# Patient Record
Sex: MALE | Race: WHITE | NOT HISPANIC OR LATINO | Employment: OTHER | URBAN - METROPOLITAN AREA
[De-identification: names, ages, dates, MRNs, and addresses within clinical notes are randomized per-mention and may not be internally consistent; named-entity substitution may affect disease eponyms.]

---

## 2017-06-06 ENCOUNTER — LAB VISIT (OUTPATIENT)
Dept: LAB | Facility: HOSPITAL | Age: 67
End: 2017-06-06
Attending: FAMILY MEDICINE
Payer: MEDICARE

## 2017-06-06 ENCOUNTER — OFFICE VISIT (OUTPATIENT)
Dept: FAMILY MEDICINE | Facility: CLINIC | Age: 67
End: 2017-06-06
Payer: MEDICARE

## 2017-06-06 VITALS
SYSTOLIC BLOOD PRESSURE: 110 MMHG | WEIGHT: 235.88 LBS | BODY MASS INDEX: 33.77 KG/M2 | HEIGHT: 70 IN | DIASTOLIC BLOOD PRESSURE: 68 MMHG | TEMPERATURE: 98 F | HEART RATE: 70 BPM

## 2017-06-06 DIAGNOSIS — R53.83 FATIGUE, UNSPECIFIED TYPE: ICD-10-CM

## 2017-06-06 DIAGNOSIS — M19.90 OSTEOARTHRITIS, UNSPECIFIED OSTEOARTHRITIS TYPE, UNSPECIFIED SITE: ICD-10-CM

## 2017-06-06 DIAGNOSIS — N40.0 BENIGN PROSTATIC HYPERPLASIA, PRESENCE OF LOWER URINARY TRACT SYMPTOMS UNSPECIFIED, UNSPECIFIED MORPHOLOGY: ICD-10-CM

## 2017-06-06 DIAGNOSIS — R53.83 FATIGUE, UNSPECIFIED TYPE: Primary | ICD-10-CM

## 2017-06-06 DIAGNOSIS — R97.20 ELEVATED PSA: ICD-10-CM

## 2017-06-06 DIAGNOSIS — R07.9 CHEST PAIN, UNSPECIFIED TYPE: ICD-10-CM

## 2017-06-06 LAB
ALBUMIN SERPL BCP-MCNC: 3.8 G/DL
ALP SERPL-CCNC: 66 U/L
ALT SERPL W/O P-5'-P-CCNC: 15 U/L
ANION GAP SERPL CALC-SCNC: 8 MMOL/L
AST SERPL-CCNC: 23 U/L
BASOPHILS # BLD AUTO: 0.06 K/UL
BASOPHILS NFR BLD: 0.8 %
BILIRUB SERPL-MCNC: 0.6 MG/DL
BUN SERPL-MCNC: 24 MG/DL
CALCIUM SERPL-MCNC: 9.7 MG/DL
CHLORIDE SERPL-SCNC: 106 MMOL/L
CO2 SERPL-SCNC: 26 MMOL/L
CREAT SERPL-MCNC: 1.6 MG/DL
CRP SERPL-MCNC: 5.4 MG/L
DIFFERENTIAL METHOD: NORMAL
EOSINOPHIL # BLD AUTO: 0.1 K/UL
EOSINOPHIL NFR BLD: 0.9 %
ERYTHROCYTE [DISTWIDTH] IN BLOOD BY AUTOMATED COUNT: 13.1 %
ERYTHROCYTE [SEDIMENTATION RATE] IN BLOOD BY WESTERGREN METHOD: 8 MM/HR
EST. GFR  (AFRICAN AMERICAN): 50.8 ML/MIN/1.73 M^2
EST. GFR  (NON AFRICAN AMERICAN): 43.9 ML/MIN/1.73 M^2
GLUCOSE SERPL-MCNC: 102 MG/DL
HCT VFR BLD AUTO: 45.6 %
HGB BLD-MCNC: 15.6 G/DL
LYMPHOCYTES # BLD AUTO: 2.2 K/UL
LYMPHOCYTES NFR BLD: 28 %
MCH RBC QN AUTO: 30.8 PG
MCHC RBC AUTO-ENTMCNC: 34.2 %
MCV RBC AUTO: 90 FL
MONOCYTES # BLD AUTO: 0.6 K/UL
MONOCYTES NFR BLD: 7.9 %
NEUTROPHILS # BLD AUTO: 4.9 K/UL
NEUTROPHILS NFR BLD: 62.1 %
PLATELET # BLD AUTO: 175 K/UL
PMV BLD AUTO: 9.4 FL
POTASSIUM SERPL-SCNC: 4.7 MMOL/L
PROT SERPL-MCNC: 7.6 G/DL
RBC # BLD AUTO: 5.06 M/UL
SODIUM SERPL-SCNC: 140 MMOL/L
TSH SERPL DL<=0.005 MIU/L-ACNC: 1.18 UIU/ML
WBC # BLD AUTO: 7.85 K/UL

## 2017-06-06 PROCEDURE — 80053 COMPREHEN METABOLIC PANEL: CPT

## 2017-06-06 PROCEDURE — 86140 C-REACTIVE PROTEIN: CPT

## 2017-06-06 PROCEDURE — 99999 PR PBB SHADOW E&M-EST. PATIENT-LVL II: CPT | Mod: PBBFAC,,, | Performed by: FAMILY MEDICINE

## 2017-06-06 PROCEDURE — 85025 COMPLETE CBC W/AUTO DIFF WBC: CPT

## 2017-06-06 PROCEDURE — 85651 RBC SED RATE NONAUTOMATED: CPT | Mod: PO

## 2017-06-06 PROCEDURE — 36415 COLL VENOUS BLD VENIPUNCTURE: CPT | Mod: PO

## 2017-06-06 PROCEDURE — 99214 OFFICE O/P EST MOD 30 MIN: CPT | Mod: S$GLB,,, | Performed by: FAMILY MEDICINE

## 2017-06-06 PROCEDURE — 84443 ASSAY THYROID STIM HORMONE: CPT

## 2017-06-06 PROCEDURE — 1159F MED LIST DOCD IN RCRD: CPT | Mod: S$GLB,,, | Performed by: FAMILY MEDICINE

## 2017-06-06 PROCEDURE — 86431 RHEUMATOID FACTOR QUANT: CPT

## 2017-06-06 NOTE — PROGRESS NOTES
The patient presents today co sig drop in energy p several months. No dyspnea but occasionally SS CP. Co mod gen arthralgia and sleeps well per wife    Past Medical History:  Past Medical History:   Diagnosis Date    Benign prostate hyperplasia     DDD (degenerative disc disease), thoracolumbar     Hypertension      Past Surgical History:   Procedure Laterality Date    EYE SURGERY      TESTICLE SURGERY       Review of patient's allergies indicates:   Allergen Reactions    Benadryl allergy decongestant      Opposite reaction, speeds him up     Diphenhydramine      Current Outpatient Prescriptions on File Prior to Visit   Medication Sig Dispense Refill    azelastine (ASTELIN) 137 mcg (0.1 %) nasal spray 1 spray (137 mcg total) by Nasal route 2 (two) times daily. 30 mL 11    dutasteride (AVODART) 0.5 mg capsule Take 0.5 mg by mouth once daily.  3    latanoprost 0.005 % ophthalmic solution PUT ONE DROP IN THE RIGHT EYE AT BEDTIME  6    meclizine (ANTIVERT) 25 mg tablet Take 1 tablet (25 mg total) by mouth 3 (three) times daily as needed. 30 tablet 1    ondansetron (ZOFRAN-ODT) 8 MG TbDL Take 1 tablet (8 mg total) by mouth every 8 (eight) hours as needed. 20 tablet 1     No current facility-administered medications on file prior to visit.      Social History     Social History    Marital status:      Spouse name: N/A    Number of children: N/A    Years of education: N/A     Occupational History    Not on file.     Social History Main Topics    Smoking status: Never Smoker    Smokeless tobacco: Never Used    Alcohol use Not on file    Drug use: No    Sexual activity: Not on file     Other Topics Concern    Not on file     Social History Narrative    No narrative on file     History reviewed. No pertinent family history.      ROS:GENERAL: No fever, chills, fatigability or weight loss.  SKIN: No rashes, itching or changes in color or texture of skin.  HEAD: No headaches or recent head  trauma.EYES: Visual acuity fine. No photophobia, ocular pain or diplopia.EARS: Denies ear pain, discharge or vertigo.NOSE: No loss of smell, no epistaxis or postnasal drip.MOUTH & THROAT: No hoarseness or change in voice. No excessive gum bleeding.NODES: Denies swollen glands.  CHEST: Denies ALVARADO, cyanosis, wheezing, cough and sputum production.  CARDIOVASCULAR: Denies chest pain, PND, orthopnea or reduced exercise tolerance.  ABDOMEN: Appetite fine. No weight loss. Denies diarrhea, abdominal pain, hematemesis or blood in stool.  URINARY: No flank pain, dysuria or hematuria.  PERIPHERAL VASCULAR: No claudication or cyanosis.  MUSCULOSKELETAL: See above.  NEUROLOGIC: No history of seizures, paralysis, alteration of gait or coordination.  PE:   HEAD: Normocephalic, atraumatic.EYES: PERRL. EOMI.   EARS: TM's intact. Light reflex normal. No retraction or perforation.   NOSE: Mucosa pink. Airway clear.MOUTH & THROAT: No tonsillar enlargement. No pharyngeal erythema or exudate. No stridor.  NODES: No cervical, axillary or inguinal lymph node enlargement.  CHEST: Lungs clear to auscultation.  CARDIOVASCULAR: Normal S1, S2. No rubs, murmurs or gallops.  ABDOMEN: Bowel sounds normal. Not distended. Soft. No tenderness or masses.  MUSCULOSKELETAL: Mod gen degen changes   NEUROLOGIC: Cranial Nerves: II-XII grossly intact.  Motor: 5/5 strength major flexors/extensors.  DTR's: Knees, Ankles 2+ and equal bilaterally; downgoing toes.  Sensory: Intact to light touch distally.  Gait & Posture: Normal gait and fine motion. No cerebellar signs.     Impression:Fatigue  Polyarthritis  El Cr  El PSA  BPH w LUTS   Plan:Lab eval  Rec diet and ex recs  Rev rec against nsaids   Consider Gabapentin hs   Rev if above nl rec Card con   fu

## 2017-06-07 LAB — RHEUMATOID FACT SERPL-ACNC: <10 IU/ML

## 2017-06-12 ENCOUNTER — TELEPHONE (OUTPATIENT)
Dept: FAMILY MEDICINE | Facility: CLINIC | Age: 67
End: 2017-06-12

## 2017-06-12 NOTE — TELEPHONE ENCOUNTER
----- Message from Lidia Orantes sent at 6/12/2017  3:05 PM CDT -----  Pt wife(Lina) at [phone]//[facility] is calling to get results of lab work done//please call//thanks/[facility]

## 2017-06-13 ENCOUNTER — TELEPHONE (OUTPATIENT)
Dept: FAMILY MEDICINE | Facility: CLINIC | Age: 67
End: 2017-06-13

## 2017-06-13 DIAGNOSIS — N28.9 FUNCTION KIDNEY DECREASED: Primary | ICD-10-CM

## 2017-07-11 ENCOUNTER — TELEPHONE (OUTPATIENT)
Dept: FAMILY MEDICINE | Facility: CLINIC | Age: 67
End: 2017-07-11

## 2017-07-11 DIAGNOSIS — R97.20 ELEVATED PSA: Primary | ICD-10-CM

## 2017-07-11 NOTE — TELEPHONE ENCOUNTER
----- Message from Guillermina Sullivan sent at 7/11/2017  3:39 PM CDT -----  Contact: Lina Jeffery  States he's schedule for lab work on Thursday and he would like to know if we can include a PSA test. Please call Lina Jeffery at [phone]. Thank you

## 2017-07-11 NOTE — TELEPHONE ENCOUNTER
Richie medicare-doesn't cover screening PSA wo waiver   Can either get waiver or order free and totsl for a diagnostic reason like elevated PSA in his case  not screening

## 2017-07-13 ENCOUNTER — LAB VISIT (OUTPATIENT)
Dept: LAB | Facility: HOSPITAL | Age: 67
End: 2017-07-13
Attending: FAMILY MEDICINE
Payer: MEDICARE

## 2017-07-13 DIAGNOSIS — R97.20 ELEVATED PSA: ICD-10-CM

## 2017-07-13 DIAGNOSIS — N28.9 FUNCTION KIDNEY DECREASED: ICD-10-CM

## 2017-07-13 LAB
ANION GAP SERPL CALC-SCNC: 9 MMOL/L
BUN SERPL-MCNC: 25 MG/DL
CALCIUM SERPL-MCNC: 9.6 MG/DL
CHLORIDE SERPL-SCNC: 105 MMOL/L
CO2 SERPL-SCNC: 23 MMOL/L
CREAT SERPL-MCNC: 1.6 MG/DL
EST. GFR  (AFRICAN AMERICAN): 50.4 ML/MIN/1.73 M^2
EST. GFR  (NON AFRICAN AMERICAN): 43.6 ML/MIN/1.73 M^2
GLUCOSE SERPL-MCNC: 89 MG/DL
POTASSIUM SERPL-SCNC: 4.4 MMOL/L
SODIUM SERPL-SCNC: 137 MMOL/L

## 2017-07-13 PROCEDURE — 36415 COLL VENOUS BLD VENIPUNCTURE: CPT | Mod: PO

## 2017-07-13 PROCEDURE — 84153 ASSAY OF PSA TOTAL: CPT

## 2017-07-13 PROCEDURE — 80048 BASIC METABOLIC PNL TOTAL CA: CPT

## 2017-07-14 LAB
PROSTATE SPECIFIC ANTIGEN, TOTAL: 3.2 NG/ML
PSA FREE MFR SERPL: 24.38 %
PSA FREE SERPL-MCNC: 0.78 NG/ML

## 2017-07-17 ENCOUNTER — TELEPHONE (OUTPATIENT)
Dept: FAMILY MEDICINE | Facility: CLINIC | Age: 67
End: 2017-07-17

## 2017-07-17 DIAGNOSIS — N28.9 DECREASED RENAL FUNCTION: Primary | ICD-10-CM

## 2017-07-17 NOTE — TELEPHONE ENCOUNTER
----- Message from Ritchie Rivero MD sent at 7/16/2017  5:27 PM CDT -----  PSA in nl range 3.2 kidney function the same-sl deminished-did Dr Aly have opinion-if he didn't think it was related to BPH, rec renal con

## 2017-07-20 NOTE — TELEPHONE ENCOUNTER
I have signed for the following orders AND/OR meds.  Please call the patient and ask the patient to schedule the testing AND/OR inform about any medications that were sent.      Orders Placed This Encounter   Procedures    Ambulatory referral to Nephrology     Referral Priority:   Routine     Referral Type:   Consultation     Referral Reason:   Specialty Services Required     Requested Specialty:   Nephrology     Number of Visits Requested:   1

## 2017-07-28 ENCOUNTER — LAB VISIT (OUTPATIENT)
Dept: LAB | Facility: HOSPITAL | Age: 67
End: 2017-07-28
Attending: FAMILY MEDICINE
Payer: MEDICARE

## 2017-07-28 ENCOUNTER — INITIAL CONSULT (OUTPATIENT)
Dept: NEPHROLOGY | Facility: CLINIC | Age: 67
End: 2017-07-28
Payer: MEDICARE

## 2017-07-28 VITALS
DIASTOLIC BLOOD PRESSURE: 73 MMHG | BODY MASS INDEX: 33.55 KG/M2 | WEIGHT: 234.38 LBS | HEART RATE: 62 BPM | SYSTOLIC BLOOD PRESSURE: 134 MMHG | HEIGHT: 70 IN

## 2017-07-28 DIAGNOSIS — N18.30 CKD (CHRONIC KIDNEY DISEASE) STAGE 3, GFR 30-59 ML/MIN: Primary | ICD-10-CM

## 2017-07-28 DIAGNOSIS — R73.9 HYPERGLYCEMIA: ICD-10-CM

## 2017-07-28 DIAGNOSIS — E66.9 NON MORBID OBESITY, UNSPECIFIED OBESITY TYPE: ICD-10-CM

## 2017-07-28 DIAGNOSIS — N18.30 CKD (CHRONIC KIDNEY DISEASE) STAGE 3, GFR 30-59 ML/MIN: ICD-10-CM

## 2017-07-28 LAB
ALBUMIN SERPL BCP-MCNC: 4 G/DL
ANION GAP SERPL CALC-SCNC: 13 MMOL/L
BASOPHILS # BLD AUTO: 0.09 K/UL
BASOPHILS NFR BLD: 1.2 %
BUN SERPL-MCNC: 19 MG/DL
CALCIUM SERPL-MCNC: 9.2 MG/DL
CHLORIDE SERPL-SCNC: 107 MMOL/L
CO2 SERPL-SCNC: 22 MMOL/L
CREAT SERPL-MCNC: 1.5 MG/DL
DIFFERENTIAL METHOD: ABNORMAL
EOSINOPHIL # BLD AUTO: 0.1 K/UL
EOSINOPHIL NFR BLD: 1.2 %
ERYTHROCYTE [DISTWIDTH] IN BLOOD BY AUTOMATED COUNT: 13.2 %
EST. GFR  (AFRICAN AMERICAN): 54.5 ML/MIN/1.73 M^2
EST. GFR  (NON AFRICAN AMERICAN): 47.2 ML/MIN/1.73 M^2
ESTIMATED AVG GLUCOSE: 108 MG/DL
GLUCOSE SERPL-MCNC: 86 MG/DL
HBA1C MFR BLD HPLC: 5.4 %
HCT VFR BLD AUTO: 44 %
HGB BLD-MCNC: 15.5 G/DL
LYMPHOCYTES # BLD AUTO: 1.8 K/UL
LYMPHOCYTES NFR BLD: 25 %
MCH RBC QN AUTO: 30.9 PG
MCHC RBC AUTO-ENTMCNC: 35.2 G/DL
MCV RBC AUTO: 88 FL
MONOCYTES # BLD AUTO: 0.5 K/UL
MONOCYTES NFR BLD: 6.2 %
NEUTROPHILS # BLD AUTO: 4.8 K/UL
NEUTROPHILS NFR BLD: 66.1 %
PHOSPHATE SERPL-MCNC: 3.6 MG/DL
PLATELET # BLD AUTO: 170 K/UL
PMV BLD AUTO: 9.1 FL
POTASSIUM SERPL-SCNC: 4.4 MMOL/L
RBC # BLD AUTO: 5.01 M/UL
SODIUM SERPL-SCNC: 142 MMOL/L
WBC # BLD AUTO: 7.31 K/UL

## 2017-07-28 PROCEDURE — 84165 PROTEIN E-PHORESIS SERUM: CPT | Mod: 26,,, | Performed by: PATHOLOGY

## 2017-07-28 PROCEDURE — 1159F MED LIST DOCD IN RCRD: CPT | Mod: S$GLB,,, | Performed by: INTERNAL MEDICINE

## 2017-07-28 PROCEDURE — 80069 RENAL FUNCTION PANEL: CPT

## 2017-07-28 PROCEDURE — 99204 OFFICE O/P NEW MOD 45 MIN: CPT | Mod: S$GLB,,, | Performed by: INTERNAL MEDICINE

## 2017-07-28 PROCEDURE — 84165 PROTEIN E-PHORESIS SERUM: CPT

## 2017-07-28 PROCEDURE — 83036 HEMOGLOBIN GLYCOSYLATED A1C: CPT

## 2017-07-28 PROCEDURE — 86038 ANTINUCLEAR ANTIBODIES: CPT

## 2017-07-28 PROCEDURE — 1126F AMNT PAIN NOTED NONE PRSNT: CPT | Mod: S$GLB,,, | Performed by: INTERNAL MEDICINE

## 2017-07-28 PROCEDURE — 3078F DIAST BP <80 MM HG: CPT | Mod: S$GLB,,, | Performed by: INTERNAL MEDICINE

## 2017-07-28 PROCEDURE — 87340 HEPATITIS B SURFACE AG IA: CPT

## 2017-07-28 PROCEDURE — 85025 COMPLETE CBC W/AUTO DIFF WBC: CPT

## 2017-07-28 PROCEDURE — 3075F SYST BP GE 130 - 139MM HG: CPT | Mod: S$GLB,,, | Performed by: INTERNAL MEDICINE

## 2017-07-28 PROCEDURE — 86803 HEPATITIS C AB TEST: CPT

## 2017-07-28 PROCEDURE — 99999 PR PBB SHADOW E&M-EST. PATIENT-LVL III: CPT | Mod: PBBFAC,,, | Performed by: INTERNAL MEDICINE

## 2017-07-28 PROCEDURE — 36415 COLL VENOUS BLD VENIPUNCTURE: CPT | Mod: PO

## 2017-07-28 PROCEDURE — 86255 FLUORESCENT ANTIBODY SCREEN: CPT | Mod: 91

## 2017-07-28 NOTE — Clinical Note
August 2, 2017      Rl Kevin MD  [address] [address]           [place] - Nephrology  [address] [address]  Phone: [phone]  Fax: [phone]          Patient: Yasir Jeffery   MR Number: [id number]   YOB: 1950   Date of Visit: 7/28/2017       Dear Dr. Rl Kevin:    Thank you for referring Yasir Jeffery to me for evaluation. Attached you will find relevant portions of my assessment and plan of care.    If you have questions, please do not hesitate to call me. I look forward to following Yasir Jeffery along with you.    Sincerely,    Lavern Marie  CC:  No Recipients    If you would like to receive this communication electronically, please contact [email or web address] or [phone] to request more information on [employer] Link access.    For providers and/or their staff who would like to refer a patient to [facility], please contact us through our one-stop-shop provider referral line, Virginia Quintana, at [phone].    If you feel you have received this communication in error or would no longer like to receive these types of communications, please e-mail [email or web address]

## 2017-07-31 ENCOUNTER — TELEPHONE (OUTPATIENT)
Dept: FAMILY MEDICINE | Facility: CLINIC | Age: 67
End: 2017-07-31

## 2017-07-31 LAB
ALBUMIN SERPL ELPH-MCNC: 4.31 G/DL
ALPHA1 GLOB SERPL ELPH-MCNC: 0.28 G/DL
ALPHA2 GLOB SERPL ELPH-MCNC: 0.65 G/DL
ANA SER QL IF: NORMAL
B-GLOBULIN SERPL ELPH-MCNC: 0.89 G/DL
GAMMA GLOB SERPL ELPH-MCNC: 1.07 G/DL
HBV SURFACE AG SERPL QL IA: NEGATIVE
HCV AB SERPL QL IA: NEGATIVE
PATHOLOGIST INTERPRETATION SPE: NORMAL
PROT SERPL-MCNC: 7.2 G/DL

## 2017-07-31 NOTE — TELEPHONE ENCOUNTER
----- Message from Lidia Orantes sent at 7/31/2017 10:09 AM CDT -----  Pt at  [phone]//states he needs to be scheduled for a kidney ultrasound//they saw Kidney Dr last Friday/7-28-17//please call to discuss//geraldine/judith

## 2017-08-01 ENCOUNTER — HOSPITAL ENCOUNTER (OUTPATIENT)
Dept: RADIOLOGY | Facility: HOSPITAL | Age: 67
Discharge: HOME | End: 2017-08-01
Attending: INTERNAL MEDICINE
Payer: MEDICARE

## 2017-08-01 DIAGNOSIS — N18.30 CKD (CHRONIC KIDNEY DISEASE) STAGE 3, GFR 30-59 ML/MIN: ICD-10-CM

## 2017-08-01 PROCEDURE — 76770 US EXAM ABDO BACK WALL COMP: CPT | Mod: TC,PO

## 2017-08-01 PROCEDURE — 76770 US EXAM ABDO BACK WALL COMP: CPT | Mod: 26,,, | Performed by: RADIOLOGY

## 2017-08-02 LAB
ANCA AB TITR SER IF: NORMAL TITER
P-ANCA TITR SER IF: NORMAL TITER

## 2017-08-04 ENCOUNTER — TELEPHONE (OUTPATIENT)
Dept: NEPHROLOGY | Facility: CLINIC | Age: 67
End: 2017-08-04

## 2017-08-09 PROBLEM — N18.30 CKD (CHRONIC KIDNEY DISEASE) STAGE 3, GFR 30-59 ML/MIN: Status: ACTIVE | Noted: 2017-08-09

## 2017-08-09 PROBLEM — E66.9 NON MORBID OBESITY: Status: ACTIVE | Noted: 2017-08-09

## 2017-08-09 NOTE — PROGRESS NOTES
Subjective:       Patient ID: Yasir Jeffery is a 67 y.o. White male who presents for new evaluation of Chronic Kidney Disease    HPI  Review of Systems    Objective:      Physical Exam    Assessment:       1. CKD (chronic kidney disease) stage 3, GFR 30-59 ml/min    2. Hyperglycemia    3. Non morbid obesity, unspecified obesity type        Plan:             He will undergo further work up with serologies, urine studies and renal u/s

## 2017-08-23 ENCOUNTER — OFFICE VISIT (OUTPATIENT)
Dept: FAMILY MEDICINE | Facility: CLINIC | Age: 67
End: 2017-08-23
Payer: MEDICARE

## 2017-08-23 ENCOUNTER — LAB VISIT (OUTPATIENT)
Dept: LAB | Facility: HOSPITAL | Age: 67
End: 2017-08-23
Attending: FAMILY MEDICINE
Payer: MEDICARE

## 2017-08-23 VITALS
SYSTOLIC BLOOD PRESSURE: 128 MMHG | DIASTOLIC BLOOD PRESSURE: 71 MMHG | HEIGHT: 70 IN | WEIGHT: 236.75 LBS | BODY MASS INDEX: 33.89 KG/M2 | HEART RATE: 72 BPM

## 2017-08-23 DIAGNOSIS — R53.83 FATIGUE, UNSPECIFIED TYPE: ICD-10-CM

## 2017-08-23 DIAGNOSIS — R53.83 FATIGUE, UNSPECIFIED TYPE: Primary | ICD-10-CM

## 2017-08-23 LAB
CRP SERPL-MCNC: 2.9 MG/L
ERYTHROCYTE [SEDIMENTATION RATE] IN BLOOD BY WESTERGREN METHOD: 2 MM/HR
TESTOST SERPL-MCNC: 418 NG/DL

## 2017-08-23 PROCEDURE — 84403 ASSAY OF TOTAL TESTOSTERONE: CPT

## 2017-08-23 PROCEDURE — 3078F DIAST BP <80 MM HG: CPT | Mod: S$GLB,,, | Performed by: FAMILY MEDICINE

## 2017-08-23 PROCEDURE — 86140 C-REACTIVE PROTEIN: CPT

## 2017-08-23 PROCEDURE — 1159F MED LIST DOCD IN RCRD: CPT | Mod: S$GLB,,, | Performed by: FAMILY MEDICINE

## 2017-08-23 PROCEDURE — 3008F BODY MASS INDEX DOCD: CPT | Mod: S$GLB,,, | Performed by: FAMILY MEDICINE

## 2017-08-23 PROCEDURE — 99999 PR PBB SHADOW E&M-EST. PATIENT-LVL II: CPT | Mod: PBBFAC,,, | Performed by: FAMILY MEDICINE

## 2017-08-23 PROCEDURE — 3074F SYST BP LT 130 MM HG: CPT | Mod: S$GLB,,, | Performed by: FAMILY MEDICINE

## 2017-08-23 PROCEDURE — 1126F AMNT PAIN NOTED NONE PRSNT: CPT | Mod: S$GLB,,, | Performed by: FAMILY MEDICINE

## 2017-08-23 PROCEDURE — 36415 COLL VENOUS BLD VENIPUNCTURE: CPT | Mod: PO

## 2017-08-23 PROCEDURE — 85651 RBC SED RATE NONAUTOMATED: CPT | Mod: PO

## 2017-08-23 PROCEDURE — 99215 OFFICE O/P EST HI 40 MIN: CPT | Mod: S$GLB,,, | Performed by: FAMILY MEDICINE

## 2017-08-23 RX ORDER — PREDNISONE 10 MG/1
10 TABLET ORAL DAILY
Qty: 30 TABLET | Refills: 0 | Status: SHIPPED | OUTPATIENT
Start: 2017-08-23 | End: 2017-09-02

## 2017-08-23 NOTE — PROGRESS NOTES
The patient presents today co sig drop in energy p several months ago. Was vigorously active and now often only able to walk a few steps. Recent Renal eval rev .  Co mod gen arthralgia and sleeps well per wife    Past Medical History:  Past Medical History:   Diagnosis Date    Benign prostate hyperplasia     DDD (degenerative disc disease), thoracolumbar     Hypertension      Past Surgical History:   Procedure Laterality Date    EYE SURGERY      TESTICLE SURGERY       Review of patient's allergies indicates:   Allergen Reactions    Benadryl allergy decongestant      Opposite reaction, speeds him up     Diphenhydramine      Current Outpatient Prescriptions on File Prior to Visit   Medication Sig Dispense Refill    latanoprost 0.005 % ophthalmic solution PUT ONE DROP IN THE RIGHT EYE AT BEDTIME  6    ondansetron (ZOFRAN-ODT) 8 MG TbDL Take 1 tablet (8 mg total) by mouth every 8 (eight) hours as needed. 20 tablet 1    [DISCONTINUED] meclizine (ANTIVERT) 25 mg tablet Take 1 tablet (25 mg total) by mouth 3 (three) times daily as needed. 30 tablet 1    [DISCONTINUED] azelastine (ASTELIN) 137 mcg (0.1 %) nasal spray 1 spray (137 mcg total) by Nasal route 2 (two) times daily. 30 mL 11    [DISCONTINUED] dutasteride (AVODART) 0.5 mg capsule Take 0.5 mg by mouth once daily.  3     No current facility-administered medications on file prior to visit.      Social History     Social History    Marital status:      Spouse name: N/A    Number of children: N/A    Years of education: N/A     Occupational History    Not on file.     Social History Main Topics    Smoking status: Never Smoker    Smokeless tobacco: Never Used    Alcohol use Not on file    Drug use: No    Sexual activity: Not on file     Other Topics Concern    Not on file     Social History Narrative    No narrative on file     History reviewed. No pertinent family history.      ROS:GENERAL: No fever, chills, fatigability or weight  loss.  SKIN: No rashes, itching or changes in color or texture of skin.  HEAD: No headaches or recent head trauma.EYES: Visual acuity fine. No photophobia, ocular pain or diplopia.EARS: Denies ear pain, discharge or vertigo.NOSE: No loss of smell, no epistaxis or postnasal drip.MOUTH & THROAT: No hoarseness or change in voice. No excessive gum bleeding.NODES: Denies swollen glands.  CHEST: Denies ALVARADO, cyanosis, wheezing, cough and sputum production.  CARDIOVASCULAR: Denies chest pain, PND, orthopnea or reduced exercise tolerance.  ABDOMEN: Appetite fine. No weight loss. Denies diarrhea, abdominal pain, hematemesis or blood in stool.  URINARY: No flank pain, dysuria or hematuria.  PERIPHERAL VASCULAR: No claudication or cyanosis.  MUSCULOSKELETAL: See above.  NEUROLOGIC: No history of seizures, paralysis, alteration of gait or coordination.  PE:   HEAD: Normocephalic, atraumatic.EYES: PERRL. EOMI.   EARS: TM's intact. Light reflex normal. No retraction or perforation.   NOSE: Mucosa pink. Airway clear.MOUTH & THROAT: No tonsillar enlargement. No pharyngeal erythema or exudate. No stridor.  NODES: No cervical, axillary or inguinal lymph node enlargement.  CHEST: Lungs clear to auscultation.  CARDIOVASCULAR: Normal S1, S2. No rubs, murmurs or gallops.  ABDOMEN: Bowel sounds normal. Not distended. Soft. No tenderness or masses.  MUSCULOSKELETAL: Mod gen degen changes   NEUROLOGIC: Cranial Nerves: II-XII grossly intact.  Motor: 5/5 strength major flexors/extensors.  DTR's: Knees, Ankles 2+ and equal bilaterally; downgoing toes.  Sensory: Intact to light touch distally.  Gait & Posture: Normal gait and fine motion. No cerebellar signs.     Impression:Fatigue suspicious for post viral malaise vs hypoT  Polyarthritis  Hypotestosterone  El Cr  El PSA  BPH w LUTS vs susp for prostate cancer   Plan:Lab eval rec testosterone and if <300 will need  input  Consider Rheum/ID con   Rec diet and ex recs  Consider trial low dose  prednisone        fu

## 2017-09-05 ENCOUNTER — HOSPITAL ENCOUNTER (OUTPATIENT)
Dept: RADIOLOGY | Facility: HOSPITAL | Age: 67
Discharge: HOME | End: 2017-09-05
Attending: NURSE PRACTITIONER
Payer: MEDICARE

## 2017-09-05 ENCOUNTER — OFFICE VISIT (OUTPATIENT)
Dept: FAMILY MEDICINE | Facility: CLINIC | Age: 67
End: 2017-09-05
Payer: MEDICARE

## 2017-09-05 VITALS
WEIGHT: 238.19 LBS | SYSTOLIC BLOOD PRESSURE: 118 MMHG | BODY MASS INDEX: 34.1 KG/M2 | HEIGHT: 70 IN | DIASTOLIC BLOOD PRESSURE: 75 MMHG | HEART RATE: 75 BPM | TEMPERATURE: 99 F

## 2017-09-05 DIAGNOSIS — R07.81 RIB PAIN ON LEFT SIDE: ICD-10-CM

## 2017-09-05 DIAGNOSIS — M51.36 DDD (DEGENERATIVE DISC DISEASE), LUMBAR: ICD-10-CM

## 2017-09-05 DIAGNOSIS — R20.9 DISTURBANCE OF SKIN SENSATION: ICD-10-CM

## 2017-09-05 DIAGNOSIS — M51.34 DDD (DEGENERATIVE DISC DISEASE), THORACIC: ICD-10-CM

## 2017-09-05 DIAGNOSIS — M50.30 DDD (DEGENERATIVE DISC DISEASE), CERVICAL: ICD-10-CM

## 2017-09-05 DIAGNOSIS — N40.0 BENIGN PROSTATIC HYPERPLASIA, PRESENCE OF LOWER URINARY TRACT SYMPTOMS UNSPECIFIED: ICD-10-CM

## 2017-09-05 DIAGNOSIS — R53.82 CHRONIC FATIGUE: ICD-10-CM

## 2017-09-05 DIAGNOSIS — E66.9 NON MORBID OBESITY, UNSPECIFIED OBESITY TYPE: ICD-10-CM

## 2017-09-05 DIAGNOSIS — M19.90 OSTEOARTHRITIS, UNSPECIFIED OSTEOARTHRITIS TYPE, UNSPECIFIED SITE: ICD-10-CM

## 2017-09-05 DIAGNOSIS — N18.30 CKD (CHRONIC KIDNEY DISEASE) STAGE 3, GFR 30-59 ML/MIN: Primary | ICD-10-CM

## 2017-09-05 PROBLEM — M51.369 DDD (DEGENERATIVE DISC DISEASE), LUMBAR: Status: ACTIVE | Noted: 2017-09-05

## 2017-09-05 PROCEDURE — 99499 UNLISTED E&M SERVICE: CPT | Mod: S$GLB,,, | Performed by: NURSE PRACTITIONER

## 2017-09-05 PROCEDURE — 96160 PT-FOCUSED HLTH RISK ASSMT: CPT | Mod: S$GLB,,, | Performed by: NURSE PRACTITIONER

## 2017-09-05 PROCEDURE — 99999 PR PBB SHADOW E&M-EST. PATIENT-LVL III: CPT | Mod: PBBFAC,,, | Performed by: NURSE PRACTITIONER

## 2017-09-05 PROCEDURE — 71100 X-RAY EXAM RIBS UNI 2 VIEWS: CPT | Mod: TC,PO

## 2017-09-05 PROCEDURE — 71100 X-RAY EXAM RIBS UNI 2 VIEWS: CPT | Mod: 26,,, | Performed by: RADIOLOGY

## 2017-09-05 NOTE — PROGRESS NOTES
"Yasir Jeffery presented for a  Medicare AWV and comprehensive Health Risk Assessment today. The following components were reviewed and updated:    · Medical history  · Family History  · Social history  · Allergies and Current Medications  · Health Risk Assessment  · Health Maintenance  · Care Team     ** See Completed Assessments for Annual Wellness Visit within the encounter summary.**       The following assessments were completed:  · Living Situation  · CAGE  · Depression Screening  · Timed Get Up and Go  · Whisper Test  · Cognitive Function Screening  · Nutrition Screening  · ADL Screening  · PAQ Screening    Vitals:    09/05/17 1304   BP: 118/75   BP Location: Left arm   Patient Position: Sitting   BP Method: Medium (Automatic)   Pulse: 75   Temp: 98.5 °F (36.9 °C)   TempSrc: Oral   Weight: 108 kg (238 lb 3.2 oz)   Height: 5' 10" (1.778 m)     Body mass index is 34.18 kg/m².  Physical Exam   Constitutional: He is oriented to person, place, and time. He appears well-developed. No distress.   HENT:   Head: Normocephalic and atraumatic.   Eyes: EOM are normal. Pupils are equal, round, and reactive to light.   Neck: Normal range of motion. Neck supple.   Cardiovascular: Normal rate and regular rhythm.    Pulmonary/Chest: Effort normal and breath sounds normal.   Tenderness over left ribs   Abdominal: Soft. Bowel sounds are normal.   Musculoskeletal: Normal range of motion.   Neurological: He is alert and oriented to person, place, and time.   Skin: Skin is warm and dry. No rash noted.   Psychiatric: He has a normal mood and affect. Thought content normal.   Nursing note and vitals reviewed.        Diagnoses and health risks identified today and associated recommendations/orders:    1. CKD (chronic kidney disease) stage 3, GFR 30-59 ml/min  Stable- continue to monitor, routine labs and follow up with nephrology  - Vitamin B12; Future  - Vitamin D; Future    2. Benign prostatic hyperplasia, presence of lower urinary " tract symptoms unspecified  Stable- routine PSA and follow up    3. Osteoarthritis, unspecified osteoarthritis type, unspecified site  Stable- routine follow up as needed  - Vitamin B12; Future  - Vitamin D; Future    4. Non morbid obesity, unspecified obesity type  Encourage healthy diet with weight loss    5. DDD (degenerative disc disease), lumbar  Stable- routine follow up as needed    6. DDD (degenerative disc disease), thoracic  Stable- routine follow up as needed    7. DDD (degenerative disc disease), cervical  Stable- routine follow up as needed    8. Chronic fatigue    - Ambulatory referral to Endocrinology  - Vitamin B12; Future  - Vitamin D; Future    9. Rib pain on left side    - X-Ray Ribs 2 View Left; Future    10. Disturbance of skin sensation     - Vitamin B12; Future      Provided Yasir with a 5-10 year written screening schedule and personal prevention plan. Recommendations were developed using the USPSTF age appropriate recommendations. Education, counseling, and referrals were provided as needed. After Visit Summary printed and given to patient which includes a list of additional screenings\tests needed.    Return if symptoms worsen or fail to improve, for Routine scheduled appointment.    Joselusi Posada NP

## 2017-10-02 ENCOUNTER — OFFICE VISIT (OUTPATIENT)
Dept: ENDOCRINOLOGY | Facility: CLINIC | Age: 67
End: 2017-10-02
Payer: MEDICARE

## 2017-10-02 VITALS
HEART RATE: 65 BPM | SYSTOLIC BLOOD PRESSURE: 140 MMHG | HEIGHT: 70 IN | DIASTOLIC BLOOD PRESSURE: 70 MMHG | BODY MASS INDEX: 34.61 KG/M2 | WEIGHT: 241.75 LBS

## 2017-10-02 DIAGNOSIS — R53.83 FATIGUE, UNSPECIFIED TYPE: Primary | ICD-10-CM

## 2017-10-02 DIAGNOSIS — R11.0 NAUSEA: ICD-10-CM

## 2017-10-02 PROCEDURE — 1125F AMNT PAIN NOTED PAIN PRSNT: CPT | Mod: S$GLB,,, | Performed by: INTERNAL MEDICINE

## 2017-10-02 PROCEDURE — 1159F MED LIST DOCD IN RCRD: CPT | Mod: S$GLB,,, | Performed by: INTERNAL MEDICINE

## 2017-10-02 PROCEDURE — 99204 OFFICE O/P NEW MOD 45 MIN: CPT | Mod: S$GLB,,, | Performed by: INTERNAL MEDICINE

## 2017-10-02 PROCEDURE — 3078F DIAST BP <80 MM HG: CPT | Mod: S$GLB,,, | Performed by: INTERNAL MEDICINE

## 2017-10-02 PROCEDURE — 3008F BODY MASS INDEX DOCD: CPT | Mod: S$GLB,,, | Performed by: INTERNAL MEDICINE

## 2017-10-02 PROCEDURE — 3077F SYST BP >= 140 MM HG: CPT | Mod: S$GLB,,, | Performed by: INTERNAL MEDICINE

## 2017-10-02 PROCEDURE — 99999 PR PBB SHADOW E&M-EST. PATIENT-LVL III: CPT | Mod: PBBFAC,,, | Performed by: INTERNAL MEDICINE

## 2017-10-02 NOTE — LETTER
October 2, 2017      [place]  [address] [place] [address]           [facility]  [address] [facility] [address] [place] [address]  Phone: [phone]  Fax: [phone]          Patient: Yasir Jeffery   MR Number: [id number]   YOB: 1950   Date of Visit: 10/2/2017       Dear Joseluis Posada:    Thank you for referring Yasir Jeffery to me for evaluation. Attached you will find relevant portions of my assessment and plan of care.    If you have questions, please do not hesitate to call me. I look forward to following Yasir Jeffery along with you.    Sincerely,    Alen Urena, DO    Enclosure  CC:  No Recipients    If you would like to receive this communication electronically, please contact [email or web address] or [phone] to request more information on [employer] Link access.    For providers and/or their staff who would like to refer a patient to [facility], please contact us through our one-stop-shop provider referral line, Virginia Quintana, at [phone].    If you feel you have received this communication in error or would no longer like to receive these types of communications, please e-mail [email or web address]

## 2017-10-02 NOTE — PROGRESS NOTES
"CHIEF COMPLAINT: Fatigue  67 year old being seen as a new patient. States that he is "extremely tired." Started since earlier in the year. Told that he may have post viral malaise syndrome. Occasional snoring. Gets 7-8 hours of sleep. Rarely has nocturia. Taking Barley green powder, fish oil, Spirolina, Vit D, and aloe juice. Was very active before. Has had some nausea.       PAST MEDICAL HISTORY/PAST SURGICAL HISTORY:  Reviewed in EPIC    SOCIAL HISTORY: No T/A    FAMILY HISTORY:  No known thyroid disease    MEDICATIONS/ALLERGIES: The patient's MedCard has been updated and reviewed.      ROS:   Constitutional: No recent significant weight change  Eyes: No recent visual changes  ENT: No dysphagia  Cardiovascular: Denies current anginal symptoms. Has had a normal stress test.   Respiratory: Denies current respiratory difficulty  Gastrointestinal: Denies recent bowel disturbances  GenitoUrinary - No dysuria  Skin: No new skin rash  Neurologic: No focal neurologic complaints  Remainder ROS negative        PE:    GENERAL: Well developed, well nourished.  PSYCH:  appropriate mood and affect  EYES:  PERRL, EOM intact.  ENT: Nares patent, oropharynx clear, mucosa pink,   NECK: Supple, trachea midline, No palpable thyroid nodules.   CHEST: Resp even and unlabored, CTA bilateral.  CARDIAC: RRR, S1, S2 heard, no murmurs, rubs, S3, or S4  ABDOMEN: Soft, non-tender, non-distended;  No organomegaly  VASCULAR:  DP pulses +2/4 bilaterally, no edema  NEURO: Gait steady, CN II-VII grossly intact  SKIN: No areas of breakdown, no acanthosis nigracans.    LABS   Results for DUNIA GUZMAN (MRN [id number]) as of 10/2/2017 12:45   Ref. Range 8/23/2017 11:55   Testosterone, Total Latest Ref Range: 195.0 - 1138.0 ng/dL 418   Results for DUNIA GUZMAN (MRN [id number]) as of 10/2/2017 12:45   Ref. Range 5/5/2016 16:18 6/6/2017 13:30   TSH Latest Ref Range: 0.400 - 4.000 uIU/mL 1.475 1.184   Results for DUNIA GUZMAN (MRN [id number]) as of " 10/2/2017 12:45   Ref. Range 7/28/2017 14:20   WBC Latest Ref Range: 3.90 - 12.70 K/uL 7.31   RBC Latest Ref Range: 4.60 - 6.20 M/uL 5.01   Hemoglobin Latest Ref Range: 14.0 - 18.0 g/dL 15.5   Hematocrit Latest Ref Range: 40.0 - 54.0 % 44.0   MCV Latest Ref Range: 82 - 98 fL 88   MCH Latest Ref Range: 27.0 - 31.0 pg 30.9   MCHC Latest Ref Range: 32.0 - 36.0 g/dL 35.2   RDW Latest Ref Range: 11.5 - 14.5 % 13.2   Platelets Latest Ref Range: 150 - 350 K/uL 170   Results for DUNIA GUZMAN (MRN [id number]) as of 10/2/2017 12:45   Ref. Range 7/28/2017 14:20   Sodium Latest Ref Range: 136 - 145 mmol/L 142   Potassium Latest Ref Range: 3.5 - 5.1 mmol/L 4.4   Chloride Latest Ref Range: 95 - 110 mmol/L 107   CO2 Latest Ref Range: 23 - 29 mmol/L 22 (L)   Anion Gap Latest Ref Range: 8 - 16 mmol/L 13   BUN, Bld Latest Ref Range: 8 - 23 mg/dL 19   Creatinine Latest Ref Range: 0.5 - 1.4 mg/dL 1.5 (H)   eGFR if non African American Latest Ref Range: >60 mL/min/1.73 m^2 47.2 (A)   eGFR if African American Latest Ref Range: >60 mL/min/1.73 m^2 54.5 (A)   Glucose Latest Ref Range: 70 - 110 mg/dL 86   Calcium Latest Ref Range: 8.7 - 10.5 mg/dL 9.2   Phosphorus Latest Ref Range: 2.7 - 4.5 mg/dL 3.6       ASSESSMENT/PLAN:  1. Fatigue- Unclear etiology. WIll do labs as seen below to r/o any endocrine related etiology. Will also refer to sleep medicine as he does snore. R/O MARIN. Discussed that TSH has been normal. Will get FT4 to r/o pituitary source. Testosterone done in the afternoon was WNL, but will repeat 8 AM. Also r/o adrenal insufficiency as he is having some nausea. States that he had a normal stress test.     2. Nausea- see # 1.       FOLLOWUP  8 AM- ACTH, Cortisol, testosterone, TSH, Ft4  Sleep medicine

## 2017-10-03 ENCOUNTER — LAB VISIT (OUTPATIENT)
Dept: LAB | Facility: HOSPITAL | Age: 67
End: 2017-10-03
Attending: FAMILY MEDICINE
Payer: MEDICARE

## 2017-10-03 DIAGNOSIS — R53.83 FATIGUE, UNSPECIFIED TYPE: ICD-10-CM

## 2017-10-03 LAB
T4 FREE SERPL-MCNC: 0.98 NG/DL
TESTOST SERPL-MCNC: 407 NG/DL
TSH SERPL DL<=0.005 MIU/L-ACNC: 1.79 UIU/ML

## 2017-10-03 PROCEDURE — 82024 ASSAY OF ACTH: CPT

## 2017-10-03 PROCEDURE — 84443 ASSAY THYROID STIM HORMONE: CPT

## 2017-10-03 PROCEDURE — 82533 TOTAL CORTISOL: CPT

## 2017-10-03 PROCEDURE — 36415 COLL VENOUS BLD VENIPUNCTURE: CPT | Mod: PO

## 2017-10-03 PROCEDURE — 84439 ASSAY OF FREE THYROXINE: CPT

## 2017-10-03 PROCEDURE — 84403 ASSAY OF TOTAL TESTOSTERONE: CPT

## 2017-10-04 ENCOUNTER — TELEPHONE (OUTPATIENT)
Dept: ENDOCRINOLOGY | Facility: CLINIC | Age: 67
End: 2017-10-04

## 2017-10-04 LAB — CORTIS SERPL-MCNC: 12.4 UG/DL

## 2017-10-04 NOTE — TELEPHONE ENCOUNTER
Let him know that the thyroid, cortisol and testosterone are normal. Will need sleep study as discussed at appointment

## 2017-10-04 NOTE — TELEPHONE ENCOUNTER
Spoke with pt, results given, voiced understanding  Will contact sleep medicine to call pt to set up sleep study

## 2017-10-06 LAB — ACTH PLAS-MCNC: 33 PG/ML

## 2017-10-12 ENCOUNTER — TELEPHONE (OUTPATIENT)
Dept: ENDOCRINOLOGY | Facility: CLINIC | Age: 67
End: 2017-10-12

## 2017-10-12 NOTE — TELEPHONE ENCOUNTER
----- Message from Adilene Velarde MA sent at 10/10/2017  9:29 AM CDT -----  Contact: pt's wife  Dr. Urena ordered sleep study on pt  ----- Message -----  From: Mica Gonzalez  Sent: 10/9/2017   2:01 PM  To: Josefa MARTINEZ Staff    She's calling stating that pt was supposed to be referred to the sleep clinic, please advise [phone]

## 2017-10-25 ENCOUNTER — HOSPITAL ENCOUNTER (OUTPATIENT)
Dept: RADIOLOGY | Facility: HOSPITAL | Age: 67
Discharge: HOME | End: 2017-10-25
Attending: FAMILY MEDICINE
Payer: MEDICARE

## 2017-10-25 ENCOUNTER — OFFICE VISIT (OUTPATIENT)
Dept: FAMILY MEDICINE | Facility: CLINIC | Age: 67
End: 2017-10-25
Payer: MEDICARE

## 2017-10-25 VITALS
SYSTOLIC BLOOD PRESSURE: 137 MMHG | TEMPERATURE: 99 F | WEIGHT: 244.63 LBS | DIASTOLIC BLOOD PRESSURE: 68 MMHG | HEIGHT: 70 IN | HEART RATE: 64 BPM | BODY MASS INDEX: 35.02 KG/M2 | OXYGEN SATURATION: 96 %

## 2017-10-25 DIAGNOSIS — R06.00 DYSPNEA, UNSPECIFIED TYPE: ICD-10-CM

## 2017-10-25 DIAGNOSIS — N18.30 CKD (CHRONIC KIDNEY DISEASE) STAGE 3, GFR 30-59 ML/MIN: ICD-10-CM

## 2017-10-25 DIAGNOSIS — R07.9 CHEST PAIN, UNSPECIFIED TYPE: Primary | ICD-10-CM

## 2017-10-25 PROCEDURE — 93010 ELECTROCARDIOGRAM REPORT: CPT | Mod: S$GLB,,, | Performed by: INTERNAL MEDICINE

## 2017-10-25 PROCEDURE — 71020 XR CHEST PA AND LATERAL: CPT | Mod: 26,,, | Performed by: RADIOLOGY

## 2017-10-25 PROCEDURE — 99214 OFFICE O/P EST MOD 30 MIN: CPT | Mod: S$GLB,,, | Performed by: FAMILY MEDICINE

## 2017-10-25 PROCEDURE — 93005 ELECTROCARDIOGRAM TRACING: CPT | Mod: S$GLB,,, | Performed by: FAMILY MEDICINE

## 2017-10-25 PROCEDURE — 99999 PR PBB SHADOW E&M-EST. PATIENT-LVL III: CPT | Mod: PBBFAC,,, | Performed by: FAMILY MEDICINE

## 2017-10-25 PROCEDURE — 71020 XR CHEST PA AND LATERAL: CPT | Mod: TC,PO

## 2017-10-25 RX ORDER — PNEUMOCOCCAL VACCINE POLYVALENT 25; 25; 25; 25; 25; 25; 25; 25; 25; 25; 25; 25; 25; 25; 25; 25; 25; 25; 25; 25; 25; 25; 25 UG/.5ML; UG/.5ML; UG/.5ML; UG/.5ML; UG/.5ML; UG/.5ML; UG/.5ML; UG/.5ML; UG/.5ML; UG/.5ML; UG/.5ML; UG/.5ML; UG/.5ML; UG/.5ML; UG/.5ML; UG/.5ML; UG/.5ML; UG/.5ML; UG/.5ML; UG/.5ML; UG/.5ML; UG/.5ML; UG/.5ML
INJECTION, SOLUTION INTRAMUSCULAR; SUBCUTANEOUS
COMMUNITY
Start: 2017-09-07 | End: 2017-12-08

## 2017-10-25 RX ORDER — DUTASTERIDE 0.5 MG/1
0.5 CAPSULE, LIQUID FILLED ORAL DAILY
COMMUNITY
Start: 2017-10-02 | End: 2019-04-04

## 2017-10-25 NOTE — PROGRESS NOTES
The patient presents today co persistent energy p several months ago. Was vigorously active and now often only able to walk a few steps wo sig ALVARADO and chest pressure , resolves at rest . Recent Renal eval and Endo marr neg rev .  At rest symptoms zurdo and sleeps well per wife but feels sleepy during the day.   Past Medical History:  Past Medical History:   Diagnosis Date    Back pain     Benign prostate hyperplasia     DDD (degenerative disc disease), thoracolumbar     Disorder of kidney and ureter     stage 3    Glaucoma     Hypertension     Obesity     Pneumonia      Past Surgical History:   Procedure Laterality Date    EYE SURGERY      TESTICLE SURGERY       Review of patient's allergies indicates:   Allergen Reactions    Benadryl allergy decongestant      Opposite reaction, speeds him up     Diphenhydramine      Current Outpatient Prescriptions on File Prior to Visit   Medication Sig Dispense Refill    latanoprost 0.005 % ophthalmic solution PUT ONE DROP IN THE RIGHT EYE AT BEDTIME  6    ondansetron (ZOFRAN-ODT) 8 MG TbDL Take 1 tablet (8 mg total) by mouth every 8 (eight) hours as needed. 20 tablet 1     No current facility-administered medications on file prior to visit.      Social History     Social History    Marital status:      Spouse name: N/A    Number of children: N/A    Years of education: N/A     Occupational History    Not on file.     Social History Main Topics    Smoking status: Never Smoker    Smokeless tobacco: Never Used    Alcohol use Not on file    Drug use: No    Sexual activity: Not on file     Other Topics Concern    Not on file     Social History Narrative    No narrative on file     History reviewed. No pertinent family history.      ROS:GENERAL: No fever, chills, fatigability or weight loss.  SKIN: No rashes, itching or changes in color or texture of skin.  HEAD: No headaches or recent head trauma.EYES: Visual acuity fine. No photophobia, ocular pain or  diplopia.EARS: Denies ear pain, discharge or vertigo.NOSE: No loss of smell, no epistaxis or postnasal drip.MOUTH & THROAT: No hoarseness or change in voice. No excessive gum bleeding.NODES: Denies swollen glands.  CHEST: Denies ALVARADO, cyanosis, wheezing, cough and sputum production.  CARDIOVASCULAR: Denies chest pain, PND, orthopnea or reduced exercise tolerance.  ABDOMEN: Appetite fine. No weight loss. Denies diarrhea, abdominal pain, hematemesis or blood in stool.  URINARY: No flank pain, dysuria or hematuria.  PERIPHERAL VASCULAR: No claudication or cyanosis.  MUSCULOSKELETAL: See above.  NEUROLOGIC: No history of seizures, paralysis, alteration of gait or coordination.  PE:   HEAD: Normocephalic, atraumatic.EYES: PERRL. EOMI.   EARS: TM's intact. Light reflex normal. No retraction or perforation.   NOSE: Mucosa pink. Airway clear.MOUTH & THROAT: No tonsillar enlargement. No pharyngeal erythema or exudate. No stridor.  NODES: No cervical, axillary or inguinal lymph node enlargement.  CHEST: Lungs clear to auscultation.  CARDIOVASCULAR: Normal S1, S2. No rubs, murmurs or gallops.  ABDOMEN: Bowel sounds normal. Not distended. Soft. No tenderness or masses.  MUSCULOSKELETAL: Mod gen degen changes   NEUROLOGIC: Cranial Nerves: II-XII grossly intact.  Motor: 5/5 strength major flexors/extensors.  DTR's: Knees, Ankles 2+ and equal bilaterally; downgoing toes.  Sensory: Intact to light touch distally.  Gait & Posture: Normal gait and fine motion. No cerebellar signs.     Impression:ALVARADO  Exertional CP   Fatigue   Polyarthritis  Hypotestosterone  El Cr  El PSA  BPH w LUTS vs susp for prostate cancer   Plan:Lab rev  EKG  CXR  Rec diet and ex recs  Card consult

## 2017-10-26 ENCOUNTER — LAB VISIT (OUTPATIENT)
Dept: LAB | Facility: HOSPITAL | Age: 67
End: 2017-10-26
Attending: NURSE PRACTITIONER
Payer: MEDICARE

## 2017-10-26 ENCOUNTER — OFFICE VISIT (OUTPATIENT)
Dept: CARDIOLOGY | Facility: CLINIC | Age: 67
End: 2017-10-26
Payer: MEDICARE

## 2017-10-26 VITALS
WEIGHT: 242.94 LBS | HEART RATE: 54 BPM | HEIGHT: 70 IN | SYSTOLIC BLOOD PRESSURE: 139 MMHG | DIASTOLIC BLOOD PRESSURE: 67 MMHG | BODY MASS INDEX: 34.78 KG/M2

## 2017-10-26 DIAGNOSIS — E78.49 OTHER HYPERLIPIDEMIA: Primary | ICD-10-CM

## 2017-10-26 DIAGNOSIS — N18.30 STAGE 3 CHRONIC KIDNEY DISEASE: ICD-10-CM

## 2017-10-26 DIAGNOSIS — N18.30 CKD (CHRONIC KIDNEY DISEASE) STAGE 3, GFR 30-59 ML/MIN: ICD-10-CM

## 2017-10-26 DIAGNOSIS — E78.2 MIXED HYPERLIPIDEMIA: ICD-10-CM

## 2017-10-26 DIAGNOSIS — R07.9 ACUTE CHEST PAIN: ICD-10-CM

## 2017-10-26 DIAGNOSIS — R07.9 ACUTE CHEST PAIN: Primary | ICD-10-CM

## 2017-10-26 DIAGNOSIS — R06.09 DOE (DYSPNEA ON EXERTION): ICD-10-CM

## 2017-10-26 LAB
ALT SERPL W/O P-5'-P-CCNC: 23 U/L
ANION GAP SERPL CALC-SCNC: 8 MMOL/L
BNP SERPL-MCNC: 25 PG/ML
BUN SERPL-MCNC: 23 MG/DL
CALCIUM SERPL-MCNC: 9.6 MG/DL
CHLORIDE SERPL-SCNC: 107 MMOL/L
CHOLEST SERPL-MCNC: 249 MG/DL
CHOLEST/HDLC SERPL: 6.6 {RATIO}
CO2 SERPL-SCNC: 25 MMOL/L
CREAT SERPL-MCNC: 1.7 MG/DL
EST. GFR  (AFRICAN AMERICAN): 46.9 ML/MIN/1.73 M^2
EST. GFR  (NON AFRICAN AMERICAN): 40.5 ML/MIN/1.73 M^2
GLUCOSE SERPL-MCNC: 100 MG/DL
HDLC SERPL-MCNC: 38 MG/DL
HDLC SERPL: 15.3 %
INSULIN COLLECTION INTERVAL: NORMAL
INSULIN SERPL-ACNC: 8.9 UU/ML
LDLC SERPL CALC-MCNC: 180.2 MG/DL
NONHDLC SERPL-MCNC: 211 MG/DL
POTASSIUM SERPL-SCNC: 5.1 MMOL/L
SODIUM SERPL-SCNC: 140 MMOL/L
TRIGL SERPL-MCNC: 154 MG/DL

## 2017-10-26 PROCEDURE — 80048 BASIC METABOLIC PNL TOTAL CA: CPT

## 2017-10-26 PROCEDURE — 80061 LIPID PANEL: CPT

## 2017-10-26 PROCEDURE — 83880 ASSAY OF NATRIURETIC PEPTIDE: CPT

## 2017-10-26 PROCEDURE — 99499 UNLISTED E&M SERVICE: CPT | Mod: S$GLB,,, | Performed by: NURSE PRACTITIONER

## 2017-10-26 PROCEDURE — 84460 ALANINE AMINO (ALT) (SGPT): CPT

## 2017-10-26 PROCEDURE — 83525 ASSAY OF INSULIN: CPT

## 2017-10-26 PROCEDURE — 99203 OFFICE O/P NEW LOW 30 MIN: CPT | Mod: S$GLB,,, | Performed by: NURSE PRACTITIONER

## 2017-10-26 PROCEDURE — 99999 PR PBB SHADOW E&M-EST. PATIENT-LVL III: CPT | Mod: PBBFAC,,, | Performed by: NURSE PRACTITIONER

## 2017-10-26 NOTE — LETTER
October 26, 2017      Ritchie Rivero MD  [address] [facility] [address]           [facility]  [address] [facility] [address]  Phone: [phone]  Fax: [phone]          Patient: Yasir Jeffery   MR Number: [id number]   YOB: 1950   Date of Visit: 10/26/2017       Dear Dr. Ritchie Rivero:    Thank you for referring Yasir Jeffery to me for evaluation. Attached you will find relevant portions of my assessment and plan of care.    If you have questions, please do not hesitate to call me. I look forward to following Yasir Jeffery along with you.    Sincerely,    Ani Srivastava NP    Enclosure  CC:  No Recipients    If you would like to receive this communication electronically, please contact [email or web address] or [phone] to request more information on [employer] Link access.    For providers and/or their staff who would like to refer a patient to [facility], please contact us through our one-stop-shop provider referral line, [facility], at [phone].    If you feel you have received this communication in error or would no longer like to receive these types of communications, please e-mail [email or web address]

## 2017-10-26 NOTE — Clinical Note
I saw Mr. Jeffery in cardiology clinic today. His symptoms are suspicious for heart disease. We are getting a stress test, echo, and labs.

## 2017-10-26 NOTE — PROGRESS NOTES
Subjective:    Patient ID:  Yasir Jeffery is a 67 y.o. male who presents for evaluation of Consult; Chest Pain; and Shortness of Breath      HPI: . Yasir Jeffery presents to the clinic with his wife for evaluation of chest pain and ALVARADO. he stated that he has ALVARADO with very little activity that is accompanied with non-radiating pressure in the center of his chest. He stated that he must stop and rest. The symptoms will resolve with rest, but return with activity. He also complains of severe fatigue with very little activity. If he takes a shower, he is exhausted and feels the need for a nap. He denied any N/V. However, he does have diaphoresis which may or may not accompany chest pain and SOB. These symptoms have been going on since May of this year. Prior to this, he has been very active from the time he awoke until bedtime. He reports that he has gained weight during this time period; he thinks it is due to lack of activity. The weight gain has been in the abdominal area predominantly. He has gone up two sizes in his clothes in the last 6 months.  Reviewed chest x-ray done yesterday: no cardiomegaly; no effusion or consolidation.    Medications: he is not taking any CV medications.  Sodium: he does not add salt to foods,  he is not reading labels for sodium content.   Dietary Fats: He does love meat: skaggs, sausage, steak and he eats some fried foods. He generally cooks his own meals from scratch.  Exercise: he does not; he is limited by ALVARADO and chest pain  Tobacco:The patient denies current or previous tobacco use. no alcohol use     Weight: 110.2 kg (242 lb 15.2 oz) he states that his daily weights increased  Wt Readings from Last 3 Encounters:   10/26/17 110.2 kg (242 lb 15.2 oz)   10/25/17 110.9 kg (244 lb 9.6 oz)   10/02/17 109.6 kg (241 lb 11.8 oz)     BP log: none.    Review of Systems   Constitution: Positive for diaphoresis, malaise/fatigue and weight gain. Negative for chills, decreased appetite,  fever, night sweats and weight loss.   HENT: Negative for congestion.         Chronic sinus problems   Cardiovascular: Positive for chest pain, dyspnea on exertion and palpitations. Negative for claudication, cyanosis, irregular heartbeat, leg swelling, near-syncope, orthopnea, paroxysmal nocturnal dyspnea and syncope.        Notices edema to right ankle when standing for long periods of time.   Respiratory: Positive for snoring. Negative for cough, hemoptysis, shortness of breath, sputum production and wheezing.    Hematologic/Lymphatic: Negative for adenopathy and bleeding problem. Does not bruise/bleed easily.   Skin: Negative for color change and nail changes.   Gastrointestinal: Positive for bloating. Negative for abdominal pain, change in bowel habit, heartburn, hematochezia, melena, nausea and vomiting.   Genitourinary: Negative for hematuria.   Neurological: Negative for dizziness and light-headedness.   Psychiatric/Behavioral: Negative for altered mental status.       Objective:   Physical Exam   Constitutional: He is oriented to person, place, and time. He appears well-developed and well-nourished. No distress.   HENT:   Head: Normocephalic and atraumatic.   Eyes: Conjunctivae are normal. No scleral icterus.   Neck: Neck supple. No JVD present. No tracheal deviation present. No thyromegaly present.   Cardiovascular: Normal rate, regular rhythm, normal heart sounds and intact distal pulses.  Exam reveals no gallop and no friction rub.    No murmur heard.  Pulmonary/Chest: Effort normal and breath sounds normal. No respiratory distress. He has no wheezes. He has no rales. He exhibits no tenderness.   Abdominal: Soft. Bowel sounds are normal. He exhibits no distension and no mass. There is no tenderness. There is no rebound and no guarding.   Musculoskeletal: Normal range of motion. He exhibits no edema.   Lymphadenopathy:     He has no cervical adenopathy.   Neurological: He is alert and oriented to person,  place, and time.   Skin: Skin is warm and dry. No rash noted. He is not diaphoretic. No erythema. No pallor.   Pink   Psychiatric: He has a normal mood and affect.   Results for DUNIA GUZMAN (MRN [id number]) as of 10/26/2017 08:46   Ref. Range 5/5/2016 16:18   Sodium Latest Ref Range: 136 - 145 mmol/L 141   Potassium Latest Ref Range: 3.5 - 5.1 mmol/L 5.3 (H)   Chloride Latest Ref Range: 95 - 110 mmol/L 108   CO2 Latest Ref Range: 23 - 29 mmol/L 25   Anion Gap Latest Ref Range: 8 - 16 mmol/L 8   BUN, Bld Latest Ref Range: 8 - 23 mg/dL 20   Creatinine Latest Ref Range: 0.5 - 1.4 mg/dL 1.4   eGFR if non African American Latest Ref Range: >60 mL/min/1.73 m^2 52.0 (A)   eGFR if African American Latest Ref Range: >60 mL/min/1.73 m^2 >60.0   Glucose Latest Ref Range: 70 - 110 mg/dL 77   Calcium Latest Ref Range: 8.7 - 10.5 mg/dL 9.5   Alkaline Phosphatase Latest Ref Range: 55 - 135 U/L 67   Total Protein Latest Ref Range: 6.0 - 8.4 g/dL 7.5   Albumin Latest Ref Range: 3.5 - 5.2 g/dL 3.9   Total Bilirubin Latest Ref Range: 0.1 - 1.0 mg/dL 0.6   AST Latest Ref Range: 10 - 40 U/L 24   ALT Latest Ref Range: 10 - 44 U/L 18   Triglycerides Latest Ref Range: 30 - 150 mg/dL 218 (H)   Cholesterol Latest Ref Range: 120 - 199 mg/dL 249 (H)   HDL Latest Ref Range: 40 - 75 mg/dL 36 (L)   LDL Cholesterol Latest Ref Range: 63.0 - 159.0 mg/dL 169.4 (H)   Total Cholesterol/HDL Ratio Latest Ref Range: 2.0 - 5.0  6.9 (H)   Results for DUNIA GUZMAN (MRN [id number]) as of 10/26/2017 08:46   Ref. Range 5/5/2016 16:18   Non-HDL Cholesterol Latest Units: mg/dL 213      Results for DUNIA GUZMAN (MRN [id number]) as of 10/26/2017 08:46   Ref. Range 7/28/2017 14:20   Sodium Latest Ref Range: 136 - 145 mmol/L 142   Potassium Latest Ref Range: 3.5 - 5.1 mmol/L 4.4   Chloride Latest Ref Range: 95 - 110 mmol/L 107   CO2 Latest Ref Range: 23 - 29 mmol/L 22 (L)   Anion Gap Latest Ref Range: 8 - 16 mmol/L 13   BUN, Bld Latest Ref Range: 8 - 23 mg/dL  19   Creatinine Latest Ref Range: 0.5 - 1.4 mg/dL 1.5 (H)   eGFR if non African American Latest Ref Range: >60 mL/min/1.73 m^2 47.2 (A)   eGFR if African American Latest Ref Range: >60 mL/min/1.73 m^2 54.5 (A)   Glucose Latest Ref Range: 70 - 110 mg/dL 86   Calcium Latest Ref Range: 8.7 - 10.5 mg/dL 9.2   Phosphorus Latest Ref Range: 2.7 - 4.5 mg/dL 3.6   Protein, Serum Latest Ref Range: 6.0 - 8.4 g/dL 7.2   Albumin Latest Ref Range: 3.5 - 5.2 g/dL 4.0   Hemoglobin A1C Latest Ref Range: 4.0 - 5.6 % 5.4   Estimated Avg Glucose Latest Ref Range: 68 - 131 mg/dL 108     Assessment:      1. Acute chest pain    2. ALVARADO (dyspnea on exertion)    3. Mixed hyperlipidemia    4. BMI 34.0-34.9,adult    5. CKD (chronic kidney disease) stage 3, GFR 30-59 ml/min        Plan:   EKG done yesterday: SB with sinus arrhythmia at 59 BPM. Possible Q waves in leads 2 and 3.    He cannot walk on treadmill because of symptoms. Will get lexiscan and complete echo.  Labs: BMP, ALT, FLP, fasting insulin, BNP.  Follow up after testing complete.

## 2017-11-02 ENCOUNTER — CLINICAL SUPPORT (OUTPATIENT)
Dept: CARDIOLOGY | Facility: CLINIC | Age: 67
End: 2017-11-02
Payer: MEDICARE

## 2017-11-02 DIAGNOSIS — N18.30 STAGE 3 CHRONIC KIDNEY DISEASE: ICD-10-CM

## 2017-11-02 DIAGNOSIS — E78.49 OTHER HYPERLIPIDEMIA: ICD-10-CM

## 2017-11-02 LAB
DIASTOLIC DYSFUNCTION: NO
ESTIMATED PA SYSTOLIC PRESSURE: 31.3
MITRAL VALVE REGURGITATION: NORMAL
RETIRED EF AND QEF - SEE NOTES: 50 (ref 55–65)

## 2017-11-02 PROCEDURE — 93306 TTE W/DOPPLER COMPLETE: CPT | Mod: S$GLB,,, | Performed by: INTERNAL MEDICINE

## 2017-11-07 ENCOUNTER — TELEPHONE (OUTPATIENT)
Dept: CARDIOLOGY | Facility: CLINIC | Age: 67
End: 2017-11-07

## 2017-11-07 NOTE — TELEPHONE ENCOUNTER
Attempted to reach patient at number listed and no answer.No VM available. Was attempting to go over test instructions for tomorrow.

## 2017-11-08 ENCOUNTER — CLINICAL SUPPORT (OUTPATIENT)
Dept: CARDIOLOGY | Facility: CLINIC | Age: 67
End: 2017-11-08
Payer: MEDICARE

## 2017-11-08 DIAGNOSIS — E78.2 MIXED HYPERLIPIDEMIA: ICD-10-CM

## 2017-11-08 DIAGNOSIS — N18.30 CKD (CHRONIC KIDNEY DISEASE) STAGE 3, GFR 30-59 ML/MIN: ICD-10-CM

## 2017-11-08 DIAGNOSIS — R06.09 DOE (DYSPNEA ON EXERTION): ICD-10-CM

## 2017-11-08 DIAGNOSIS — R07.9 ACUTE CHEST PAIN: ICD-10-CM

## 2017-11-08 PROCEDURE — 93015 CV STRESS TEST SUPVJ I&R: CPT | Mod: S$GLB,,, | Performed by: INTERNAL MEDICINE

## 2017-11-08 PROCEDURE — A9500 TC99M SESTAMIBI: HCPCS | Mod: S$GLB,,, | Performed by: INTERNAL MEDICINE

## 2017-11-08 PROCEDURE — 78452 HT MUSCLE IMAGE SPECT MULT: CPT | Mod: S$GLB,,, | Performed by: INTERNAL MEDICINE

## 2017-11-15 ENCOUNTER — OFFICE VISIT (OUTPATIENT)
Dept: CARDIOLOGY | Facility: CLINIC | Age: 67
End: 2017-11-15
Payer: MEDICARE

## 2017-11-15 VITALS
SYSTOLIC BLOOD PRESSURE: 140 MMHG | WEIGHT: 243 LBS | BODY MASS INDEX: 34.79 KG/M2 | DIASTOLIC BLOOD PRESSURE: 80 MMHG | HEART RATE: 72 BPM | HEIGHT: 70 IN

## 2017-11-15 DIAGNOSIS — E78.5 HYPERLIPIDEMIA, UNSPECIFIED HYPERLIPIDEMIA TYPE: ICD-10-CM

## 2017-11-15 DIAGNOSIS — R06.02 SOB (SHORTNESS OF BREATH): Primary | ICD-10-CM

## 2017-11-15 DIAGNOSIS — R06.02 SOB (SHORTNESS OF BREATH): ICD-10-CM

## 2017-11-15 DIAGNOSIS — Z71.2 ENCOUNTER TO DISCUSS TEST RESULTS: Primary | ICD-10-CM

## 2017-11-15 PROCEDURE — 99213 OFFICE O/P EST LOW 20 MIN: CPT | Mod: S$GLB,,, | Performed by: NURSE PRACTITIONER

## 2017-11-15 PROCEDURE — 99999 PR PBB SHADOW E&M-EST. PATIENT-LVL III: CPT | Mod: PBBFAC,,, | Performed by: NURSE PRACTITIONER

## 2017-11-15 PROCEDURE — 99499 UNLISTED E&M SERVICE: CPT | Mod: S$GLB,,, | Performed by: NURSE PRACTITIONER

## 2017-11-15 RX ORDER — ROSUVASTATIN CALCIUM 10 MG/1
10 TABLET, COATED ORAL DAILY
Qty: 30 TABLET | Refills: 6 | Status: SHIPPED | OUTPATIENT
Start: 2017-11-15 | End: 2018-01-24 | Stop reason: SDDI

## 2017-11-15 NOTE — PROGRESS NOTES
Subjective:    Patient ID:  Yasir Jeffery is a 67 y.o. male who presents for evaluation of Chest Pain (review test results) and Hyperlipidemia      HPI: Mr. Yasir Jeffery presents to the clinic with his wife for test results. he stated that he has ALVARADO with very little activity that is accompanied with non-radiating pressure in the center of his chest. He stated that he must stop and rest. The symptoms will resolve with rest, but return with activity. He also complains of severe fatigue with very little activity. If he takes a shower, he is exhausted and feels the need for a nap. He denied any N/V. However, he does have diaphoresis which may or may not accompany chest pain and SOB. These symptoms have been going on since May of this year. Prior to this, he has been very active from the time he awoke until bedtime. He reports that he has gained weight during this time period; he thinks it is due to lack of activity. The weight gain has been in the abdominal area predominantly. He has gone up two sizes in his clothes in the last 6 months. Stress test normal. Echo with concentric hypertrophy, normal EF. PAP systolic 31mmHg. Labs: hypercholesterolemia.  Reviewed chest x-ray done yesterday: no cardiomegaly; no effusion or consolidation.  Has not had sleep study yet. Waiting for stress test results. 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    Pharm NM stress test 11/8/17: Impression: NORMAL MYOCARDIAL PERFUSION  1. The perfusion scan is free of evidence for myocardial ischemia or injury.   2. There is a mild intensity fixed defect in the inferoapical wall of the left ventricle, secondary to diaphragm attenuation.   3. Resting wall motion is physiologic.   4. Resting LV function is normal.   5. The ventricular volumes are normal at rest and stress.   6. The extracardiac distribution of radioactivity is normal.     Medications: he is not taking any CV  medications.  Sodium: he does not add salt to foods,  he is not reading labels for sodium content.   Dietary Fats: He does love meat: skaggs, sausage, steak and he eats some fried foods. He generally cooks his own meals from scratch, but has been eating convenience foods lately. He does like making veggie smoothies with orange juice, but he hasn't done that recently.  Exercise: he does not; he is limited by ALVARADO and chest pain  Tobacco:smoked one cigar/day for 3 years in his 30s.. None since. no alcohol use     Weight: 110.2 kg (243 lb) he states that his daily weights increased  Wt Readings from Last 3 Encounters:   11/15/17 110.2 kg (243 lb)   10/26/17 110.2 kg (242 lb 15.2 oz)   10/25/17 110.9 kg (244 lb 9.6 oz)     BP log: none.    Review of Systems   Constitution: Positive for diaphoresis, malaise/fatigue and weight gain. Negative for chills, decreased appetite, fever, night sweats and weight loss.   HENT: Negative for congestion.         Chronic sinus problems   Cardiovascular: Positive for chest pain, dyspnea on exertion and palpitations. Negative for claudication, cyanosis, irregular heartbeat, leg swelling, near-syncope, orthopnea, paroxysmal nocturnal dyspnea and syncope.        Notices edema to right ankle when standing for long periods of time.   Respiratory: Positive for snoring. Negative for cough, hemoptysis, shortness of breath, sputum production and wheezing.    Hematologic/Lymphatic: Negative for adenopathy and bleeding problem. Does not bruise/bleed easily.   Skin: Negative for color change and nail changes.   Gastrointestinal: Positive for bloating. Negative for abdominal pain, change in bowel habit, heartburn, hematochezia, melena, nausea and vomiting.   Genitourinary: Negative for hematuria.   Neurological: Negative for dizziness and light-headedness.   Psychiatric/Behavioral: Negative for altered mental status.       Objective:   Physical Exam   Constitutional: He is oriented to person, place,  and time. He appears well-developed and well-nourished. No distress.   HENT:   Head: Normocephalic and atraumatic.   Eyes: Conjunctivae are normal. No scleral icterus.   Neck: Neck supple. No JVD present. No tracheal deviation present. No thyromegaly present.   Cardiovascular: Normal rate, regular rhythm, normal heart sounds and intact distal pulses.  Exam reveals no gallop and no friction rub.    No murmur heard.  Pulmonary/Chest: Effort normal and breath sounds normal. No respiratory distress. He has no wheezes. He has no rales. He exhibits no tenderness.   Abdominal: Soft. Bowel sounds are normal. He exhibits no distension and no mass. There is no tenderness. There is no rebound and no guarding.   Musculoskeletal: Normal range of motion. He exhibits no edema.   Lymphadenopathy:     He has no cervical adenopathy.   Neurological: He is alert and oriented to person, place, and time.   Skin: Skin is warm and dry. No rash noted. He is not diaphoretic. No erythema. No pallor.   Pink   Psychiatric: He has a normal mood and affect.     Results for DUNIA GUZMAN CADEN (MRN [id number]) as of 11/15/2017 10:10   Ref. Range 10/26/2017 10:05   Sodium Latest Ref Range: 136 - 145 mmol/L 140   Potassium Latest Ref Range: 3.5 - 5.1 mmol/L 5.1   Chloride Latest Ref Range: 95 - 110 mmol/L 107   CO2 Latest Ref Range: 23 - 29 mmol/L 25   Anion Gap Latest Ref Range: 8 - 16 mmol/L 8   BUN, Bld Latest Ref Range: 8 - 23 mg/dL 23   Creatinine Latest Ref Range: 0.5 - 1.4 mg/dL 1.7 (H)   eGFR if non African American Latest Ref Range: >60 mL/min/1.73 m^2 40.5 (A)   eGFR if African American Latest Ref Range: >60 mL/min/1.73 m^2 46.9 (A)   Glucose Latest Ref Range: 70 - 110 mg/dL 100   Calcium Latest Ref Range: 8.7 - 10.5 mg/dL 9.6   ALT Latest Ref Range: 10 - 44 U/L 23   Triglycerides Latest Ref Range: 30 - 150 mg/dL 154 (H)   Cholesterol Latest Ref Range: 120 - 199 mg/dL 249 (H)   HDL Latest Ref Range: 40 - 75 mg/dL 38 (L)   LDL Cholesterol Latest  Ref Range: 63.0 - 159.0 mg/dL 180.2 (H)   Total Cholesterol/HDL Ratio Latest Ref Range: 2.0 - 5.0  6.6 (H)   BNP Latest Ref Range: 0 - 99 pg/mL 25   Insulin Latest Ref Range: <25.0 uU/mL 8.9       Results for DUNIA GUZMAN (MRN [id number]) as of 10/26/2017 08:46   Ref. Range 5/5/2016 16:18   Sodium Latest Ref Range: 136 - 145 mmol/L 141   Potassium Latest Ref Range: 3.5 - 5.1 mmol/L 5.3 (H)   Chloride Latest Ref Range: 95 - 110 mmol/L 108   CO2 Latest Ref Range: 23 - 29 mmol/L 25   Anion Gap Latest Ref Range: 8 - 16 mmol/L 8   BUN, Bld Latest Ref Range: 8 - 23 mg/dL 20   Creatinine Latest Ref Range: 0.5 - 1.4 mg/dL 1.4   eGFR if non African American Latest Ref Range: >60 mL/min/1.73 m^2 52.0 (A)   eGFR if African American Latest Ref Range: >60 mL/min/1.73 m^2 >60.0   Glucose Latest Ref Range: 70 - 110 mg/dL 77   Calcium Latest Ref Range: 8.7 - 10.5 mg/dL 9.5   Alkaline Phosphatase Latest Ref Range: 55 - 135 U/L 67   Total Protein Latest Ref Range: 6.0 - 8.4 g/dL 7.5   Albumin Latest Ref Range: 3.5 - 5.2 g/dL 3.9   Total Bilirubin Latest Ref Range: 0.1 - 1.0 mg/dL 0.6   AST Latest Ref Range: 10 - 40 U/L 24   ALT Latest Ref Range: 10 - 44 U/L 18   Triglycerides Latest Ref Range: 30 - 150 mg/dL 218 (H)   Cholesterol Latest Ref Range: 120 - 199 mg/dL 249 (H)   HDL Latest Ref Range: 40 - 75 mg/dL 36 (L)   LDL Cholesterol Latest Ref Range: 63.0 - 159.0 mg/dL 169.4 (H)   Total Cholesterol/HDL Ratio Latest Ref Range: 2.0 - 5.0  6.9 (H)   Results for DUNIA GUZMAN (MRN [id number]) as of 10/26/2017 08:46   Ref. Range 5/5/2016 16:18   Non-HDL Cholesterol Latest Units: mg/dL 213      Results for DUNIA GUZMAN (MRN [id number]) as of 10/26/2017 08:46   Ref. Range 7/28/2017 14:20   Sodium Latest Ref Range: 136 - 145 mmol/L 142   Potassium Latest Ref Range: 3.5 - 5.1 mmol/L 4.4   Chloride Latest Ref Range: 95 - 110 mmol/L 107   CO2 Latest Ref Range: 23 - 29 mmol/L 22 (L)   Anion Gap Latest Ref Range: 8 - 16 mmol/L 13   BUN, Bld  Latest Ref Range: 8 - 23 mg/dL 19   Creatinine Latest Ref Range: 0.5 - 1.4 mg/dL 1.5 (H)   eGFR if non African American Latest Ref Range: >60 mL/min/1.73 m^2 47.2 (A)   eGFR if African American Latest Ref Range: >60 mL/min/1.73 m^2 54.5 (A)   Glucose Latest Ref Range: 70 - 110 mg/dL 86   Calcium Latest Ref Range: 8.7 - 10.5 mg/dL 9.2   Phosphorus Latest Ref Range: 2.7 - 4.5 mg/dL 3.6   Protein, Serum Latest Ref Range: 6.0 - 8.4 g/dL 7.2   Albumin Latest Ref Range: 3.5 - 5.2 g/dL 4.0   Hemoglobin A1C Latest Ref Range: 4.0 - 5.6 % 5.4   Estimated Avg Glucose Latest Ref Range: 68 - 131 mg/dL 108   Results for DUNIA GUZMAN (MRN [id number]) as of 11/15/2017 10:10   Ref. Range 10/26/2017 10:05   Non-HDL Cholesterol Latest Units: mg/dL 211     Assessment:      1. Encounter to discuss test results    2. SOB (shortness of breath)    3. Hyperlipidemia, unspecified hyperlipidemia type    4. BMI 34.0-34.9,adult        Plan:   Reviewed test results with Mr. Guzman and his wife.  Tabby Crestor. Explained rationale. He will also make some diet changes at this time. He is not currently able to exercise due to SOB and ALVARADO.  Reduce sugar intake. Increase non-starchy vegetables. Reduce fat intake by removing skin from poultry, eating lean cuts of meat, and reducing portion size.  Labs: BMP, ALT, FLP in 6 weeks.  PFTs, sleep study, pulmonary evaluation.  Follow up in 3 months or call sooner for any problems.

## 2017-11-15 NOTE — Clinical Note
Mr. Jeffery's stress test is negative. His cholesterol is high, and he will start crestor and begin to make some changes in his diet. He will get sleep study and PFTs next.

## 2017-12-08 ENCOUNTER — PROCEDURE VISIT (OUTPATIENT)
Dept: PULMONOLOGY | Facility: CLINIC | Age: 67
End: 2017-12-08
Payer: MEDICARE

## 2017-12-08 ENCOUNTER — OFFICE VISIT (OUTPATIENT)
Dept: PULMONOLOGY | Facility: CLINIC | Age: 67
End: 2017-12-08
Payer: MEDICARE

## 2017-12-08 VITALS
DIASTOLIC BLOOD PRESSURE: 60 MMHG | RESPIRATION RATE: 15 BRPM | OXYGEN SATURATION: 94 % | BODY MASS INDEX: 34.93 KG/M2 | HEIGHT: 70 IN | SYSTOLIC BLOOD PRESSURE: 120 MMHG | WEIGHT: 244 LBS | HEART RATE: 73 BPM

## 2017-12-08 DIAGNOSIS — R06.02 SOB (SHORTNESS OF BREATH): ICD-10-CM

## 2017-12-08 DIAGNOSIS — G47.33 OSA (OBSTRUCTIVE SLEEP APNEA): ICD-10-CM

## 2017-12-08 DIAGNOSIS — R06.09 DOE (DYSPNEA ON EXERTION): Primary | ICD-10-CM

## 2017-12-08 PROCEDURE — 94729 DIFFUSING CAPACITY: CPT | Mod: S$GLB,,, | Performed by: INTERNAL MEDICINE

## 2017-12-08 PROCEDURE — 94060 EVALUATION OF WHEEZING: CPT | Mod: S$GLB,,, | Performed by: INTERNAL MEDICINE

## 2017-12-08 PROCEDURE — 94726 PLETHYSMOGRAPHY LUNG VOLUMES: CPT | Mod: S$GLB,,, | Performed by: INTERNAL MEDICINE

## 2017-12-08 PROCEDURE — 99999 PR PBB SHADOW E&M-EST. PATIENT-LVL III: CPT | Mod: PBBFAC,,, | Performed by: NURSE PRACTITIONER

## 2017-12-08 PROCEDURE — 99204 OFFICE O/P NEW MOD 45 MIN: CPT | Mod: 25,S$GLB,, | Performed by: NURSE PRACTITIONER

## 2017-12-08 RX ORDER — MECLIZINE HCL 12.5 MG 12.5 MG/1
12.5 TABLET ORAL 3 TIMES DAILY PRN
COMMUNITY
End: 2019-01-03 | Stop reason: SDUPTHER

## 2017-12-08 NOTE — LETTER
December 8, 2017      Ani Srivastava NP  [address] [place] [address]           [facility]  [address] [facility] [address] [place] [address]  Phone: [phone]  Fax: [phone]          Patient: Yasir Jeffery   MR Number: [id number]   YOB: 1950   Date of Visit: 12/8/2017       Dear Ani Srivastava:    Thank you for referring Yaisr Jeffery to me for evaluation. Attached you will find relevant portions of my assessment and plan of care.    If you have questions, please do not hesitate to call me. I look forward to following Yasir Jeffery along with you.    Sincerely,    Elizabeth Lejeune, NP    Enclosure  CC:  No Recipients    If you would like to receive this communication electronically, please contact [email or web address] or [phone] to request more information on [employer] Link access.    For providers and/or their staff who would like to refer a patient to [facility], please contact us through our one-stop-shop provider referral line, [facility], at [phone].    If you feel you have received this communication in error or would no longer like to receive these types of communications, please e-mail [email or web address]

## 2017-12-08 NOTE — PROGRESS NOTES
Subjective:      Patient ID: Yasir Jeffery is a 67 y.o. male.    Chief Complaint: No chief complaint on file.    Patient referred for dyspnea with exertion referred from cardiology for pulmonary eval and polysomnogram.  Shortness of breath with minimal exertion.  He also has complaints of severe fatigue with little activity such as taking a shower.  He has hypersomnolence.   He had been sleeping all night and a lot of the day. He is pushing through it now but still sleepy. He is typically a very active person. His symptoms started 8-10 months ago.     STOP - BANG Questionnaire:     1. Snoring : Do you snore loudly ?    Yes    2. Tired : Do you often feel tired, fatigued, or sleepy during daytime? No    3. Observed: Has anyone observed you stop breathing during your sleep?   Yes     4. Blood pressure : Do you have or are you being treated for high blood pressure?   No    5. BMI :BMI more than 35 kg/m2?   Yes    6. Age : Age over 50 yr old?   Yes    7. Neck circumference: Neck circumference greater than 40 cm?   No    8. Gender: Gender male?   Yes    High risk of MARIN: Yes 5 - 8  Intermediate risk of MARIN: Yes 3 - 4  Low risk of MARIN: Yes 0 - 2      References:   STOP Questionnaire   A Tool to Screen Patients for Obstructive Sleep Apnea: APRIL Sahu.C.P.C., ANIL Ye.B.B.S., Betito Pollack M.D.,Anusha Martin, Ph.D., ANIL Martino.B.B.S.,_ Bebeto Nova.,_ Sherron Ernst M.D., APRIL Murray.C.P.C.; Anesthesiology 2008; 108:812-21 Copyright © 2008, the American Society of Anesthesiologists, Inc. [employer]          There were no vitals taken for this visit.  There is no height or weight on file to calculate BMI.    Review of Systems   Respiratory: Positive for dyspnea on extertion and somnolence.    Psychiatric/Behavioral: Positive for sleep disturbance.   All other systems reviewed and are negative.    Objective:      Physical Exam   Constitutional: He  is oriented to person, place, and time. He appears well-developed and well-nourished.   HENT:   Head: Normocephalic and atraumatic.   Mouth/Throat: Oropharynx is clear and moist.   Eyes: EOM are normal. Pupils are equal, round, and reactive to light.   Neck: Normal range of motion. Neck supple.   Cardiovascular: Normal rate and regular rhythm.  Exam reveals no gallop and no friction rub.    No murmur heard.  Pulmonary/Chest: Effort normal and breath sounds normal.   Abdominal: Soft. Bowel sounds are normal. He exhibits no distension. There is no tenderness.   Musculoskeletal: Normal range of motion.   Neurological: He is alert and oriented to person, place, and time.   Skin: Skin is warm and dry.   Psychiatric: He has a normal mood and affect.     Personal Diagnostic Review  PFT normal    Results for orders placed during the hospital encounter of 10/25/17   X-Ray Chest PA And Lateral    Narrative Comparison: 03/24/2006    2 views     Findings:     Heart and pulmonary vasculature are within normal limits and trachea midline.  Lungs are symmetrically aerated and there is again noted to be multiple calcified nodules bilaterally consistent with granulomas.  No consolidation or effusion.  Degenerative change noted throughout the spine.  Multilevel bridging syndesmophyte formation and minimal underlying scoliosis noted.    Impression       1.  Prior granulomatous disease without focal consolidation or effusion.    2.  Additional findings as above.       Electronically signed by: RUMA ARMSTRONG III, MD  Date:     10/25/17  Time:    15:34          Assessment:       No diagnosis found.    Outpatient Encounter Prescriptions as of 12/8/2017   Medication Sig Dispense Refill    dutasteride (AVODART) 0.5 mg capsule Take 0.5 mg by mouth once daily.       latanoprost 0.005 % ophthalmic solution PUT ONE DROP IN THE RIGHT EYE AT BEDTIME  6    ondansetron (ZOFRAN-ODT) 8 MG TbDL Take 1 tablet (8 mg total) by mouth every 8 (eight)  hours as needed. 20 tablet 1    PNEUMOVAX 23 25 mcg/0.5 mL Syrg       rosuvastatin (CRESTOR) 10 MG tablet Take 1 tablet (10 mg total) by mouth once daily. 30 tablet 6    ZOSTAVAX, PF, 19,400 unit/0.65 mL injection        No facility-administered encounter medications on file as of 12/8/2017.      No orders of the defined types were placed in this encounter.    Plan:      PFT normal. Formal polysomnogram for evaluation of sleep apnea. Return to clinic when study is available for review.    Thank you SERGE Webber for this consultation.

## 2017-12-30 ENCOUNTER — HOSPITAL ENCOUNTER (OUTPATIENT)
Dept: SLEEP MEDICINE | Facility: HOSPITAL | Age: 67
Discharge: HOME | End: 2017-12-30
Attending: STUDENT IN AN ORGANIZED HEALTH CARE EDUCATION/TRAINING PROGRAM
Payer: MEDICARE

## 2017-12-30 DIAGNOSIS — G47.33 OSA (OBSTRUCTIVE SLEEP APNEA): Primary | ICD-10-CM

## 2017-12-30 DIAGNOSIS — F51.04 PSYCHOPHYSIOLOGICAL INSOMNIA: ICD-10-CM

## 2017-12-30 DIAGNOSIS — R06.09 DOE (DYSPNEA ON EXERTION): ICD-10-CM

## 2017-12-30 DIAGNOSIS — Z72.821 INADEQUATE SLEEP HYGIENE: ICD-10-CM

## 2017-12-30 DIAGNOSIS — G47.61 PERIODIC LIMB MOVEMENT DISORDER (PLMD): ICD-10-CM

## 2017-12-30 PROCEDURE — 95810 POLYSOM 6/> YRS 4/> PARAM: CPT

## 2017-12-30 PROCEDURE — 95810 POLYSOM 6/> YRS 4/> PARAM: CPT | Mod: 26,,, | Performed by: PSYCHOLOGIST

## 2018-01-02 PROBLEM — R06.09 DOE (DYSPNEA ON EXERTION): Status: ACTIVE | Noted: 2017-10-25

## 2018-01-08 LAB
POST FEF 25 75: 3.52 L/S (ref 1.78–3.38)
POST FET 100: 8.5 S
POST FEV1 FVC: 84 %
POST FEV1: 2.89 L (ref 2.94–3.73)
POST FIF 50: 2.17 L/S
POST FVC: 3.44 L (ref 4.05–4.98)
POST PEF: 6.92 L/S (ref 7.41–9.73)
PRE DLCO: 21.86 ML/MMHG/MIN (ref 21.7–29.99)
PRE ERV: 1.21 L
PRE FEF 25 75: 2.73 L/S (ref 1.78–3.38)
PRE FET 100: 9.65 S
PRE FEV1 FVC: 78 %
PRE FEV1: 2.74 L (ref 2.94–3.73)
PRE FIF 50: 2.44 L/S
PRE FRC PL: 3.47 L (ref 2.63–3.84)
PRE FVC: 3.5 L (ref 4.05–4.98)
PRE KROGHS K: 4.05 1/MIN
PRE PEF: 6.12 L/S (ref 7.41–9.73)
PRE RV: 2.25 L (ref 2.11–2.9)
PRE SVC: 3.83 L
PRE TLC: 6.08 L (ref 6.04–7.04)
PREDICTED DLCO: 25.85 ML/MMHG/MIN (ref 21.7–29.99)
PREDICTED FEV1 FVC: 74.02 % (ref 69.18–78.86)
PREDICTED FEV1: 3.34 L (ref 2.94–3.73)
PREDICTED FRC N2: 3.24 L (ref 2.63–3.84)
PREDICTED FRC PL: 3.24 L (ref 2.63–3.84)
PREDICTED FVC: 4.51 L (ref 4.05–4.98)
PREDICTED RV: 2.51 L (ref 2.11–2.9)
PREDICTED SVC: 4.22 L
PREDICTED TLC: 6.54 L (ref 6.04–7.04)
PROVOCATION PROTOCOL: ABNORMAL

## 2018-01-09 ENCOUNTER — LAB VISIT (OUTPATIENT)
Dept: LAB | Facility: HOSPITAL | Age: 68
End: 2018-01-09
Attending: FAMILY MEDICINE
Payer: MEDICARE

## 2018-01-09 DIAGNOSIS — E78.5 HYPERLIPIDEMIA, UNSPECIFIED HYPERLIPIDEMIA TYPE: ICD-10-CM

## 2018-01-09 LAB
ALT SERPL W/O P-5'-P-CCNC: 17 U/L
ANION GAP SERPL CALC-SCNC: 8 MMOL/L
BUN SERPL-MCNC: 21 MG/DL
CALCIUM SERPL-MCNC: 9.2 MG/DL
CHLORIDE SERPL-SCNC: 108 MMOL/L
CHOLEST SERPL-MCNC: 187 MG/DL
CHOLEST/HDLC SERPL: 6.2 {RATIO}
CO2 SERPL-SCNC: 28 MMOL/L
CREAT SERPL-MCNC: 1.4 MG/DL
EST. GFR  (AFRICAN AMERICAN): 59.3 ML/MIN/1.73 M^2
EST. GFR  (NON AFRICAN AMERICAN): 51.3 ML/MIN/1.73 M^2
GLUCOSE SERPL-MCNC: 109 MG/DL
HDLC SERPL-MCNC: 30 MG/DL
HDLC SERPL: 16 %
LDLC SERPL CALC-MCNC: 112.6 MG/DL
NONHDLC SERPL-MCNC: 157 MG/DL
POTASSIUM SERPL-SCNC: 4.8 MMOL/L
SODIUM SERPL-SCNC: 144 MMOL/L
TRIGL SERPL-MCNC: 222 MG/DL

## 2018-01-09 PROCEDURE — 80048 BASIC METABOLIC PNL TOTAL CA: CPT

## 2018-01-09 PROCEDURE — 84460 ALANINE AMINO (ALT) (SGPT): CPT

## 2018-01-09 PROCEDURE — 36415 COLL VENOUS BLD VENIPUNCTURE: CPT | Mod: PO

## 2018-01-09 PROCEDURE — 80061 LIPID PANEL: CPT

## 2018-01-10 DIAGNOSIS — G47.33 OSA (OBSTRUCTIVE SLEEP APNEA): Primary | ICD-10-CM

## 2018-01-24 ENCOUNTER — OFFICE VISIT (OUTPATIENT)
Dept: CARDIOLOGY | Facility: CLINIC | Age: 68
End: 2018-01-24
Payer: MEDICARE

## 2018-01-24 VITALS
HEART RATE: 66 BPM | SYSTOLIC BLOOD PRESSURE: 141 MMHG | HEIGHT: 70 IN | WEIGHT: 246.38 LBS | DIASTOLIC BLOOD PRESSURE: 69 MMHG | BODY MASS INDEX: 35.27 KG/M2

## 2018-01-24 DIAGNOSIS — R06.09 DOE (DYSPNEA ON EXERTION): Primary | ICD-10-CM

## 2018-01-24 DIAGNOSIS — E88.810 METABOLIC SYNDROME: ICD-10-CM

## 2018-01-24 DIAGNOSIS — E78.2 MIXED HYPERLIPIDEMIA: Chronic | ICD-10-CM

## 2018-01-24 PROCEDURE — 99213 OFFICE O/P EST LOW 20 MIN: CPT | Mod: S$GLB,,, | Performed by: NURSE PRACTITIONER

## 2018-01-24 PROCEDURE — 99999 PR PBB SHADOW E&M-EST. PATIENT-LVL III: CPT | Mod: PBBFAC,,, | Performed by: NURSE PRACTITIONER

## 2018-01-24 PROCEDURE — 99499 UNLISTED E&M SERVICE: CPT | Mod: S$GLB,,, | Performed by: NURSE PRACTITIONER

## 2018-01-24 NOTE — PROGRESS NOTES
Subjective:    Patient ID:  Yasir Jeffery is a 67 y.o. male who presents for evaluation of Follow-up      HPI: . Yasir Jeffery presents to the clinic with his wife for follow up of hyperlipidemia and SOB. He stated that he is not taking Crestor. He wants to try to get his cholesterol down naturally with lifestyle changes. He added oatmeal, and he is eating serving size of about 1.5 cups. He adds milk to it. He is not drinking juice. He is eating peanut butter, fried eggs, fried catfish. He does like vegetables. He is interested in making his own green drinks again, but he noticed that he gained weight when he did it before. He was adding juice, fruit, and it was a very large portion that he drank.  HiS SOB is about the same as it has been. It tends to occur with exertion. He denied coughing or wheezing. He denied any chest pain.  Had sleep study, and he does not think he needs CPAP. I do not see report. Trudi Tate, NP's note indicated normal PFTs.  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    Pharm NM stress test 11/8/17: Impression: NORMAL MYOCARDIAL PERFUSION  1. The perfusion scan is free of evidence for myocardial ischemia or injury.   2. There is a mild intensity fixed defect in the inferoapical wall of the left ventricle, secondary to diaphragm attenuation.   3. Resting wall motion is physiologic.   4. Resting LV function is normal.   5. The ventricular volumes are normal at rest and stress.   6. The extracardiac distribution of radioactivity is normal.     Echo 11/02/17: CONCLUSIONS     1 - Concentric hypertrophy.     2 - No wall motion abnormalities.     3 - Low normal to mildly depressed left ventricular systolic function (EF 50-55%).     4 - Normal left ventricular diastolic function.     5 - Normal right ventricular systolic function .     6 - The estimated PA systolic pressure is 31 mmHg.     7 - Mild mitral regurgitation.     Medications:  he is not taking any CV medications.  Sodium: he does not add salt to foods,  he is not reading labels for sodium content.   Dietary Fats: He does love meat: skaggs, sausage, steak and he eats some fried foods. He generally cooks his own meals from scratch. Added oatmeal with milk. He generally eats one meal/day.  Exercise: he does not; he is limited by back pain, arthritis in knees, left shoulder and right elbow.  Tobacco:smoked one cigar/day for 3 years in his 30s.. None since. no alcohol use     Weight: 111.7 kg (246 lb 5.8 oz) he states that his daily weights increased  Wt Readings from Last 3 Encounters:   01/24/18 111.7 kg (246 lb 5.8 oz)   12/08/17 110.7 kg (244 lb)   11/15/17 110.2 kg (243 lb)     BP log: none.    Review of Systems   Constitution: Positive for malaise/fatigue and weight gain. Negative for chills, decreased appetite, diaphoresis, fever, night sweats and weight loss.   HENT: Negative for congestion.         Chronic sinus problems   Cardiovascular: Positive for dyspnea on exertion. Negative for chest pain, claudication, cyanosis, irregular heartbeat, leg swelling, near-syncope, orthopnea, palpitations, paroxysmal nocturnal dyspnea and syncope.        Notices edema to right ankle when standing for long periods of time.   Respiratory: Positive for snoring. Negative for cough, hemoptysis, shortness of breath, sputum production and wheezing.    Hematologic/Lymphatic: Negative for adenopathy and bleeding problem. Does not bruise/bleed easily.   Skin: Negative for color change and nail changes.   Gastrointestinal: Negative for bloating, abdominal pain, change in bowel habit, heartburn, hematochezia, melena, nausea and vomiting.   Genitourinary: Negative for hematuria.   Neurological: Negative for dizziness and light-headedness.   Psychiatric/Behavioral: Negative for altered mental status.       Objective:   Physical Exam   Constitutional: He is oriented to person, place, and time. He appears  well-developed and well-nourished. No distress.   HENT:   Head: Normocephalic and atraumatic.   Eyes: Conjunctivae are normal. No scleral icterus.   Neck: Neck supple. No JVD present. No tracheal deviation present. No thyromegaly present.   Cardiovascular: Normal rate, regular rhythm, normal heart sounds and intact distal pulses.  Exam reveals no gallop and no friction rub.    No murmur heard.  Pulmonary/Chest: Effort normal and breath sounds normal. No respiratory distress. He has no wheezes. He has no rales. He exhibits no tenderness.   Abdominal: Soft. Bowel sounds are normal. He exhibits no distension and no mass. There is no tenderness. There is no rebound and no guarding.   Musculoskeletal: Normal range of motion. He exhibits no edema.   Lymphadenopathy:     He has no cervical adenopathy.   Neurological: He is alert and oriented to person, place, and time.   Skin: Skin is warm and dry. No rash noted. He is not diaphoretic. No erythema. No pallor.   Pink   Psychiatric: He has a normal mood and affect.   Results for DUNIA GUZMAN CADEN (MRN [id number]) as of 1/24/2018 10:55   Ref. Range 1/9/2018 08:20   Sodium Latest Ref Range: 136 - 145 mmol/L 144   Potassium Latest Ref Range: 3.5 - 5.1 mmol/L 4.8   Chloride Latest Ref Range: 95 - 110 mmol/L 108   CO2 Latest Ref Range: 23 - 29 mmol/L 28   Anion Gap Latest Ref Range: 8 - 16 mmol/L 8   BUN, Bld Latest Ref Range: 8 - 23 mg/dL 21   Creatinine Latest Ref Range: 0.5 - 1.4 mg/dL 1.4   eGFR if non African American Latest Ref Range: >60 mL/min/1.73 m^2 51.3 (A)   eGFR if African American Latest Ref Range: >60 mL/min/1.73 m^2 59.3 (A)   Glucose Latest Ref Range: 70 - 110 mg/dL 109   Calcium Latest Ref Range: 8.7 - 10.5 mg/dL 9.2   ALT Latest Ref Range: 10 - 44 U/L 17   Triglycerides Latest Ref Range: 30 - 150 mg/dL 222 (H)   Cholesterol Latest Ref Range: 120 - 199 mg/dL 187   HDL Latest Ref Range: 40 - 75 mg/dL 30 (L)   LDL Cholesterol Latest Ref Range: 63.0 - 159.0 mg/dL  112.6   Total Cholesterol/HDL Ratio Latest Ref Range: 2.0 - 5.0  6.2 (H)   Results for DUNIA GUZMAN (MRN [id number]) as of 1/24/2018 10:55   Ref. Range 1/9/2018 08:20   Non-HDL Cholesterol Latest Units: mg/dL 157       Assessment:      1. ALVARADO (dyspnea on exertion)    2. Mixed hyperlipidemia    3. BMI 35.0-35.9,adult    4. Metabolic syndrome        Plan:   Reviewed lab results with Mr. Guzman and his wife. He is not taking Crestor. He is willing to try lifestyle modifications for weight loss and physiologic improvements.  Reduce sugar intake. Discussed sources of hidden sugars: peanut butter, protein drinks/powders, and other convenience foods. Increase non-starchy vegetables to every day. Reduce fat intake by removing skin from poultry, eating lean cuts of meat, and reducing portion size (oatmeal is 1/2 cup). Green smoothies with water, decrease fruit (half a banana, small orange); no juice. Include berries, cinnamon, rahel, and tumeric in diet.  Avoid skipping meals.  Labs: BMP, ALT, FLP in 4 months.  Recommended low impact exercise three days per week.  Follow up in 4 months or call sooner for any problems.

## 2018-01-24 NOTE — PROGRESS NOTES
Patient, Yasir Jeffery (MRN #[id number]), presented with a recorded BMI of 35.35 kg/m^2 and a documented comorbidity(s):  - Hyperlipidemia  to which the severe obesity is a contributing factor. This is consistent with the definition of severe obesity (BMI 35.0-35.9) with comorbidity (ICD-10 E66.01, Z68.35). The patient's severe obesity was monitored, evaluated, addressed and/or treated. This addendum to the medical record is made on 01/24/2018.

## 2018-01-24 NOTE — PATIENT INSTRUCTIONS
Sources of hidden sugars: peanut butter, protein drinks/powders, and other convenience foods. Increase non-starchy vegetables to every day.   Reduce fat intake by removing skin from poultry, eating lean cuts of meat, and reducing portion size (oatmeal is 1/2 cup).   Green smoothies with water; decrease fruit (half a banana, small orange); no juice. Include berries, cinnamon, rahel, and tumeric in diet.  Avoid skipping meals.

## 2018-04-30 ENCOUNTER — PES CALL (OUTPATIENT)
Dept: ADMINISTRATIVE | Facility: CLINIC | Age: 68
End: 2018-04-30

## 2018-05-14 ENCOUNTER — OFFICE VISIT (OUTPATIENT)
Dept: FAMILY MEDICINE | Facility: CLINIC | Age: 68
End: 2018-05-14
Payer: MEDICARE

## 2018-05-14 ENCOUNTER — LAB VISIT (OUTPATIENT)
Dept: LAB | Facility: HOSPITAL | Age: 68
End: 2018-05-14
Attending: NURSE PRACTITIONER
Payer: MEDICARE

## 2018-05-14 VITALS
HEIGHT: 70 IN | HEART RATE: 61 BPM | BODY MASS INDEX: 33.07 KG/M2 | WEIGHT: 231 LBS | SYSTOLIC BLOOD PRESSURE: 131 MMHG | DIASTOLIC BLOOD PRESSURE: 64 MMHG | TEMPERATURE: 99 F

## 2018-05-14 DIAGNOSIS — M50.30 DDD (DEGENERATIVE DISC DISEASE), CERVICAL: ICD-10-CM

## 2018-05-14 DIAGNOSIS — N40.1 BENIGN PROSTATIC HYPERPLASIA WITH WEAK URINARY STREAM: ICD-10-CM

## 2018-05-14 DIAGNOSIS — E88.810 METABOLIC SYNDROME: ICD-10-CM

## 2018-05-14 DIAGNOSIS — G47.33 OSA (OBSTRUCTIVE SLEEP APNEA): ICD-10-CM

## 2018-05-14 DIAGNOSIS — F51.04 PSYCHOPHYSIOLOGICAL INSOMNIA: ICD-10-CM

## 2018-05-14 DIAGNOSIS — E78.2 MIXED HYPERLIPIDEMIA: Chronic | ICD-10-CM

## 2018-05-14 DIAGNOSIS — E66.9 OBESITY (BMI 30-39.9): ICD-10-CM

## 2018-05-14 DIAGNOSIS — R07.9 CHEST PAIN, UNSPECIFIED TYPE: ICD-10-CM

## 2018-05-14 DIAGNOSIS — R97.20 ELEVATED PSA: ICD-10-CM

## 2018-05-14 DIAGNOSIS — R39.12 BENIGN PROSTATIC HYPERPLASIA WITH WEAK URINARY STREAM: ICD-10-CM

## 2018-05-14 DIAGNOSIS — M51.36 DDD (DEGENERATIVE DISC DISEASE), LUMBAR: ICD-10-CM

## 2018-05-14 DIAGNOSIS — Z13.6 SCREENING FOR AAA (ABDOMINAL AORTIC ANEURYSM): Primary | ICD-10-CM

## 2018-05-14 DIAGNOSIS — N18.30 CKD (CHRONIC KIDNEY DISEASE) STAGE 3, GFR 30-59 ML/MIN: ICD-10-CM

## 2018-05-14 DIAGNOSIS — M51.34 DDD (DEGENERATIVE DISC DISEASE), THORACIC: ICD-10-CM

## 2018-05-14 DIAGNOSIS — G47.61 PERIODIC LIMB MOVEMENT DISORDER (PLMD): ICD-10-CM

## 2018-05-14 DIAGNOSIS — R06.09 DOE (DYSPNEA ON EXERTION): ICD-10-CM

## 2018-05-14 DIAGNOSIS — M19.90 OSTEOARTHRITIS, UNSPECIFIED OSTEOARTHRITIS TYPE, UNSPECIFIED SITE: ICD-10-CM

## 2018-05-14 LAB
ALT SERPL W/O P-5'-P-CCNC: 16 U/L
ANION GAP SERPL CALC-SCNC: 8 MMOL/L
BUN SERPL-MCNC: 24 MG/DL
CALCIUM SERPL-MCNC: 9.4 MG/DL
CHLORIDE SERPL-SCNC: 110 MMOL/L
CHOLEST SERPL-MCNC: 195 MG/DL
CHOLEST/HDLC SERPL: 6.1 {RATIO}
CO2 SERPL-SCNC: 24 MMOL/L
CREAT SERPL-MCNC: 1.5 MG/DL
EST. GFR  (AFRICAN AMERICAN): 54.5 ML/MIN/1.73 M^2
EST. GFR  (NON AFRICAN AMERICAN): 47.2 ML/MIN/1.73 M^2
GLUCOSE SERPL-MCNC: 108 MG/DL
HDLC SERPL-MCNC: 32 MG/DL
HDLC SERPL: 16.4 %
LDLC SERPL CALC-MCNC: 135 MG/DL
NONHDLC SERPL-MCNC: 163 MG/DL
POTASSIUM SERPL-SCNC: 5 MMOL/L
SODIUM SERPL-SCNC: 142 MMOL/L
TRIGL SERPL-MCNC: 140 MG/DL

## 2018-05-14 PROCEDURE — 99999 PR PBB SHADOW E&M-EST. PATIENT-LVL III: CPT | Mod: PBBFAC,,, | Performed by: NURSE PRACTITIONER

## 2018-05-14 PROCEDURE — 36415 COLL VENOUS BLD VENIPUNCTURE: CPT | Mod: PO

## 2018-05-14 PROCEDURE — 99499 UNLISTED E&M SERVICE: CPT | Mod: S$GLB,,, | Performed by: NURSE PRACTITIONER

## 2018-05-14 PROCEDURE — 80048 BASIC METABOLIC PNL TOTAL CA: CPT

## 2018-05-14 PROCEDURE — 96160 PT-FOCUSED HLTH RISK ASSMT: CPT | Mod: S$GLB,,, | Performed by: NURSE PRACTITIONER

## 2018-05-14 PROCEDURE — 80061 LIPID PANEL: CPT

## 2018-05-14 PROCEDURE — 84460 ALANINE AMINO (ALT) (SGPT): CPT

## 2018-05-14 NOTE — ASSESSMENT & PLAN NOTE
Continue efforts towards weight loss  Stable  Continue current treatment plan as previously prescribed with your PCP and cardiology

## 2018-05-15 PROBLEM — R39.12 BENIGN PROSTATIC HYPERPLASIA WITH WEAK URINARY STREAM: Status: ACTIVE | Noted: 2017-06-06

## 2018-05-15 PROBLEM — N40.1 BENIGN PROSTATIC HYPERPLASIA WITH WEAK URINARY STREAM: Status: ACTIVE | Noted: 2017-06-06

## 2018-05-15 NOTE — ASSESSMENT & PLAN NOTE
Stable   Continue medication as prescribed  Continue current treatment plan as previously prescribed with your PCP

## 2018-05-15 NOTE — ASSESSMENT & PLAN NOTE
Stable  Continue medication as prescribed  Continue current treatment plan as previously prescribed with your PCP and Dr. Wyatt, urology

## 2018-05-15 NOTE — PROGRESS NOTES
"Yasir Jeffery presented for a  Medicare AWV and comprehensive Health Risk Assessment today. The following components were reviewed and updated:    · Medical history  · Family History  · Social history  · Allergies and Current Medications  · Health Risk Assessment  · Health Maintenance  · Care Team     ** See Completed Assessments for Annual Wellness Visit within the encounter summary.**       The following assessments were completed:  · Living Situation  · CAGE  · Depression Screening  · Timed Get Up and Go  · Whisper Test  · Cognitive Function Screening  · Nutrition Screening  · ADL Screening  · PAQ Screening    Vitals:    05/14/18 1127   BP: 131/64   Pulse: 61   Temp: 98.7 °F (37.1 °C)   TempSrc: Oral   Weight: 104.8 kg (231 lb)   Height: 5' 10" (1.778 m)     Body mass index is 33.15 kg/m².  Physical Exam   Constitutional: He is oriented to person, place, and time. He appears well-developed and well-nourished.   HENT:   Head: Normocephalic.   Eyes: Pupils are equal, round, and reactive to light.   Cardiovascular: Normal rate, regular rhythm and normal heart sounds.    Pulmonary/Chest: Effort normal and breath sounds normal. No respiratory distress. He has no decreased breath sounds. He has no wheezes. He has no rhonchi. He has no rales.   Lymphadenopathy:     He has no cervical adenopathy.   Neurological: He is alert and oriented to person, place, and time.   Skin: Skin is warm and dry. No rash noted.   Psychiatric: He has a normal mood and affect. His behavior is normal. Judgment and thought content normal.   Vitals reviewed.        Diagnoses and health risks identified today and associated recommendations/orders:    Mixed hyperlipidemia  Continue efforts towards weight loss  Stable  Continue current treatment plan as previously prescribed with your PCP and cardiology      Metabolic syndrome  Continue efforts towards weight loss  Stable  Continue current treatment plan as previously prescribed with your PCP and " cardiology    Obesity (BMI 30-39.9)  Continue efforts towards weight loss  Stable  Continue current treatment plan as previously prescribed with your PCP and cardiology    CKD (chronic kidney disease) stage 3, GFR 30-59 ml/min  .Stable-followed by Dr. Durbin in nephrology  Continue current treatment plan as previously prescribed with your PCP and Dr. Durbin      Benign prostatic hyperplasia with weak urinary stream  Stable  Continue medication as prescribed  Continue current treatment plan as previously prescribed with your PCP and Dr. Wyatt, urology      Osteoarthritis  Stable   Continue medication as prescribed  Continue current treatment plan as previously prescribed with your PCP      DDD (degenerative disc disease), cervical  Stable   Continue current treatment plan as previously prescribed with your PCP      DDD (degenerative disc disease), thoracic  Stable   Continue current treatment plan as previously prescribed with your PCP    DDD (degenerative disc disease), lumbar  Stable   Continue current treatment plan as previously prescribed with your PCP    MARIN (obstructive sleep apnea)  Stable   Continue current treatment plan as previously prescribed with your PCP and pulmonology      Psychophysiological insomnia  Stable   Continue current treatment plan as previously prescribed with your PCP and pulmonology    Periodic limb movement disorder (PLMD)  Stable   Continue current treatment plan as previously prescribed with your PCP and pulmonology    ALVARADO (dyspnea on exertion)  Stable   Continue current treatment plan as previously prescribed with your PCP and cardiology    Chest pain  Stable   Continue current treatment plan as previously prescribed with your PCP and cardiology    Elevated PSA  Stable  Continue medication as prescribed  Continue current treatment plan as previously prescribed with your PCP and Dr. Wyatt, urology      Patient voices he has received his pneumonia and zoster vaccinations at  [facility] and the nurse has been asked to retrieve these records.  Provided Yasir with a 5-10 year written screening schedule and personal prevention plan. Recommendations were developed using the USPSTF age appropriate recommendations. Education, counseling, and referrals were provided as needed. After Visit Summary printed and given to patient which includes a list of additional screenings\tests needed.    Follow-up if symptoms worsen or fail to improve.    Nick Lagunas, SERGE

## 2018-05-15 NOTE — ASSESSMENT & PLAN NOTE
.Stable-followed by Dr. Durbin in nephrology  Continue current treatment plan as previously prescribed with your PCP and Dr. Durbin

## 2018-05-30 ENCOUNTER — OFFICE VISIT (OUTPATIENT)
Dept: CARDIOLOGY | Facility: CLINIC | Age: 68
End: 2018-05-30
Payer: MEDICARE

## 2018-05-30 VITALS
HEART RATE: 53 BPM | HEIGHT: 70 IN | WEIGHT: 223.69 LBS | BODY MASS INDEX: 32.02 KG/M2 | SYSTOLIC BLOOD PRESSURE: 130 MMHG | DIASTOLIC BLOOD PRESSURE: 62 MMHG

## 2018-05-30 DIAGNOSIS — Z71.2 ENCOUNTER TO DISCUSS TEST RESULTS: Primary | ICD-10-CM

## 2018-05-30 DIAGNOSIS — E78.2 MIXED HYPERLIPIDEMIA: Primary | ICD-10-CM

## 2018-05-30 DIAGNOSIS — E88.810 METABOLIC SYNDROME: ICD-10-CM

## 2018-05-30 DIAGNOSIS — E66.9 OBESITY (BMI 30-39.9): ICD-10-CM

## 2018-05-30 DIAGNOSIS — E78.2 MIXED HYPERLIPIDEMIA: Chronic | ICD-10-CM

## 2018-05-30 PROCEDURE — 3078F DIAST BP <80 MM HG: CPT | Mod: CPTII,S$GLB,, | Performed by: NURSE PRACTITIONER

## 2018-05-30 PROCEDURE — 99499 UNLISTED E&M SERVICE: CPT | Mod: S$GLB,,, | Performed by: NURSE PRACTITIONER

## 2018-05-30 PROCEDURE — 99999 PR PBB SHADOW E&M-EST. PATIENT-LVL III: CPT | Mod: PBBFAC,,, | Performed by: NURSE PRACTITIONER

## 2018-05-30 PROCEDURE — 3075F SYST BP GE 130 - 139MM HG: CPT | Mod: CPTII,S$GLB,, | Performed by: NURSE PRACTITIONER

## 2018-05-30 PROCEDURE — 99213 OFFICE O/P EST LOW 20 MIN: CPT | Mod: S$GLB,,, | Performed by: NURSE PRACTITIONER

## 2018-05-30 NOTE — PROGRESS NOTES
Subjective:    Patient ID:  Yasir Jeffery is a 67 y.o. male who presents for evaluation of Follow-up      HPI: . Yasir Jeffery presents to the clinic with his wife for follow up of lab results, hyperlipidemia and SOB. He stated that he is doing very well; he denied any chest pain or SOB; she does report ALVARADO that is chronic and unchanged from the past. Had sleep study, and he does not think he needs CPAP. I do not see report. Trudi Tate, NP's note indicated normal PFTs.  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  He does not want to take statin. He wants to try to get his cholesterol down naturally with lifestyle changes. He added oatmeal, and he is eating serving size of about 1.5 cups. He adds milk to it and 2-3 tsp of sugar.      Medications: he is not taking any CV medications.  Sodium: he does not add salt to foods,  he is not reading labels for sodium content.   Diet: Rare bread; stops snacking/eating at 6:30-7:00. No pasta or potatoes. Eating oatmeal with some sugar and milk. Eating two meals per day. Chicken, occasional red meat. Cooks with a little butter. He generally cooks his own meals from scratch. He is not drinking juice or sodas. He is concerned about bacterial contamination and pesticides on produce. So he doesn't eat much of them.  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 He limits climbing due to osteoarthritis pain-shoulders, hips, and knees.  Tobacco:smoked one cigar/day for 3 years in his 30s.. None since. no alcohol use     Weight: 101.5 kg (223 lb 10.5 oz) he states that his daily weights increased  Wt Readings from Last 3 Encounters:   05/30/18 101.5 kg (223 lb 10.5 oz)   05/14/18 104.8 kg (231 lb)   01/24/18 111.7 kg (246 lb 5.8 oz)     BP log: none.    Review of Systems   Constitution: Negative for chills, decreased appetite, diaphoresis, fever, malaise/fatigue, night sweats, weight gain and weight  loss.   HENT: Negative for congestion.         Chronic sinus problems   Cardiovascular: Positive for dyspnea on exertion. Negative for chest pain, claudication, cyanosis, irregular heartbeat, leg swelling, near-syncope, orthopnea, palpitations, paroxysmal nocturnal dyspnea and syncope.   Respiratory: Negative for cough, hemoptysis, shortness of breath, sputum production and wheezing.    Hematologic/Lymphatic: Negative for adenopathy and bleeding problem. Does not bruise/bleed easily.   Skin: Negative for color change and nail changes.   Gastrointestinal: Negative for bloating, abdominal pain, change in bowel habit, heartburn, hematochezia, melena, nausea and vomiting.   Genitourinary: Negative for hematuria.   Neurological: Negative for dizziness and light-headedness.   Psychiatric/Behavioral: Negative for altered mental status.       Objective:   Physical Exam   Constitutional: He is oriented to person, place, and time. He appears well-developed and well-nourished. No distress.   HENT:   Head: Normocephalic and atraumatic.   Eyes: Conjunctivae are normal. No scleral icterus.   Neck: Neck supple. No JVD present. No tracheal deviation present. No thyromegaly present.   Cardiovascular: Normal rate, regular rhythm, normal heart sounds and intact distal pulses.  Exam reveals no gallop and no friction rub.    No murmur heard.  Pulmonary/Chest: Effort normal and breath sounds normal. No respiratory distress. He has no wheezes. He has no rales. He exhibits no tenderness.   Abdominal: Soft. Bowel sounds are normal. He exhibits no distension and no mass. There is no tenderness. There is no rebound and no guarding.   Musculoskeletal: Normal range of motion. He exhibits no edema.   Lymphadenopathy:     He has no cervical adenopathy.   Neurological: He is alert and oriented to person, place, and time.   Skin: Skin is warm and dry. No rash noted. He is not diaphoretic. No erythema. No pallor.   Pink   Psychiatric: He has a  normal mood and affect.   Results for DUNIA GUZMAN (MRN [id number]) as of 5/30/2018 08:28   Ref. Range 5/14/2018 08:40   Sodium Latest Ref Range: 136 - 145 mmol/L 142   Potassium Latest Ref Range: 3.5 - 5.1 mmol/L 5.0   Chloride Latest Ref Range: 95 - 110 mmol/L 110   CO2 Latest Ref Range: 23 - 29 mmol/L 24   Anion Gap Latest Ref Range: 8 - 16 mmol/L 8   BUN, Bld Latest Ref Range: 8 - 23 mg/dL 24 (H)   Creatinine Latest Ref Range: 0.5 - 1.4 mg/dL 1.5 (H)   eGFR if non African American Latest Ref Range: >60 mL/min/1.73 m^2 47.2 (A)   eGFR if African American Latest Ref Range: >60 mL/min/1.73 m^2 54.5 (A)   Glucose Latest Ref Range: 70 - 110 mg/dL 108   Calcium Latest Ref Range: 8.7 - 10.5 mg/dL 9.4   ALT Latest Ref Range: 10 - 44 U/L 16   Triglycerides Latest Ref Range: 30 - 150 mg/dL 140   Cholesterol Latest Ref Range: 120 - 199 mg/dL 195   HDL Latest Ref Range: 40 - 75 mg/dL 32 (L)   LDL Cholesterol Latest Ref Range: 63.0 - 159.0 mg/dL 135.0   Total Cholesterol/HDL Ratio Latest Ref Range: 2.0 - 5.0  6.1 (H)   Results for DUNIA GUZMAN (MRN [id number]) as of 5/30/2018 08:28   Ref. Range 5/14/2018 08:40   Non-HDL Cholesterol Latest Units: mg/dL 163     Pharm NM stress test 11/8/17: Impression: NORMAL MYOCARDIAL PERFUSION  1. The perfusion scan is free of evidence for myocardial ischemia or injury.   2. There is a mild intensity fixed defect in the inferoapical wall of the left ventricle, secondary to diaphragm attenuation.   3. Resting wall motion is physiologic.   4. Resting LV function is normal.   5. The ventricular volumes are normal at rest and stress.   6. The extracardiac distribution of radioactivity is normal.     Echo 11/02/17: CONCLUSIONS     1 - Concentric hypertrophy.     2 - No wall motion abnormalities.     3 - Low normal to mildly depressed left ventricular systolic function (EF 50-55%).     4 - Normal left ventricular diastolic function.     5 - Normal right ventricular systolic function .     6 -  The estimated PA systolic pressure is 31 mmHg.     7 - Mild mitral regurgitation.     Assessment:      1. Encounter to discuss test results    2. Metabolic syndrome    3. Mixed hyperlipidemia    4. Obesity (BMI 30-39.9)        Plan:   Reviewed lab results with Mr. Jeffery. He does not want to take medication for his lipids. He is willing to continue to try lifestyle modifications for weight loss and physiologic improvements.  Reduce sugar intake.  Increase non-starchy vegetables. Dirty dozen and clean 15 lists given and reviewed.  Avoid skipping meals.  Recommended low impact exercise three days per week. Walking or chair exercises.  Labs in 6 months: BMP, FLP, ALT.  Follow up in 6 months or call sooner for any problems.

## 2018-07-18 ENCOUNTER — TELEPHONE (OUTPATIENT)
Dept: RADIOLOGY | Facility: HOSPITAL | Age: 68
End: 2018-07-18

## 2018-07-19 ENCOUNTER — HOSPITAL ENCOUNTER (OUTPATIENT)
Dept: RADIOLOGY | Facility: HOSPITAL | Age: 68
Discharge: HOME | End: 2018-07-19
Attending: NURSE PRACTITIONER
Payer: MEDICARE

## 2018-07-19 DIAGNOSIS — Z13.6 SCREENING FOR AAA (ABDOMINAL AORTIC ANEURYSM): ICD-10-CM

## 2018-07-19 PROCEDURE — 76775 US EXAM ABDO BACK WALL LIM: CPT | Mod: 26,,, | Performed by: RADIOLOGY

## 2018-07-19 PROCEDURE — 76775 US EXAM ABDO BACK WALL LIM: CPT | Mod: TC,PO

## 2018-09-10 NOTE — PROGRESS NOTES
Subjective:       Patient ID: Yasir Jeffery is a 68 y.o. male.    Chief Complaint: Hip Pain and Shoulder Pain    Hip Pain    The incident occurred more than 1 week ago. There was no injury mechanism. The pain is present in the right hip. The quality of the pain is described as aching. The pain is at a severity of 7/10. The pain has been constant since onset. The symptoms are aggravated by weight bearing. He has tried acetaminophen and NSAIDs for the symptoms. The treatment provided no relief.   Shoulder Pain    The pain is present in the left shoulder. This is a chronic problem. The current episode started more than 1 year ago. There has been no history of extremity trauma. The problem occurs intermittently. The problem has been gradually worsening. The quality of the pain is described as aching and dull. The pain is at a severity of 6/10. Pertinent negatives include no fever or headaches. He has tried NSAIDS for the symptoms. The treatment provided no relief.       Review of Systems   Constitutional: Negative for fatigue, fever and unexpected weight change.   HENT: Negative for ear pain and sore throat.    Eyes: Negative.  Negative for pain and visual disturbance.   Respiratory: Negative for cough and shortness of breath.    Cardiovascular: Negative for chest pain and palpitations.   Gastrointestinal: Negative for abdominal pain, diarrhea, nausea and vomiting.   Genitourinary: Negative for dysuria and frequency.   Musculoskeletal: Positive for arthralgias. Negative for myalgias.   Skin: Negative for color change and rash.   Neurological: Negative for dizziness and headaches.   Psychiatric/Behavioral: Negative for sleep disturbance. The patient is not nervous/anxious.        Vitals:    09/11/18 0820   BP: 128/79   Pulse: (!) 59   Temp: 98.8 °F (37.1 °C)       Objective:     Current Outpatient Medications   Medication Sig Dispense Refill    latanoprost 0.005 % ophthalmic solution PUT ONE DROP IN THE RIGHT EYE AT  BEDTIME  6    meclizine (ANTIVERT) 12.5 mg tablet Take 12.5 mg by mouth 3 (three) times daily as needed.      ondansetron (ZOFRAN-ODT) 8 MG TbDL Take 1 tablet (8 mg total) by mouth every 8 (eight) hours as needed. 20 tablet 1    dutasteride (AVODART) 0.5 mg capsule Take 0.5 mg by mouth once daily.       sulindac (CLINORIL) 200 MG Tab Take 1 tablet (200 mg total) by mouth 2 (two) times daily as needed. 30 tablet 1     No current facility-administered medications for this visit.        Physical Exam   Constitutional: He is oriented to person, place, and time. He appears well-developed. No distress.   HENT:   Head: Normocephalic and atraumatic.   Eyes: EOM are normal. Pupils are equal, round, and reactive to light.   Neck: Normal range of motion. Neck supple.   Cardiovascular: Normal rate and regular rhythm.   Pulmonary/Chest: Effort normal and breath sounds normal.   Musculoskeletal:        Left shoulder: He exhibits decreased range of motion, tenderness and decreased strength.        Right hip: He exhibits decreased range of motion, tenderness and bony tenderness.   Neurological: He is alert and oriented to person, place, and time.   Skin: Skin is warm and dry. No rash noted.   Psychiatric: He has a normal mood and affect. Thought content normal.   Nursing note and vitals reviewed.      Assessment:       1. Bursitis of right hip, unspecified bursa    2. Chronic left shoulder pain    3. Vertigo        Plan:   Bursitis of right hip, unspecified bursa  -     dexamethasone injection 8 mg; Inject 2 mLs (8 mg total) into the muscle once.    Chronic left shoulder pain  -     Ambulatory referral to Orthopedics    Vertigo  -     ondansetron (ZOFRAN-ODT) 8 MG TbDL; Take 1 tablet (8 mg total) by mouth every 8 (eight) hours as needed.  Dispense: 20 tablet; Refill: 1    Other orders  -     sulindac (CLINORIL) 200 MG Tab; Take 1 tablet (200 mg total) by mouth 2 (two) times daily as needed.  Dispense: 30 tablet; Refill:  1        No Follow-up on file.    There are no Patient Instructions on file for this visit.

## 2018-09-11 ENCOUNTER — OFFICE VISIT (OUTPATIENT)
Dept: FAMILY MEDICINE | Facility: CLINIC | Age: 68
End: 2018-09-11
Payer: MEDICARE

## 2018-09-11 VITALS
TEMPERATURE: 99 F | SYSTOLIC BLOOD PRESSURE: 128 MMHG | WEIGHT: 215.19 LBS | DIASTOLIC BLOOD PRESSURE: 79 MMHG | BODY MASS INDEX: 30.81 KG/M2 | HEART RATE: 59 BPM | HEIGHT: 70 IN

## 2018-09-11 DIAGNOSIS — G89.29 CHRONIC LEFT SHOULDER PAIN: ICD-10-CM

## 2018-09-11 DIAGNOSIS — M25.512 LEFT SHOULDER PAIN, UNSPECIFIED CHRONICITY: Primary | ICD-10-CM

## 2018-09-11 DIAGNOSIS — R42 VERTIGO: ICD-10-CM

## 2018-09-11 DIAGNOSIS — M70.71 BURSITIS OF RIGHT HIP, UNSPECIFIED BURSA: Primary | ICD-10-CM

## 2018-09-11 DIAGNOSIS — M25.512 CHRONIC LEFT SHOULDER PAIN: ICD-10-CM

## 2018-09-11 PROCEDURE — 99214 OFFICE O/P EST MOD 30 MIN: CPT | Mod: PBBFAC,PO | Performed by: NURSE PRACTITIONER

## 2018-09-11 PROCEDURE — 99213 OFFICE O/P EST LOW 20 MIN: CPT | Mod: S$PBB,,, | Performed by: NURSE PRACTITIONER

## 2018-09-11 PROCEDURE — 3078F DIAST BP <80 MM HG: CPT | Mod: CPTII,,, | Performed by: NURSE PRACTITIONER

## 2018-09-11 PROCEDURE — 1101F PT FALLS ASSESS-DOCD LE1/YR: CPT | Mod: CPTII,,, | Performed by: NURSE PRACTITIONER

## 2018-09-11 PROCEDURE — 99999 PR PBB SHADOW E&M-EST. PATIENT-LVL IV: CPT | Mod: PBBFAC,,, | Performed by: NURSE PRACTITIONER

## 2018-09-11 PROCEDURE — 3074F SYST BP LT 130 MM HG: CPT | Mod: CPTII,,, | Performed by: NURSE PRACTITIONER

## 2018-09-11 PROCEDURE — 96372 THER/PROPH/DIAG INJ SC/IM: CPT | Mod: PBBFAC,PO

## 2018-09-11 RX ORDER — DEXAMETHASONE SODIUM PHOSPHATE 4 MG/ML
8 INJECTION, SOLUTION INTRA-ARTICULAR; INTRALESIONAL; INTRAMUSCULAR; INTRAVENOUS; SOFT TISSUE ONCE
Status: COMPLETED | OUTPATIENT
Start: 2018-09-11 | End: 2018-09-11

## 2018-09-11 RX ORDER — ONDANSETRON 8 MG/1
8 TABLET, ORALLY DISINTEGRATING ORAL EVERY 8 HOURS PRN
Qty: 20 TABLET | Refills: 1 | Status: SHIPPED | OUTPATIENT
Start: 2018-09-11 | End: 2020-03-09 | Stop reason: CLARIF

## 2018-09-11 RX ORDER — SULINDAC 200 MG/1
200 TABLET ORAL 2 TIMES DAILY PRN
Qty: 30 TABLET | Refills: 1 | Status: SHIPPED | OUTPATIENT
Start: 2018-09-11 | End: 2019-04-04

## 2018-09-11 RX ADMIN — DEXAMETHASONE SODIUM PHOSPHATE 8 MG: 4 INJECTION, SOLUTION INTRAMUSCULAR; INTRAVENOUS at 08:09

## 2018-09-12 ENCOUNTER — HOSPITAL ENCOUNTER (OUTPATIENT)
Dept: RADIOLOGY | Facility: HOSPITAL | Age: 68
Discharge: HOME | End: 2018-09-12
Attending: ORTHOPAEDIC SURGERY
Payer: MEDICARE

## 2018-09-12 ENCOUNTER — OFFICE VISIT (OUTPATIENT)
Dept: ORTHOPEDICS | Facility: CLINIC | Age: 68
End: 2018-09-12
Payer: MEDICARE

## 2018-09-12 VITALS — BODY MASS INDEX: 30.78 KG/M2 | WEIGHT: 215 LBS | HEIGHT: 70 IN

## 2018-09-12 DIAGNOSIS — M19.012 LOCALIZED OSTEOARTHROSIS, SHOULDER REGION, LEFT: Primary | ICD-10-CM

## 2018-09-12 DIAGNOSIS — M25.512 LEFT SHOULDER PAIN, UNSPECIFIED CHRONICITY: ICD-10-CM

## 2018-09-12 PROCEDURE — 73030 X-RAY EXAM OF SHOULDER: CPT | Mod: TC,PO,LT

## 2018-09-12 PROCEDURE — 99212 OFFICE O/P EST SF 10 MIN: CPT | Mod: PBBFAC,25,PO | Performed by: ORTHOPAEDIC SURGERY

## 2018-09-12 PROCEDURE — 99203 OFFICE O/P NEW LOW 30 MIN: CPT | Mod: S$PBB,,, | Performed by: ORTHOPAEDIC SURGERY

## 2018-09-12 PROCEDURE — 73030 X-RAY EXAM OF SHOULDER: CPT | Mod: 26,LT,, | Performed by: RADIOLOGY

## 2018-09-12 PROCEDURE — 1101F PT FALLS ASSESS-DOCD LE1/YR: CPT | Mod: CPTII,,, | Performed by: ORTHOPAEDIC SURGERY

## 2018-09-12 PROCEDURE — 99999 PR PBB SHADOW E&M-EST. PATIENT-LVL II: CPT | Mod: PBBFAC,,, | Performed by: ORTHOPAEDIC SURGERY

## 2018-09-12 NOTE — LETTER
September 12, 2018      Joseluis Posada NP  [address] [facility] [address]           [place] [facility]  [address] [facility] [address]  Phone: [phone]          Patient: Yasir Jeffery   MR Number: [id number]   YOB: 1950   Date of Visit: 9/12/2018       Dear Joseluis Posada:    Thank you for referring Yasir Jeffery to me for evaluation. Attached you will find relevant portions of my assessment and plan of care.    If you have questions, please do not hesitate to call me. I look forward to following Yasir Jeffery along with you.    Sincerely,    Ritchie Patel MD    Enclosure  CC:  No Recipients    If you would like to receive this communication electronically, please contact [email or web address] or [phone] to request more information on [employer] access.    For providers and/or their staff who would like to refer a patient to [facility], please contact us through our one-stop-shop provider referral line, [facility], at [phone].    If you feel you have received this communication in error or would no longer like to receive these types of communications, please e-mail [email or web address]

## 2018-09-12 NOTE — PROGRESS NOTES
DATE: 9/12/2018  PATIENT: Yasir Jeffery  REFERRING MD:   CHIEF COMPLAINT:   Chief Complaint   Patient presents with    Left Shoulder - Pain       HISTORY:  Yasir Jeffery is a 68 y.o. right hand dominant male  who presents for initial evaluation of left shoulder pain.  Notes a 1 year history without trauma.  He was seen a year ago and had a cortisone injection which seemed to help.  He states his symptoms are intermittent.  They occur with activity.  He notes pain on the superior lateral aspect of his shoulder.  Denies any neck pain. Denies any numbness, tingling or weakness to the time upper extremity.  He presents today for evaluation.  States he takes no medicine for this problem as he is not good with medicines and .  He is retired [employer] .  Pain is reported at 0/10 at rest but increases with activity.    PAST MEDICAL/SURGICAL HISTORY:  Past Medical History:   Diagnosis Date    Back pain     Benign prostate hyperplasia     DDD (degenerative disc disease), thoracolumbar     Disorder of kidney and ureter     stage 3    Glaucoma     Hypertension     Metabolic syndrome     Mixed hyperlipidemia 1/24/2018    Obesity     MARIN (obstructive sleep apnea)     Pneumonia     Psychophysiological insomnia      Past Surgical History:   Procedure Laterality Date    CYST REMOVAL  1993    neck    EYE SURGERY      cataract surgery    GANGLION CYST EXCISION Right 1994    TESTICLE SURGERY  1980       Current Medications:   Current Outpatient Medications:     dutasteride (AVODART) 0.5 mg capsule, Take 0.5 mg by mouth once daily. , Disp: , Rfl:     latanoprost 0.005 % ophthalmic solution, PUT ONE DROP IN THE RIGHT EYE AT BEDTIME, Disp: , Rfl: 6    meclizine (ANTIVERT) 12.5 mg tablet, Take 12.5 mg by mouth 3 (three) times daily as needed., Disp: , Rfl:     ondansetron (ZOFRAN-ODT) 8 MG TbDL, Take 1 tablet (8 mg total) by mouth every 8 (eight) hours as needed., Disp: 20 tablet, Rfl: 1     "sulindac (CLINORIL) 200 MG Tab, Take 1 tablet (200 mg total) by mouth 2 (two) times daily as needed., Disp: 30 tablet, Rfl: 1    Family History: family history was reviewed and is noncontributory  Social History:   Social History     Socioeconomic History    Marital status:      Spouse name: Not on file    Number of children: Not on file    Years of education: Not on file    Highest education level: Not on file   Social Needs    Financial resource strain: Not on file    Food insecurity - worry: Not on file    Food insecurity - inability: Not on file    Transportation needs - medical: Not on file    Transportation needs - non-medical: Not on file   Occupational History    Not on file   Tobacco Use    Smoking status: Former Smoker     Types: Cigars     Last attempt to quit:      Years since quittin.7    Smokeless tobacco: Never Used   Substance and Sexual Activity    Alcohol use: No    Drug use: No    Sexual activity: Not on file   Other Topics Concern    Not on file   Social History Narrative    Not on file       ROS:  Constitution: Negative for chills, fever, and sweats. Negative for unexplained weight loss.  HENT: Negative for headaches and blurry vision.   Cardiovascular: Negative for chest pain, irregular heartbeat, leg swelling and palpitations.   Respiratory: Negative for cough and shortness of breath.   Gastrointestinal: Negative for abdominal pain, heartburn, nausea and vomiting.   Genitourinary: Negative for bladder incontinence and dysuria.   Musculoskeletal: Negative for systemic arthritis, joint swelling, muscle weakness and myalgias.   Neurological: Negative for numbness.   Psychiatric/Behavioral: Negative for depression.   Endocrine: Negative for polyuria.   Hematologic/Lymphatic: Negative for bleeding disorders.  Skin: Negative for poor wound healing.       PHYSICAL EXAM:  Ht 5' 10" (1.778 m)   Wt 97.5 kg (215 lb)   BMI 30.85 kg/m²   Yasir Jeffery is a well " developed, well nourished male in no acute distress. Physical examination of the left shoulder evaluated the following:    Inspection, palpation and ROM of the cervical spine  Disc compression testing bilaterally  Inspection for swelling, ecchymosis, erythema, deformity and atrophy  Tenderness to palpation of the soft tissue and bony structures  Active and passive range of motion  Sensation of the shoulder and upper extremity  Motor strength in the deltoid, supraspinatus, internal rotators and external rotators  Impingement, apprehension, relocation and Speed's tests  Upper extremity vascular exam (skin temp,color, capillary refill)  Inspection for pseudomotor signs    Remarkable findings included:  Full range of motion cervical spine. Negative disc compression sign.  Examination left shoulder reveals range of motion is 160° of forward elevation, 160° of abduction, external rotation abducted 90° is near 90°, internal rotation to L1.  Motor strength 5/5 in the supraspinatus and external rotators.  Negative impingement sign.  Positive crossover sign.          IMAGING:   X-rays of the left shoulder personally reviewed.  No acute fractures or dislocations are seen.  Significant degenerative changes at the acromioclavicular joint are noted. Moderate degenerative changes also seen at the glenohumeral joint with an inferior humeral head osteophyte.      ASSESSMENT:   Osteoarthrosis left glenohumeral and acromioclavicular joints    PLAN:  The nature of the diagnosis, using models and diagrams when appropriate, was explained to the patient and his wife in detail.  I have explained that I think most of his pain is coming from the acromioclavicular joint.  Treatment options discussed included observation, physical therapy, cortisone injection to the acromioclavicular joint.. All questions answered and the patient wishes to observe his symptoms at present time.  Activities may otherwise be performed as tolerated.  Should his  symptoms worsen, he will return for consideration of ultrasound-guided cortisone injection to the acromioclavicular joint.  Follow-up if not improving or worse.      This note was dictated using voice recognition software. Please excuse any grammatical or typographical errors.

## 2018-12-06 ENCOUNTER — OFFICE VISIT (OUTPATIENT)
Dept: ORTHOPEDICS | Facility: CLINIC | Age: 68
End: 2018-12-06
Payer: MEDICARE

## 2018-12-06 VITALS — WEIGHT: 215 LBS | BODY MASS INDEX: 30.78 KG/M2 | HEIGHT: 70 IN

## 2018-12-06 DIAGNOSIS — M19.012 LOCALIZED OSTEOARTHROSIS, SHOULDER REGION, LEFT: Primary | ICD-10-CM

## 2018-12-06 PROCEDURE — 99213 OFFICE O/P EST LOW 20 MIN: CPT | Mod: 25,HCWC,S$GLB, | Performed by: ORTHOPAEDIC SURGERY

## 2018-12-06 PROCEDURE — 99999 PR PBB SHADOW E&M-EST. PATIENT-LVL II: CPT | Mod: PBBFAC,HCWC,, | Performed by: ORTHOPAEDIC SURGERY

## 2018-12-06 PROCEDURE — 20610 DRAIN/INJ JOINT/BURSA W/O US: CPT | Mod: HCWC,LT,S$GLB, | Performed by: ORTHOPAEDIC SURGERY

## 2018-12-06 PROCEDURE — 1101F PT FALLS ASSESS-DOCD LE1/YR: CPT | Mod: CPTII,HCWC,S$GLB, | Performed by: ORTHOPAEDIC SURGERY

## 2018-12-06 RX ORDER — TRIAMCINOLONE ACETONIDE 40 MG/ML
40 INJECTION, SUSPENSION INTRA-ARTICULAR; INTRAMUSCULAR
Status: DISCONTINUED | OUTPATIENT
Start: 2018-12-06 | End: 2018-12-06 | Stop reason: HOSPADM

## 2018-12-06 RX ADMIN — TRIAMCINOLONE ACETONIDE 40 MG: 40 INJECTION, SUSPENSION INTRA-ARTICULAR; INTRAMUSCULAR at 01:12

## 2018-12-06 NOTE — PROGRESS NOTES
"DATE: 12/6/2018  PATIENT: Yasir Jeffery    Attending Physician: Ritchie Patel M.D.    HISTORY:  Yasir Jeffery is a 68 y.o. male who returns for follow up evaluation of  his left shoulder.  He was seen in September and diagnosed with osteoarthrosis of both the glenohumeral and acromioclavicular joints.  States his pain is got worse.  Strap going down his arm.  He notes occasional numbness tingling.  He denies any neck pain.  Pain is reported at 2/10 today    PMH/PSH/FamHx/SocHx:  Reviewed and unchanged from prior visit    ROS:  Constitution: Negative for chills, fever, and sweats. Negative for unexplained weight loss.  HENT: Negative for headaches and blurry vision.   Cardiovascular: Negative for chest pain, irregular heartbeat, leg swelling and palpitations.   Respiratory: Negative for cough and shortness of breath.   Gastrointestinal: Negative for abdominal pain, heartburn, nausea and vomiting.   Genitourinary: Negative for bladder incontinence and dysuria.   Musculoskeletal: Negative for systemic arthritis, joint swelling, muscle weakness and myalgias.   Neurological: Negative for numbness.   Psychiatric/Behavioral: Negative for depression.   Endocrine: Negative for polyuria.   Hematologic/Lymphatic: Negative for bleeding disorders.  Skin: Negative for poor wound healing.       EXAM:  Ht 5' 10" (1.778 m)   Wt 97.5 kg (215 lb)   BMI 30.85 kg/m²   Yasir Jeffery is a well developed, well nourished male in no acute distress. Physical examination of the left shoulder evaluated the following:    Inspection, palpation and ROM of the cervical spine  Disc compression testing bilaterally  Inspection for swelling, ecchymosis, erythema, deformity and atrophy  Tenderness to palpation of the soft tissue and bony structures  Active and passive range of motion  Sensation of the shoulder and upper extremity  Motor strength in the deltoid, supraspinatus, internal rotators and external rotators  Impingement, " apprehension, relocation and Speed's tests  Upper extremity vascular exam (skin temp,color, capillary refill)  Inspection for pseudomotor signs    Remarkable findings included:  Full range of motion cervical spine. Negative disc compression sign.  Examination left shoulder reveals range of motion is 160° of forward elevation, 160° of abduction, external rotation abducted 90° is near 90°, internal rotation to L1.  Motor strength 5/5 in the supraspinatus and external rotators.  Negative impingement sign.  No tenderness at the acromioclavicular joint.  Mild crepitus with range of motion of the glenohumeral joint.  Negative crossover sign    IMAGING:   No new x-rays are performed today    ASSESSMENT:  .Osteoarthrosis left glenohumeral and acromioclavicular joints    PLAN:  The implications of the patient's evolution of symptoms and findings were explained to the patient and his wife in detail.  I have explained that examination today does suggest more glenohumeral arthrosis.  I recommended cortisone injection performed to the glenohumeral joint (performed today).  A monitor symptoms over the next few weeks.  Follow-up if not improving or worse    This note was dictated using voice recognition software. Please excuse any grammatical or typographical errors.

## 2018-12-06 NOTE — PROCEDURES
Large Joint Aspiration/Injection: L glenohumeral  Date/Time: 12/6/2018 1:42 PM  Performed by: Ritchie Patel MD  Authorized by: Ritchie Patel MD     Consent Done?:  Yes (Verbal)  Indications:  Pain  Procedure site marked: Yes    Timeout: Prior to procedure the correct patient, procedure, and site was verified      Location:  Shoulder  Site:  L glenohumeral  Prep: Patient was prepped and draped in usual sterile fashion    Ultrasonic Guidance for needle placement: No  Needle size:  22 G  Approach:  Anterior  Medications:  40 mg triamcinolone acetonide 40 mg/mL  Patient tolerance:  Patient tolerated the procedure well with no immediate complications

## 2019-01-03 RX ORDER — MECLIZINE HCL 12.5 MG 12.5 MG/1
12.5 TABLET ORAL 3 TIMES DAILY PRN
Qty: 30 TABLET | Refills: 3 | Status: SHIPPED | OUTPATIENT
Start: 2019-01-03 | End: 2019-01-04 | Stop reason: SDUPTHER

## 2019-01-03 NOTE — TELEPHONE ENCOUNTER
----- Message from Guillermina Sullivan sent at 1/3/2019  2:19 PM CST -----  Contact: Lina Jeffery/wife   1. What is the name of the medication you are requesting? antivert  2. What is the dose? ?  3. How do you take the medication? Orally, topically, etc? orally  4. How often do you take this medication? As needed  5. Do you need a 30 day or 90 day supply? ?  6. How many refills are you requesting? ?  7. What is your preferred pharmacy and location of the pharmacy?   [facility] [place] [address] [address]  Phone: [phone] Fax: [phone]  8. Who can we contact with further questions? Lina Latia at [phone]  Thank you

## 2019-01-04 RX ORDER — MECLIZINE HCL 12.5 MG 12.5 MG/1
12.5 TABLET ORAL 3 TIMES DAILY PRN
Qty: 90 TABLET | Refills: 3 | Status: SHIPPED | OUTPATIENT
Start: 2019-01-04 | End: 2020-02-24 | Stop reason: SDUPTHER

## 2019-01-04 NOTE — TELEPHONE ENCOUNTER
----- Message from Carolee Morales sent at 1/4/2019 10:46 AM CST -----  Contact: pt   Pt is requesting a call back from the nurse in regards to the pt med meclizine being moved to   [phone]      1. What is the name of the medication you are requesting? meclizine   2. What is the dose? 1  3. How do you take the medication? Orally, topically, etc? Orally  4. How often do you take this medication? As needed  5. Do you need a 30 day or 90 day supply? 90 day   6. How many refills are you requesting? Per    7. What is your preferred pharmacy and location of the pharmacy?    [facility] - [place], [place] - [address]  [address] [facility] [address]  Phone: [phone] Fax: [phone]    8. Who can we contact with further questions? Pt

## 2019-01-09 ENCOUNTER — TELEPHONE (OUTPATIENT)
Dept: FAMILY MEDICINE | Facility: CLINIC | Age: 69
End: 2019-01-09

## 2019-01-09 NOTE — TELEPHONE ENCOUNTER
----- Message from London Brown sent at 1/9/2019 10:21 AM CST -----  Contact: Lina Jeffery (Spouse)  Lina Jeffery (Spouse) is requesting a call from nurse to f/u on a prescription PA          Please call Lina Jeffery (Spouse) [phone]

## 2019-01-09 NOTE — TELEPHONE ENCOUNTER
Advised pts wife that we had not gotten a PA request on pts meclizine, states she spoke to [facility] this morning and they were going to send it. Advised her that we would continue to watch for it.

## 2019-03-27 ENCOUNTER — TELEPHONE (OUTPATIENT)
Dept: FAMILY MEDICINE | Facility: CLINIC | Age: 69
End: 2019-03-27

## 2019-04-04 ENCOUNTER — OFFICE VISIT (OUTPATIENT)
Dept: FAMILY MEDICINE | Facility: CLINIC | Age: 69
End: 2019-04-04
Payer: MEDICARE

## 2019-04-04 VITALS
HEIGHT: 70 IN | BODY MASS INDEX: 30.52 KG/M2 | HEART RATE: 55 BPM | DIASTOLIC BLOOD PRESSURE: 73 MMHG | SYSTOLIC BLOOD PRESSURE: 125 MMHG | WEIGHT: 213.19 LBS | TEMPERATURE: 99 F

## 2019-04-04 DIAGNOSIS — J02.9 SORE THROAT: ICD-10-CM

## 2019-04-04 DIAGNOSIS — J06.9 UPPER RESPIRATORY TRACT INFECTION, UNSPECIFIED TYPE: Primary | ICD-10-CM

## 2019-04-04 LAB — DEPRECATED S PYO AG THROAT QL EIA: NEGATIVE

## 2019-04-04 PROCEDURE — 87880 STREP A ASSAY W/OPTIC: CPT | Mod: HCNC,PO

## 2019-04-04 PROCEDURE — 96372 THER/PROPH/DIAG INJ SC/IM: CPT | Mod: HCNC,S$GLB,, | Performed by: NURSE PRACTITIONER

## 2019-04-04 PROCEDURE — 3078F PR MOST RECENT DIASTOLIC BLOOD PRESSURE < 80 MM HG: ICD-10-PCS | Mod: HCNC,CPTII,S$GLB, | Performed by: NURSE PRACTITIONER

## 2019-04-04 PROCEDURE — 99999 PR PBB SHADOW E&M-EST. PATIENT-LVL III: ICD-10-PCS | Mod: PBBFAC,HCNC,, | Performed by: NURSE PRACTITIONER

## 2019-04-04 PROCEDURE — 3074F PR MOST RECENT SYSTOLIC BLOOD PRESSURE < 130 MM HG: ICD-10-PCS | Mod: HCNC,CPTII,S$GLB, | Performed by: NURSE PRACTITIONER

## 2019-04-04 PROCEDURE — 1101F PR PT FALLS ASSESS DOC 0-1 FALLS W/OUT INJ PAST YR: ICD-10-PCS | Mod: HCNC,CPTII,S$GLB, | Performed by: NURSE PRACTITIONER

## 2019-04-04 PROCEDURE — 3078F DIAST BP <80 MM HG: CPT | Mod: HCNC,CPTII,S$GLB, | Performed by: NURSE PRACTITIONER

## 2019-04-04 PROCEDURE — 99213 OFFICE O/P EST LOW 20 MIN: CPT | Mod: 25,HCNC,S$GLB, | Performed by: NURSE PRACTITIONER

## 2019-04-04 PROCEDURE — 87081 CULTURE SCREEN ONLY: CPT | Mod: HCNC

## 2019-04-04 PROCEDURE — 3074F SYST BP LT 130 MM HG: CPT | Mod: HCNC,CPTII,S$GLB, | Performed by: NURSE PRACTITIONER

## 2019-04-04 PROCEDURE — 1101F PT FALLS ASSESS-DOCD LE1/YR: CPT | Mod: HCNC,CPTII,S$GLB, | Performed by: NURSE PRACTITIONER

## 2019-04-04 PROCEDURE — 99213 PR OFFICE/OUTPT VISIT, EST, LEVL III, 20-29 MIN: ICD-10-PCS | Mod: 25,HCNC,S$GLB, | Performed by: NURSE PRACTITIONER

## 2019-04-04 PROCEDURE — 96372 PR INJECTION,THERAP/PROPH/DIAG2ST, IM OR SUBCUT: ICD-10-PCS | Mod: HCNC,S$GLB,, | Performed by: NURSE PRACTITIONER

## 2019-04-04 PROCEDURE — 99999 PR PBB SHADOW E&M-EST. PATIENT-LVL III: CPT | Mod: PBBFAC,HCNC,, | Performed by: NURSE PRACTITIONER

## 2019-04-04 RX ORDER — ALBUTEROL SULFATE 90 UG/1
2 AEROSOL, METERED RESPIRATORY (INHALATION) EVERY 6 HOURS PRN
Qty: 1 INHALER | Refills: 0 | Status: SHIPPED | OUTPATIENT
Start: 2019-04-04 | End: 2020-03-09 | Stop reason: CLARIF

## 2019-04-04 RX ORDER — LIDOCAINE HYDROCHLORIDE 20 MG/ML
SOLUTION OROPHARYNGEAL EVERY 8 HOURS PRN
Qty: 1 BOTTLE | Refills: 0 | Status: SHIPPED | OUTPATIENT
Start: 2019-04-04 | End: 2019-04-11

## 2019-04-04 RX ORDER — BENZONATATE 200 MG/1
200 CAPSULE ORAL 3 TIMES DAILY PRN
Qty: 30 CAPSULE | Refills: 0 | Status: SHIPPED | OUTPATIENT
Start: 2019-04-04 | End: 2019-04-14

## 2019-04-04 RX ORDER — DEXAMETHASONE SODIUM PHOSPHATE 4 MG/ML
8 INJECTION, SOLUTION INTRA-ARTICULAR; INTRALESIONAL; INTRAMUSCULAR; INTRAVENOUS; SOFT TISSUE
Status: COMPLETED | OUTPATIENT
Start: 2019-04-04 | End: 2019-04-04

## 2019-04-04 RX ADMIN — DEXAMETHASONE SODIUM PHOSPHATE 8 MG: 4 INJECTION, SOLUTION INTRA-ARTICULAR; INTRALESIONAL; INTRAMUSCULAR; INTRAVENOUS; SOFT TISSUE at 10:04

## 2019-04-04 NOTE — PROGRESS NOTES
Subjective:       Patient ID: Yasir Jeffery is a 68 y.o. male.    Chief Complaint: Nasal Congestion; Cough; Chest Congestion; and Sore Throat    URI    This is a new problem. The current episode started in the past 7 days. The problem has been unchanged. There has been no fever. Associated symptoms include congestion, coughing, rhinorrhea, a sore throat and wheezing. Pertinent negatives include no abdominal pain, chest pain, diarrhea, dysuria, ear pain, headaches, joint pain, joint swelling, nausea, neck pain, plugged ear sensation, rash, sinus pain, sneezing, swollen glands or vomiting. He has tried nothing for the symptoms. The treatment provided no relief.     Past Medical History:   Diagnosis Date    Back pain     Benign prostate hyperplasia     DDD (degenerative disc disease), thoracolumbar     Disorder of kidney and ureter     stage 3    Glaucoma     Hypertension     Metabolic syndrome     Mixed hyperlipidemia 2018    Obesity     MARIN (obstructive sleep apnea)     Pneumonia     Psychophysiological insomnia      Social History     Socioeconomic History    Marital status:      Spouse name: Not on file    Number of children: Not on file    Years of education: Not on file    Highest education level: Not on file   Occupational History    Not on file   Social Needs    Financial resource strain: Not on file    Food insecurity:     Worry: Not on file     Inability: Not on file    Transportation needs:     Medical: Not on file     Non-medical: Not on file   Tobacco Use    Smoking status: Former Smoker     Types: Cigars     Last attempt to quit:      Years since quittin.2    Smokeless tobacco: Never Used   Substance and Sexual Activity    Alcohol use: No    Drug use: No    Sexual activity: Not on file   Lifestyle    Physical activity:     Days per week: Not on file     Minutes per session: Not on file    Stress: Not on file   Relationships    Social connections:      Talks on phone: Not on file     Gets together: Not on file     Attends Ten[employer] service: Not on file     Active member of club or organization: Not on file     Attends meetings of clubs or organizations: Not on file     Relationship status: Not on file   Other Topics Concern    Not on file   Social History Narrative    Not on file     Past Surgical History:   Procedure Laterality Date    CYST REMOVAL  1993    neck    EYE SURGERY      cataract surgery    GANGLION CYST EXCISION Right 1994    TESTICLE SURGERY  1980       Review of Systems   Constitutional: Negative.    HENT: Positive for congestion, postnasal drip, rhinorrhea and sore throat. Negative for ear pain, sinus pain and sneezing.    Eyes: Negative.    Respiratory: Positive for cough and wheezing.    Cardiovascular: Negative.  Negative for chest pain.   Gastrointestinal: Negative.  Negative for abdominal pain, diarrhea, nausea and vomiting.   Endocrine: Negative.    Genitourinary: Negative.  Negative for dysuria.   Musculoskeletal: Negative.  Negative for joint pain and neck pain.   Skin: Negative.  Negative for rash.   Allergic/Immunologic: Negative.    Neurological: Negative.  Negative for headaches.   Psychiatric/Behavioral: Negative.        Objective:      Physical Exam   Constitutional: He is oriented to person, place, and time. He appears well-developed and well-nourished.   HENT:   Head: Normocephalic.   Right Ear: Hearing, tympanic membrane, external ear and ear canal normal.   Left Ear: Hearing, tympanic membrane, external ear and ear canal normal.   Nose: Rhinorrhea present. Right sinus exhibits no maxillary sinus tenderness and no frontal sinus tenderness. Left sinus exhibits no maxillary sinus tenderness and no frontal sinus tenderness.   Mouth/Throat: Uvula is midline and mucous membranes are normal. Posterior oropharyngeal erythema present.   Eyes: Pupils are equal, round, and reactive to light. Conjunctivae are normal.   Neck: Normal  range of motion. Neck supple.   Cardiovascular: Normal rate, regular rhythm and normal heart sounds.   Pulmonary/Chest: Effort normal and breath sounds normal.   Abdominal: Soft. Bowel sounds are normal.   Musculoskeletal: Normal range of motion.   Neurological: He is alert and oriented to person, place, and time.   Skin: Skin is warm and dry. Capillary refill takes 2 to 3 seconds.   Psychiatric: He has a normal mood and affect. His behavior is normal. Judgment and thought content normal.   Nursing note and vitals reviewed.      Assessment:       1. Upper respiratory tract infection, unspecified type    2. Sore throat        Plan:           Yasir was seen today for nasal congestion, cough, chest congestion and sore throat.    Diagnoses and all orders for this visit:    Upper respiratory tract infection, unspecified type  Sore throat  -     Throat Screen, Rapid  -     dexamethasone injection 8 mg  -     albuterol (PROAIR HFA) 90 mcg/actuation inhaler; Inhale 2 puffs into the lungs every 6 (six) hours as needed for Wheezing. Rescue  -     benzonatate (TESSALON) 200 MG capsule; Take 1 capsule (200 mg total) by mouth 3 (three) times daily as needed.  -     lidocaine HCl 2% (XYLOCAINE) 2 % Soln; by Mucous Membrane route every 8 (eight) hours as needed. Gargle and spit. Do not swallow  Mucinex OTC as directed  Hydrate well  Tylenol OTC as directed  Report to ER immediately if symptoms worsen

## 2019-04-04 NOTE — PATIENT INSTRUCTIONS
Mucinex OTC as directed  Hydrate well  Tylenol OTC as directed  Report to ER immediately if symptoms worsen

## 2019-04-06 LAB — BACTERIA THROAT CULT: NORMAL

## 2019-06-07 ENCOUNTER — PATIENT OUTREACH (OUTPATIENT)
Dept: ADMINISTRATIVE | Facility: HOSPITAL | Age: 69
End: 2019-06-07

## 2019-06-07 ENCOUNTER — OFFICE VISIT (OUTPATIENT)
Dept: FAMILY MEDICINE | Facility: CLINIC | Age: 69
End: 2019-06-07
Payer: MEDICARE

## 2019-06-07 VITALS
HEART RATE: 58 BPM | TEMPERATURE: 98 F | SYSTOLIC BLOOD PRESSURE: 122 MMHG | DIASTOLIC BLOOD PRESSURE: 72 MMHG | WEIGHT: 204 LBS | BODY MASS INDEX: 29.2 KG/M2 | HEIGHT: 70 IN

## 2019-06-07 DIAGNOSIS — M19.90 OSTEOARTHRITIS, UNSPECIFIED OSTEOARTHRITIS TYPE, UNSPECIFIED SITE: ICD-10-CM

## 2019-06-07 DIAGNOSIS — Z23 IMMUNIZATION DUE: ICD-10-CM

## 2019-06-07 DIAGNOSIS — M50.30 DDD (DEGENERATIVE DISC DISEASE), CERVICAL: ICD-10-CM

## 2019-06-07 DIAGNOSIS — G47.61 PERIODIC LIMB MOVEMENT DISORDER (PLMD): ICD-10-CM

## 2019-06-07 DIAGNOSIS — N18.30 CKD (CHRONIC KIDNEY DISEASE) STAGE 3, GFR 30-59 ML/MIN: ICD-10-CM

## 2019-06-07 DIAGNOSIS — M51.34 DDD (DEGENERATIVE DISC DISEASE), THORACIC: ICD-10-CM

## 2019-06-07 DIAGNOSIS — N40.1 BENIGN PROSTATIC HYPERPLASIA WITH WEAK URINARY STREAM: ICD-10-CM

## 2019-06-07 DIAGNOSIS — E66.9 OBESITY (BMI 30-39.9): ICD-10-CM

## 2019-06-07 DIAGNOSIS — G47.33 OSA (OBSTRUCTIVE SLEEP APNEA): ICD-10-CM

## 2019-06-07 DIAGNOSIS — M51.36 DDD (DEGENERATIVE DISC DISEASE), LUMBAR: ICD-10-CM

## 2019-06-07 DIAGNOSIS — R97.20 ELEVATED PSA: ICD-10-CM

## 2019-06-07 DIAGNOSIS — F51.04 PSYCHOPHYSIOLOGICAL INSOMNIA: ICD-10-CM

## 2019-06-07 DIAGNOSIS — E78.2 MIXED HYPERLIPIDEMIA: Primary | Chronic | ICD-10-CM

## 2019-06-07 DIAGNOSIS — E88.810 METABOLIC SYNDROME: ICD-10-CM

## 2019-06-07 DIAGNOSIS — R39.12 BENIGN PROSTATIC HYPERPLASIA WITH WEAK URINARY STREAM: ICD-10-CM

## 2019-06-07 PROCEDURE — 99999 PR PBB SHADOW E&M-EST. PATIENT-LVL III: CPT | Mod: PBBFAC,HCNC,, | Performed by: NURSE PRACTITIONER

## 2019-06-07 PROCEDURE — 90670 PNEUMOCOCCAL CONJUGATE VACCINE 13-VALENT LESS THAN 5YO & GREATER THAN: ICD-10-PCS | Mod: HCNC,S$GLB,, | Performed by: NURSE PRACTITIONER

## 2019-06-07 PROCEDURE — 99999 PR PBB SHADOW E&M-EST. PATIENT-LVL III: ICD-10-PCS | Mod: PBBFAC,HCNC,, | Performed by: NURSE PRACTITIONER

## 2019-06-07 PROCEDURE — G0009 ADMIN PNEUMOCOCCAL VACCINE: HCPCS | Mod: HCNC,S$GLB,, | Performed by: NURSE PRACTITIONER

## 2019-06-07 PROCEDURE — 99499 UNLISTED E&M SERVICE: CPT | Mod: HCNC,S$GLB,, | Performed by: NURSE PRACTITIONER

## 2019-06-07 PROCEDURE — G0439 PPPS, SUBSEQ VISIT: HCPCS | Mod: HCNC,S$GLB,, | Performed by: NURSE PRACTITIONER

## 2019-06-07 PROCEDURE — 99499 RISK ADDL DX/OHS AUDIT: ICD-10-PCS | Mod: HCNC,S$GLB,, | Performed by: NURSE PRACTITIONER

## 2019-06-07 PROCEDURE — 3078F PR MOST RECENT DIASTOLIC BLOOD PRESSURE < 80 MM HG: ICD-10-PCS | Mod: HCNC,CPTII,S$GLB, | Performed by: NURSE PRACTITIONER

## 2019-06-07 PROCEDURE — G0439 PR MEDICARE ANNUAL WELLNESS SUBSEQUENT VISIT: ICD-10-PCS | Mod: HCNC,S$GLB,, | Performed by: NURSE PRACTITIONER

## 2019-06-07 PROCEDURE — 90670 PCV13 VACCINE IM: CPT | Mod: HCNC,S$GLB,, | Performed by: NURSE PRACTITIONER

## 2019-06-07 PROCEDURE — 3074F PR MOST RECENT SYSTOLIC BLOOD PRESSURE < 130 MM HG: ICD-10-PCS | Mod: HCNC,CPTII,S$GLB, | Performed by: NURSE PRACTITIONER

## 2019-06-07 PROCEDURE — G0009 PNEUMOCOCCAL CONJUGATE VACCINE 13-VALENT LESS THAN 5YO & GREATER THAN: ICD-10-PCS | Mod: HCNC,S$GLB,, | Performed by: NURSE PRACTITIONER

## 2019-06-07 PROCEDURE — 3074F SYST BP LT 130 MM HG: CPT | Mod: HCNC,CPTII,S$GLB, | Performed by: NURSE PRACTITIONER

## 2019-06-07 PROCEDURE — 3078F DIAST BP <80 MM HG: CPT | Mod: HCNC,CPTII,S$GLB, | Performed by: NURSE PRACTITIONER

## 2019-06-07 NOTE — PROGRESS NOTES
"Yasir Jeffery presented for a  Medicare AWV and comprehensive Health Risk Assessment today. The following components were reviewed and updated:    · Medical history  · Family History  · Social history  · Allergies and Current Medications  · Health Risk Assessment  · Health Maintenance  · Care Team     ** See Completed Assessments for Annual Wellness Visit within the encounter summary.**       The following assessments were completed:  · Living Situation  · CAGE  · Depression Screening  · Timed Get Up and Go  · Whisper Test  · Cognitive Function Screening  · Nutrition Screening  · ADL Screening  · PAQ Screening    Vitals:    06/07/19 1122   BP: 122/72   Pulse: (!) 58   Temp: 98 °F (36.7 °C)   TempSrc: Oral   Weight: 92.5 kg (204 lb)   Height: 5' 10" (1.778 m)     Body mass index is 29.27 kg/m².  Physical Exam   Constitutional: He is oriented to person, place, and time. He appears well-developed. No distress.   HENT:   Head: Normocephalic and atraumatic.   Eyes: Pupils are equal, round, and reactive to light. EOM are normal.   Neck: Normal range of motion. Neck supple.   Cardiovascular: Normal rate and regular rhythm.   Pulmonary/Chest: Effort normal and breath sounds normal.   Musculoskeletal: Normal range of motion.   Neurological: He is alert and oriented to person, place, and time.   Skin: Skin is warm and dry. No rash noted.   Psychiatric: He has a normal mood and affect. Thought content normal.   Nursing note and vitals reviewed.        Diagnoses and health risks identified today and associated recommendations/orders:    1. Mixed hyperlipidemia  Stable on no medications, will continue to monitor labs    2. Metabolic syndrome  Stable on no medications, will continue to monitor labs    3. Obesity (BMI 30-39.9)  Stable-encourage healthy diet choices and cardiovascular activities    4. Osteoarthritis, unspecified osteoarthritis type, unspecified site  Stable-follow up for worsening symptoms    5. MARIN (obstructive " sleep apnea)  Stable-follow up for worsening symptoms    6. Periodic limb movement disorder (PLMD)  Stable-follow up for worsening symptoms    7. Psychophysiological insomnia  Stable-follow up for worsening symptoms    8. Elevated PSA  Stable-routine labs and follow-up    9. CKD (chronic kidney disease) stage 3, GFR 30-59 ml/min  Stable-routine labs and follow-up    10. Benign prostatic hyperplasia with weak urinary stream  Stable-follow up for worsening symptoms    11. DDD (degenerative disc disease), thoracic  Stable-follow up for worsening symptoms    12. DDD (degenerative disc disease), lumbar  Stable-follow up for worsening symptoms    13. DDD (degenerative disc disease), cervical  Stable-follow up for worsening symptoms    14. Immunization due    - (In Office Administered) Pneumococcal Conjugate Vaccine (13 Valent) (IM)      Provided Yasir with a 5-10 year written screening schedule and personal prevention plan. Recommendations were developed using the USPSTF age appropriate recommendations. Education, counseling, and referrals were provided as needed. After Visit Summary printed and given to patient which includes a list of additional screenings\tests needed.    Follow up for Routine scheduled appointment.    Joseluis Posada NP

## 2019-06-07 NOTE — PROGRESS NOTES
Health Maintenance reviewed. Colonoscopy dated 3/11/2010 performed by Dr. Ware linked to media and HM updated.

## 2019-06-27 ENCOUNTER — HOSPITAL ENCOUNTER (OUTPATIENT)
Dept: RADIOLOGY | Facility: HOSPITAL | Age: 69
Discharge: HOME | End: 2019-06-27
Attending: FAMILY MEDICINE
Payer: MEDICARE

## 2019-06-27 ENCOUNTER — OFFICE VISIT (OUTPATIENT)
Dept: FAMILY MEDICINE | Facility: CLINIC | Age: 69
End: 2019-06-27
Payer: MEDICARE

## 2019-06-27 VITALS
BODY MASS INDEX: 29.57 KG/M2 | WEIGHT: 206.56 LBS | SYSTOLIC BLOOD PRESSURE: 136 MMHG | TEMPERATURE: 98 F | DIASTOLIC BLOOD PRESSURE: 69 MMHG | OXYGEN SATURATION: 95 % | HEART RATE: 52 BPM | HEIGHT: 70 IN

## 2019-06-27 DIAGNOSIS — R97.20 ELEVATED PSA: ICD-10-CM

## 2019-06-27 DIAGNOSIS — R06.09 DOE (DYSPNEA ON EXERTION): ICD-10-CM

## 2019-06-27 DIAGNOSIS — N41.9 PROSTATITIS, UNSPECIFIED PROSTATITIS TYPE: ICD-10-CM

## 2019-06-27 DIAGNOSIS — N18.30 CKD (CHRONIC KIDNEY DISEASE) STAGE 3, GFR 30-59 ML/MIN: ICD-10-CM

## 2019-06-27 DIAGNOSIS — R63.4 WEIGHT LOSS: ICD-10-CM

## 2019-06-27 DIAGNOSIS — N40.1 BENIGN PROSTATIC HYPERPLASIA WITH WEAK URINARY STREAM: ICD-10-CM

## 2019-06-27 DIAGNOSIS — R39.12 BENIGN PROSTATIC HYPERPLASIA WITH WEAK URINARY STREAM: ICD-10-CM

## 2019-06-27 DIAGNOSIS — M54.50 BILATERAL LOW BACK PAIN WITHOUT SCIATICA, UNSPECIFIED CHRONICITY: Primary | ICD-10-CM

## 2019-06-27 LAB
BACTERIA #/AREA URNS HPF: ABNORMAL /HPF
BILIRUB UR QL STRIP: NEGATIVE
CLARITY UR: CLEAR
COLOR UR: YELLOW
GLUCOSE UR QL STRIP: NEGATIVE
HGB UR QL STRIP: NEGATIVE
KETONES UR QL STRIP: NEGATIVE
LEUKOCYTE ESTERASE UR QL STRIP: ABNORMAL
MICROSCOPIC COMMENT: ABNORMAL
NITRITE UR QL STRIP: NEGATIVE
PH UR STRIP: 6.5 [PH] (ref 5–8)
PROT UR QL STRIP: ABNORMAL
RBC #/AREA URNS HPF: 1 /HPF (ref 0–4)
SP GR UR STRIP: 1.02 (ref 1–1.03)
SQUAMOUS #/AREA URNS HPF: ABNORMAL /HPF
URN SPEC COLLECT METH UR: ABNORMAL
WBC #/AREA URNS HPF: 8 /HPF (ref 0–5)

## 2019-06-27 PROCEDURE — 1101F PT FALLS ASSESS-DOCD LE1/YR: CPT | Mod: HCNC,CPTII,S$GLB, | Performed by: FAMILY MEDICINE

## 2019-06-27 PROCEDURE — 1101F PR PT FALLS ASSESS DOC 0-1 FALLS W/OUT INJ PAST YR: ICD-10-PCS | Mod: HCNC,CPTII,S$GLB, | Performed by: FAMILY MEDICINE

## 2019-06-27 PROCEDURE — 71046 X-RAY EXAM CHEST 2 VIEWS: CPT | Mod: 26,HCNC,, | Performed by: RADIOLOGY

## 2019-06-27 PROCEDURE — 71046 X-RAY EXAM CHEST 2 VIEWS: CPT | Mod: TC,HCNC,PO

## 2019-06-27 PROCEDURE — 81000 URINALYSIS NONAUTO W/SCOPE: CPT | Mod: HCNC,PO

## 2019-06-27 PROCEDURE — 3078F PR MOST RECENT DIASTOLIC BLOOD PRESSURE < 80 MM HG: ICD-10-PCS | Mod: HCNC,CPTII,S$GLB, | Performed by: FAMILY MEDICINE

## 2019-06-27 PROCEDURE — 99999 PR PBB SHADOW E&M-EST. PATIENT-LVL IV: ICD-10-PCS | Mod: PBBFAC,HCNC,, | Performed by: FAMILY MEDICINE

## 2019-06-27 PROCEDURE — 99214 PR OFFICE/OUTPT VISIT, EST, LEVL IV, 30-39 MIN: ICD-10-PCS | Mod: HCNC,S$GLB,, | Performed by: FAMILY MEDICINE

## 2019-06-27 PROCEDURE — 3075F PR MOST RECENT SYSTOLIC BLOOD PRESS GE 130-139MM HG: ICD-10-PCS | Mod: HCNC,CPTII,S$GLB, | Performed by: FAMILY MEDICINE

## 2019-06-27 PROCEDURE — 87086 URINE CULTURE/COLONY COUNT: CPT | Mod: HCNC

## 2019-06-27 PROCEDURE — 99999 PR PBB SHADOW E&M-EST. PATIENT-LVL IV: CPT | Mod: PBBFAC,HCNC,, | Performed by: FAMILY MEDICINE

## 2019-06-27 PROCEDURE — 71046 XR CHEST PA AND LATERAL: ICD-10-PCS | Mod: 26,HCNC,, | Performed by: RADIOLOGY

## 2019-06-27 PROCEDURE — 99214 OFFICE O/P EST MOD 30 MIN: CPT | Mod: HCNC,S$GLB,, | Performed by: FAMILY MEDICINE

## 2019-06-27 PROCEDURE — 3078F DIAST BP <80 MM HG: CPT | Mod: HCNC,CPTII,S$GLB, | Performed by: FAMILY MEDICINE

## 2019-06-27 PROCEDURE — 3075F SYST BP GE 130 - 139MM HG: CPT | Mod: HCNC,CPTII,S$GLB, | Performed by: FAMILY MEDICINE

## 2019-06-27 RX ORDER — SULFAMETHOXAZOLE AND TRIMETHOPRIM 800; 160 MG/1; MG/1
1 TABLET ORAL 2 TIMES DAILY
Qty: 28 TABLET | Refills: 0 | Status: SHIPPED | OUTPATIENT
Start: 2019-06-27 | End: 2019-07-11

## 2019-06-27 RX ORDER — ACETAMINOPHEN 500 MG
1000 TABLET ORAL EVERY 6 HOURS PRN
COMMUNITY
Start: 2019-06-27 | End: 2020-03-09 | Stop reason: CLARIF

## 2019-06-27 NOTE — PROGRESS NOTES
Complains of some bilateral right greater than left lower back pain.  Notice past month, prior history of similar symptoms.  Previous degenerative disease noted..  No injury.  No radiation.  Prior chronic kidney disease which he was concerned about.  Appears to be asymptomatic.  He does get some urinary frequency and decreased flow.  Previously followed by outside Urology due to elevated PSA.  States biopsy with uncertain results.  He would like to see [facility].  Some increase in obstructive symptoms past month.  Noted some dyspnea on exertion for at least a year.  Previous negative nuclear stress test November 2017.  Echocardiogram with mild mitral regurgitation.  He occasionally gets a catching sensation in his chest which lasts less than 10 min and is not exertional.  He has lost weight which he relates to significant diet change past year.    Yasir was seen today for back pain.    Diagnoses and all orders for this visit:    Bilateral low back pain without sciatica, unspecified chronicity  -     Urinalysis    Benign prostatic hyperplasia with weak urinary stream  -     Ambulatory referral to Urology    Elevated PSA  -     Ambulatory referral to Urology  -     Prostate Specific Antigen, Diagnostic; Future    CKD (chronic kidney disease) stage 3, GFR 30-59 ml/min  -     Basic metabolic panel; Future  -     Ambulatory referral to Urology    Prostatitis, unspecified prostatitis type  -     Urine culture; Future  -     Ambulatory referral to Urology  -     Urine culture    ALVARADO (dyspnea on exertion)  -     X-Ray Chest PA And Lateral; Future  -     CBC auto differential; Future    Weight loss  -     TSH; Future    Other orders  -     Urinalysis Microscopic  -     sulfamethoxazole-trimethoprim 800-160mg (BACTRIM DS) 800-160 mg Tab; Take 1 tablet by mouth 2 (two) times daily. for 14 days  -     acetaminophen (TYLENOL) 500 MG tablet; Take 2 tablets (1,000 mg total) by mouth every 6 (six) hours as needed for  Pain.      Schedule follow-up with PCP next month.  Request last note from his neurologist as well as pathology report.  Will treat for possible prostatitis until he sees the urologist.  Urine analysis today with 8 WBCs otherwise negative.            Past Medical History:  Past Medical History:   Diagnosis Date    Back pain     Benign prostate hyperplasia     DDD (degenerative disc disease), thoracolumbar     Disorder of kidney and ureter     stage 3    Elevated PSA     Glaucoma     Hypertension     Metabolic syndrome     Mixed hyperlipidemia 1/24/2018    Obesity     MARIN (obstructive sleep apnea)     Pneumonia     Psychophysiological insomnia      Past Surgical History:   Procedure Laterality Date    CYST REMOVAL  1993    neck    EYE SURGERY      cataract surgery    GANGLION CYST EXCISION Right 1994    PROSTATE BIOPSY      TESTICLE SURGERY  1980     Review of patient's allergies indicates:   Allergen Reactions    Benadryl allergy decongestant      Opposite reaction, speeds him up   Opposite reaction, speeds him up     Diphenhydramine      Current Outpatient Medications on File Prior to Visit   Medication Sig Dispense Refill    latanoprost 0.005 % ophthalmic solution PUT ONE DROP IN THE RIGHT EYE AT BEDTIME  6    meclizine (ANTIVERT) 12.5 mg tablet Take 1 tablet (12.5 mg total) by mouth 3 (three) times daily as needed. 90 tablet 3    ondansetron (ZOFRAN-ODT) 8 MG TbDL Take 1 tablet (8 mg total) by mouth every 8 (eight) hours as needed. 20 tablet 1    albuterol (PROAIR HFA) 90 mcg/actuation inhaler Inhale 2 puffs into the lungs every 6 (six) hours as needed for Wheezing. Rescue 1 Inhaler 0     No current facility-administered medications on file prior to visit.      Social History     Socioeconomic History    Marital status:      Spouse name: Not on file    Number of children: Not on file    Years of education: Not on file    Highest education level: Not on file   Occupational  "History    Not on file   Social Needs    Financial resource strain: Not on file    Food insecurity:     Worry: Not on file     Inability: Not on file    Transportation needs:     Medical: Not on file     Non-medical: Not on file   Tobacco Use    Smoking status: Former Smoker     Years: 3.00     Types: Cigars     Last attempt to quit:      Years since quittin.5    Smokeless tobacco: Never Used   Substance and Sexual Activity    Alcohol use: No    Drug use: No    Sexual activity: Not on file   Lifestyle    Physical activity:     Days per week: Not on file     Minutes per session: Not on file    Stress: Not on file   Relationships    Social connections:     Talks on phone: Not on file     Gets together: Not on file     Attends Jainism service: Not on file     Active member of club or organization: Not on file     Attends meetings of clubs or organizations: Not on file     Relationship status: Not on file   Other Topics Concern    Not on file   Social History Narrative    Not on file     Family History   Problem Relation Age of Onset    Prostate cancer Father     No Known Problems Sister     Alzheimer's disease Brother     Dementia Brother     Pacemaker/defibrilator Sister     Arthritis Sister            ROS:  GENERAL: No fever, chills,.  Positive weight changes.   CARDIOVASCULAR: Denies exertional chest pain, PND, orthopnea.  ABDOMEN: Appetite fine. Denies diarrhea, abdominal pain, hematemesis or blood in stool.  URINARY: No  hematuria.    Vitals:    19 1011   BP: 136/69   Pulse: (!) 52   Temp: 98.1 °F (36.7 °C)   SpO2: 95%   Weight: 93.7 kg (206 lb 9.1 oz)   Height: 5' 10" (1.778 m)     Wt Readings from Last 3 Encounters:   19 93.7 kg (206 lb 9.1 oz)   19 92.5 kg (204 lb)   19 96.7 kg (213 lb 3.2 oz)       OBJECTIVE:   APPEARANCE: Well nourished, well developed, in no acute distress.    HEAD: Normocephalic.  Atraumatic.  No sinus tenderness.  EYES:   Right eye: " Pupil reactive.  Conjunctiva clear.    Left eye: Pupil reactive.  Conjunctiva clear.  EOMI.    EARS: TM's intact. Light reflex normal. No retraction or perforation.    NOSE:  clear.  MOUTH & THROAT:  No pharyngeal erythema or exudate. No lesions.  NECK: Supple. No bruits.  No JVD.  No cervical lymphadenopathy.  No thyromegaly.    CHEST: Breath sounds clear bilaterally.  Normal respiratory effort  CARDIOVASCULAR: Normal rate.  Regular rhythm.  No murmurs.  No rub.  No gallops.  ABDOMEN: Bowel sounds normal.  Soft.  No tenderness.  No organomegaly.  PERIPHERAL VASCULAR: No cyanosis.  No clubbing.  No edema.  NEUROLOGIC: No focal findings.  Negative straight leg raise.  Deep tendon reflexes intact. Motor strength intact.  MENTAL STATUS: Alert.  Oriented x 3.  Rectal exam prostate enlarged without masses.  Slightly boggy.  Slightly tender.  Back with decreased range of motion.  Mild tenderness palpation left SI area.  Mild tenderness palpation right paralumbar muscles.

## 2019-06-29 LAB — BACTERIA UR CULT: NORMAL

## 2019-07-03 ENCOUNTER — OFFICE VISIT (OUTPATIENT)
Dept: UROLOGY | Facility: CLINIC | Age: 69
End: 2019-07-03
Payer: MEDICARE

## 2019-07-03 VITALS
BODY MASS INDEX: 29.35 KG/M2 | HEART RATE: 59 BPM | HEIGHT: 70 IN | WEIGHT: 205 LBS | SYSTOLIC BLOOD PRESSURE: 124 MMHG | DIASTOLIC BLOOD PRESSURE: 71 MMHG

## 2019-07-03 DIAGNOSIS — R20.0 NUMBNESS AND TINGLING IN BOTH HANDS: ICD-10-CM

## 2019-07-03 DIAGNOSIS — R97.20 ELEVATED PSA: ICD-10-CM

## 2019-07-03 DIAGNOSIS — N13.8 ENLARGED PROSTATE WITH URINARY OBSTRUCTION: ICD-10-CM

## 2019-07-03 DIAGNOSIS — R20.2 NUMBNESS AND TINGLING IN BOTH HANDS: ICD-10-CM

## 2019-07-03 DIAGNOSIS — N40.0 PROSTATE ENLARGEMENT: Primary | ICD-10-CM

## 2019-07-03 DIAGNOSIS — N40.1 ENLARGED PROSTATE WITH URINARY OBSTRUCTION: ICD-10-CM

## 2019-07-03 PROCEDURE — 1101F PT FALLS ASSESS-DOCD LE1/YR: CPT | Mod: HCNC,CPTII,S$GLB, | Performed by: UROLOGY

## 2019-07-03 PROCEDURE — 3078F DIAST BP <80 MM HG: CPT | Mod: HCNC,CPTII,S$GLB, | Performed by: UROLOGY

## 2019-07-03 PROCEDURE — 99999 PR PBB SHADOW E&M-EST. PATIENT-LVL IV: CPT | Mod: PBBFAC,HCNC,, | Performed by: UROLOGY

## 2019-07-03 PROCEDURE — 99204 PR OFFICE/OUTPT VISIT, NEW, LEVL IV, 45-59 MIN: ICD-10-PCS | Mod: HCNC,S$GLB,, | Performed by: UROLOGY

## 2019-07-03 PROCEDURE — 1101F PR PT FALLS ASSESS DOC 0-1 FALLS W/OUT INJ PAST YR: ICD-10-PCS | Mod: HCNC,CPTII,S$GLB, | Performed by: UROLOGY

## 2019-07-03 PROCEDURE — 99999 PR PBB SHADOW E&M-EST. PATIENT-LVL IV: ICD-10-PCS | Mod: PBBFAC,HCNC,, | Performed by: UROLOGY

## 2019-07-03 PROCEDURE — 3074F SYST BP LT 130 MM HG: CPT | Mod: HCNC,CPTII,S$GLB, | Performed by: UROLOGY

## 2019-07-03 PROCEDURE — 3074F PR MOST RECENT SYSTOLIC BLOOD PRESSURE < 130 MM HG: ICD-10-PCS | Mod: HCNC,CPTII,S$GLB, | Performed by: UROLOGY

## 2019-07-03 PROCEDURE — 99204 OFFICE O/P NEW MOD 45 MIN: CPT | Mod: HCNC,S$GLB,, | Performed by: UROLOGY

## 2019-07-03 PROCEDURE — 3078F PR MOST RECENT DIASTOLIC BLOOD PRESSURE < 80 MM HG: ICD-10-PCS | Mod: HCNC,CPTII,S$GLB, | Performed by: UROLOGY

## 2019-07-03 NOTE — PROGRESS NOTES
"Subjective:       Patient ID: Yasir Jeffery is a 69 y.o. male.    Chief Complaint: Consult (bilateral flank pain) and Flank Pain    68 yo M, hx of back pain, referred for right flank pain, elevated psa, BPH.  Also c/o intermittent numbness bilateral hands.  IPSS 17.  Reports undergoing prostate biopsy 1-2 years ago with Dr. Aly, "suspicious cells" found per patient.  Report not available.  Urine culture negative.  PSA 6.5, was 3.2 in 2017.     Past Medical History:   Diagnosis Date    Back pain     Benign prostate hyperplasia     DDD (degenerative disc disease), thoracolumbar     Disorder of kidney and ureter     stage 3    Elevated PSA     Glaucoma     Hypertension     Metabolic syndrome     Mixed hyperlipidemia 2018    Obesity     MARIN (obstructive sleep apnea)     Pneumonia     Psychophysiological insomnia       Past Surgical History:   Procedure Laterality Date    CYST REMOVAL      neck    EYE SURGERY      cataract surgery    GANGLION CYST EXCISION Right 1994    PROSTATE BIOPSY      TESTICLE SURGERY       Social History     Socioeconomic History    Marital status:      Spouse name: Not on file    Number of children: Not on file    Years of education: Not on file    Highest education level: Not on file   Occupational History    Not on file   Social Needs    Financial resource strain: Not on file    Food insecurity:     Worry: Not on file     Inability: Not on file    Transportation needs:     Medical: Not on file     Non-medical: Not on file   Tobacco Use    Smoking status: Former Smoker     Years: 3.00     Types: Cigars     Last attempt to quit:      Years since quittin.5    Smokeless tobacco: Never Used   Substance and Sexual Activity    Alcohol use: No    Drug use: No    Sexual activity: Not on file   Lifestyle    Physical activity:     Days per week: Not on file     Minutes per session: Not on file    Stress: Not on file   Relationships    " Social connections:     Talks on phone: Not on file     Gets together: Not on file     Attends Hinduism service: Not on file     Active member of club or organization: Not on file     Attends meetings of clubs or organizations: Not on file     Relationship status: Not on file   Other Topics Concern    Not on file   Social History Narrative    Not on file       Family History   Problem Relation Age of Onset    Prostate cancer Father     No Known Problems Sister     Alzheimer's disease Brother     Dementia Brother     Pacemaker/defibrilator Sister     Arthritis Sister       Review of patient's allergies indicates:   Allergen Reactions    Benadryl allergy decongestant      Opposite reaction, speeds him up   Opposite reaction, speeds him up     Diphenhydramine      Medication List with Changes/Refills   Current Medications    ACETAMINOPHEN (TYLENOL) 500 MG TABLET    Take 2 tablets (1,000 mg total) by mouth every 6 (six) hours as needed for Pain.    ALBUTEROL (PROAIR HFA) 90 MCG/ACTUATION INHALER    Inhale 2 puffs into the lungs every 6 (six) hours as needed for Wheezing. Rescue    LATANOPROST 0.005 % OPHTHALMIC SOLUTION    PUT ONE DROP IN THE RIGHT EYE AT BEDTIME    MECLIZINE (ANTIVERT) 12.5 MG TABLET    Take 1 tablet (12.5 mg total) by mouth 3 (three) times daily as needed.    ONDANSETRON (ZOFRAN-ODT) 8 MG TBDL    Take 1 tablet (8 mg total) by mouth every 8 (eight) hours as needed.    SULFAMETHOXAZOLE-TRIMETHOPRIM 800-160MG (BACTRIM DS) 800-160 MG TAB    Take 1 tablet by mouth 2 (two) times daily. for 14 days      Review of Systems   Constitutional: Negative for fatigue, fever and unexpected weight change.   HENT: Negative for congestion.    Eyes: Negative for visual disturbance.   Respiratory: Negative for cough, choking and shortness of breath.    Cardiovascular: Negative for chest pain and leg swelling.   Gastrointestinal: Negative for abdominal distention, constipation, diarrhea, nausea and vomiting.    Endocrine: Negative for polyuria.   Genitourinary: Positive for flank pain. Negative for difficulty urinating, hematuria and testicular pain.   Musculoskeletal: Positive for back pain.   Skin: Negative for color change and wound.   Allergic/Immunologic: Negative for immunocompromised state.   Neurological: Negative for weakness.   Hematological: Negative for adenopathy. Does not bruise/bleed easily.   Psychiatric/Behavioral: Negative for confusion.       Objective:      Physical Exam   Nursing note and vitals reviewed.  Constitutional: He is oriented to person, place, and time. He appears well-developed and well-nourished. No distress.   HENT:   Head: Normocephalic and atraumatic.   Eyes: Pupils are equal, round, and reactive to light.   Neck: Neck supple.   Cardiovascular: Normal rate.    Pulmonary/Chest: Effort normal. No respiratory distress. He has no wheezes.   Abdominal: Soft. He exhibits no distension and no mass. There is no tenderness. There is no rebound and no guarding. Hernia confirmed negative in the right inguinal area and confirmed negative in the left inguinal area.   Genitourinary: Prostate normal, testes normal and penis normal. Rectal exam shows no external hemorrhoid, no internal hemorrhoid, no mass and no tenderness. Prostate is not tender. Right testis shows no swelling and no tenderness. Left testis shows no swelling and no tenderness. Circumcised. No penile tenderness.   Genitourinary Comments: Prostate smooth   Musculoskeletal: He exhibits no edema.   Neurological: He is alert and oriented to person, place, and time.   Skin: Skin is warm. He is not diaphoretic.           Sodium   Date Value Ref Range Status   06/27/2019 142 136 - 145 mmol/L Final     Potassium   Date Value Ref Range Status   06/27/2019 5.0 3.5 - 5.1 mmol/L Final     Chloride   Date Value Ref Range Status   06/27/2019 107 95 - 110 mmol/L Final     CO2   Date Value Ref Range Status   06/27/2019 29 23 - 29 mmol/L Final      Creatinine   Date Value Ref Range Status   06/27/2019 1.6 (H) 0.5 - 1.4 mg/dL Final     Glucose   Date Value Ref Range Status   06/27/2019 91 70 - 110 mg/dL Final     AST   Date Value Ref Range Status   06/06/2017 23 10 - 40 U/L Final     ALT   Date Value Ref Range Status   05/14/2018 16 10 - 44 U/L Final     WBC   Date Value Ref Range Status   06/27/2019 6.56 3.90 - 12.70 K/uL Final     Hemoglobin   Date Value Ref Range Status   06/27/2019 15.3 14.0 - 18.0 g/dL Final     Hematocrit   Date Value Ref Range Status   06/27/2019 47.3 40.0 - 54.0 % Final     Platelets   Date Value Ref Range Status   06/27/2019 170 150 - 350 K/uL Final          Assessment/Plan:       Problem List Items Addressed This Visit        Renal/    Elevated PSA    Overview     9/21/2017 PSA 6.5 per Dr. Wyatt's record in media file.  Using Avodart and plans to recheck.  If still elevated at next check, Dr. Wyatt is planning to biopsy prostate.            Other Visit Diagnoses     Prostate enlargement    -  Primary    Relevant Orders    POCT urine dipstick without microscope    Enlarged prostate with urinary obstruction        Numbness and tingling in both hands              -discussed management of BPH and options.  disucssed rising PSA.  Will plan on prostate biopsy, cysto.   I have explained the risk, benefits, and alternatives of the procedure in detail. The patient voices understanding and all questions have been answered. The patient agrees to proceed as planned.    -refer to NSG for numbness

## 2019-07-03 NOTE — LETTER
July 3, 2019      Leo Livingston MD  [address]  [place] [address]           [facility]  [address] [facility] [address] [place] [address]  Phone: [phone]  Fax: [phone]          Patient: Yasir Jeffery   MR Number: [id number]   YOB: 1950   Date of Visit: 7/3/2019       Dear Dr. Leo Livingston:    Thank you for referring Yasir Jeffery to me for evaluation. Attached you will find relevant portions of my assessment and plan of care.    If you have questions, please do not hesitate to call me. I look forward to following Yasir Jeffery along with you.    Sincerely,    Ulisses Serra MD    Enclosure  CC:  No Recipients    If you would like to receive this communication electronically, please contact [email or web address] or [phone] to request more information on Push[employer] Link access.    For providers and/or their staff who would like to refer a patient to [facility], please contact us through our one-stop-shop provider referral line, [facility], at [phone].    If you feel you have received this communication in error or would no longer like to receive these types of communications, please e-mail [email or web address]

## 2019-07-03 NOTE — H&P (VIEW-ONLY)
"Subjective:       Patient ID: Yasir Jeffery is a 69 y.o. male.    Chief Complaint: Consult (bilateral flank pain) and Flank Pain    70 yo M, hx of back pain, referred for right flank pain, elevated psa, BPH.  Also c/o intermittent numbness bilateral hands.  IPSS 17.  Reports undergoing prostate biopsy 1-2 years ago with Dr. Aly, "suspicious cells" found per patient.  Report not available.  Urine culture negative.  PSA 6.5, was 3.2 in 2017.     Past Medical History:   Diagnosis Date    Back pain     Benign prostate hyperplasia     DDD (degenerative disc disease), thoracolumbar     Disorder of kidney and ureter     stage 3    Elevated PSA     Glaucoma     Hypertension     Metabolic syndrome     Mixed hyperlipidemia 2018    Obesity     MARIN (obstructive sleep apnea)     Pneumonia     Psychophysiological insomnia       Past Surgical History:   Procedure Laterality Date    CYST REMOVAL      neck    EYE SURGERY      cataract surgery    GANGLION CYST EXCISION Right 1994    PROSTATE BIOPSY      TESTICLE SURGERY       Social History     Socioeconomic History    Marital status:      Spouse name: Not on file    Number of children: Not on file    Years of education: Not on file    Highest education level: Not on file   Occupational History    Not on file   Social Needs    Financial resource strain: Not on file    Food insecurity:     Worry: Not on file     Inability: Not on file    Transportation needs:     Medical: Not on file     Non-medical: Not on file   Tobacco Use    Smoking status: Former Smoker     Years: 3.00     Types: Cigars     Last attempt to quit:      Years since quittin.5    Smokeless tobacco: Never Used   Substance and Sexual Activity    Alcohol use: No    Drug use: No    Sexual activity: Not on file   Lifestyle    Physical activity:     Days per week: Not on file     Minutes per session: Not on file    Stress: Not on file   Relationships    " Social connections:     Talks on phone: Not on file     Gets together: Not on file     Attends Pentecostal service: Not on file     Active member of club or organization: Not on file     Attends meetings of clubs or organizations: Not on file     Relationship status: Not on file   Other Topics Concern    Not on file   Social History Narrative    Not on file       Family History   Problem Relation Age of Onset    Prostate cancer Father     No Known Problems Sister     Alzheimer's disease Brother     Dementia Brother     Pacemaker/defibrilator Sister     Arthritis Sister       Review of patient's allergies indicates:   Allergen Reactions    Benadryl allergy decongestant      Opposite reaction, speeds him up   Opposite reaction, speeds him up     Diphenhydramine      Medication List with Changes/Refills   Current Medications    ACETAMINOPHEN (TYLENOL) 500 MG TABLET    Take 2 tablets (1,000 mg total) by mouth every 6 (six) hours as needed for Pain.    ALBUTEROL (PROAIR HFA) 90 MCG/ACTUATION INHALER    Inhale 2 puffs into the lungs every 6 (six) hours as needed for Wheezing. Rescue    LATANOPROST 0.005 % OPHTHALMIC SOLUTION    PUT ONE DROP IN THE RIGHT EYE AT BEDTIME    MECLIZINE (ANTIVERT) 12.5 MG TABLET    Take 1 tablet (12.5 mg total) by mouth 3 (three) times daily as needed.    ONDANSETRON (ZOFRAN-ODT) 8 MG TBDL    Take 1 tablet (8 mg total) by mouth every 8 (eight) hours as needed.    SULFAMETHOXAZOLE-TRIMETHOPRIM 800-160MG (BACTRIM DS) 800-160 MG TAB    Take 1 tablet by mouth 2 (two) times daily. for 14 days      Review of Systems   Constitutional: Negative for fatigue, fever and unexpected weight change.   HENT: Negative for congestion.    Eyes: Negative for visual disturbance.   Respiratory: Negative for cough, choking and shortness of breath.    Cardiovascular: Negative for chest pain and leg swelling.   Gastrointestinal: Negative for abdominal distention, constipation, diarrhea, nausea and vomiting.    Endocrine: Negative for polyuria.   Genitourinary: Positive for flank pain. Negative for difficulty urinating, hematuria and testicular pain.   Musculoskeletal: Positive for back pain.   Skin: Negative for color change and wound.   Allergic/Immunologic: Negative for immunocompromised state.   Neurological: Negative for weakness.   Hematological: Negative for adenopathy. Does not bruise/bleed easily.   Psychiatric/Behavioral: Negative for confusion.       Objective:      Physical Exam   Nursing note and vitals reviewed.  Constitutional: He is oriented to person, place, and time. He appears well-developed and well-nourished. No distress.   HENT:   Head: Normocephalic and atraumatic.   Eyes: Pupils are equal, round, and reactive to light.   Neck: Neck supple.   Cardiovascular: Normal rate.    Pulmonary/Chest: Effort normal. No respiratory distress. He has no wheezes.   Abdominal: Soft. He exhibits no distension and no mass. There is no tenderness. There is no rebound and no guarding. Hernia confirmed negative in the right inguinal area and confirmed negative in the left inguinal area.   Genitourinary: Prostate normal, testes normal and penis normal. Rectal exam shows no external hemorrhoid, no internal hemorrhoid, no mass and no tenderness. Prostate is not tender. Right testis shows no swelling and no tenderness. Left testis shows no swelling and no tenderness. Circumcised. No penile tenderness.   Genitourinary Comments: Prostate smooth   Musculoskeletal: He exhibits no edema.   Neurological: He is alert and oriented to person, place, and time.   Skin: Skin is warm. He is not diaphoretic.           Sodium   Date Value Ref Range Status   06/27/2019 142 136 - 145 mmol/L Final     Potassium   Date Value Ref Range Status   06/27/2019 5.0 3.5 - 5.1 mmol/L Final     Chloride   Date Value Ref Range Status   06/27/2019 107 95 - 110 mmol/L Final     CO2   Date Value Ref Range Status   06/27/2019 29 23 - 29 mmol/L Final      Creatinine   Date Value Ref Range Status   06/27/2019 1.6 (H) 0.5 - 1.4 mg/dL Final     Glucose   Date Value Ref Range Status   06/27/2019 91 70 - 110 mg/dL Final     AST   Date Value Ref Range Status   06/06/2017 23 10 - 40 U/L Final     ALT   Date Value Ref Range Status   05/14/2018 16 10 - 44 U/L Final     WBC   Date Value Ref Range Status   06/27/2019 6.56 3.90 - 12.70 K/uL Final     Hemoglobin   Date Value Ref Range Status   06/27/2019 15.3 14.0 - 18.0 g/dL Final     Hematocrit   Date Value Ref Range Status   06/27/2019 47.3 40.0 - 54.0 % Final     Platelets   Date Value Ref Range Status   06/27/2019 170 150 - 350 K/uL Final          Assessment/Plan:       Problem List Items Addressed This Visit        Renal/    Elevated PSA    Overview     9/21/2017 PSA 6.5 per Dr. Wyatt's record in media file.  Using Avodart and plans to recheck.  If still elevated at next check, Dr. Wyatt is planning to biopsy prostate.            Other Visit Diagnoses     Prostate enlargement    -  Primary    Relevant Orders    POCT urine dipstick without microscope    Enlarged prostate with urinary obstruction        Numbness and tingling in both hands              -discussed management of BPH and options.  disucssed rising PSA.  Will plan on prostate biopsy, cysto.   I have explained the risk, benefits, and alternatives of the procedure in detail. The patient voices understanding and all questions have been answered. The patient agrees to proceed as planned.    -refer to NSG for numbness

## 2019-07-09 ENCOUNTER — TELEPHONE (OUTPATIENT)
Dept: UROLOGY | Facility: CLINIC | Age: 69
End: 2019-07-09

## 2019-07-09 NOTE — TELEPHONE ENCOUNTER
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  Lina is calling to follow up on the MRI order that was to be placed.  Please call her about that.  Thank you!

## 2019-07-09 NOTE — TELEPHONE ENCOUNTER
Spoke to pt's wife and spoke to Dr. Serra. If pt's biopsy results are normal then he may advise pt to get an MRI of prostate. She verbalized understanding.

## 2019-07-16 ENCOUNTER — ANESTHESIA EVENT (OUTPATIENT)
Dept: SURGERY | Facility: HOSPITAL | Age: 69
End: 2019-07-16
Payer: MEDICARE

## 2019-07-17 ENCOUNTER — HOSPITAL ENCOUNTER (OUTPATIENT)
Facility: HOSPITAL | Age: 69
Discharge: HOME | End: 2019-07-17
Attending: UROLOGY | Admitting: UROLOGY
Payer: MEDICARE

## 2019-07-17 ENCOUNTER — ANESTHESIA (OUTPATIENT)
Dept: SURGERY | Facility: HOSPITAL | Age: 69
End: 2019-07-17
Payer: MEDICARE

## 2019-07-17 VITALS
TEMPERATURE: 97 F | DIASTOLIC BLOOD PRESSURE: 68 MMHG | HEIGHT: 70 IN | SYSTOLIC BLOOD PRESSURE: 123 MMHG | RESPIRATION RATE: 14 BRPM | HEART RATE: 58 BPM | BODY MASS INDEX: 29.35 KG/M2 | WEIGHT: 205 LBS | OXYGEN SATURATION: 98 %

## 2019-07-17 DIAGNOSIS — R97.20 ELEVATED PSA: Primary | ICD-10-CM

## 2019-07-17 PROCEDURE — 88305 TISSUE SPECIMEN TO PATHOLOGY - SURGERY: ICD-10-PCS | Mod: 26,HCNC,, | Performed by: PATHOLOGY

## 2019-07-17 PROCEDURE — 37000008 HC ANESTHESIA 1ST 15 MINUTES: Mod: HCNC,PO | Performed by: UROLOGY

## 2019-07-17 PROCEDURE — 25000003 PHARM REV CODE 250: Mod: HCNC,PO | Performed by: ANESTHESIOLOGY

## 2019-07-17 PROCEDURE — 36000707: Mod: HCNC,PO | Performed by: UROLOGY

## 2019-07-17 PROCEDURE — 71000033 HC RECOVERY, INTIAL HOUR: Mod: HCNC,PO | Performed by: UROLOGY

## 2019-07-17 PROCEDURE — 88305 TISSUE EXAM BY PATHOLOGIST: CPT | Mod: 26,HCNC,, | Performed by: PATHOLOGY

## 2019-07-17 PROCEDURE — D9220A PRA ANESTHESIA: ICD-10-PCS | Mod: HCNC,ANES,, | Performed by: ANESTHESIOLOGY

## 2019-07-17 PROCEDURE — 71000015 HC POSTOP RECOV 1ST HR: Mod: HCNC,PO | Performed by: UROLOGY

## 2019-07-17 PROCEDURE — 63600175 PHARM REV CODE 636 W HCPCS: Mod: HCNC,PO | Performed by: NURSE ANESTHETIST, CERTIFIED REGISTERED

## 2019-07-17 PROCEDURE — 37000009 HC ANESTHESIA EA ADD 15 MINS: Mod: HCNC,PO | Performed by: UROLOGY

## 2019-07-17 PROCEDURE — 36000706: Mod: HCNC,PO | Performed by: UROLOGY

## 2019-07-17 PROCEDURE — 88305 TISSUE EXAM BY PATHOLOGIST: CPT | Mod: HCNC | Performed by: PATHOLOGY

## 2019-07-17 PROCEDURE — D9220A PRA ANESTHESIA: Mod: HCNC,ANES,, | Performed by: ANESTHESIOLOGY

## 2019-07-17 PROCEDURE — 63600175 PHARM REV CODE 636 W HCPCS: Mod: HCNC,PO | Performed by: UROLOGY

## 2019-07-17 PROCEDURE — 25000003 PHARM REV CODE 250: Mod: HCNC,PO | Performed by: NURSE ANESTHETIST, CERTIFIED REGISTERED

## 2019-07-17 RX ORDER — SODIUM CHLORIDE, SODIUM LACTATE, POTASSIUM CHLORIDE, CALCIUM CHLORIDE 600; 310; 30; 20 MG/100ML; MG/100ML; MG/100ML; MG/100ML
INJECTION, SOLUTION INTRAVENOUS CONTINUOUS
Status: DISCONTINUED | OUTPATIENT
Start: 2019-07-17 | End: 2019-07-17 | Stop reason: HOSPADM

## 2019-07-17 RX ORDER — ONDANSETRON 2 MG/ML
INJECTION INTRAMUSCULAR; INTRAVENOUS
Status: DISCONTINUED | OUTPATIENT
Start: 2019-07-17 | End: 2019-07-17

## 2019-07-17 RX ORDER — CEFTRIAXONE 1 G/50ML
1 INJECTION, SOLUTION INTRAVENOUS ONCE
Status: COMPLETED | OUTPATIENT
Start: 2019-07-17 | End: 2019-07-17

## 2019-07-17 RX ORDER — FENTANYL CITRATE 50 UG/ML
INJECTION, SOLUTION INTRAMUSCULAR; INTRAVENOUS
Status: DISCONTINUED | OUTPATIENT
Start: 2019-07-17 | End: 2019-07-17

## 2019-07-17 RX ORDER — HYDROCODONE BITARTRATE AND ACETAMINOPHEN 5; 325 MG/1; MG/1
1 TABLET ORAL EVERY 4 HOURS PRN
Status: DISCONTINUED | OUTPATIENT
Start: 2019-07-17 | End: 2019-07-17 | Stop reason: HOSPADM

## 2019-07-17 RX ORDER — KETAMINE HYDROCHLORIDE 100 MG/ML
INJECTION, SOLUTION INTRAMUSCULAR; INTRAVENOUS
Status: DISCONTINUED | OUTPATIENT
Start: 2019-07-17 | End: 2019-07-17

## 2019-07-17 RX ORDER — LIDOCAINE HYDROCHLORIDE 10 MG/ML
1 INJECTION, SOLUTION EPIDURAL; INFILTRATION; INTRACAUDAL; PERINEURAL ONCE
Status: DISCONTINUED | OUTPATIENT
Start: 2019-07-17 | End: 2019-07-17 | Stop reason: HOSPADM

## 2019-07-17 RX ORDER — MIDAZOLAM HYDROCHLORIDE 1 MG/ML
INJECTION, SOLUTION INTRAMUSCULAR; INTRAVENOUS
Status: DISCONTINUED | OUTPATIENT
Start: 2019-07-17 | End: 2019-07-17

## 2019-07-17 RX ORDER — PROPOFOL 10 MG/ML
VIAL (ML) INTRAVENOUS CONTINUOUS PRN
Status: DISCONTINUED | OUTPATIENT
Start: 2019-07-17 | End: 2019-07-17

## 2019-07-17 RX ORDER — LIDOCAINE HCL/PF 100 MG/5ML
SYRINGE (ML) INTRAVENOUS
Status: DISCONTINUED | OUTPATIENT
Start: 2019-07-17 | End: 2019-07-17

## 2019-07-17 RX ADMIN — LIDOCAINE HYDROCHLORIDE 100 MG: 20 INJECTION PARENTERAL at 12:07

## 2019-07-17 RX ADMIN — PROPOFOL 100 MCG/KG/MIN: 10 INJECTION, EMULSION INTRAVENOUS at 12:07

## 2019-07-17 RX ADMIN — KETAMINE HYDROCHLORIDE 30 MG: 100 INJECTION, SOLUTION, CONCENTRATE INTRAMUSCULAR; INTRAVENOUS at 12:07

## 2019-07-17 RX ADMIN — CEFTRIAXONE 2 G: 1 INJECTION, SOLUTION INTRAVENOUS at 12:07

## 2019-07-17 RX ADMIN — FENTANYL CITRATE 50 MCG: 50 INJECTION, SOLUTION INTRAMUSCULAR; INTRAVENOUS at 12:07

## 2019-07-17 RX ADMIN — ONDANSETRON 4 MG: 2 INJECTION, SOLUTION INTRAMUSCULAR; INTRAVENOUS at 12:07

## 2019-07-17 RX ADMIN — SODIUM CHLORIDE, SODIUM LACTATE, POTASSIUM CHLORIDE, AND CALCIUM CHLORIDE: .6; .31; .03; .02 INJECTION, SOLUTION INTRAVENOUS at 11:07

## 2019-07-17 RX ADMIN — MIDAZOLAM HYDROCHLORIDE 2 MG: 1 INJECTION, SOLUTION INTRAMUSCULAR; INTRAVENOUS at 12:07

## 2019-07-17 NOTE — BRIEF OP NOTE
[facility]  Brief Operative Note     SUMMARY     Surgery Date: 7/17/2019     Surgeon(s) and Role:     * Ulisses Serra MD - Primary    Assisting Surgeon: None    Pre-op Diagnosis:  Elevated PSA [R97.20]    Post-op Diagnosis:  Post-Op Diagnosis Codes:     * Elevated PSA [R97.20]    Procedure(s) (LRB):  BIOPSY, PROSTATE, RECTAL APPROACH, WITH US GUIDANCE (Bilateral)  CYSTOSCOPY (N/A)    Anesthesia: Local MAC    Description of the findings of the procedure: see op note    Findings/Key Components:     Estimated Blood Loss: * No values recorded between 7/17/2019 12:24 PM and 7/17/2019 12:51 PM *         Specimens:   Specimen (12h ago, onward)    Start     Ordered    07/17/19 1239  Specimen to Pathology - Surgery  Once     Comments:  Pre-op Diagnosis: Elevated PSA [R97.20]Post-op Diagnosis: sameProcedure(s):BIOPSY, PROSTATE, RECTAL APPROACH, WITH US GUIDANCECYSTOSCOPY Number of specimens: 6Name of specimens: 1- right prostate base2- right prostate mid3- right prostate apex4- left prostate base5- left prostate mid6- left prostate apex     Start Status     07/17/19 1239 Collected (07/17/19 1239) Order ID: [id number]       07/17/19 1239          Discharge Note    SUMMARY     Admit Date: 7/17/2019    Discharge Date and Time:  07/17/2019 12:51 PM    Hospital Course (synopsis of major diagnoses, care, treatment, and services provided during the course of the hospital stay): s/p prostate biopsy, cysto     Final Diagnosis: Post-Op Diagnosis Codes:     * Elevated PSA [R97.20]    Disposition: Home or Self Care    Follow Up/Patient Instructions:     Medications:  Reconciled Home Medications:      Medication List      CONTINUE taking these medications    acetaminophen 500 MG tablet  Commonly known as:  TYLENOL  Take 2 tablets (1,000 mg total) by mouth every 6 (six) hours as needed for Pain.     albuterol 90 mcg/actuation inhaler  Commonly known as:  PROAIR HFA  Inhale 2 puffs into the lungs every 6 (six) hours  as needed for Wheezing. Rescue     latanoprost 0.005 % ophthalmic solution  PUT ONE DROP IN THE RIGHT EYE AT BEDTIME     meclizine 12.5 mg tablet  Commonly known as:  ANTIVERT  Take 1 tablet (12.5 mg total) by mouth 3 (three) times daily as needed.     ondansetron 8 MG Tbdl  Commonly known as:  ZOFRAN-ODT  Take 1 tablet (8 mg total) by mouth every 8 (eight) hours as needed.          Discharge Procedure Orders   Diet general     Call MD for:  temperature >100.4     Call MD for:  persistent nausea and vomiting     Call MD for:  severe uncontrolled pain     Activity as tolerated     Follow-up Information     Ulisses Serra MD In 2 weeks.    Specialty:  Urology  Contact information:  [address] [place] [address] [phone]

## 2019-07-17 NOTE — ANESTHESIA PREPROCEDURE EVALUATION
07/17/2019  Yasir Jeffery is a 69 y.o., male.    Anesthesia Evaluation    I have reviewed the Patient Summary Reports.    I have reviewed the Nursing Notes.   I have reviewed the Medications.     Review of Systems  Anesthesia Hx:  No problems with previous Anesthesia    Social:  Former Smoker    Hematology/Oncology:  Hematology Normal   Oncology Normal     EENT/Dental:EENT/Dental Normal   Cardiovascular:   Hypertension hyperlipidemia ALVARADO    Pulmonary:   Pneumonia Shortness of breath Sleep Apnea    Renal/:   Chronic Renal Disease BPH    Musculoskeletal:   Arthritis   Spine Disorders: Degenerative disease and Disc disease    Neurological:  Neurology Normal    Dermatological:  Skin Normal    Psych:  Psychiatric Normal           Physical Exam  General:  Well nourished    Airway/Jaw/Neck:  Airway Findings: Mouth Opening: Normal Tongue: Normal  General Airway Assessment: Adult  Mallampati: II  TM Distance: Normal, at least 6 cm        Eyes/Ears/Nose:  EYES/EARS/NOSE FINDINGS: Normal   Dental:  DENTAL FINDINGS: Normal   Chest/Lungs:  Chest/Lungs Findings: Clear to auscultation, Normal Respiratory Rate     Heart/Vascular:  Heart Findings: Rate: Normal  Rhythm: Regular Rhythm        Mental Status:  Mental Status Findings:  Cooperative, Alert and Oriented         Anesthesia Plan  Type of Anesthesia, risks & benefits discussed:  Anesthesia Type:  MAC  Patient's Preference:   Intra-op Monitoring Plan: standard ASA monitors  Intra-op Monitoring Plan Comments:   Post Op Pain Control Plan: IV/PO Opioids PRN  Post Op Pain Control Plan Comments:   Induction:   IV  Beta Blocker:  Patient is not currently on a Beta-Blocker (No further documentation required).       Informed Consent: Patient understands risks and agrees with Anesthesia plan.  Questions answered. Anesthesia consent signed with patient.  ASA Score: 2     Day  of Surgery Review of History & Physical: I have interviewed and examined the patient. I have reviewed the patient's H&P dated:  There are no significant changes.  H&P update referred to the surgeon.         Ready For Surgery From Anesthesia Perspective.

## 2019-07-17 NOTE — DISCHARGE INSTRUCTIONS
PROSTATE BIOPSY      DOS:   Minimal activity for 24 hours.   May shower or bathe today.   Advance diet as tolerated.   Drink a lot of liquids until you see your doctor.   Expect blood in urine/stool for up to 3 weeks.   Expect blood in semen for up to 8 weeks.   Resume home medications as prescribed   Biopsy results may not be available for 10-14 days    DONT:   No driving for 24 hours or while taking narcotic pain medication   No aspirin, NSAIDS or blood thinners for 7 days   No sexual intercourse, heavy lifting, or strenuous activity for 7 days.   DO NOT TAKE ADDITIONAL TYLENOL/ACETAMINOPHEN WHILE TAKING NARCOTIC PAIN MEDICATION THAT CONTAINS TYLENOL/ACETAMINOPHEN.    CALL PHYSICIAN FOR:   Unable to urinate within 6 hours after surgery.   Excessive bleeding.    Fever>101   Persistent pain not relieved by pain medication.    Contact your physician for emergencies at [phone]

## 2019-07-17 NOTE — INTERVAL H&P NOTE
The patient has been examined and the H&P has been reviewed:    I concur with the findings and no changes have occurred since H&P was written.    Anesthesia/Surgery risks, benefits and alternative options discussed and understood by patient/family.          Active Hospital Problems    Diagnosis  POA    Elevated PSA [R97.20]  Yes     9/21/2017 PSA 6.5 per Dr. Wyatt's record in media file.  Using Avodart and plans to recheck.  If still elevated at next check, Dr. Wyatt is planning to biopsy prostate.         Resolved Hospital Problems   No resolved problems to display.

## 2019-07-17 NOTE — TRANSFER OF CARE
"Anesthesia Transfer of Care Note    Patient: Yasir Jeffery    Procedure(s) Performed: Procedure(s) (LRB):  BIOPSY, PROSTATE, RECTAL APPROACH, WITH US GUIDANCE (Bilateral)  CYSTOSCOPY (N/A)    Patient location: PACU    Anesthesia Type: MAC    Transport from OR: Transported from OR on room air with adequate spontaneous ventilation    Post pain: adequate analgesia    Post assessment: no apparent anesthetic complications    Post vital signs: stable    Level of consciousness: awake    Nausea/Vomiting: no nausea/vomiting    Complications: none    Transfer of care protocol was followed      Last vitals:   Visit Vitals  /69 (BP Location: Left arm, Patient Position: Lying)   Pulse 62   Temp 36.2 °C (97.1 °F) (Skin)   Resp 18   Ht 5' 10" (1.778 m)   Wt 93 kg (205 lb 0.4 oz)   SpO2 96%   BMI 29.42 kg/m²     "

## 2019-07-17 NOTE — ANESTHESIA POSTPROCEDURE EVALUATION
Anesthesia Post Evaluation    Patient: Yasir Jeffery    Procedure(s) Performed: Procedure(s) (LRB):  BIOPSY, PROSTATE, RECTAL APPROACH, WITH US GUIDANCE (Bilateral)  CYSTOSCOPY (N/A)    Final Anesthesia Type: MAC  Patient location during evaluation: PACU  Patient participation: Yes- Able to Participate  Level of consciousness: awake and alert  Post-procedure vital signs: reviewed and stable  Pain management: adequate  Airway patency: patent  PONV status at discharge: No PONV  Anesthetic complications: no      Cardiovascular status: hemodynamically stable  Respiratory status: unassisted and room air  Hydration status: euvolemic  Follow-up not needed.          Vitals Value Taken Time   /68 7/17/2019  1:14 PM   Temp 36.2 °C (97.1 °F) 7/17/2019 12:44 PM   Pulse 58 7/17/2019  1:14 PM   Resp 14 7/17/2019  1:14 PM   SpO2 98 % 7/17/2019  1:14 PM         Event Time     Out of Recovery 12:45:00          Pain/Shital Score: Shital Score: 10 (7/17/2019 12:44 PM)

## 2019-07-17 NOTE — OP NOTE
Pre-procedure diagnosis: elevated PSA, BPH  Procedure: TRUS / Prostate Bx, cystoscopy  Surgeon: Ponce  Anesthesia: MAC  Complications: none  EBL: none        Procedure in Detail: After proper consents were obtained, the patient was prepped and draped in normal fashion.  A 16 Citizen of [place] rigid cystoscope is inserted into the urethra.  The anterior urethra is normal.  Bilobar hyperplasia without a median lobe is seen.  Mild trabeculations are seen in the bladder.  NO stones or tumors are identified.  He is then placed in the left lateral decubitus position for TRUS/Bx. The prostate is surveyed, no lesions are seen.  The seminal vesicles appear normal.  The prostate is sized to 37 cc.  Biopsy was then performed using an 18Ga biopsy needle directed at the base, middle, and apex bilaterally for a total of 12 cores. No bleeding is seen.  The patient is then taken to the PACU in stable condition.

## 2019-07-29 ENCOUNTER — OFFICE VISIT (OUTPATIENT)
Dept: FAMILY MEDICINE | Facility: CLINIC | Age: 69
End: 2019-07-29
Payer: MEDICARE

## 2019-07-29 ENCOUNTER — TELEPHONE (OUTPATIENT)
Dept: UROLOGY | Facility: CLINIC | Age: 69
End: 2019-07-29

## 2019-07-29 VITALS
DIASTOLIC BLOOD PRESSURE: 79 MMHG | HEART RATE: 57 BPM | WEIGHT: 206 LBS | HEIGHT: 70 IN | SYSTOLIC BLOOD PRESSURE: 146 MMHG | BODY MASS INDEX: 29.49 KG/M2 | TEMPERATURE: 99 F

## 2019-07-29 DIAGNOSIS — R10.9 FLANK PAIN: ICD-10-CM

## 2019-07-29 DIAGNOSIS — M51.36 DDD (DEGENERATIVE DISC DISEASE), LUMBAR: ICD-10-CM

## 2019-07-29 DIAGNOSIS — C61 PROSTATE CANCER: Primary | ICD-10-CM

## 2019-07-29 PROCEDURE — 3077F PR MOST RECENT SYSTOLIC BLOOD PRESSURE >= 140 MM HG: ICD-10-PCS | Mod: HCNC,CPTII,S$GLB, | Performed by: FAMILY MEDICINE

## 2019-07-29 PROCEDURE — 1101F PR PT FALLS ASSESS DOC 0-1 FALLS W/OUT INJ PAST YR: ICD-10-PCS | Mod: HCNC,CPTII,S$GLB, | Performed by: FAMILY MEDICINE

## 2019-07-29 PROCEDURE — 3078F PR MOST RECENT DIASTOLIC BLOOD PRESSURE < 80 MM HG: ICD-10-PCS | Mod: HCNC,CPTII,S$GLB, | Performed by: FAMILY MEDICINE

## 2019-07-29 PROCEDURE — 99214 OFFICE O/P EST MOD 30 MIN: CPT | Mod: HCNC,S$GLB,, | Performed by: FAMILY MEDICINE

## 2019-07-29 PROCEDURE — 3077F SYST BP >= 140 MM HG: CPT | Mod: HCNC,CPTII,S$GLB, | Performed by: FAMILY MEDICINE

## 2019-07-29 PROCEDURE — 99214 PR OFFICE/OUTPT VISIT, EST, LEVL IV, 30-39 MIN: ICD-10-PCS | Mod: HCNC,S$GLB,, | Performed by: FAMILY MEDICINE

## 2019-07-29 PROCEDURE — 99999 PR PBB SHADOW E&M-EST. PATIENT-LVL III: ICD-10-PCS | Mod: PBBFAC,HCNC,, | Performed by: FAMILY MEDICINE

## 2019-07-29 PROCEDURE — 3078F DIAST BP <80 MM HG: CPT | Mod: HCNC,CPTII,S$GLB, | Performed by: FAMILY MEDICINE

## 2019-07-29 PROCEDURE — 1101F PT FALLS ASSESS-DOCD LE1/YR: CPT | Mod: HCNC,CPTII,S$GLB, | Performed by: FAMILY MEDICINE

## 2019-07-29 PROCEDURE — 99999 PR PBB SHADOW E&M-EST. PATIENT-LVL III: CPT | Mod: PBBFAC,HCNC,, | Performed by: FAMILY MEDICINE

## 2019-07-29 NOTE — PROGRESS NOTES
The patient presents today to fu back pain urinary symptoms incl hesitency and decr stream Recent prosate Bx cw carcinoma.    Past Medical History:  Past Medical History:   Diagnosis Date    Back pain     Benign prostate hyperplasia     DDD (degenerative disc disease), thoracolumbar     Disorder of kidney and ureter     stage 3    Elevated PSA     Glaucoma     Hypertension     Metabolic syndrome     Mixed hyperlipidemia 1/24/2018    Obesity     MARIN (obstructive sleep apnea)     Pneumonia     Psychophysiological insomnia      Past Surgical History:   Procedure Laterality Date    BIOPSY, PROSTATE, RECTAL APPROACH, WITH US GUIDANCE Bilateral 7/17/2019    Performed [facility] at [facility] OR    CYST REMOVAL  1993    neck    CYSTOSCOPY N/A 7/17/2019    Performed by [facility] at [facility] OR    EYE SURGERY      cataract surgery    GANGLION CYST EXCISION Right 1994    PROSTATE BIOPSY      TESTICLE SURGERY  1980     Review of patient's allergies indicates:   Allergen Reactions    Benadryl allergy decongestant      Opposite reaction, speeds him up   Opposite reaction, speeds him up     Diphenhydramine      Current Outpatient Medications on File Prior to Visit   Medication Sig Dispense Refill    acetaminophen (TYLENOL) 500 MG tablet Take 2 tablets (1,000 mg total) by mouth every 6 (six) hours as needed for Pain.      latanoprost 0.005 % ophthalmic solution PUT ONE DROP IN THE RIGHT EYE AT BEDTIME  6    meclizine (ANTIVERT) 12.5 mg tablet Take 1 tablet (12.5 mg total) by mouth 3 (three) times daily as needed. 90 tablet 3    ondansetron (ZOFRAN-ODT) 8 MG TbDL Take 1 tablet (8 mg total) by mouth every 8 (eight) hours as needed. 20 tablet 1    albuterol (PROAIR HFA) 90 mcg/actuation inhaler Inhale 2 puffs into the lungs every 6 (six) hours as needed for Wheezing. Rescue 1 Inhaler 0     No current facility-administered medications on file prior to visit.      Social History      Socioeconomic History    Marital status:      Spouse name: Not on file    Number of children: Not on file    Years of education: Not on file    Highest education level: Not on file   Occupational History    Not on file   Social Needs    Financial resource strain: Not on file    Food insecurity:     Worry: Not on file     Inability: Not on file    Transportation needs:     Medical: Not on file     Non-medical: Not on file   Tobacco Use    Smoking status: Former Smoker     Years: 3.00     Types: Cigars     Last attempt to quit:      Years since quittin.5    Smokeless tobacco: Never Used   Substance and Sexual Activity    Alcohol use: No    Drug use: No    Sexual activity: Not on file   Lifestyle    Physical activity:     Days per week: Not on file     Minutes per session: Not on file    Stress: Not on file   Relationships    Social connections:     Talks on phone: Not on file     Gets together: Not on file     Attends Orthodoxy service: Not on file     Active member of club or organization: Not on file     Attends meetings of clubs or organizations: Not on file     Relationship status: Not on file   Other Topics Concern    Not on file   Social History Narrative    Not on file     Family History   Problem Relation Age of Onset    Prostate cancer Father     No Known Problems Sister     Alzheimer's disease Brother     Dementia Brother     Pacemaker/defibrilator Sister     Arthritis Sister          ROS:GENERAL: No fever, chills, fatigability or weight loss.  SKIN: No rashes, itching or changes in color or texture of skin.  HEAD: No headaches or recent head trauma.EYES: Visual acuity fine. No photophobia, ocular pain or diplopia.EARS: Denies ear pain, discharge or vertigo.NOSE: No loss of smell, no epistaxis or postnasal drip.MOUTH & THROAT: No hoarseness or change in voice. No excessive gum bleeding.NODES: Denies swollen glands.  CHEST: Denies ALVARADO, cyanosis, wheezing, cough and  sputum production.  CARDIOVASCULAR: Denies chest pain, PND, orthopnea or reduced exercise tolerance.  ABDOMEN: Appetite fine. No weight loss. Denies diarrhea, abdominal pain, hematemesis or blood in stool.  URINARY: No flank pain, dysuria or hematuria.  PERIPHERAL VASCULAR: No claudication or cyanosis.  MUSCULOSKELETAL: See above.  NEUROLOGIC: No history of seizures, paralysis, alteration of gait or coordination.  PE:    HEAD: Normocephalic, atraumatic.EYES: PERRL. EOMI.   EARS: TM's intact. Light reflex normal. No retraction or perforation.   NOSE: Mucosa pink. Airway clear.MOUTH & THROAT: No tonsillar enlargement. No pharyngeal erythema or exudate. No stridor.  NODES: No cervical, axillary or inguinal lymph node enlargement.  CHEST: Lungs clear to auscultation.  CARDIOVASCULAR: Normal S1, S2. No rubs, murmurs or gallops.  ABDOMEN: Bowel sounds normal. Not distended. Soft. No tenderness or masses.  MUSCULOSKELETAL: Tender T10-L3 midline. Rt flank tender     NEUROLOGIC: Cranial Nerves: II-XII grossly intact.  Motor: 5/5 strength major flexors/extensors.  DTR's: Knees, Ankles 2+ and equal bilaterally; downgoing toes.  Sensory: Intact to light touch distally.  Gait & Posture: Normal gait and fine motion. No cerebellar signs.     Impression:Prostate cancer  Flank pain  Lumbar pain   Plan:  fu asap  Rec diet and ex recs  Will need further imaging -Renal US

## 2019-07-29 NOTE — TELEPHONE ENCOUNTER
----- Message from Lizabeth Fisher sent at 7/29/2019 11:31 AM CDT -----  Contact: Adilene gaviria/ [facility] in [place]  Type:  Sooner Apoointment Request    Caller is requesting a sooner appointment.  Caller declined first available appointment listed below.  Caller will not accept being placed on the waitlist and is requesting a message be sent to doctor.    Name of Caller:  Adilene  When is the first available appointment?  N/a w/ Dr Serra  Symptoms:  Pt has surgery w/ Dr Serra. Computer will not allow me to schedule or send msg to provder  Best Call Back Number:  Ext [phone]    Additional Information:  Pls call w/ an earlier post op appt

## 2019-07-30 ENCOUNTER — HOSPITAL ENCOUNTER (OUTPATIENT)
Dept: RADIOLOGY | Facility: HOSPITAL | Age: 69
Discharge: HOME | End: 2019-07-30
Attending: FAMILY MEDICINE
Payer: MEDICARE

## 2019-07-30 DIAGNOSIS — R10.9 FLANK PAIN: ICD-10-CM

## 2019-07-30 PROCEDURE — 76770 US EXAM ABDO BACK WALL COMP: CPT | Mod: 26,HCNC,, | Performed by: RADIOLOGY

## 2019-07-30 PROCEDURE — 76770 US RETROPERITONEAL COMPLETE: ICD-10-PCS | Mod: 26,HCNC,, | Performed by: RADIOLOGY

## 2019-07-30 PROCEDURE — 76770 US EXAM ABDO BACK WALL COMP: CPT | Mod: TC,HCNC,PO

## 2019-07-31 ENCOUNTER — OFFICE VISIT (OUTPATIENT)
Dept: UROLOGY | Facility: CLINIC | Age: 69
End: 2019-07-31
Payer: MEDICARE

## 2019-07-31 VITALS
BODY MASS INDEX: 29.22 KG/M2 | DIASTOLIC BLOOD PRESSURE: 80 MMHG | WEIGHT: 204.13 LBS | SYSTOLIC BLOOD PRESSURE: 142 MMHG | HEIGHT: 70 IN | HEART RATE: 57 BPM

## 2019-07-31 DIAGNOSIS — N40.0 ENLARGED PROSTATE: Primary | ICD-10-CM

## 2019-07-31 DIAGNOSIS — M54.9 BACK PAIN, UNSPECIFIED BACK LOCATION, UNSPECIFIED BACK PAIN LATERALITY, UNSPECIFIED CHRONICITY: ICD-10-CM

## 2019-07-31 DIAGNOSIS — C61 MALIGNANT NEOPLASM OF PROSTATE: ICD-10-CM

## 2019-07-31 LAB
BILIRUB SERPL-MCNC: NORMAL MG/DL
BLOOD URINE, POC: NORMAL
COLOR, POC UA: NORMAL
GLUCOSE UR QL STRIP: NORMAL
KETONES UR QL STRIP: NORMAL
LEUKOCYTE ESTERASE URINE, POC: NORMAL
NITRITE, POC UA: NORMAL
PH, POC UA: 5.5
PROTEIN, POC: NORMAL
SPECIFIC GRAVITY, POC UA: 1.02
UROBILINOGEN, POC UA: 0.2

## 2019-07-31 PROCEDURE — 99214 PR OFFICE/OUTPT VISIT, EST, LEVL IV, 30-39 MIN: ICD-10-PCS | Mod: HCNC,25,S$GLB, | Performed by: UROLOGY

## 2019-07-31 PROCEDURE — 3079F DIAST BP 80-89 MM HG: CPT | Mod: HCNC,CPTII,S$GLB, | Performed by: UROLOGY

## 2019-07-31 PROCEDURE — 99999 PR PBB SHADOW E&M-EST. PATIENT-LVL III: ICD-10-PCS | Mod: PBBFAC,HCNC,, | Performed by: UROLOGY

## 2019-07-31 PROCEDURE — 3077F SYST BP >= 140 MM HG: CPT | Mod: HCNC,CPTII,S$GLB, | Performed by: UROLOGY

## 2019-07-31 PROCEDURE — 81002 POCT URINE DIPSTICK WITHOUT MICROSCOPE: ICD-10-PCS | Mod: HCNC,S$GLB,, | Performed by: UROLOGY

## 2019-07-31 PROCEDURE — 81002 URINALYSIS NONAUTO W/O SCOPE: CPT | Mod: HCNC,S$GLB,, | Performed by: UROLOGY

## 2019-07-31 PROCEDURE — 1100F PR PT FALLS ASSESS DOC 2+ FALLS/FALL W/INJURY/YR: ICD-10-PCS | Mod: HCNC,CPTII,S$GLB, | Performed by: UROLOGY

## 2019-07-31 PROCEDURE — 99214 OFFICE O/P EST MOD 30 MIN: CPT | Mod: HCNC,25,S$GLB, | Performed by: UROLOGY

## 2019-07-31 PROCEDURE — 3288F FALL RISK ASSESSMENT DOCD: CPT | Mod: HCNC,CPTII,S$GLB, | Performed by: UROLOGY

## 2019-07-31 PROCEDURE — 99999 PR PBB SHADOW E&M-EST. PATIENT-LVL III: CPT | Mod: PBBFAC,HCNC,, | Performed by: UROLOGY

## 2019-07-31 PROCEDURE — 3288F PR FALLS RISK ASSESSMENT DOCUMENTED: ICD-10-PCS | Mod: HCNC,CPTII,S$GLB, | Performed by: UROLOGY

## 2019-07-31 PROCEDURE — 1100F PTFALLS ASSESS-DOCD GE2>/YR: CPT | Mod: HCNC,CPTII,S$GLB, | Performed by: UROLOGY

## 2019-07-31 PROCEDURE — 3079F PR MOST RECENT DIASTOLIC BLOOD PRESSURE 80-89 MM HG: ICD-10-PCS | Mod: HCNC,CPTII,S$GLB, | Performed by: UROLOGY

## 2019-07-31 PROCEDURE — 3077F PR MOST RECENT SYSTOLIC BLOOD PRESSURE >= 140 MM HG: ICD-10-PCS | Mod: HCNC,CPTII,S$GLB, | Performed by: UROLOGY

## 2019-07-31 NOTE — PROGRESS NOTES
Subjective:       Patient ID: Yasir Jeffery is a 69 y.o. male.    Chief Complaint: Follow-up    F/u after biopsy.  Doing well. Denies any fevers, hematuria, dysuria.  Prostate sized to 37 cc.  Path reviewed, scanned in epic:  cores Gl 3+3=6, <5% each core.  Reports ED and LUTs.  Also c/o ongoing flank pain.  Recent u/s revealed 1 cm right renal cyst, no obstruction.  Waiting for appt with NS    Past Medical History:   Diagnosis Date    Back pain     Benign prostate hyperplasia     DDD (degenerative disc disease), thoracolumbar     Disorder of kidney and ureter     stage 3    Elevated PSA     Glaucoma     Hypertension     Malignant neoplasm of prostate 2019    Metabolic syndrome     Mixed hyperlipidemia 2018    Obesity     MARIN (obstructive sleep apnea)     Pneumonia     Psychophysiological insomnia       Past Surgical History:   Procedure Laterality Date    BIOPSY, PROSTATE, RECTAL APPROACH, WITH US GUIDANCE Bilateral 2019    [facility]    CYST REMOVAL      neck    CYSTOSCOPY N/A 2019    [facility] OR    EYE SURGERY      cataract surgery    GANGLION CYST EXCISION Right     PROSTATE BIOPSY      TESTICLE SURGERY       Social History     Socioeconomic History    Marital status:      Spouse name: Not on file    Number of children: Not on file    Years of education: Not on file    Highest education level: Not on file   Occupational History    Not on file   Social Needs    Financial resource strain: Not on file    Food insecurity:     Worry: Not on file     Inability: Not on file    Transportation needs:     Medical: Not on file     Non-medical: Not on file   Tobacco Use    Smoking status: Former Smoker     Years: 3.00     Types: Cigars     Last attempt to quit:      Years since quittin.5    Smokeless tobacco: Never Used   Substance and Sexual Activity    Alcohol use:  No    Drug use: No    Sexual activity: Not on file   Lifestyle    Physical activity:     Days per week: Not on file     Minutes per session: Not on file    Stress: Not on file   Relationships    Social connections:     Talks on phone: Not on file     Gets together: Not on file     Attends Orthodox service: Not on file     Active member of club or organization: Not on file     Attends meetings of clubs or organizations: Not on file     Relationship status: Not on file   Other Topics Concern    Not on file   Social History Narrative    Not on file       Family History   Problem Relation Age of Onset    Prostate cancer Father     No Known Problems Sister     Alzheimer's disease Brother     Dementia Brother     Pacemaker/defibrilator Sister     Arthritis Sister       Review of patient's allergies indicates:   Allergen Reactions    Benadryl allergy decongestant      Opposite reaction, speeds him up   Opposite reaction, speeds him up     Diphenhydramine      Medication List with Changes/Refills   Current Medications    ACETAMINOPHEN (TYLENOL) 500 MG TABLET    Take 2 tablets (1,000 mg total) by mouth every 6 (six) hours as needed for Pain.    ALBUTEROL (PROAIR HFA) 90 MCG/ACTUATION INHALER    Inhale 2 puffs into the lungs every 6 (six) hours as needed for Wheezing. Rescue    LATANOPROST 0.005 % OPHTHALMIC SOLUTION    PUT ONE DROP IN THE RIGHT EYE AT BEDTIME    MECLIZINE (ANTIVERT) 12.5 MG TABLET    Take 1 tablet (12.5 mg total) by mouth 3 (three) times daily as needed.    ONDANSETRON (ZOFRAN-ODT) 8 MG TBDL    Take 1 tablet (8 mg total) by mouth every 8 (eight) hours as needed.      Review of Systems   Constitutional: Negative for fatigue, fever and unexpected weight change.   HENT: Negative for congestion.    Eyes: Negative for visual disturbance.   Respiratory: Negative for cough, choking and shortness of breath.    Cardiovascular: Negative for chest pain and leg swelling.   Gastrointestinal: Negative for  abdominal distention, constipation, diarrhea, nausea and vomiting.   Endocrine: Negative for polyuria.   Genitourinary: Positive for flank pain. Negative for difficulty urinating, hematuria and testicular pain.   Musculoskeletal: Positive for back pain.   Skin: Negative for color change and wound.   Allergic/Immunologic: Negative for immunocompromised state.   Neurological: Positive for numbness. Negative for weakness.   Hematological: Negative for adenopathy. Does not bruise/bleed easily.   Psychiatric/Behavioral: Negative for confusion.       Objective:      Physical Exam   Nursing note and vitals reviewed.  Constitutional: He is oriented to person, place, and time. He appears well-developed and well-nourished. No distress.   HENT:   Head: Normocephalic and atraumatic.   Eyes: Pupils are equal, round, and reactive to light.   Neck: Neck supple.   Cardiovascular: Normal rate.    Pulmonary/Chest: Effort normal. No respiratory distress. He has no wheezes.   Abdominal: Soft. He exhibits no distension and no mass. There is no tenderness. There is no rebound and no guarding. Hernia confirmed negative in the right inguinal area and confirmed negative in the left inguinal area.   Genitourinary: Prostate normal, testes normal and penis normal. Rectal exam shows no external hemorrhoid, no internal hemorrhoid, no mass and no tenderness. Prostate is not tender. Right testis shows no swelling and no tenderness. Left testis shows no swelling and no tenderness. Circumcised. No penile tenderness.   Genitourinary Comments: Prostate smooth   Musculoskeletal: He exhibits no edema.   Neurological: He is alert and oriented to person, place, and time.   Skin: Skin is warm. He is not diaphoretic.           Sodium   Date Value Ref Range Status   06/27/2019 142 136 - 145 mmol/L Final     Potassium   Date Value Ref Range Status   06/27/2019 5.0 3.5 - 5.1 mmol/L Final     Chloride   Date Value Ref Range Status   06/27/2019 107 95 - 110  mmol/L Final     CO2   Date Value Ref Range Status   06/27/2019 29 23 - 29 mmol/L Final     Creatinine   Date Value Ref Range Status   06/27/2019 1.6 (H) 0.5 - 1.4 mg/dL Final     Glucose   Date Value Ref Range Status   06/27/2019 91 70 - 110 mg/dL Final     AST   Date Value Ref Range Status   06/06/2017 23 10 - 40 U/L Final     ALT   Date Value Ref Range Status   05/14/2018 16 10 - 44 U/L Final     WBC   Date Value Ref Range Status   06/27/2019 6.56 3.90 - 12.70 K/uL Final     Hemoglobin   Date Value Ref Range Status   06/27/2019 15.3 14.0 - 18.0 g/dL Final     Hematocrit   Date Value Ref Range Status   06/27/2019 47.3 40.0 - 54.0 % Final     Platelets   Date Value Ref Range Status   06/27/2019 170 150 - 350 K/uL Final          Assessment/Plan:       Problem List Items Addressed This Visit        Oncology    Malignant neoplasm of prostate      Other Visit Diagnoses     Enlarged prostate    -  Primary    Relevant Orders    POCT urine dipstick without microscope (Completed)    Back pain, unspecified back location, unspecified back pain laterality, unspecified chronicity                -discussed pathology findings and options for treatment including active surveillance, RALP, radiation.  Discussed potential risks of each.  He is leaning toward AS at this time.  Discussed need to follow PSA, discussed obtaining MRI.  Patient and wife to decide.  F/u  Month    -discussed renal cyst not likely cause of pain, referral to Dr. Cosme Pending

## 2019-08-09 ENCOUNTER — OFFICE VISIT (OUTPATIENT)
Dept: NEUROSURGERY | Facility: CLINIC | Age: 69
End: 2019-08-09
Payer: MEDICARE

## 2019-08-09 DIAGNOSIS — R20.2 PARESTHESIAS: ICD-10-CM

## 2019-08-09 DIAGNOSIS — M54.42 CHRONIC BILATERAL LOW BACK PAIN WITH LEFT-SIDED SCIATICA: ICD-10-CM

## 2019-08-09 DIAGNOSIS — G89.29 CHRONIC NECK PAIN: Primary | ICD-10-CM

## 2019-08-09 DIAGNOSIS — M54.2 CHRONIC NECK PAIN: Primary | ICD-10-CM

## 2019-08-09 DIAGNOSIS — G89.29 CHRONIC BILATERAL LOW BACK PAIN WITH LEFT-SIDED SCIATICA: ICD-10-CM

## 2019-08-09 PROCEDURE — 1101F PT FALLS ASSESS-DOCD LE1/YR: CPT | Mod: HCNC,CPTII,S$GLB, | Performed by: PHYSICIAN ASSISTANT

## 2019-08-09 PROCEDURE — 99999 PR PBB SHADOW E&M-EST. PATIENT-LVL III: CPT | Mod: PBBFAC,HCNC,, | Performed by: PHYSICIAN ASSISTANT

## 2019-08-09 PROCEDURE — 99999 PR PBB SHADOW E&M-EST. PATIENT-LVL III: ICD-10-PCS | Mod: PBBFAC,HCNC,, | Performed by: PHYSICIAN ASSISTANT

## 2019-08-09 PROCEDURE — 99204 PR OFFICE/OUTPT VISIT, NEW, LEVL IV, 45-59 MIN: ICD-10-PCS | Mod: HCNC,S$GLB,, | Performed by: PHYSICIAN ASSISTANT

## 2019-08-09 PROCEDURE — 99204 OFFICE O/P NEW MOD 45 MIN: CPT | Mod: HCNC,S$GLB,, | Performed by: PHYSICIAN ASSISTANT

## 2019-08-09 PROCEDURE — 1101F PR PT FALLS ASSESS DOC 0-1 FALLS W/OUT INJ PAST YR: ICD-10-PCS | Mod: HCNC,CPTII,S$GLB, | Performed by: PHYSICIAN ASSISTANT

## 2019-08-09 NOTE — LETTER
August 9, 2019      Ulisses Serra MD  [id number] [address] Urology  [facility] [address]           [place] - Neurosurgery  [address] [facility] [address] [place] [address]  Phone: [phone]  Fax: [phone]          Patient: Yasir Jeffery   MR Number: [id number]   YOB: 1950   Date of Visit: 8/9/2019       Dear Dr. Ulisses Serra:    Thank you for referring Yasir Jeffery to me for evaluation. Attached you will find relevant portions of my assessment and plan of care.    If you have questions, please do not hesitate to call me. I look forward to following Yasir Jeffery along with you.    Sincerely,    Lana Deleon PA-C    Enclosure  CC:  No Recipients    If you would like to receive this communication electronically, please contact [email or web address] or [phone] to request more information on [employer] Link access.    For providers and/or their staff who would like to refer a patient to [facility], please contact us through our one-stop-shop provider referral line, [facility], at [phone].    If you feel you have received this communication in error or would no longer like to receive these types of communications, please e-mail [email or web address]

## 2019-08-09 NOTE — PROGRESS NOTES
[place] - Neurosurgery  Clinic Consult     Consult Requested By: Ulisses Serra MD  PCP: Ritchie Rivero MD    Chief Complaint:   Chief Complaint   Patient presents with    Lumbar Spine Pain (L-Spine)     patient reports low back pain  > 40 years; bilateral leg pain; left is worse; pain 2/10         Past Medical History:   Diagnosis Date    Back pain     Benign prostate hyperplasia     DDD (degenerative disc disease), thoracolumbar     Disorder of kidney and ureter     stage 3    Elevated PSA     Glaucoma     Hypertension     Malignant neoplasm of prostate 2019    Metabolic syndrome     Mixed hyperlipidemia 2018    Obesity     MARIN (obstructive sleep apnea)     Pneumonia     Psychophysiological insomnia      Past Surgical History:   Procedure Laterality Date    BIOPSY, PROSTATE, RECTAL APPROACH, WITH US GUIDANCE Bilateral 2019    [facility]es [facility] at [facility] OR    CYST REMOVAL      neck    CYSTOSCOPY N/A 2019    [facility] by [facility] at [facility] OR    EYE SURGERY      cataract surgery    GANGLION CYST EXCISION Right     PROSTATE BIOPSY      TESTICLE SURGERY       Family History   Problem Relation Age of Onset    Prostate cancer Father     No Known Problems Sister     Alzheimer's disease Brother     Dementia Brother     Pacemaker/defibrilator Sister     Arthritis Sister      Social History     Tobacco Use    Smoking status: Former Smoker     Years: 3.00     Types: Cigars     Last attempt to quit:      Years since quittin.6    Smokeless tobacco: Never Used   Substance Use Topics    Alcohol use: No    Drug use: No      Review of patient's allergies indicates:   Allergen Reactions    Benadryl allergy decongestant      Opposite reaction, speeds him up   Opposite reaction, speeds him up     Diphenhydramine        Current Outpatient Medications:     acetaminophen (TYLENOL) 500 MG tablet, Take 2 tablets (1,000 mg total) by  mouth every 6 (six) hours as needed for Pain., Disp: , Rfl:     latanoprost 0.005 % ophthalmic solution, PUT ONE DROP IN THE RIGHT EYE AT BEDTIME, Disp: , Rfl: 6    meclizine (ANTIVERT) 12.5 mg tablet, Take 1 tablet (12.5 mg total) by mouth 3 (three) times daily as needed., Disp: 90 tablet, Rfl: 3    ondansetron (ZOFRAN-ODT) 8 MG TbDL, Take 1 tablet (8 mg total) by mouth every 8 (eight) hours as needed., Disp: 20 tablet, Rfl: 1    albuterol (PROAIR HFA) 90 mcg/actuation inhaler, Inhale 2 puffs into the lungs every 6 (six) hours as needed for Wheezing. Rescue, Disp: 1 Inhaler, Rfl: 0    Review of Systems:   Constitutional: no fever, chills or night sweats. No changes in weight   Eyes: no visual changes   ENT: no nasal congestion or sore throat   Respiratory: no cough or shortness of breath   Cardiovascular: no chest pain or palpitations   Gastrointestinal: no nausea or vomiting   Genitourinary: no hematuria or dysuria   Integument/Breast: no rash or pruritis   Hematologic/Lymphatic: no easy bruising or lymphadenopathy   Musculoskeletal: +neck pain, back pain   Neurological: no seizures or tremors +paresthesias, weakness   Behavioral/Psych: no auditory or visual hallucinations   Endocrine: no heat or cold intolerance         OBJECTIVE:     Vital Signs (Most Recent):  There were no vitals filed for this visit.      Physical Exam:   General: well developed, well nourished, no distress.   Neurologic: Alert and oriented. Thought content appropriate. GCS 15.   Head: normocephalic, atraumatic  Eyes: EOMI.  Neck: trachea midline, no JVD   Cardiovascular: no LE edema  Pulmonary: normal respirations, no signs of respiratory distress  Abdomen: non-distended  Sensory: intact to light touch throughout  Skin: Skin is warm, dry and intact.    Motor Strength: Moves all extremities spontaneously with good tone. No abnormal movements seen.     Strength  Deltoids Triceps Biceps Wrist Extension Wrist Flexion Hand  Interossei    Upper: R 5/5 5/5 5/5 5/5 5/5 5/5 4/5    L 5/5 5/5 5/5 5/5 5/5 5/5 4/5     Iliopsoas Quadriceps Knee  Flexion Tibialis  anterior Gastro- cnemius EHL    Lower: R 5/5 5/5 5/5 5/5 5/5 5/5     L 5/5 5/5 5/5 5/5 5/5 5/5      DTR's: 2 + and symmetric in UE and LE  Leger: absent  Clonus: absent  Gait: slow   Tandem Gait: unable to complete           Able to stand on heels & toes            Provider Dictation:     Yasir Jeffery is a 69 y.o. right handed male who presents for evaluation of neck and back pain. Patient reports several year history of pain. He reports nonspecific paresthesias in UEs with numbness and difficulty with hand function. He has attributed difficulty with hand dexterity and dropping objects to hand arthritis. He has been experiencing gait instability. He also complains of acute worsening of chronic low back pain with radiation into the left buttock/lateral hip starting a few weeks ago. He feels as though the left leg will give out on him when walking. He has not attended physical therapy or received injections with pain management.     Imaging independently reviewed. Spine XRs from 2016 reviewed. Degenerative changes throughout with anterior osteophytes and decreased disc height     On exam, patient has a slow gait. He is unable to complete tandem gait safely. He is full strength throughout with except of hand intrinsics. 2+ reflexes, negative leger. Sensation intact.     PHQ 7  Oswestry Disability Index 32    Given the above, I recommend obtaining imaging of the cervical and lumbar spine. I will obtain dynamic x-rays and MRI. I have also referred the patient to physical therapy. I will review the imaging once complete and call the patient with results and further recommendations.     Patient verbalized understanding of plan. Encouraged to call with any questions or concerns.     Chronic neck pain  -     MRI Cervical Spine Without Contrast; Future; Expected date: 08/09/2019  -     X-Ray  Cervical Spine AP Lat with Flexion  Extension; Future; Expected date: 08/09/2019  -     Ambulatory Referral to Physical/Occupational Therapy    Paresthesias  -     MRI Cervical Spine Without Contrast; Future; Expected date: 08/09/2019  -     MRI Lumbar Spine Without Contrast; Future; Expected date: 08/09/2019  -     X-Ray Cervical Spine AP Lat with Flexion  Extension; Future; Expected date: 08/09/2019  -     X-Ray Lumbar Spine Ap Lateral w/Flex Ext; Future; Expected date: 08/09/2019  -     Ambulatory Referral to Physical/Occupational Therapy    Chronic bilateral low back pain with left-sided sciatica  -     MRI Lumbar Spine Without Contrast; Future; Expected date: 08/09/2019  -     X-Ray Lumbar Spine Ap Lateral w/Flex Ext; Future; Expected date: 08/09/2019  -     Ambulatory Referral to Physical/Occupational Therapy

## 2019-08-27 ENCOUNTER — HOSPITAL ENCOUNTER (OUTPATIENT)
Dept: RADIOLOGY | Facility: HOSPITAL | Age: 69
Discharge: HOME | End: 2019-08-27
Attending: PHYSICIAN ASSISTANT
Payer: MEDICARE

## 2019-08-27 DIAGNOSIS — R20.2 PARESTHESIAS: ICD-10-CM

## 2019-08-27 DIAGNOSIS — G89.29 CHRONIC BILATERAL LOW BACK PAIN WITH LEFT-SIDED SCIATICA: ICD-10-CM

## 2019-08-27 DIAGNOSIS — M54.2 CHRONIC NECK PAIN: ICD-10-CM

## 2019-08-27 DIAGNOSIS — M54.42 CHRONIC BILATERAL LOW BACK PAIN WITH LEFT-SIDED SCIATICA: ICD-10-CM

## 2019-08-27 DIAGNOSIS — G89.29 CHRONIC NECK PAIN: ICD-10-CM

## 2019-08-27 PROCEDURE — 72141 MRI CERVICAL SPINE WITHOUT CONTRAST: ICD-10-PCS | Mod: 26,HCNC,, | Performed by: RADIOLOGY

## 2019-08-27 PROCEDURE — 72120 X-RAY BEND ONLY L-S SPINE: CPT | Mod: 26,HCNC,, | Performed by: RADIOLOGY

## 2019-08-27 PROCEDURE — 72120 XR LUMBAR SPINE AP AND LAT WITH FLEX/EXT: ICD-10-PCS | Mod: 26,HCNC,, | Performed by: RADIOLOGY

## 2019-08-27 PROCEDURE — 72100 X-RAY EXAM L-S SPINE 2/3 VWS: CPT | Mod: 26,HCNC,, | Performed by: RADIOLOGY

## 2019-08-27 PROCEDURE — 72148 MRI LUMBAR SPINE WITHOUT CONTRAST: ICD-10-PCS | Mod: 26,HCNC,, | Performed by: RADIOLOGY

## 2019-08-27 PROCEDURE — 72050 X-RAY EXAM NECK SPINE 4/5VWS: CPT | Mod: 26,HCNC,, | Performed by: RADIOLOGY

## 2019-08-27 PROCEDURE — 72100 XR LUMBAR SPINE AP AND LAT WITH FLEX/EXT: ICD-10-PCS | Mod: 26,HCNC,, | Performed by: RADIOLOGY

## 2019-08-27 PROCEDURE — 72120 X-RAY BEND ONLY L-S SPINE: CPT | Mod: TC,HCNC,FY,PO

## 2019-08-27 PROCEDURE — 72148 MRI LUMBAR SPINE W/O DYE: CPT | Mod: 26,HCNC,, | Performed by: RADIOLOGY

## 2019-08-27 PROCEDURE — 72141 MRI NECK SPINE W/O DYE: CPT | Mod: 26,HCNC,, | Performed by: RADIOLOGY

## 2019-08-27 PROCEDURE — 72050 XR CERVICAL SPINE AP LAT WITH FLEX EXTEN: ICD-10-PCS | Mod: 26,HCNC,, | Performed by: RADIOLOGY

## 2019-08-27 PROCEDURE — 72148 MRI LUMBAR SPINE W/O DYE: CPT | Mod: TC,HCNC,PO

## 2019-08-27 PROCEDURE — 72050 X-RAY EXAM NECK SPINE 4/5VWS: CPT | Mod: TC,HCNC,FY,PO

## 2019-08-27 PROCEDURE — 72141 MRI NECK SPINE W/O DYE: CPT | Mod: TC,HCNC,PO

## 2019-08-28 ENCOUNTER — OFFICE VISIT (OUTPATIENT)
Dept: UROLOGY | Facility: CLINIC | Age: 69
End: 2019-08-28
Payer: MEDICARE

## 2019-08-28 ENCOUNTER — TELEPHONE (OUTPATIENT)
Dept: NEUROSURGERY | Facility: CLINIC | Age: 69
End: 2019-08-28

## 2019-08-28 VITALS
DIASTOLIC BLOOD PRESSURE: 74 MMHG | SYSTOLIC BLOOD PRESSURE: 134 MMHG | BODY MASS INDEX: 29.38 KG/M2 | WEIGHT: 205.25 LBS | HEART RATE: 58 BPM | HEIGHT: 70 IN

## 2019-08-28 DIAGNOSIS — D49.59 NEOPLASM OF PROSTATE: ICD-10-CM

## 2019-08-28 DIAGNOSIS — C61 PROSTATE CANCER: ICD-10-CM

## 2019-08-28 DIAGNOSIS — N40.0 ENLARGED PROSTATE: Primary | ICD-10-CM

## 2019-08-28 DIAGNOSIS — G95.9 SPINAL CORD LESION: Primary | ICD-10-CM

## 2019-08-28 LAB
BILIRUB SERPL-MCNC: NORMAL MG/DL
BLOOD URINE, POC: NORMAL
COLOR, POC UA: NORMAL
GLUCOSE UR QL STRIP: NORMAL
KETONES UR QL STRIP: NORMAL
LEUKOCYTE ESTERASE URINE, POC: NORMAL
NITRITE, POC UA: NORMAL
PH, POC UA: 5.5
PROTEIN, POC: NORMAL
SPECIFIC GRAVITY, POC UA: 1.03
UROBILINOGEN, POC UA: 0.2

## 2019-08-28 PROCEDURE — 3078F DIAST BP <80 MM HG: CPT | Mod: HCNC,CPTII,S$GLB, | Performed by: UROLOGY

## 2019-08-28 PROCEDURE — 99999 PR PBB SHADOW E&M-EST. PATIENT-LVL III: CPT | Mod: PBBFAC,HCNC,, | Performed by: UROLOGY

## 2019-08-28 PROCEDURE — 3288F FALL RISK ASSESSMENT DOCD: CPT | Mod: HCNC,CPTII,S$GLB, | Performed by: UROLOGY

## 2019-08-28 PROCEDURE — 3075F PR MOST RECENT SYSTOLIC BLOOD PRESS GE 130-139MM HG: ICD-10-PCS | Mod: HCNC,CPTII,S$GLB, | Performed by: UROLOGY

## 2019-08-28 PROCEDURE — 1100F PR PT FALLS ASSESS DOC 2+ FALLS/FALL W/INJURY/YR: ICD-10-PCS | Mod: HCNC,CPTII,S$GLB, | Performed by: UROLOGY

## 2019-08-28 PROCEDURE — 99999 PR PBB SHADOW E&M-EST. PATIENT-LVL III: ICD-10-PCS | Mod: PBBFAC,HCNC,, | Performed by: UROLOGY

## 2019-08-28 PROCEDURE — 81002 POCT URINE DIPSTICK WITHOUT MICROSCOPE: ICD-10-PCS | Mod: HCNC,S$GLB,, | Performed by: UROLOGY

## 2019-08-28 PROCEDURE — 3078F PR MOST RECENT DIASTOLIC BLOOD PRESSURE < 80 MM HG: ICD-10-PCS | Mod: HCNC,CPTII,S$GLB, | Performed by: UROLOGY

## 2019-08-28 PROCEDURE — 3075F SYST BP GE 130 - 139MM HG: CPT | Mod: HCNC,CPTII,S$GLB, | Performed by: UROLOGY

## 2019-08-28 PROCEDURE — 99213 OFFICE O/P EST LOW 20 MIN: CPT | Mod: HCNC,25,S$GLB, | Performed by: UROLOGY

## 2019-08-28 PROCEDURE — 81002 URINALYSIS NONAUTO W/O SCOPE: CPT | Mod: HCNC,S$GLB,, | Performed by: UROLOGY

## 2019-08-28 PROCEDURE — 1100F PTFALLS ASSESS-DOCD GE2>/YR: CPT | Mod: HCNC,CPTII,S$GLB, | Performed by: UROLOGY

## 2019-08-28 PROCEDURE — 3288F PR FALLS RISK ASSESSMENT DOCUMENTED: ICD-10-PCS | Mod: HCNC,CPTII,S$GLB, | Performed by: UROLOGY

## 2019-08-28 PROCEDURE — 99213 PR OFFICE/OUTPT VISIT, EST, LEVL III, 20-29 MIN: ICD-10-PCS | Mod: HCNC,25,S$GLB, | Performed by: UROLOGY

## 2019-08-28 NOTE — LETTER
August 28, 2019      Ritchie Rivero MD  [address] [place] [address]           [facility]  [address] [facility] [address] [place] [address]  Phone: [phone]  Fax: [phone]          Patient: Yasir Jeffery   MR Number: [id number]   YOB: 1950   Date of Visit: 8/28/2019       Dear Dr. Ritchie Rivero:    Thank you for referring Yasir Jeffery to me for evaluation. Attached you will find relevant portions of my assessment and plan of care.    If you have questions, please do not hesitate to call me. I look forward to following Yasir Jeffery along with you.    Sincerely,    Ulisses Serra MD    Enclosure  CC:  No Recipients    If you would like to receive this communication electronically, please contact [email or web address] or [phone] to request more information on [employer] Link access.    For providers and/or their staff who would like to refer a patient to [facility], please contact us through our one-stop-shop provider referral line, [facility] , at [phone].    If you feel you have received this communication in error or would no longer like to receive these types of communications, please e-mail [email or web address]

## 2019-08-28 NOTE — PROGRESS NOTES
Subjective:       Patient ID: Yasir Jeffery is a 69 y.o. male.    Chief Complaint: Follow-up (1 month)    Follow up for prostate cancer..  Path reviewed, scanned in epic:  cores Gl 3+3=6, <5% each core.  Reports ED and LUTs. MRI obtained by NSGY, follow up pending.  He has decided upon active surveillance for now.     Past Medical History:   Diagnosis Date    Back pain     Benign prostate hyperplasia     DDD (degenerative disc disease), thoracolumbar     Disorder of kidney and ureter     stage 3    Elevated PSA     Glaucoma     Hypertension     Malignant neoplasm of prostate 2019    Metabolic syndrome     Mixed hyperlipidemia 2018    Obesity     MARIN (obstructive sleep apnea)     Pneumonia     Psychophysiological insomnia       Past Surgical History:   Procedure Laterality Date    BIOPSY, PROSTATE, RECTAL APPROACH, WITH US GUIDANCE Bilateral 2019    Performed by [facility] at [facility] OR    CYST REMOVAL      neck    CYSTOSCOPY N/A 2019    Performed by [facility] at [facility] OR    EYE SURGERY      cataract surgery    GANGLION CYST EXCISION Right     PROSTATE BIOPSY      TESTICLE SURGERY       Social History     Socioeconomic History    Marital status:      Spouse name: Not on file    Number of children: Not on file    Years of education: Not on file    Highest education level: Not on file   Occupational History    Not on file   Social Needs    Financial resource strain: Not on file    Food insecurity:     Worry: Not on file     Inability: Not on file    Transportation needs:     Medical: Not on file     Non-medical: Not on file   Tobacco Use    Smoking status: Former Smoker     Years: 3.00     Types: Cigars     Last attempt to quit:      Years since quittin.6    Smokeless tobacco: Never Used   Substance and Sexual Activity    Alcohol use: No    Drug use: No    Sexual activity: Not on file   Lifestyle    Physical  activity:     Days per week: Not on file     Minutes per session: Not on file    Stress: Not on file   Relationships    Social connections:     Talks on phone: Not on file     Gets together: Not on file     Attends Taoist service: Not on file     Active member of club or organization: Not on file     Attends meetings of clubs or organizations: Not on file     Relationship status: Not on file   Other Topics Concern    Not on file   Social History Narrative    Not on file       Family History   Problem Relation Age of Onset    Prostate cancer Father     No Known Problems Sister     Alzheimer's disease Brother     Dementia Brother     Pacemaker/defibrilator Sister     Arthritis Sister       Review of patient's allergies indicates:   Allergen Reactions    Benadryl allergy decongestant      Opposite reaction, speeds him up   Opposite reaction, speeds him up     Diphenhydramine      Medication List with Changes/Refills   Current Medications    ACETAMINOPHEN (TYLENOL) 500 MG TABLET    Take 2 tablets (1,000 mg total) by mouth every 6 (six) hours as needed for Pain.    ALBUTEROL (PROAIR HFA) 90 MCG/ACTUATION INHALER    Inhale 2 puffs into the lungs every 6 (six) hours as needed for Wheezing. Rescue    LATANOPROST 0.005 % OPHTHALMIC SOLUTION    PUT ONE DROP IN THE RIGHT EYE AT BEDTIME    MECLIZINE (ANTIVERT) 12.5 MG TABLET    Take 1 tablet (12.5 mg total) by mouth 3 (three) times daily as needed.    ONDANSETRON (ZOFRAN-ODT) 8 MG TBDL    Take 1 tablet (8 mg total) by mouth every 8 (eight) hours as needed.      Review of Systems   Constitutional: Negative for fatigue, fever and unexpected weight change.   HENT: Negative for congestion.    Eyes: Negative for visual disturbance.   Respiratory: Negative for cough, choking and shortness of breath.    Cardiovascular: Negative for chest pain and leg swelling.   Gastrointestinal: Negative for abdominal distention, constipation, diarrhea, nausea and vomiting.    Endocrine: Negative for polyuria.   Genitourinary: Positive for flank pain. Negative for difficulty urinating, hematuria and testicular pain.   Musculoskeletal: Positive for back pain.   Skin: Negative for color change and wound.   Allergic/Immunologic: Negative for immunocompromised state.   Neurological: Positive for numbness. Negative for weakness.   Hematological: Negative for adenopathy. Does not bruise/bleed easily.   Psychiatric/Behavioral: Negative for confusion.       Objective:      Physical Exam   Nursing note and vitals reviewed.  Constitutional: He is oriented to person, place, and time. He appears well-developed and well-nourished. No distress.   HENT:   Head: Normocephalic and atraumatic.   Eyes: Pupils are equal, round, and reactive to light.   Neck: Neck supple.   Cardiovascular: Normal rate.    Pulmonary/Chest: Effort normal. No respiratory distress. He has no wheezes.   Abdominal: Soft. He exhibits no distension and no mass. There is no tenderness. There is no rebound and no guarding. Hernia confirmed negative in the right inguinal area and confirmed negative in the left inguinal area.   Genitourinary: Prostate normal, testes normal and penis normal. Rectal exam shows no external hemorrhoid, no internal hemorrhoid, no mass and no tenderness. Prostate is not tender. Right testis shows no swelling and no tenderness. Left testis shows no swelling and no tenderness. Circumcised. No penile tenderness.   Genitourinary Comments: Prostate smooth   Musculoskeletal: He exhibits no edema.   Neurological: He is alert and oriented to person, place, and time.   Skin: Skin is warm. He is not diaphoretic.           Sodium   Date Value Ref Range Status   06/27/2019 142 136 - 145 mmol/L Final     Potassium   Date Value Ref Range Status   06/27/2019 5.0 3.5 - 5.1 mmol/L Final     Chloride   Date Value Ref Range Status   06/27/2019 107 95 - 110 mmol/L Final     CO2   Date Value Ref Range Status   06/27/2019 29 23 -  29 mmol/L Final     Creatinine   Date Value Ref Range Status   06/27/2019 1.6 (H) 0.5 - 1.4 mg/dL Final     Glucose   Date Value Ref Range Status   06/27/2019 91 70 - 110 mg/dL Final     AST   Date Value Ref Range Status   06/06/2017 23 10 - 40 U/L Final     ALT   Date Value Ref Range Status   05/14/2018 16 10 - 44 U/L Final     WBC   Date Value Ref Range Status   06/27/2019 6.56 3.90 - 12.70 K/uL Final     Hemoglobin   Date Value Ref Range Status   06/27/2019 15.3 14.0 - 18.0 g/dL Final     Hematocrit   Date Value Ref Range Status   06/27/2019 47.3 40.0 - 54.0 % Final     Platelets   Date Value Ref Range Status   06/27/2019 170 150 - 350 K/uL Final          Assessment/Plan:       Problem List Items Addressed This Visit        Oncology    Prostate cancer      Other Visit Diagnoses     Enlarged prostate    -  Primary    Relevant Orders    POCT urine dipstick without microscope (Completed)    Neoplasm of prostate        Relevant Orders    MRI Prostate W W/O Contrast          -discussed options including AS, RALP, XRT.  Given pathology and psa, AS appropriate  -will obtain MRI 3 months, follow up after

## 2019-08-28 NOTE — TELEPHONE ENCOUNTER
Called patient to review imaging results. Will obtain cervical spine MRI w/wo to further evaluate lesion at level of T1. Will refer to neurology for demyelinating work up. Patient verbalized understanding     Lana Deleon PA-C  Neurosurgery - [facility]

## 2019-08-29 DIAGNOSIS — G95.9 SPINAL CORD LESION: Primary | ICD-10-CM

## 2019-08-30 ENCOUNTER — HOSPITAL ENCOUNTER (OUTPATIENT)
Dept: RADIOLOGY | Facility: HOSPITAL | Age: 69
Discharge: HOME | End: 2019-08-30
Attending: PHYSICIAN ASSISTANT
Payer: MEDICARE

## 2019-08-30 DIAGNOSIS — G95.9 SPINAL CORD LESION: ICD-10-CM

## 2019-08-30 PROCEDURE — 72142 MRI NECK SPINE W/DYE: CPT | Mod: 26,HCNC,, | Performed by: RADIOLOGY

## 2019-08-30 PROCEDURE — 72142 MRI NECK SPINE W/DYE: CPT | Mod: TC,HCNC,PO

## 2019-08-30 PROCEDURE — A9585 GADOBUTROL INJECTION: HCPCS | Mod: HCNC,PO | Performed by: PHYSICIAN ASSISTANT

## 2019-08-30 PROCEDURE — 72142 MRI CERVICAL SPINE WITH CONTRAST: ICD-10-PCS | Mod: 26,HCNC,, | Performed by: RADIOLOGY

## 2019-08-30 PROCEDURE — 25500020 PHARM REV CODE 255: Mod: HCNC,PO | Performed by: PHYSICIAN ASSISTANT

## 2019-08-30 RX ORDER — GADOBUTROL 604.72 MG/ML
7 INJECTION INTRAVENOUS
Status: COMPLETED | OUTPATIENT
Start: 2019-08-30 | End: 2019-08-30

## 2019-08-30 RX ADMIN — GADOBUTROL 7 ML: 604.72 INJECTION INTRAVENOUS at 01:08

## 2019-09-11 ENCOUNTER — TELEPHONE (OUTPATIENT)
Dept: NEUROSURGERY | Facility: CLINIC | Age: 69
End: 2019-09-11

## 2019-09-11 DIAGNOSIS — G95.9 SPINAL CORD LESION: Primary | ICD-10-CM

## 2019-09-11 NOTE — TELEPHONE ENCOUNTER
Called patient to discuss Dr. Cosme' recommendations. He has reviewed the MRI with contrast. I ordered MRI cervical spine w/wo contrast with demyelinating protocol, but the order was changed.     Dr. Cosme recommends repeat cervical spine MRI w/wo contrast demyelinating protocol in 6 months and follow up in clinic in 6 weeks to 3 months. Dr. Cosme will consult with his colleagues. He recommends evaluation with neurology.    Patient verbalizes understanding. Encouraged to call with any questions/concerns.    Lana Deleon PA-C  Neurosurgery - [facility] [place]

## 2019-10-02 ENCOUNTER — TELEPHONE (OUTPATIENT)
Dept: NEUROSURGERY | Facility: CLINIC | Age: 69
End: 2019-10-02

## 2019-10-02 NOTE — TELEPHONE ENCOUNTER
Called patient to reschedule f/u appointment. Patient had questions about the last MRI. Patient states that Dr Cosme was supposed to speak with his peers. Patient would like a call back?

## 2019-10-31 ENCOUNTER — OFFICE VISIT (OUTPATIENT)
Dept: NEUROSURGERY | Facility: CLINIC | Age: 69
End: 2019-10-31
Payer: MEDICARE

## 2019-10-31 VITALS — SYSTOLIC BLOOD PRESSURE: 134 MMHG | DIASTOLIC BLOOD PRESSURE: 61 MMHG | HEART RATE: 56 BPM

## 2019-10-31 DIAGNOSIS — G89.29 CHRONIC BILATERAL LOW BACK PAIN WITH LEFT-SIDED SCIATICA: ICD-10-CM

## 2019-10-31 DIAGNOSIS — G89.29 CHRONIC NECK PAIN: ICD-10-CM

## 2019-10-31 DIAGNOSIS — G95.9 SPINAL CORD LESION: Primary | ICD-10-CM

## 2019-10-31 DIAGNOSIS — M54.42 CHRONIC BILATERAL LOW BACK PAIN WITH LEFT-SIDED SCIATICA: ICD-10-CM

## 2019-10-31 DIAGNOSIS — M54.2 CHRONIC NECK PAIN: ICD-10-CM

## 2019-10-31 PROCEDURE — 3075F SYST BP GE 130 - 139MM HG: CPT | Mod: HCNC,CPTII,S$GLB, | Performed by: STUDENT IN AN ORGANIZED HEALTH CARE EDUCATION/TRAINING PROGRAM

## 2019-10-31 PROCEDURE — 1101F PT FALLS ASSESS-DOCD LE1/YR: CPT | Mod: HCNC,CPTII,S$GLB, | Performed by: STUDENT IN AN ORGANIZED HEALTH CARE EDUCATION/TRAINING PROGRAM

## 2019-10-31 PROCEDURE — 99215 PR OFFICE/OUTPT VISIT, EST, LEVL V, 40-54 MIN: ICD-10-PCS | Mod: HCNC,S$GLB,, | Performed by: STUDENT IN AN ORGANIZED HEALTH CARE EDUCATION/TRAINING PROGRAM

## 2019-10-31 PROCEDURE — 3078F PR MOST RECENT DIASTOLIC BLOOD PRESSURE < 80 MM HG: ICD-10-PCS | Mod: HCNC,CPTII,S$GLB, | Performed by: STUDENT IN AN ORGANIZED HEALTH CARE EDUCATION/TRAINING PROGRAM

## 2019-10-31 PROCEDURE — 3078F DIAST BP <80 MM HG: CPT | Mod: HCNC,CPTII,S$GLB, | Performed by: STUDENT IN AN ORGANIZED HEALTH CARE EDUCATION/TRAINING PROGRAM

## 2019-10-31 PROCEDURE — 99999 PR PBB SHADOW E&M-EST. PATIENT-LVL III: ICD-10-PCS | Mod: PBBFAC,HCNC,, | Performed by: STUDENT IN AN ORGANIZED HEALTH CARE EDUCATION/TRAINING PROGRAM

## 2019-10-31 PROCEDURE — 1101F PR PT FALLS ASSESS DOC 0-1 FALLS W/OUT INJ PAST YR: ICD-10-PCS | Mod: HCNC,CPTII,S$GLB, | Performed by: STUDENT IN AN ORGANIZED HEALTH CARE EDUCATION/TRAINING PROGRAM

## 2019-10-31 PROCEDURE — 99999 PR PBB SHADOW E&M-EST. PATIENT-LVL III: CPT | Mod: PBBFAC,HCNC,, | Performed by: STUDENT IN AN ORGANIZED HEALTH CARE EDUCATION/TRAINING PROGRAM

## 2019-10-31 PROCEDURE — 99215 OFFICE O/P EST HI 40 MIN: CPT | Mod: HCNC,S$GLB,, | Performed by: STUDENT IN AN ORGANIZED HEALTH CARE EDUCATION/TRAINING PROGRAM

## 2019-10-31 PROCEDURE — 3075F PR MOST RECENT SYSTOLIC BLOOD PRESS GE 130-139MM HG: ICD-10-PCS | Mod: HCNC,CPTII,S$GLB, | Performed by: STUDENT IN AN ORGANIZED HEALTH CARE EDUCATION/TRAINING PROGRAM

## 2019-10-31 NOTE — PROGRESS NOTES
[facility] Neurosurgery  Clinic Consult     Consult Requested By: No ref. provider found  PCP: Ritchie Rivero MD    Chief Complaint:   Chief Complaint   Patient presents with    Follow-up     continues to have pain; pain 4/10     Patient returns in follow-up cervical MRI with T1 intramedullary lesion  History of prostate cancer current leads under surveillance with planned follow-up imaging    He has no new symptoms    Past Medical History:   Diagnosis Date    Back pain     Benign prostate hyperplasia     DDD (degenerative disc disease), thoracolumbar     Disorder of kidney and ureter     stage 3    Elevated PSA     Glaucoma     Hypertension     Malignant neoplasm of prostate 2019    Metabolic syndrome     Mixed hyperlipidemia 2018    Obesity     MARIN (obstructive sleep apnea)     Pneumonia     Psychophysiological insomnia      Past Surgical History:   Procedure Laterality Date    CYST REMOVAL      neck    CYSTOSCOPY N/A 2019    Procedure: CYSTOSCOPY;  Surgeon: Ulisses Serra MD;  Location: [facility] OR;  Service: Urology;  Laterality: N/A;    EYE SURGERY      cataract surgery    GANGLION CYST EXCISION Right     PROSTATE BIOPSY      TESTICLE SURGERY      TRANSRECTAL BIOPSY OF PROSTATE WITH ULTRASOUND GUIDANCE Bilateral 2019    Procedure: BIOPSY, PROSTATE, RECTAL APPROACH, WITH US GUIDANCE;  Surgeon: Ulisses Serra MD;  Location: [facility] OR;  Service: Urology;  Laterality: Bilateral;     Family History   Problem Relation Age of Onset    Prostate cancer Father     No Known Problems Sister     Alzheimer's disease Brother     Dementia Brother     Pacemaker/defibrilator Sister     Arthritis Sister      Social History     Tobacco Use    Smoking status: Former Smoker     Years: 3.00     Types: Cigars     Last attempt to quit:      Years since quittin.8    Smokeless tobacco: Never Used   Substance Use Topics    Alcohol use: No    Drug use: No       Review of patient's allergies indicates:   Allergen Reactions    Benadryl allergy decongestant      Opposite reaction, speeds him up   Opposite reaction, speeds him up     Diphenhydramine        Current Outpatient Medications:     acetaminophen (TYLENOL) 500 MG tablet, Take 2 tablets (1,000 mg total) by mouth every 6 (six) hours as needed for Pain., Disp: , Rfl:     latanoprost 0.005 % ophthalmic solution, PUT ONE DROP IN THE RIGHT EYE AT BEDTIME, Disp: , Rfl: 6    meclizine (ANTIVERT) 12.5 mg tablet, Take 1 tablet (12.5 mg total) by mouth 3 (three) times daily as needed., Disp: 90 tablet, Rfl: 3    ondansetron (ZOFRAN-ODT) 8 MG TbDL, Take 1 tablet (8 mg total) by mouth every 8 (eight) hours as needed., Disp: 20 tablet, Rfl: 1    albuterol (PROAIR HFA) 90 mcg/actuation inhaler, Inhale 2 puffs into the lungs every 6 (six) hours as needed for Wheezing. Rescue, Disp: 1 Inhaler, Rfl: 0    Review of Systems:   Constitutional: no fever, chills or night sweats. No changes in weight   Eyes: no visual changes   ENT: no nasal congestion or sore throat   Respiratory: no cough or shortness of breath   Cardiovascular: no chest pain or palpitations   Gastrointestinal: no nausea or vomiting   Genitourinary: no hematuria or dysuria   Integument/Breast: no rash or pruritis   Hematologic/Lymphatic: no easy bruising or lymphadenopathy   Musculoskeletal: +neck pain, back pain   Neurological: no seizures or tremors +paresthesias, weakness   Behavioral/Psych: no auditory or visual hallucinations   Endocrine: no heat or cold intolerance         OBJECTIVE:     Vital Signs (Most Recent):  Vitals:    10/31/19 0959   BP: 134/61   Pulse: (!) 56         Physical Exam:   General: well developed, well nourished, no distress.   Neurologic: Alert and oriented. Thought content appropriate. GCS 15.   Head: normocephalic, atraumatic  Eyes: EOMI.  Neck: trachea midline, no JVD   Cardiovascular: no LE edema  Pulmonary: normal respirations, no  signs of respiratory distress  Abdomen: non-distended  Sensory: intact to light touch throughout  Skin: Skin is warm, dry and intact.    Motor Strength: Moves all extremities spontaneously with good tone. No abnormal movements seen.     Strength  Deltoids Triceps Biceps Wrist Extension Wrist Flexion Hand  Interossei   Upper: R 5/5 5/5 5/5 5/5 5/5 5/5 4/5    L 5/5 5/5 5/5 5/5 5/5 5/5 4/5     Iliopsoas Quadriceps Knee  Flexion Tibialis  anterior Gastro- cnemius EHL    Lower: R 5/5 5/5 5/5 5/5 5/5 5/5     L 5/5 5/5 5/5 5/5 5/5 5/5      DTR's: 2 + and symmetric in UE and LE  Leger: absent  Clonus: absent  Gait: slow   Tandem Gait: unable to complete           Able to stand on heels & toes            Provider Dictation:     Yasir Jeffery is a 69 y.o. right handed male who presents for evaluation of neck and back pain. Patient reports several year history of pain. He reports nonspecific paresthesias in UEs with numbness and difficulty with hand function. He has attributed difficulty with hand dexterity and dropping objects to hand arthritis. He has been experiencing gait instability. He also complains of acute worsening of chronic low back pain with radiation into the left buttock/lateral hip starting a few weeks ago. He feels as though the left leg will give out on him when walking. He has not attended physical therapy or received injections with pain management.     Imaging independently reviewed. Spine XRs from 2016 reviewed. Degenerative changes throughout with anterior osteophytes and decreased disc height     On exam, patient has a slow gait. He is unable to complete tandem gait safely. He is full strength throughout with except of hand intrinsics. 2+ reflexes, negative leger. Sensation intact.     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  Anterior T1 lesion very high morbidity biopsy, do not recommend  Broad differential diagnosis  Will repeat MRI in December which will be just over 3 months with and without misty  Will refer to neurology for impression in further work up    Regarding with back pain, lumbar spondylosis, without surgical indication  He is referred to PT, a pain management referral has been placed for consultation      For he will call imaging is complete.  He has educated on signs and symptoms warranting earlier evaluation                              Patient verbalized understanding of plan. Encouraged to call with any questions or concerns.     There are no diagnoses linked to this encounter.

## 2019-11-04 DIAGNOSIS — G89.29 CHRONIC BILATERAL LOW BACK PAIN WITH LEFT-SIDED SCIATICA: Primary | ICD-10-CM

## 2019-11-04 DIAGNOSIS — M54.42 CHRONIC BILATERAL LOW BACK PAIN WITH LEFT-SIDED SCIATICA: Primary | ICD-10-CM

## 2019-12-16 ENCOUNTER — HOSPITAL ENCOUNTER (OUTPATIENT)
Dept: RADIOLOGY | Facility: HOSPITAL | Age: 69
Discharge: HOME | End: 2019-12-16
Attending: STUDENT IN AN ORGANIZED HEALTH CARE EDUCATION/TRAINING PROGRAM
Payer: MEDICARE

## 2019-12-16 DIAGNOSIS — M54.2 CHRONIC NECK PAIN: ICD-10-CM

## 2019-12-16 DIAGNOSIS — G89.29 CHRONIC NECK PAIN: ICD-10-CM

## 2019-12-16 DIAGNOSIS — G95.9 SPINAL CORD LESION: ICD-10-CM

## 2019-12-16 PROCEDURE — 25500020 PHARM REV CODE 255: Mod: HCNC,PO | Performed by: STUDENT IN AN ORGANIZED HEALTH CARE EDUCATION/TRAINING PROGRAM

## 2019-12-16 PROCEDURE — 72156 MRI NECK SPINE W/O & W/DYE: CPT | Mod: TC,HCNC,PO

## 2019-12-16 PROCEDURE — 72156 MRI NECK SPINE W/O & W/DYE: CPT | Mod: 26,HCNC,, | Performed by: RADIOLOGY

## 2019-12-16 PROCEDURE — 72156 MRI CERVICAL SPINE W WO CONTRAST: ICD-10-PCS | Mod: 26,HCNC,, | Performed by: RADIOLOGY

## 2019-12-16 PROCEDURE — A9585 GADOBUTROL INJECTION: HCPCS | Mod: HCNC,PO | Performed by: STUDENT IN AN ORGANIZED HEALTH CARE EDUCATION/TRAINING PROGRAM

## 2019-12-16 RX ORDER — GADOBUTROL 604.72 MG/ML
9 INJECTION INTRAVENOUS
Status: COMPLETED | OUTPATIENT
Start: 2019-12-16 | End: 2019-12-16

## 2019-12-16 RX ADMIN — GADOBUTROL 9 ML: 604.72 INJECTION INTRAVENOUS at 01:12

## 2019-12-18 ENCOUNTER — LAB VISIT (OUTPATIENT)
Dept: LAB | Facility: HOSPITAL | Age: 69
End: 2019-12-18
Attending: UROLOGY
Payer: MEDICARE

## 2019-12-18 ENCOUNTER — OFFICE VISIT (OUTPATIENT)
Dept: UROLOGY | Facility: CLINIC | Age: 69
End: 2019-12-18
Payer: MEDICARE

## 2019-12-18 VITALS
HEART RATE: 62 BPM | HEIGHT: 70 IN | WEIGHT: 210.56 LBS | DIASTOLIC BLOOD PRESSURE: 70 MMHG | SYSTOLIC BLOOD PRESSURE: 140 MMHG | BODY MASS INDEX: 30.14 KG/M2

## 2019-12-18 DIAGNOSIS — D49.59 NEOPLASM OF PROSTATE: ICD-10-CM

## 2019-12-18 DIAGNOSIS — C61 MALIGNANT NEOPLASM OF PROSTATE: Primary | ICD-10-CM

## 2019-12-18 DIAGNOSIS — C61 MALIGNANT NEOPLASM OF PROSTATE: ICD-10-CM

## 2019-12-18 LAB
BILIRUB SERPL-MCNC: NORMAL MG/DL
BLOOD URINE, POC: NORMAL
COLOR, POC UA: NORMAL
COMPLEXED PSA SERPL-MCNC: 7.6 NG/ML (ref 0–4)
GLUCOSE UR QL STRIP: NORMAL
KETONES UR QL STRIP: NORMAL
LEUKOCYTE ESTERASE URINE, POC: NORMAL
NITRITE, POC UA: NORMAL
PH, POC UA: 5.5
PROTEIN, POC: NORMAL
SPECIFIC GRAVITY, POC UA: 1.02
UROBILINOGEN, POC UA: 0.2

## 2019-12-18 PROCEDURE — 99213 OFFICE O/P EST LOW 20 MIN: CPT | Mod: HCNC,25,S$GLB, | Performed by: UROLOGY

## 2019-12-18 PROCEDURE — 1100F PTFALLS ASSESS-DOCD GE2>/YR: CPT | Mod: HCNC,CPTII,S$GLB, | Performed by: UROLOGY

## 2019-12-18 PROCEDURE — 81002 URINALYSIS NONAUTO W/O SCOPE: CPT | Mod: HCNC,S$GLB,, | Performed by: UROLOGY

## 2019-12-18 PROCEDURE — 99999 PR PBB SHADOW E&M-EST. PATIENT-LVL III: CPT | Mod: PBBFAC,HCNC,, | Performed by: UROLOGY

## 2019-12-18 PROCEDURE — 99999 PR PBB SHADOW E&M-EST. PATIENT-LVL III: ICD-10-PCS | Mod: PBBFAC,HCNC,, | Performed by: UROLOGY

## 2019-12-18 PROCEDURE — 3288F FALL RISK ASSESSMENT DOCD: CPT | Mod: HCNC,CPTII,S$GLB, | Performed by: UROLOGY

## 2019-12-18 PROCEDURE — 1126F AMNT PAIN NOTED NONE PRSNT: CPT | Mod: HCNC,S$GLB,, | Performed by: UROLOGY

## 2019-12-18 PROCEDURE — 1126F PR PAIN SEVERITY QUANTIFIED, NO PAIN PRESENT: ICD-10-PCS | Mod: HCNC,S$GLB,, | Performed by: UROLOGY

## 2019-12-18 PROCEDURE — 84153 ASSAY OF PSA TOTAL: CPT | Mod: HCNC

## 2019-12-18 PROCEDURE — 3077F SYST BP >= 140 MM HG: CPT | Mod: HCNC,CPTII,S$GLB, | Performed by: UROLOGY

## 2019-12-18 PROCEDURE — 3078F DIAST BP <80 MM HG: CPT | Mod: HCNC,CPTII,S$GLB, | Performed by: UROLOGY

## 2019-12-18 PROCEDURE — 99213 PR OFFICE/OUTPT VISIT, EST, LEVL III, 20-29 MIN: ICD-10-PCS | Mod: HCNC,25,S$GLB, | Performed by: UROLOGY

## 2019-12-18 PROCEDURE — 36415 COLL VENOUS BLD VENIPUNCTURE: CPT | Mod: HCNC,PO

## 2019-12-18 PROCEDURE — 81002 POCT URINE DIPSTICK WITHOUT MICROSCOPE: ICD-10-PCS | Mod: HCNC,S$GLB,, | Performed by: UROLOGY

## 2019-12-18 PROCEDURE — 3077F PR MOST RECENT SYSTOLIC BLOOD PRESSURE >= 140 MM HG: ICD-10-PCS | Mod: HCNC,CPTII,S$GLB, | Performed by: UROLOGY

## 2019-12-18 PROCEDURE — 1159F MED LIST DOCD IN RCRD: CPT | Mod: HCNC,S$GLB,, | Performed by: UROLOGY

## 2019-12-18 PROCEDURE — 1159F PR MEDICATION LIST DOCUMENTED IN MEDICAL RECORD: ICD-10-PCS | Mod: HCNC,S$GLB,, | Performed by: UROLOGY

## 2019-12-18 PROCEDURE — 3288F PR FALLS RISK ASSESSMENT DOCUMENTED: ICD-10-PCS | Mod: HCNC,CPTII,S$GLB, | Performed by: UROLOGY

## 2019-12-18 PROCEDURE — 1100F PR PT FALLS ASSESS DOC 2+ FALLS/FALL W/INJURY/YR: ICD-10-PCS | Mod: HCNC,CPTII,S$GLB, | Performed by: UROLOGY

## 2019-12-18 PROCEDURE — 3078F PR MOST RECENT DIASTOLIC BLOOD PRESSURE < 80 MM HG: ICD-10-PCS | Mod: HCNC,CPTII,S$GLB, | Performed by: UROLOGY

## 2019-12-18 NOTE — PROGRESS NOTES
Subjective:       Patient ID: Yasir eJffery is a 69 y.o. male.    Chief Complaint: Follow-up (4 months)    Follow up for prostate cancer, on AS for  Gl 6.  Doing well.  Follows with NSGY for neck pain.  Due for PSA and prostate MRI.  Continues with hesitancy and slow stream.          Past Medical History:   Diagnosis Date    Back pain     Benign prostate hyperplasia     DDD (degenerative disc disease), thoracolumbar     Disorder of kidney and ureter     stage 3    Elevated PSA     Glaucoma     Hypertension     Malignant neoplasm of prostate 2019    Metabolic syndrome     Mixed hyperlipidemia 2018    Obesity     MARIN (obstructive sleep apnea)     Pneumonia     Psychophysiological insomnia       Past Surgical History:   Procedure Laterality Date    CYST REMOVAL      neck    CYSTOSCOPY N/A 2019    Procedure: CYSTOSCOPY;  Surgeon: Ulisses Serra MD;  Location: [facility] OR;  Service: Urology;  Laterality: N/A;    EYE SURGERY      cataract surgery    GANGLION CYST EXCISION Right     PROSTATE BIOPSY      TESTICLE SURGERY      TRANSRECTAL BIOPSY OF PROSTATE WITH ULTRASOUND GUIDANCE Bilateral 2019    Procedure: BIOPSY, PROSTATE, RECTAL APPROACH, WITH US GUIDANCE;  Surgeon: Ulisses Serra MD;  Location: [facility] OR;  Service: Urology;  Laterality: Bilateral;     Social History     Socioeconomic History    Marital status:      Spouse name: Not on file    Number of children: Not on file    Years of education: Not on file    Highest education level: Not on file   Occupational History    Not on file   Social Needs    Financial resource strain: Not on file    Food insecurity:     Worry: Not on file     Inability: Not on file    Transportation needs:     Medical: Not on file     Non-medical: Not on file   Tobacco Use    Smoking status: Former Smoker     Years: 3.00     Types: Cigars     Last attempt to quit:      Years since quittin.9    Smokeless  tobacco: Never Used   Substance and Sexual Activity    Alcohol use: No    Drug use: No    Sexual activity: Not on file   Lifestyle    Physical activity:     Days per week: Not on file     Minutes per session: Not on file    Stress: Not on file   Relationships    Social connections:     Talks on phone: Not on file     Gets together: Not on file     Attends Taoist service: Not on file     Active member of club or organization: Not on file     Attends meetings of clubs or organizations: Not on file     Relationship status: Not on file   Other Topics Concern    Not on file   Social History Narrative    Not on file       Family History   Problem Relation Age of Onset    Prostate cancer Father     No Known Problems Sister     Alzheimer's disease Brother     Dementia Brother     Pacemaker/defibrilator Sister     Arthritis Sister       Review of patient's allergies indicates:   Allergen Reactions    Benadryl allergy decongestant      Opposite reaction, speeds him up   Opposite reaction, speeds him up     Diphenhydramine      Medication List with Changes/Refills   Current Medications    ACETAMINOPHEN (TYLENOL) 500 MG TABLET    Take 2 tablets (1,000 mg total) by mouth every 6 (six) hours as needed for Pain.    ALBUTEROL (PROAIR HFA) 90 MCG/ACTUATION INHALER    Inhale 2 puffs into the lungs every 6 (six) hours as needed for Wheezing. Rescue    LATANOPROST 0.005 % OPHTHALMIC SOLUTION    PUT ONE DROP IN THE RIGHT EYE AT BEDTIME    MECLIZINE (ANTIVERT) 12.5 MG TABLET    Take 1 tablet (12.5 mg total) by mouth 3 (three) times daily as needed.    ONDANSETRON (ZOFRAN-ODT) 8 MG TBDL    Take 1 tablet (8 mg total) by mouth every 8 (eight) hours as needed.      Review of Systems   Constitutional: Negative for fatigue, fever and unexpected weight change.   HENT: Negative for congestion.    Eyes: Negative for visual disturbance.   Respiratory: Negative for cough, choking and shortness of breath.    Cardiovascular:  Negative for chest pain and leg swelling.   Gastrointestinal: Negative for abdominal distention, constipation, diarrhea, nausea and vomiting.   Endocrine: Negative for polyuria.   Genitourinary: Negative for difficulty urinating, flank pain, hematuria and testicular pain.   Musculoskeletal: Negative for back pain.   Skin: Negative for color change and wound.   Allergic/Immunologic: Negative for immunocompromised state.   Neurological: Negative for weakness.   Hematological: Negative for adenopathy. Does not bruise/bleed easily.   Psychiatric/Behavioral: Negative for confusion.       Objective:      Physical Exam   Nursing note and vitals reviewed.  Constitutional: He is oriented to person, place, and time. He appears well-developed and well-nourished. No distress.   HENT:   Head: Normocephalic and atraumatic.   Eyes: Pupils are equal, round, and reactive to light.   Neck: Neck supple.   Cardiovascular: Normal rate.    Pulmonary/Chest: Effort normal. No respiratory distress. He has no wheezes.   Abdominal: Soft. He exhibits no distension and no mass. There is no tenderness. There is no rebound and no guarding. Hernia confirmed negative in the right inguinal area and confirmed negative in the left inguinal area.   Genitourinary: Testes normal and penis normal. Right testis shows no swelling and no tenderness. Left testis shows no swelling and no tenderness. Circumcised. No penile tenderness.   Genitourinary Comments: Prostate smooth   Musculoskeletal: He exhibits no edema.   Neurological: He is alert and oriented to person, place, and time.   Skin: Skin is warm. He is not diaphoretic.           Sodium   Date Value Ref Range Status   06/27/2019 142 136 - 145 mmol/L Final     Potassium   Date Value Ref Range Status   06/27/2019 5.0 3.5 - 5.1 mmol/L Final     Chloride   Date Value Ref Range Status   06/27/2019 107 95 - 110 mmol/L Final     CO2   Date Value Ref Range Status   06/27/2019 29 23 - 29 mmol/L Final      Creatinine   Date Value Ref Range Status   12/16/2019 1.3 0.5 - 1.4 mg/dL Final     Glucose   Date Value Ref Range Status   06/27/2019 91 70 - 110 mg/dL Final     AST   Date Value Ref Range Status   06/06/2017 23 10 - 40 U/L Final     ALT   Date Value Ref Range Status   05/14/2018 16 10 - 44 U/L Final     WBC   Date Value Ref Range Status   06/27/2019 6.56 3.90 - 12.70 K/uL Final     Hemoglobin   Date Value Ref Range Status   06/27/2019 15.3 14.0 - 18.0 g/dL Final     Hematocrit   Date Value Ref Range Status   06/27/2019 47.3 40.0 - 54.0 % Final     Platelets   Date Value Ref Range Status   06/27/2019 170 150 - 350 K/uL Final          Assessment/Plan:       Problem List Items Addressed This Visit        Oncology    Malignant neoplasm of prostate - Primary    Relevant Orders    PROSTATE SPECIFIC ANTIGEN, DIAGNOSTIC      Other Visit Diagnoses     Neoplasm of prostate        Relevant Orders    MRI Prostatectomy W W/O Contrast    POCT urine dipstick without microscope (Completed)          -psa today  -will obtain MRI  -follow up 3 months

## 2020-01-07 ENCOUNTER — TELEPHONE (OUTPATIENT)
Dept: NEUROSURGERY | Facility: CLINIC | Age: 70
End: 2020-01-07

## 2020-01-07 NOTE — TELEPHONE ENCOUNTER
Message from Lana Deleon-- initially sent with no patient attached.      Can we please make sure he gets an appointment with neurology? We have sent 2 requests to the [place] office. He has a spinal cord lesion. Unlikely neoplastic. May be demyelinating or inflammatory.     Let's repeat MRI in 6 months (cervical spine w/wo) for surveillance. It was stable in size in December.

## 2020-01-07 NOTE — TELEPHONE ENCOUNTER
Hey! Dr. Cosme is requesting a neurology referral to Dr. Sibley or Dr. Romeo for this patient who has a spinal cord lesion. Unlikely neoplastic. May be demyelinating or inflammatory per Lana. Will either of them see him for this?    Thanks!

## 2020-01-09 ENCOUNTER — TELEPHONE (OUTPATIENT)
Dept: NEUROSURGERY | Facility: CLINIC | Age: 70
End: 2020-01-09

## 2020-01-09 NOTE — TELEPHONE ENCOUNTER
----- Message from Roberta Nails sent at 1/9/2020  1:19 PM CST -----  Contact: self [phone]  Pt would like return call from nurse regarding question about MRI. Please call back at [phone].  Md Alyson

## 2020-01-13 NOTE — TELEPHONE ENCOUNTER
Dr. Romeo is going out on maternity leave. I can initiate the workup on this patient.  We may need further assistance from the team at [facility] depending on initial workup.

## 2020-01-15 NOTE — TELEPHONE ENCOUNTER
Returned call to pt who handed call off to his wife. Continued discussion with pt wife who is refusing consult with Dr. Sibley at this time bc the results of MRI had already been verbalized to them and the copay is too great only to be told the same thing. States that they will call in 6 months when they were instructed to follow up. Also, instructed pt wife to call us should pt become symptomatic. Verbalized understanding.

## 2020-01-28 ENCOUNTER — PATIENT OUTREACH (OUTPATIENT)
Dept: ADMINISTRATIVE | Facility: OTHER | Age: 70
End: 2020-01-28

## 2020-01-29 ENCOUNTER — TELEPHONE (OUTPATIENT)
Dept: UROLOGY | Facility: CLINIC | Age: 70
End: 2020-01-29

## 2020-01-29 NOTE — TELEPHONE ENCOUNTER
----- Message from Tamara Cash sent at 1/29/2020  8:34 AM CST -----  Contact: Anum from [facility]  Type: Needs Medical Advice    Who Called:  Anum from [facility]  Best Call Back Number:[phone]  Additional Information:Anum states pt is at [facility] for a MRI appt.No MRI appt is in Epic.Pt also have an appt in [facility] today at 9:45am to see Dr. Serra. Please call pt [phone] and advise.

## 2020-01-29 NOTE — TELEPHONE ENCOUNTER
SPoke to pt and dr prado and got pt set up for mri at [facility] and f/u apt. Mailing to pt. They verbalized understanding.

## 2020-01-29 NOTE — TELEPHONE ENCOUNTER
----- Message from Muna Chu sent at 1/29/2020  9:22 AM CST -----  Contact: Lina Jeffery (Spouse)  Type: Needs Medical Advice    Who Called:  Lina Jeffery (Spouse)  Best Call Back Number:   Additional Information: Calling to speak to the nurse to clarify patient's MRI orders. She advised she tried to schedule with someone with [facility] and they told her that it does not specify whether he need contrast or not.

## 2020-02-10 ENCOUNTER — PATIENT OUTREACH (OUTPATIENT)
Dept: ADMINISTRATIVE | Facility: OTHER | Age: 70
End: 2020-02-10

## 2020-02-12 ENCOUNTER — OFFICE VISIT (OUTPATIENT)
Dept: UROLOGY | Facility: CLINIC | Age: 70
End: 2020-02-12
Payer: MEDICARE

## 2020-02-12 VITALS
BODY MASS INDEX: 30.42 KG/M2 | DIASTOLIC BLOOD PRESSURE: 74 MMHG | HEART RATE: 72 BPM | WEIGHT: 212.5 LBS | HEIGHT: 70 IN | SYSTOLIC BLOOD PRESSURE: 132 MMHG

## 2020-02-12 DIAGNOSIS — C61 PROSTATE CANCER: Primary | ICD-10-CM

## 2020-02-12 LAB
BILIRUB SERPL-MCNC: ABNORMAL MG/DL
BLOOD URINE, POC: ABNORMAL
COLOR, POC UA: ABNORMAL
GLUCOSE UR QL STRIP: ABNORMAL
KETONES UR QL STRIP: ABNORMAL
LEUKOCYTE ESTERASE URINE, POC: ABNORMAL
NITRITE, POC UA: ABNORMAL
PH, POC UA: 6.5
PROTEIN, POC: ABNORMAL
SPECIFIC GRAVITY, POC UA: 1.02
UROBILINOGEN, POC UA: 0.2

## 2020-02-12 PROCEDURE — 99214 PR OFFICE/OUTPT VISIT, EST, LEVL IV, 30-39 MIN: ICD-10-PCS | Mod: 25,HCNC,S$GLB, | Performed by: UROLOGY

## 2020-02-12 PROCEDURE — 1159F PR MEDICATION LIST DOCUMENTED IN MEDICAL RECORD: ICD-10-PCS | Mod: HCNC,S$GLB,, | Performed by: UROLOGY

## 2020-02-12 PROCEDURE — 99999 PR PBB SHADOW E&M-EST. PATIENT-LVL III: CPT | Mod: PBBFAC,HCNC,, | Performed by: UROLOGY

## 2020-02-12 PROCEDURE — 1159F MED LIST DOCD IN RCRD: CPT | Mod: HCNC,S$GLB,, | Performed by: UROLOGY

## 2020-02-12 PROCEDURE — 99214 OFFICE O/P EST MOD 30 MIN: CPT | Mod: 25,HCNC,S$GLB, | Performed by: UROLOGY

## 2020-02-12 PROCEDURE — 3078F PR MOST RECENT DIASTOLIC BLOOD PRESSURE < 80 MM HG: ICD-10-PCS | Mod: HCNC,CPTII,S$GLB, | Performed by: UROLOGY

## 2020-02-12 PROCEDURE — 3075F SYST BP GE 130 - 139MM HG: CPT | Mod: HCNC,CPTII,S$GLB, | Performed by: UROLOGY

## 2020-02-12 PROCEDURE — 1101F PR PT FALLS ASSESS DOC 0-1 FALLS W/OUT INJ PAST YR: ICD-10-PCS | Mod: HCNC,CPTII,S$GLB, | Performed by: UROLOGY

## 2020-02-12 PROCEDURE — 99999 PR PBB SHADOW E&M-EST. PATIENT-LVL III: ICD-10-PCS | Mod: PBBFAC,HCNC,, | Performed by: UROLOGY

## 2020-02-12 PROCEDURE — 3078F DIAST BP <80 MM HG: CPT | Mod: HCNC,CPTII,S$GLB, | Performed by: UROLOGY

## 2020-02-12 PROCEDURE — 3075F PR MOST RECENT SYSTOLIC BLOOD PRESS GE 130-139MM HG: ICD-10-PCS | Mod: HCNC,CPTII,S$GLB, | Performed by: UROLOGY

## 2020-02-12 PROCEDURE — 1101F PT FALLS ASSESS-DOCD LE1/YR: CPT | Mod: HCNC,CPTII,S$GLB, | Performed by: UROLOGY

## 2020-02-12 PROCEDURE — 81002 URINALYSIS NONAUTO W/O SCOPE: CPT | Mod: HCNC,S$GLB,, | Performed by: UROLOGY

## 2020-02-12 PROCEDURE — 1126F AMNT PAIN NOTED NONE PRSNT: CPT | Mod: HCNC,S$GLB,, | Performed by: UROLOGY

## 2020-02-12 PROCEDURE — 1126F PR PAIN SEVERITY QUANTIFIED, NO PAIN PRESENT: ICD-10-PCS | Mod: HCNC,S$GLB,, | Performed by: UROLOGY

## 2020-02-12 PROCEDURE — 81002 POCT URINE DIPSTICK WITHOUT MICROSCOPE: ICD-10-PCS | Mod: HCNC,S$GLB,, | Performed by: UROLOGY

## 2020-02-12 NOTE — PROGRESS NOTES
Subjective:       Patient ID: Yasir Jeffery is a 69 y.o. male.    Chief Complaint: Follow-up (6w)    68 yo, on active surveillance for prostate cancer. Underwent MRI, scanned in [facility].  Pi Rads 4/5 seen at left peripheral zone.  Pi Rads 3/5 left anterior transitional zone.  PSA increased to 7.6 from 6.5.  Here to discuss options.        Past Medical History:   Diagnosis Date    Back pain     Benign prostate hyperplasia     DDD (degenerative disc disease), thoracolumbar     Disorder of kidney and ureter     stage 3    Elevated PSA     Glaucoma     Hypertension     Malignant neoplasm of prostate 7/31/2019    Metabolic syndrome     Mixed hyperlipidemia 1/24/2018    Obesity     MARIN (obstructive sleep apnea)     Pneumonia     Psychophysiological insomnia       Past Surgical History:   Procedure Laterality Date    CYST REMOVAL  1993    neck    CYSTOSCOPY N/A 7/17/2019    Procedure: CYSTOSCOPY;  Surgeon: Ulisses Serra MD;  Location: [facility] OR;  Service: Urology;  Laterality: N/A;    EYE SURGERY      cataract surgery    GANGLION CYST EXCISION Right 1994    PROSTATE BIOPSY      TESTICLE SURGERY  1980    TRANSRECTAL BIOPSY OF PROSTATE WITH ULTRASOUND GUIDANCE Bilateral 7/17/2019    Procedure: BIOPSY, PROSTATE, RECTAL APPROACH, WITH US GUIDANCE;  Surgeon: Ulisses Serra MD;  Location: [facility] OR;  Service: Urology;  Laterality: Bilateral;     Social History     Socioeconomic History    Marital status:      Spouse name: Not on file    Number of children: Not on file    Years of education: Not on file    Highest education level: Not on file   Occupational History    Not on file   Social Needs    Financial resource strain: Not on file    Food insecurity:     Worry: Not on file     Inability: Not on file    Transportation needs:     Medical: Not on file     Non-medical: Not on file   Tobacco Use    Smoking status: Former Smoker     Years: 3.00     Types: Cigars     Last attempt to quit:  1995     Years since quittin.1    Smokeless tobacco: Never Used   Substance and Sexual Activity    Alcohol use: No    Drug use: No    Sexual activity: Not on file   Lifestyle    Physical activity:     Days per week: Not on file     Minutes per session: Not on file    Stress: Not on file   Relationships    Social connections:     Talks on phone: Not on file     Gets together: Not on file     Attends Baptism service: Not on file     Active member of club or organization: Not on file     Attends meetings of clubs or organizations: Not on file     Relationship status: Not on file   Other Topics Concern    Not on file   Social History Narrative    Not on file       Family History   Problem Relation Age of Onset    Prostate cancer Father     No Known Problems Sister     Alzheimer's disease Brother     Dementia Brother     Pacemaker/defibrilator Sister     Arthritis Sister       Review of patient's allergies indicates:   Allergen Reactions    Benadryl allergy decongestant      Opposite reaction, speeds him up   Opposite reaction, speeds him up     Diphenhydramine      Medication List with Changes/Refills   Current Medications    ACETAMINOPHEN (TYLENOL) 500 MG TABLET    Take 2 tablets (1,000 mg total) by mouth every 6 (six) hours as needed for Pain.    ALBUTEROL (PROAIR HFA) 90 MCG/ACTUATION INHALER    Inhale 2 puffs into the lungs every 6 (six) hours as needed for Wheezing. Rescue    LATANOPROST 0.005 % OPHTHALMIC SOLUTION    PUT ONE DROP IN THE RIGHT EYE AT BEDTIME    MECLIZINE (ANTIVERT) 12.5 MG TABLET    Take 1 tablet (12.5 mg total) by mouth 3 (three) times daily as needed.    ONDANSETRON (ZOFRAN-ODT) 8 MG TBDL    Take 1 tablet (8 mg total) by mouth every 8 (eight) hours as needed.      Review of Systems   Constitutional: Negative for fatigue, fever and unexpected weight change.   HENT: Negative for congestion.    Eyes: Negative for visual disturbance.   Respiratory: Negative for cough, choking  and shortness of breath.    Cardiovascular: Negative for chest pain and leg swelling.   Gastrointestinal: Negative for abdominal distention, constipation, diarrhea, nausea and vomiting.   Endocrine: Negative for polyuria.   Genitourinary: Negative for difficulty urinating, flank pain, hematuria and testicular pain.   Musculoskeletal: Negative for back pain.   Skin: Negative for color change and wound.   Allergic/Immunologic: Negative for immunocompromised state.   Neurological: Negative for weakness.   Hematological: Negative for adenopathy. Does not bruise/bleed easily.   Psychiatric/Behavioral: Negative for confusion.       Objective:      Physical Exam   Nursing note and vitals reviewed.  Constitutional: He is oriented to person, place, and time. He appears well-developed and well-nourished. No distress.   HENT:   Head: Normocephalic and atraumatic.   Eyes: Pupils are equal, round, and reactive to light.   Neck: Neck supple.   Cardiovascular: Normal rate.    Pulmonary/Chest: Effort normal. No respiratory distress. He has no wheezes.   Abdominal: Soft. He exhibits no distension and no mass. There is no tenderness. There is no rebound and no guarding. Hernia confirmed negative in the right inguinal area and confirmed negative in the left inguinal area.   Genitourinary: Prostate normal, testes normal and penis normal. Rectal exam shows no external hemorrhoid, no internal hemorrhoid, no mass and no tenderness. Prostate is not tender. Right testis shows no swelling and no tenderness. Left testis shows no swelling and no tenderness. Circumcised. No penile tenderness.   Genitourinary Comments: Prostate smooth   Musculoskeletal: He exhibits no edema.   Neurological: He is alert and oriented to person, place, and time.   Skin: Skin is warm. He is not diaphoretic.           Sodium   Date Value Ref Range Status   06/27/2019 142 136 - 145 mmol/L Final     Potassium   Date Value Ref Range Status   06/27/2019 5.0 3.5 - 5.1  mmol/L Final     Chloride   Date Value Ref Range Status   06/27/2019 107 95 - 110 mmol/L Final     CO2   Date Value Ref Range Status   06/27/2019 29 23 - 29 mmol/L Final     Creatinine   Date Value Ref Range Status   02/07/2020 1.25 0.50 - 1.40 mg/dL Final     Glucose   Date Value Ref Range Status   06/27/2019 91 70 - 110 mg/dL Final     AST   Date Value Ref Range Status   06/06/2017 23 10 - 40 U/L Final     ALT   Date Value Ref Range Status   05/14/2018 16 10 - 44 U/L Final     WBC   Date Value Ref Range Status   06/27/2019 6.56 3.90 - 12.70 K/uL Final     Hemoglobin   Date Value Ref Range Status   06/27/2019 15.3 14.0 - 18.0 g/dL Final     Hematocrit   Date Value Ref Range Status   06/27/2019 47.3 40.0 - 54.0 % Final     Platelets   Date Value Ref Range Status   06/27/2019 170 150 - 350 K/uL Final          Assessment/Plan:       Problem List Items Addressed This Visit     None      Visit Diagnoses     Prostate cancer    -  Primary    Relevant Orders    POCT urine dipstick without microscope (Completed)          -discussed mri and psa  -discussed surgery, radiation, AS and risks/benefits  -discussed MRI fusion biopsy, pt would like to proceed

## 2020-02-24 ENCOUNTER — OFFICE VISIT (OUTPATIENT)
Dept: FAMILY MEDICINE | Facility: CLINIC | Age: 70
End: 2020-02-24
Payer: MEDICARE

## 2020-02-24 ENCOUNTER — LAB VISIT (OUTPATIENT)
Dept: LAB | Facility: HOSPITAL | Age: 70
End: 2020-02-24
Attending: FAMILY MEDICINE
Payer: MEDICARE

## 2020-02-24 VITALS
HEART RATE: 60 BPM | HEIGHT: 70 IN | BODY MASS INDEX: 30.18 KG/M2 | WEIGHT: 210.81 LBS | TEMPERATURE: 99 F | SYSTOLIC BLOOD PRESSURE: 138 MMHG | DIASTOLIC BLOOD PRESSURE: 67 MMHG

## 2020-02-24 DIAGNOSIS — Z13.220 ENCOUNTER FOR LIPID SCREENING FOR CARDIOVASCULAR DISEASE: ICD-10-CM

## 2020-02-24 DIAGNOSIS — Z13.6 ENCOUNTER FOR LIPID SCREENING FOR CARDIOVASCULAR DISEASE: ICD-10-CM

## 2020-02-24 DIAGNOSIS — N18.30 CKD (CHRONIC KIDNEY DISEASE) STAGE 3, GFR 30-59 ML/MIN: ICD-10-CM

## 2020-02-24 DIAGNOSIS — G95.9 SPINAL CORD LESION: ICD-10-CM

## 2020-02-24 DIAGNOSIS — Z00.01 ENCOUNTER FOR GENERAL ADULT MEDICAL EXAMINATION WITH ABNORMAL FINDINGS: Primary | ICD-10-CM

## 2020-02-24 DIAGNOSIS — Z79.899 ENCOUNTER FOR LONG-TERM (CURRENT) USE OF MEDICATIONS: ICD-10-CM

## 2020-02-24 DIAGNOSIS — E66.01 MORBID (SEVERE) OBESITY DUE TO EXCESS CALORIES: ICD-10-CM

## 2020-02-24 DIAGNOSIS — Z00.01 ENCOUNTER FOR GENERAL ADULT MEDICAL EXAMINATION WITH ABNORMAL FINDINGS: ICD-10-CM

## 2020-02-24 LAB
ALBUMIN SERPL BCP-MCNC: 3.9 G/DL (ref 3.5–5.2)
ALP SERPL-CCNC: 58 U/L (ref 55–135)
ALT SERPL W/O P-5'-P-CCNC: 10 U/L (ref 10–44)
ANION GAP SERPL CALC-SCNC: 10 MMOL/L (ref 8–16)
AST SERPL-CCNC: 22 U/L (ref 10–40)
BILIRUB SERPL-MCNC: 0.7 MG/DL (ref 0.1–1)
BUN SERPL-MCNC: 21 MG/DL (ref 8–23)
CALCIUM SERPL-MCNC: 9.1 MG/DL (ref 8.7–10.5)
CHLORIDE SERPL-SCNC: 108 MMOL/L (ref 95–110)
CHOLEST SERPL-MCNC: 220 MG/DL (ref 120–199)
CHOLEST/HDLC SERPL: 5.8 {RATIO} (ref 2–5)
CO2 SERPL-SCNC: 23 MMOL/L (ref 23–29)
CREAT SERPL-MCNC: 1.3 MG/DL (ref 0.5–1.4)
ERYTHROCYTE [DISTWIDTH] IN BLOOD BY AUTOMATED COUNT: 12.7 % (ref 11.5–14.5)
EST. GFR  (AFRICAN AMERICAN): >60 ML/MIN/1.73 M^2
EST. GFR  (NON AFRICAN AMERICAN): 55.3 ML/MIN/1.73 M^2
GLUCOSE SERPL-MCNC: 84 MG/DL (ref 70–110)
HCT VFR BLD AUTO: 43.8 % (ref 40–54)
HDLC SERPL-MCNC: 38 MG/DL (ref 40–75)
HDLC SERPL: 17.3 % (ref 20–50)
HGB BLD-MCNC: 14.6 G/DL (ref 14–18)
LDLC SERPL CALC-MCNC: 153.8 MG/DL (ref 63–159)
MCH RBC QN AUTO: 30.6 PG (ref 27–31)
MCHC RBC AUTO-ENTMCNC: 33.3 G/DL (ref 32–36)
MCV RBC AUTO: 92 FL (ref 82–98)
NONHDLC SERPL-MCNC: 182 MG/DL
PLATELET # BLD AUTO: 175 K/UL (ref 150–350)
PMV BLD AUTO: 9.2 FL (ref 9.2–12.9)
POTASSIUM SERPL-SCNC: 4.3 MMOL/L (ref 3.5–5.1)
PROT SERPL-MCNC: 6.9 G/DL (ref 6–8.4)
RBC # BLD AUTO: 4.77 M/UL (ref 4.6–6.2)
SODIUM SERPL-SCNC: 141 MMOL/L (ref 136–145)
TRIGL SERPL-MCNC: 141 MG/DL (ref 30–150)
TSH SERPL DL<=0.005 MIU/L-ACNC: 1.16 UIU/ML (ref 0.4–4)
WBC # BLD AUTO: 8.59 K/UL (ref 3.9–12.7)

## 2020-02-24 PROCEDURE — 99999 PR PBB SHADOW E&M-EST. PATIENT-LVL III: CPT | Mod: PBBFAC,HCNC,, | Performed by: FAMILY MEDICINE

## 2020-02-24 PROCEDURE — 99999 PR PBB SHADOW E&M-EST. PATIENT-LVL III: ICD-10-PCS | Mod: PBBFAC,HCNC,, | Performed by: FAMILY MEDICINE

## 2020-02-24 PROCEDURE — 3078F PR MOST RECENT DIASTOLIC BLOOD PRESSURE < 80 MM HG: ICD-10-PCS | Mod: HCNC,CPTII,S$GLB, | Performed by: FAMILY MEDICINE

## 2020-02-24 PROCEDURE — 99397 PER PM REEVAL EST PAT 65+ YR: CPT | Mod: HCNC,S$GLB,, | Performed by: FAMILY MEDICINE

## 2020-02-24 PROCEDURE — 99397 PR PREVENTIVE VISIT,EST,65 & OVER: ICD-10-PCS | Mod: HCNC,S$GLB,, | Performed by: FAMILY MEDICINE

## 2020-02-24 PROCEDURE — 80053 COMPREHEN METABOLIC PANEL: CPT | Mod: HCNC

## 2020-02-24 PROCEDURE — 36415 COLL VENOUS BLD VENIPUNCTURE: CPT | Mod: HCNC,PO

## 2020-02-24 PROCEDURE — 3078F DIAST BP <80 MM HG: CPT | Mod: HCNC,CPTII,S$GLB, | Performed by: FAMILY MEDICINE

## 2020-02-24 PROCEDURE — 80061 LIPID PANEL: CPT | Mod: HCNC

## 2020-02-24 PROCEDURE — 84443 ASSAY THYROID STIM HORMONE: CPT | Mod: HCNC

## 2020-02-24 PROCEDURE — 3075F SYST BP GE 130 - 139MM HG: CPT | Mod: HCNC,CPTII,S$GLB, | Performed by: FAMILY MEDICINE

## 2020-02-24 PROCEDURE — 85027 COMPLETE CBC AUTOMATED: CPT | Mod: HCNC

## 2020-02-24 PROCEDURE — 3075F PR MOST RECENT SYSTOLIC BLOOD PRESS GE 130-139MM HG: ICD-10-PCS | Mod: HCNC,CPTII,S$GLB, | Performed by: FAMILY MEDICINE

## 2020-02-24 RX ORDER — MECLIZINE HCL 12.5 MG 12.5 MG/1
12.5 TABLET ORAL 3 TIMES DAILY PRN
Qty: 90 TABLET | Refills: 3 | Status: SHIPPED | OUTPATIENT
Start: 2020-02-24 | End: 2021-03-19

## 2020-02-24 NOTE — ASSESSMENT & PLAN NOTE
Chronic.  Unchanged appearance on recent MRI.  Patient following with specialist concerning this condition.

## 2020-02-24 NOTE — PATIENT INSTRUCTIONS
Follow up in about 1 year (around 2/24/2021), or if symptoms worsen or fail to improve, for Annual Wellness Exam.     If no improvement in symptoms or symptoms worsen, please be advised to call MD, follow-up at clinic and/or go to ER if becomes severe.    Trevor Jarvis M.D.        We Offer TELEHEALTH & Same Day Appointments!   Book your Telehealth appointment with me through my nurse or   Clinic appointments on [employer]!    [address] [place] [address]    Office: [phone]   FAX: [phone]    Check out my Facebook Page and Follow Me at: [email or web address]    Check out my website at [employer] by clicking on: [email or web address]    To Schedule appointments online, go to [email or web address]: [email or web address]

## 2020-02-24 NOTE — ASSESSMENT & PLAN NOTE
Stable.  Followed by  in Nephrology.  Continue current medication regimen.  Monitoring renal function.

## 2020-02-24 NOTE — PROGRESS NOTES
======================================================  GOAL of current visit: [facility]    Previous PCP: Dr. Ritchie Rivero  Specialists: Urologist Dr. Ulisses Serra;   Recent lab work: February 2020  Recent imaging: October 2019  Colonoscopy History: 3/11/2010 Dr. Troy Ware    There are no preventive care reminders to display for this patient.  ======================================================  PLAN:      Problem List Items Addressed This Visit     CKD (chronic kidney disease) stage 3, GFR 30-59 ml/min (Chronic)     Stable.  Followed by  in Nephrology.  Continue current medication regimen.  Monitoring renal function.         Morbid (severe) obesity due to excess calories (Chronic)     Counseled on diet and exercise.         Spinal cord lesion (Chronic)     Chronic.  Unchanged appearance on recent MRI.  Patient following with specialist concerning this condition.           Other Visit Diagnoses     Encounter for general adult medical examination with abnormal findings    -  Primary    Relevant Orders    CBC Without Differential    Comprehensive metabolic panel    TSH    Lipid panel    Encounter for lipid screening for cardiovascular disease        Relevant Orders    Lipid panel    Encounter for long-term (current) use of medications        Relevant Medications    meclizine (ANTIVERT) 12.5 mg tablet    Other Relevant Orders    CBC Without Differential    Comprehensive metabolic panel    TSH    Lipid panel        Future Appointments     Date Provider Specialty Appt Notes    2/24/2020  Lab     2/27/2020 Ani Srivastava NP Cardiology abnormal beats    8/24/2020 Trevor Jarvis MD Family Medicine 6 mo f/u    2/24/2021 Trevor Jarvis MD Family Medicine annual           Medication List with Changes/Refills   Current Medications    ACETAMINOPHEN (TYLENOL) 500 MG TABLET    Take 2 tablets (1,000 mg total) by mouth every 6 (six) hours as needed for Pain.    ALBUTEROL (PROAIR HFA) 90 MCG/ACTUATION INHALER     Inhale 2 puffs into the lungs every 6 (six) hours as needed for Wheezing. Rescue    LATANOPROST 0.005 % OPHTHALMIC SOLUTION    PUT ONE DROP IN THE RIGHT EYE AT BEDTIME    ONDANSETRON (ZOFRAN-ODT) 8 MG TBDL    Take 1 tablet (8 mg total) by mouth every 8 (eight) hours as needed.   Changed and/or Refilled Medications    Modified Medication Previous Medication    MECLIZINE (ANTIVERT) 12.5 MG TABLET meclizine (ANTIVERT) 12.5 mg tablet       Take 1 tablet (12.5 mg total) by mouth 3 (three) times daily as needed.    Take 1 tablet (12.5 mg total) by mouth 3 (three) times daily as needed.       Trevor Jarvis M.D.     ==========================================================================  Subjective:      Patient ID: Yasir Jeffery is a 69 y.o. male.  has a past medical history of Back pain, Benign prostate hyperplasia, DDD (degenerative disc disease), thoracolumbar, Disorder of kidney and ureter, Elevated PSA, Glaucoma, Hypertension, Malignant neoplasm of prostate (7/31/2019), Metabolic syndrome, Mixed hyperlipidemia (1/24/2018), Obesity, MARIN (obstructive sleep apnea), Pneumonia, and Psychophysiological insomnia.     Chief Complaint: Establish Care (former patient Dr. Rivero)      Problem List Items Addressed This Visit     CKD (chronic kidney disease) stage 3, GFR 30-59 ml/min (Chronic)    Overview     Lab Results   Component Value Date    CREATININE 1.25 02/07/2020    BUN 22 06/27/2019     06/27/2019    K 5.0 06/27/2019     06/27/2019    CO2 29 06/27/2019    Chronic.  Stable.           Current Assessment & Plan     Stable.  Followed by  in Nephrology.  Continue current medication regimen.  Monitoring renal function.         Morbid (severe) obesity due to excess calories (Chronic)    Overview     Chronic.  Control is uncertain.  Current BMI is 30.  Patient reports noncompliance with diet and exercise.         Current Assessment & Plan     Counseled on diet and exercise.         Spinal cord  lesion (Chronic)    Overview     Impression       1. Redemonstration of a 5 x 4 x 9 mm T2 hyperintense, homogeneously enhancing intramedullary thoracic cord lesion, essentially unchanged as compared to the prior study dated 08/30/2019.  This lesion is indeterminate with differential considerations continuing to include a primary intramedullary neoplasm, metastasis, or single focus of inflammation/demyelination.  No new focal cord signal or abnormal intraspinal enhancement elsewhere.  2. Multilevel degenerative changes of the cervical spine, unchanged as compared to 08/30/2019.      Electronically signed by: Ricardo Nixon  Date: 12/16/2019  Time: 16:02              Current Assessment & Plan     Chronic.  Unchanged appearance on recent MRI.  Patient following with specialist concerning this condition.           Other Visit Diagnoses     Encounter for general adult medical examination with abnormal findings    -  Primary    Encounter for lipid screening for cardiovascular disease        Encounter for long-term (current) use of medications               Past Medical History:  Past Medical History:   Diagnosis Date    Back pain     Benign prostate hyperplasia     DDD (degenerative disc disease), thoracolumbar     Disorder of kidney and ureter     stage 3    Elevated PSA     Glaucoma     Hypertension     Malignant neoplasm of prostate 7/31/2019    Metabolic syndrome     Mixed hyperlipidemia 1/24/2018    Obesity     MARIN (obstructive sleep apnea)     Pneumonia     Psychophysiological insomnia      Past Surgical History:   Procedure Laterality Date    CYST REMOVAL  1993    neck    CYSTOSCOPY N/A 7/17/2019    Procedure: CYSTOSCOPY;  Surgeon: Ulisses Serra MD;  [facility];  Service: Urology;  Laterality: N/A;    EYE SURGERY      cataract surgery    GANGLION CYST EXCISION Right 1994    PROSTATE BIOPSY      TESTICLE SURGERY  1980    TRANSRECTAL BIOPSY OF PROSTATE WITH ULTRASOUND GUIDANCE Bilateral  2019    Procedure: BIOPSY, PROSTATE, RECTAL APPROACH, WITH US GUIDANCE;  Surgeon: Ulisses Serra MD;  Location: [facility];  Service: Urology;  Laterality: Bilateral;     Review of patient's allergies indicates:   Allergen Reactions    Benadryl allergy decongestant      Opposite reaction, speeds him up   Opposite reaction, speeds him up     Diphenhydramine      Medication List with Changes/Refills   Current Medications    ACETAMINOPHEN (TYLENOL) 500 MG TABLET    Take 2 tablets (1,000 mg total) by mouth every 6 (six) hours as needed for Pain.    ALBUTEROL (PROAIR HFA) 90 MCG/ACTUATION INHALER    Inhale 2 puffs into the lungs every 6 (six) hours as needed for Wheezing. Rescue    LATANOPROST 0.005 % OPHTHALMIC SOLUTION    PUT ONE DROP IN THE RIGHT EYE AT BEDTIME    ONDANSETRON (ZOFRAN-ODT) 8 MG TBDL    Take 1 tablet (8 mg total) by mouth every 8 (eight) hours as needed.   Changed and/or Refilled Medications    Modified Medication Previous Medication    MECLIZINE (ANTIVERT) 12.5 MG TABLET meclizine (ANTIVERT) 12.5 mg tablet       Take 1 tablet (12.5 mg total) by mouth 3 (three) times daily as needed.    Take 1 tablet (12.5 mg total) by mouth 3 (three) times daily as needed.      Social History     Tobacco Use    Smoking status: Former Smoker     Years: 3.00     Types: Cigars     Last attempt to quit:      Years since quittin.1    Smokeless tobacco: Never Used   Substance Use Topics    Alcohol use: No      Family History   Problem Relation Age of Onset    Prostate cancer Father     No Known Problems Sister     Alzheimer's disease Brother     Dementia Brother     Pacemaker/defibrilator Sister     Arthritis Sister        I have reviewed the complete PMH, social history, surgical history, allergies and medications.  As well as family history.    Review of Systems   Constitutional: Positive for fatigue. Negative for chills, fever and unexpected weight change.   HENT: Negative for ear pain and sore  "throat.    Eyes: Negative for redness and visual disturbance.   Respiratory: Positive for shortness of breath. Negative for cough.    Cardiovascular: Negative for chest pain and palpitations.   Gastrointestinal: Negative for nausea and vomiting.   Endocrine: Negative for cold intolerance and heat intolerance.   Genitourinary: Negative for difficulty urinating and hematuria.   Musculoskeletal: Negative for arthralgias and myalgias.   Skin: Negative for rash and wound.   Allergic/Immunologic: Negative for environmental allergies and food allergies.   Neurological: Negative for weakness and headaches.   Hematological: Negative for adenopathy. Does not bruise/bleed easily.   Psychiatric/Behavioral: Negative for sleep disturbance. The patient is not nervous/anxious.      Objective:   /67   Pulse 60   Temp 98.8 °F (37.1 °C) (Oral)   Ht 5' 10" (1.778 m)   Wt 95.6 kg (210 lb 12.8 oz)   BMI 30.25 kg/m²   Physical Exam   Constitutional: He is oriented to person, place, and time. He appears well-developed and well-nourished. No distress.   HENT:   Head: Normocephalic and atraumatic.   Eyes: Pupils are equal, round, and reactive to light. EOM are normal.   Neck: Normal range of motion. Neck supple.   Cardiovascular: Normal rate, regular rhythm, normal heart sounds and intact distal pulses.  Extrasystoles are present.   Pulmonary/Chest: Effort normal and breath sounds normal. No respiratory distress. He has no wheezes.   Abdominal: Soft. Bowel sounds are normal.   Musculoskeletal: Normal range of motion. He exhibits no edema.   Neurological: He is alert and oriented to person, place, and time. No cranial nerve deficit.   Skin: Skin is warm and dry. Capillary refill takes less than 2 seconds.   Psychiatric: He has a normal mood and affect. His behavior is normal.   Nursing note and vitals reviewed.      Assessment:     1. Encounter for general adult medical examination with abnormal findings    2. Encounter for lipid " screening for cardiovascular disease    3. Encounter for long-term (current) use of medications    4. Morbid (severe) obesity due to excess calories    5. Spinal cord lesion    6. CKD (chronic kidney disease) stage 3, GFR 30-59 ml/min      MDM:   Patient here for annual wellness labs.  This noticed is specifically for chronic conditions.  I have Reviewed and summarized old records.  I have performed thorough medication reconciliation today and discussed risk and benefits of each medication.  I have reviewed imaging, labs and discussed with patient.  All questions were answered.  I am requesting old records and will review them once they are available.    I have signed for the following orders AND/OR meds.  Orders Placed This Encounter   Procedures    CBC Without Differential     Standing Status:   Future     Number of Occurrences:   1     Standing Expiration Date:   4/24/2021    Comprehensive metabolic panel     Standing Status:   Future     Number of Occurrences:   1     Standing Expiration Date:   4/24/2021    TSH     Standing Status:   Future     Number of Occurrences:   1     Standing Expiration Date:   4/24/2021    Lipid panel     Standing Status:   Future     Number of Occurrences:   1     Standing Expiration Date:   4/24/2021     Medications Ordered This Encounter   Medications    meclizine (ANTIVERT) 12.5 mg tablet     Sig: Take 1 tablet (12.5 mg total) by mouth 3 (three) times daily as needed.     Dispense:  90 tablet     Refill:  3        Follow up in about 1 year (around 2/24/2021), or if symptoms worsen or fail to improve, for Annual Wellness Exam.    If no improvement in symptoms or symptoms worsen, advised to call/follow-up at clinic or go to ER. Patient voiced understanding and all questions/concerns were addressed.     DISCLAIMER: This note was compiled by using a speech recognition dictation system and therefore please be aware that typographical / speech recognition errors can and do occur.   Please contact me if you see any errors specifically.    Trevor Jarvis M.D.       Office: [phone] [address] [place] [address]  FAX: [phone]

## 2020-02-24 NOTE — PROGRESS NOTES
This note is specifically for wellness visit performed today.     WELLNESS EXAM      Patient ID: Yasir Jeffery is a 69 y.o. male.  has a past medical history of Back pain, Benign prostate hyperplasia, DDD (degenerative disc disease), thoracolumbar, Disorder of kidney and ureter, Elevated PSA, Glaucoma, Hypertension, Malignant neoplasm of prostate (7/31/2019), Metabolic syndrome, Mixed hyperlipidemia (1/24/2018), Obesity, MARIN (obstructive sleep apnea), Pneumonia, and Psychophysiological insomnia.     Chief Complaint:  Encounter for wellness exam    Well Adult Physical: Patient here for a comprehensive physical exam.The patient reports problems. See other note for chronic conditions.  Do you take any herbs or supplements that were not prescribed by a doctor? no   Are you taking calcium supplements? no   Are you taking aspirin daily? no   History:  Pending prostate biopsies for enlarged prostate abnormal PSA.  Previous PCP: Dr. Ritchie Rivero  Urologist: Dr. Ulisses Serra  GI: Dr. Ziyad Marmolejo  Date last prostate exam:  2019  Date last PSA:  Requesting records  No results found for: PSA   No history of STDs    Health Maintenance Topics with due status: Not Due       Topic Last Completion Date    Colonoscopy 03/11/2010    TETANUS VACCINE 06/20/2015    Lipid Panel 05/14/2018        ==============================================  History reviewed.     There are no preventive care reminders to display for this patient.    Past Medical History:  Past Medical History:   Diagnosis Date    Back pain     Benign prostate hyperplasia     DDD (degenerative disc disease), thoracolumbar     Disorder of kidney and ureter     stage 3    Elevated PSA     Glaucoma     Hypertension     Malignant neoplasm of prostate 7/31/2019    Metabolic syndrome     Mixed hyperlipidemia 1/24/2018    Obesity     MARIN (obstructive sleep apnea)     Pneumonia     Psychophysiological insomnia      Past Surgical History:   Procedure  Laterality Date    CYST REMOVAL  1993    neck    CYSTOSCOPY N/A 7/17/2019    Procedure: CYSTOSCOPY;  Surgeon: Ulisses Serra MD;  Location: [facility] OR;  Service: Urology;  Laterality: N/A;    EYE SURGERY      cataract surgery    GANGLION CYST EXCISION Right 1994    PROSTATE BIOPSY      TESTICLE SURGERY  1980    TRANSRECTAL BIOPSY OF PROSTATE WITH ULTRASOUND GUIDANCE Bilateral 7/17/2019    Procedure: BIOPSY, PROSTATE, RECTAL APPROACH, WITH US GUIDANCE;  Surgeon: Ulisses Serra MD;  Location: [facility] OR;  Service: Urology;  Laterality: Bilateral;     Review of patient's allergies indicates:   Allergen Reactions    Benadryl allergy decongestant      Opposite reaction, speeds him up   Opposite reaction, speeds him up     Diphenhydramine      Current Outpatient Medications on File Prior to Visit   Medication Sig Dispense Refill    acetaminophen (TYLENOL) 500 MG tablet Take 2 tablets (1,000 mg total) by mouth every 6 (six) hours as needed for Pain.      latanoprost 0.005 % ophthalmic solution PUT ONE DROP IN THE RIGHT EYE AT BEDTIME  6    ondansetron (ZOFRAN-ODT) 8 MG TbDL Take 1 tablet (8 mg total) by mouth every 8 (eight) hours as needed. 20 tablet 1    [DISCONTINUED] meclizine (ANTIVERT) 12.5 mg tablet Take 1 tablet (12.5 mg total) by mouth 3 (three) times daily as needed. 90 tablet 3    albuterol (PROAIR HFA) 90 mcg/actuation inhaler Inhale 2 puffs into the lungs every 6 (six) hours as needed for Wheezing. Rescue 1 Inhaler 0     No current facility-administered medications on file prior to visit.      Social History     Socioeconomic History    Marital status:      Spouse name: Not on file    Number of children: Not on file    Years of education: Not on file    Highest education level: Not on file   Occupational History    Not on file   Social Needs    Financial resource strain: Not on file    Food insecurity:     Worry: Not on file     Inability: Not on file    Transportation needs:      Medical: Not on file     Non-medical: Not on file   Tobacco Use    Smoking status: Former Smoker     Years: 3.00     Types: Cigars     Last attempt to quit:      Years since quittin.1    Smokeless tobacco: Never Used   Substance and Sexual Activity    Alcohol use: No    Drug use: No    Sexual activity: Not on file   Lifestyle    Physical activity:     Days per week: Not on file     Minutes per session: Not on file    Stress: Not on file   Relationships    Social connections:     Talks on phone: Not on file     Gets together: Not on file     Attends Confucianism service: Not on file     Active member of club or organization: Not on file     Attends meetings of clubs or organizations: Not on file     Relationship status: Not on file   Other Topics Concern    Not on file   Social History Narrative    Not on file     Family History   Problem Relation Age of Onset    Prostate cancer Father     No Known Problems Sister     Alzheimer's disease Brother     Dementia Brother     Pacemaker/defibrilator Sister     Arthritis Sister        Vitals:    20 1409   BP: 138/67   Pulse: 60   Temp: 98.8 °F (37.1 °C)      Body mass index is 30.25 kg/m².     Review of Systems   Constitutional: Negative for chills, fever and unexpected weight change.   HENT: Negative for ear pain and sore throat.    Eyes: Negative for redness and visual disturbance.   Respiratory: Negative for cough and shortness of breath.    Cardiovascular: Negative for chest pain and palpitations.   Gastrointestinal: Negative for nausea and vomiting.   Musculoskeletal: Negative for arthralgias and myalgias.   Skin: Negative for rash and wound.   Neurological: Negative for weakness and headaches.         Objective:      Physical Exam   Constitutional: He is oriented to person, place, and time. He appears well-developed and well-nourished. No distress.   HENT:   Head: Normocephalic and atraumatic.   Eyes: Pupils are equal, round, and reactive to  light. EOM are normal.   Neck: Normal range of motion. Neck supple.   Cardiovascular: Normal rate, regular rhythm, normal heart sounds and intact distal pulses.   No murmur heard.  Pulmonary/Chest: Effort normal and breath sounds normal. No respiratory distress. He has no wheezes.   Musculoskeletal: Normal range of motion. He exhibits no edema.   Neurological: He is alert and oriented to person, place, and time. No cranial nerve deficit.   Skin: Skin is warm and dry. Capillary refill takes less than 2 seconds.   Psychiatric: He has a normal mood and affect. His behavior is normal.   Nursing note and vitals reviewed.        Assessment / Plan:      1. Z00.01 -patient here for annual wellness exam.  Labs ordered.  Health maintenance was reviewed and ordered.    Complete history and physical was completed today.  Complete and thorough medication reconciliation was performed.  Discussed risks and benefits of medications.  Advised patient on orders and health maintenance.  We discussed old records and old labs if available.  Will request any records not available through epic.  Continue current medications listed on your summary sheet.    All questions were answered. Patient had no further concerns. Advised of diagnoses and plan. Follow up as planned or return sooner if symptoms persist or worsen.     Orders Placed This Encounter   Procedures    CBC Without Differential     Standing Status:   Future     Number of Occurrences:   1     Standing Expiration Date:   4/24/2021    Comprehensive metabolic panel     Standing Status:   Future     Number of Occurrences:   1     Standing Expiration Date:   4/24/2021    TSH     Standing Status:   Future     Number of Occurrences:   1     Standing Expiration Date:   4/24/2021    Lipid panel     Standing Status:   Future     Number of Occurrences:   1     Standing Expiration Date:   4/24/2021        Trevor Jarvis MD

## 2020-02-25 ENCOUNTER — TELEPHONE (OUTPATIENT)
Dept: FAMILY MEDICINE | Facility: CLINIC | Age: 70
End: 2020-02-25

## 2020-02-25 DIAGNOSIS — I10 HYPERTENSION, UNSPECIFIED TYPE: ICD-10-CM

## 2020-02-25 DIAGNOSIS — R06.09 DOE (DYSPNEA ON EXERTION): ICD-10-CM

## 2020-02-25 DIAGNOSIS — E78.2 MIXED HYPERLIPIDEMIA: Primary | Chronic | ICD-10-CM

## 2020-02-25 DIAGNOSIS — E78.5 HYPERLIPIDEMIA, UNSPECIFIED HYPERLIPIDEMIA TYPE: Primary | ICD-10-CM

## 2020-02-25 NOTE — PROGRESS NOTES
Please CALL patient with results and Document verification.   [phone]    Blood counts and thyroid lab are normal.  Metabolic panel shows stable renal function electrolytes and liver function.  Cholesterol is abnormal with borderline high total cholesterol and LDL.  and needs further evaluation and treatment.  Please follow-up with Cardiology and repeat lipid panel in six months.  Change diet with low-cholesterol, low-fat is recommended.  Increase exercise.

## 2020-02-25 NOTE — TELEPHONE ENCOUNTER
----- Message from Trevor Jarvis MD sent at 2/25/2020  6:14 AM CST -----  Please CALL patient with results and Document verification.   [phone]    Blood counts and thyroid lab are normal.  Metabolic panel shows stable renal function electrolytes and liver function.  Cholesterol is abnormal with borderline high total cholesterol and LDL.  and needs further evaluation and treatment.  Please follow-up with Cardiology and repeat lipid panel in six months.  Change diet with low-cholesterol, low-fat is recommended.  Increase exercise.

## 2020-02-25 NOTE — TELEPHONE ENCOUNTER
Patient informed of all lab results and recommendations.  Patient states he has appt with his cardiologist this week, verbalized understanding on all other recommendations, order pended for lipid, make appt in 6 months

## 2020-02-27 ENCOUNTER — HOSPITAL ENCOUNTER (OUTPATIENT)
Dept: CARDIOLOGY | Facility: HOSPITAL | Age: 70
Discharge: HOME | End: 2020-02-27
Payer: MEDICARE

## 2020-02-27 ENCOUNTER — OFFICE VISIT (OUTPATIENT)
Dept: CARDIOLOGY | Facility: CLINIC | Age: 70
End: 2020-02-27
Payer: MEDICARE

## 2020-02-27 VITALS
SYSTOLIC BLOOD PRESSURE: 128 MMHG | OXYGEN SATURATION: 97 % | WEIGHT: 209 LBS | BODY MASS INDEX: 29.92 KG/M2 | HEIGHT: 70 IN | DIASTOLIC BLOOD PRESSURE: 74 MMHG

## 2020-02-27 DIAGNOSIS — R06.09 DOE (DYSPNEA ON EXERTION): ICD-10-CM

## 2020-02-27 DIAGNOSIS — R07.9 ACUTE CHEST PAIN: Primary | ICD-10-CM

## 2020-02-27 DIAGNOSIS — I10 ESSENTIAL HYPERTENSION: ICD-10-CM

## 2020-02-27 DIAGNOSIS — E88.810 METABOLIC SYNDROME: ICD-10-CM

## 2020-02-27 DIAGNOSIS — I10 HYPERTENSION, UNSPECIFIED TYPE: ICD-10-CM

## 2020-02-27 DIAGNOSIS — N18.30 CKD (CHRONIC KIDNEY DISEASE) STAGE 3, GFR 30-59 ML/MIN: Chronic | ICD-10-CM

## 2020-02-27 DIAGNOSIS — R53.83 LACK OF ENERGY: ICD-10-CM

## 2020-02-27 DIAGNOSIS — R53.83 FATIGUE, UNSPECIFIED TYPE: ICD-10-CM

## 2020-02-27 DIAGNOSIS — E78.2 MIXED HYPERLIPIDEMIA: Chronic | ICD-10-CM

## 2020-02-27 PROCEDURE — 3074F SYST BP LT 130 MM HG: CPT | Mod: HCNC,CPTII,S$GLB, | Performed by: NURSE PRACTITIONER

## 2020-02-27 PROCEDURE — 99999 PR PBB SHADOW E&M-EST. PATIENT-LVL III: ICD-10-PCS | Mod: PBBFAC,HCNC,, | Performed by: NURSE PRACTITIONER

## 2020-02-27 PROCEDURE — 3078F DIAST BP <80 MM HG: CPT | Mod: HCNC,CPTII,S$GLB, | Performed by: NURSE PRACTITIONER

## 2020-02-27 PROCEDURE — 93010 ELECTROCARDIOGRAM REPORT: CPT | Mod: HCNC,,, | Performed by: INTERNAL MEDICINE

## 2020-02-27 PROCEDURE — 1101F PT FALLS ASSESS-DOCD LE1/YR: CPT | Mod: HCNC,CPTII,S$GLB, | Performed by: NURSE PRACTITIONER

## 2020-02-27 PROCEDURE — 93010 EKG 12-LEAD: ICD-10-PCS | Mod: HCNC,,, | Performed by: INTERNAL MEDICINE

## 2020-02-27 PROCEDURE — 99214 PR OFFICE/OUTPT VISIT, EST, LEVL IV, 30-39 MIN: ICD-10-PCS | Mod: HCNC,S$GLB,, | Performed by: NURSE PRACTITIONER

## 2020-02-27 PROCEDURE — 93005 ELECTROCARDIOGRAM TRACING: CPT | Mod: HCNC,PO

## 2020-02-27 PROCEDURE — 99999 PR PBB SHADOW E&M-EST. PATIENT-LVL III: CPT | Mod: PBBFAC,HCNC,, | Performed by: NURSE PRACTITIONER

## 2020-02-27 PROCEDURE — 1159F MED LIST DOCD IN RCRD: CPT | Mod: HCNC,S$GLB,, | Performed by: NURSE PRACTITIONER

## 2020-02-27 PROCEDURE — 1101F PR PT FALLS ASSESS DOC 0-1 FALLS W/OUT INJ PAST YR: ICD-10-PCS | Mod: HCNC,CPTII,S$GLB, | Performed by: NURSE PRACTITIONER

## 2020-02-27 PROCEDURE — 3078F PR MOST RECENT DIASTOLIC BLOOD PRESSURE < 80 MM HG: ICD-10-PCS | Mod: HCNC,CPTII,S$GLB, | Performed by: NURSE PRACTITIONER

## 2020-02-27 PROCEDURE — 1159F PR MEDICATION LIST DOCUMENTED IN MEDICAL RECORD: ICD-10-PCS | Mod: HCNC,S$GLB,, | Performed by: NURSE PRACTITIONER

## 2020-02-27 PROCEDURE — 3074F PR MOST RECENT SYSTOLIC BLOOD PRESSURE < 130 MM HG: ICD-10-PCS | Mod: HCNC,CPTII,S$GLB, | Performed by: NURSE PRACTITIONER

## 2020-02-27 PROCEDURE — 99214 OFFICE O/P EST MOD 30 MIN: CPT | Mod: HCNC,S$GLB,, | Performed by: NURSE PRACTITIONER

## 2020-02-27 NOTE — PATIENT INSTRUCTIONS
Blood pressure goal: less than 130/80. Check blood pressure once daily in the morning.  Aspirin 81 mg once daily  Metamucil-work up to two teaspoons twice daily; no sugar added.

## 2020-02-27 NOTE — PROGRESS NOTES
Subjective:    Patient ID:  Yasir Jeffery is a 69 y.o. male who presents for evaluation of No chief complaint on file.      HPI: . Yasir Jeffery presents to the clinic with his wife for evaluation of fatigue, lack of energy, palpitations, and chest discomfort.  He reports when he lays down sometimes he experiences chest tightness to the left chest; it is nonradiating. The pain lasts a few minutes. He stated that it gets 3-4/10. He does not notice diaphoresis, SOB, N/V. He sometimes notices palpitations when he has chest pain. It does not occur every day. It is not related to exertion; however, he has stopped exerting himself much. He noticed a decreased ability to do things around the house because of severe lack of energy. He is napping during the day, which is unusual for him. He has chronic ALVARADO that is unchanged from the past. Normal PFTs in 2017 via [facility]Jeune, SERGE's note.  Saw Dr. Jarvis 3 days ago. He had lab work 3 days ago-no anemia; chem stable. He has mixed hyperlipidemia, and refuses cholesterol medication.  Sleep study in 2017 did reveal mild obstructive sleep apnea, but Mr. Jeffery stated that he believes that he doesn't have it. No titration study was done; however, he has lost weight since that time. In 2017, he was about 244 pounds.    Medications: he is not taking any CV medications.  Sodium: he does not add salt to foods,  he is not reading labels for sodium content.   Diet: Rare bread; stops snacking/eating at 6:30-7:00. No pasta or potatoes. Eating oatmeal with some sugar and milk. Eating two meals per day. Chicken, occasional red meat. Cooks with a little butter. He generally cooks his own meals from scratch. He is not drinking juice or sodas. He may eat a donut at [employer]; he likes slim [employer].  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 He limits climbing due to osteoarthritis pain-shoulders, hips, and knees.  Tobacco:smoked one cigar/day for 3 years in his  30s. None since. no alcohol use  Coffee throughout the day-6 cups or more.     Weight: 94.8 kg (209 lb) he states that his daily weights has been stable. Body mass index is 29.99 kg/m².  Wt Readings from Last 3 Encounters:   02/27/20 94.8 kg (209 lb)   02/24/20 95.6 kg (210 lb 12.8 oz)   02/12/20 96.4 kg (212 lb 8.4 oz)     BP log: none.    Review of Systems   Constitution: Positive for malaise/fatigue. Negative for chills, decreased appetite, diaphoresis, fever, night sweats, weight gain and weight loss.   HENT: Positive for congestion.         Chronic sinus problems   Cardiovascular: Positive for chest pain, dyspnea on exertion, leg swelling (ankles, near the end of the day.) and palpitations. Negative for claudication, cyanosis, irregular heartbeat, near-syncope, orthopnea, paroxysmal nocturnal dyspnea and syncope.   Respiratory: Negative for cough, hemoptysis, shortness of breath, sputum production and wheezing.    Hematologic/Lymphatic: Negative for adenopathy and bleeding problem. Does not bruise/bleed easily.   Skin: Negative for color change and nail changes.   Gastrointestinal: Negative for bloating, abdominal pain, change in bowel habit, heartburn, hematochezia, melena, nausea and vomiting.   Genitourinary: Negative for hematuria.   Neurological: Negative for dizziness and light-headedness.   Psychiatric/Behavioral: Negative for altered mental status.       Objective:   Physical Exam   Constitutional: He is oriented to person, place, and time. He appears well-developed and well-nourished. No distress.   HENT:   Head: Normocephalic and atraumatic.   Eyes: Conjunctivae are normal. No scleral icterus.   Neck: Neck supple. No JVD present. No tracheal deviation present. No thyromegaly present.   Cardiovascular: Normal rate, regular rhythm, normal heart sounds and intact distal pulses. Exam reveals no gallop and no friction rub.   No murmur heard.  Pulmonary/Chest: Effort normal and breath sounds normal. No  respiratory distress. He has no wheezes. He has no rales. He exhibits no tenderness.   Abdominal: Soft. Bowel sounds are normal. He exhibits no distension and no mass. There is no tenderness. There is no rebound and no guarding.   Musculoskeletal: Normal range of motion. He exhibits no edema.   Lymphadenopathy:     He has no cervical adenopathy.   Neurological: He is alert and oriented to person, place, and time.   Skin: Skin is warm and dry. No rash noted. He is not diaphoretic. No erythema. No pallor.   Pink   Psychiatric: He has a normal mood and affect.   Results for DUNIA GUZMAN (MRN [id number]) as of 2/27/2020 08:31   Ref. Range 2/24/2020 14:46   Sodium Latest Ref Range: 136 - 145 mmol/L 141   Potassium Latest Ref Range: 3.5 - 5.1 mmol/L 4.3   Chloride Latest Ref Range: 95 - 110 mmol/L 108   CO2 Latest Ref Range: 23 - 29 mmol/L 23   Anion Gap Latest Ref Range: 8 - 16 mmol/L 10   BUN, Bld Latest Ref Range: 8 - 23 mg/dL 21   Creatinine Latest Ref Range: 0.5 - 1.4 mg/dL 1.3   eGFR if non African American Latest Ref Range: >60 mL/min/1.73 m^2 55.3 (A)   eGFR if African American Latest Ref Range: >60 mL/min/1.73 m^2 >60.0   Glucose Latest Ref Range: 70 - 110 mg/dL 84   Calcium Latest Ref Range: 8.7 - 10.5 mg/dL 9.1   Alkaline Phosphatase Latest Ref Range: 55 - 135 U/L 58   PROTEIN TOTAL Latest Ref Range: 6.0 - 8.4 g/dL 6.9   Albumin Latest Ref Range: 3.5 - 5.2 g/dL 3.9   BILIRUBIN TOTAL Latest Ref Range: 0.1 - 1.0 mg/dL 0.7   AST Latest Ref Range: 10 - 40 U/L 22   ALT Latest Ref Range: 10 - 44 U/L 10   Triglycerides Latest Ref Range: 30 - 150 mg/dL 141   Cholesterol Latest Ref Range: 120 - 199 mg/dL 220 (H)   HDL Latest Ref Range: 40 - 75 mg/dL 38 (L)   Hdl/Cholesterol Ratio Latest Ref Range: 20.0 - 50.0 % 17.3 (L)   LDL Cholesterol External Latest Ref Range: 63.0 - 159.0 mg/dL 153.8   Non-HDL Cholesterol Latest Units: mg/dL 182   Total Cholesterol/HDL Ratio Latest Ref Range: 2.0 - 5.0  5.8 (H)   TSH Latest Ref  Range: 0.400 - 4.000 uIU/mL 1.156   Trig/HDL 3.71    Pharm NM stress test 11/8/17: Impression: NORMAL MYOCARDIAL PERFUSION  1. The perfusion scan is free of evidence for myocardial ischemia or injury.   2. There is a mild intensity fixed defect in the inferoapical wall of the left ventricle, secondary to diaphragm attenuation.   3. Resting wall motion is physiologic.   4. Resting LV function is normal.   5. The ventricular volumes are normal at rest and stress.   6. The extracardiac distribution of radioactivity is normal.     Echo 11/02/17: CONCLUSIONS     1 - Concentric hypertrophy.     2 - No wall motion abnormalities.     3 - Low normal to mildly depressed left ventricular systolic function (EF 50-55%).     4 - Normal left ventricular diastolic function.     5 - Normal right ventricular systolic function .     6 - The estimated PA systolic pressure is 31 mmHg.     7 - Mild mitral regurgitation.     Assessment:      1. Acute chest pain    2. Fatigue, unspecified type    3. Lack of energy    4. Metabolic syndrome    5. Essential hypertension    6. Mixed hyperlipidemia    7. CKD (chronic kidney disease) stage 3, GFR 30-59 ml/min        Plan:   EKG today: SR with sinus arrhythmia @ 66 BPM possible Q waves in inferior leads (not new); possible lead switch with V2/V#3  Lexiscan.  Echocardiogram  ASA 81mg QD.  He refuses cholesterol medication.  Metamucil: work towards 2 tsp BID. No sugar added.  Rolled oats or steel cut oats.  Reduce sugar intake.  Increase non-starchy vegetables.   Reduce caffeine intake-try 1/2 caffeinated with 1/2 decaffeinated.  Follow up after testing complete or call sooner for any problems.

## 2020-03-05 ENCOUNTER — HOSPITAL ENCOUNTER (OUTPATIENT)
Dept: CARDIOLOGY | Facility: HOSPITAL | Age: 70
Discharge: HOME | End: 2020-03-05
Attending: NURSE PRACTITIONER
Payer: MEDICARE

## 2020-03-05 VITALS
WEIGHT: 209 LBS | HEART RATE: 61 BPM | BODY MASS INDEX: 29.92 KG/M2 | HEIGHT: 70 IN | SYSTOLIC BLOOD PRESSURE: 128 MMHG | DIASTOLIC BLOOD PRESSURE: 74 MMHG

## 2020-03-05 DIAGNOSIS — I10 ESSENTIAL HYPERTENSION: ICD-10-CM

## 2020-03-05 DIAGNOSIS — R53.83 FATIGUE, UNSPECIFIED TYPE: ICD-10-CM

## 2020-03-05 DIAGNOSIS — E78.2 MIXED HYPERLIPIDEMIA: ICD-10-CM

## 2020-03-05 DIAGNOSIS — R53.83 LACK OF ENERGY: ICD-10-CM

## 2020-03-05 DIAGNOSIS — R07.9 ACUTE CHEST PAIN: ICD-10-CM

## 2020-03-05 DIAGNOSIS — E88.810 METABOLIC SYNDROME: ICD-10-CM

## 2020-03-05 PROCEDURE — 93306 TTE W/DOPPLER COMPLETE: CPT | Mod: 26,HCNC,, | Performed by: INTERNAL MEDICINE

## 2020-03-05 PROCEDURE — 93306 TTE W/DOPPLER COMPLETE: CPT | Mod: HCNC,PO

## 2020-03-05 PROCEDURE — 93306 ECHO (CUPID ONLY): ICD-10-PCS | Mod: 26,HCNC,, | Performed by: INTERNAL MEDICINE

## 2020-03-06 LAB
AV INDEX (PROSTH): 0.82
AV LVOT PEAK GRADIENT: 3 MMHG
AV MEAN GRADIENT: 3 MMHG
AV PEAK GRADIENT: 6 MMHG
AV VALVE AREA: 3.43 CM2
AV VELOCITY RATIO: 0.74
BSA FOR ECHO PROCEDURE: 2.16 M2
CV ECHO LV RWT: 0.5 CM
DOP CALC AO PEAK VEL: 1.2 M/S
DOP CALC AO VTI: 26.31 CM
DOP CALC LVOT AREA: 4.2 CM2
DOP CALC LVOT DIAMETER: 2.3 CM
DOP CALC LVOT PEAK VEL: 0.89 M/S
DOP CALC LVOT STROKE VOLUME: 90.11 CM3
DOP CALC RVOT PEAK VEL: 0.66 M/S
DOP CALC RVOT VTI: 15.45 CM
DOP CALCLVOT PEAK VEL VTI: 21.7 CM
E WAVE DECELERATION TIME: 173.23 MS
E/A RATIO: 0.78
E/E' RATIO: 5.43 M/S
ECHO EF ESTIMATED: 61 %
ECHO LV POSTERIOR WALL: 1.13 CM (ref 0.6–1.1)
FRACTIONAL SHORTENING: 32 % (ref 28–44)
INTERVENTRICULAR SEPTUM: 1.13 CM (ref 0.6–1.1)
IVRT: 114.19 MS
LA MAJOR: 5.4 CM
LA MINOR: 5.2 CM
LA WIDTH: 3.9 CM
LEFT ATRIUM SIZE: 3.44 CM
LEFT ATRIUM VOLUME INDEX: 28.4 ML/M2
LEFT ATRIUM VOLUME: 60.42 CM3
LEFT INTERNAL DIMENSION IN SYSTOLE: 3.06 CM (ref 2.1–4)
LEFT VENTRICLE DIASTOLIC VOLUME INDEX: 44.2 ML/M2
LEFT VENTRICLE DIASTOLIC VOLUME: 94 ML
LEFT VENTRICLE MASS INDEX: 86 G/M2
LEFT VENTRICLE SYSTOLIC VOLUME INDEX: 17.4 ML/M2
LEFT VENTRICLE SYSTOLIC VOLUME: 37 ML
LEFT VENTRICULAR INTERNAL DIMENSION IN DIASTOLE: 4.53 CM (ref 3.5–6)
LEFT VENTRICULAR MASS: 183.71 G
LV LATERAL E/E' RATIO: 5.18 M/S
LV SEPTAL E/E' RATIO: 5.7 M/S
MV PEAK A VEL: 0.73 M/S
MV PEAK E VEL: 0.57 M/S
MV STENOSIS PRESSURE HALF TIME: 50 MS
MV VALVE AREA P 1/2 METHOD: 4.4 CM2
PISA TR MAX VEL: 2.55 M/S
PROX AORTA: 2.71 CM
PULM VEIN A" WAVE DURATION": 103.81 MS
PULM VEIN S/D RATIO: 1.11
PV MEAN GRADIENT: 1 MMHG
PV PEAK D VEL: 0.45 M/S
PV PEAK S VEL: 0.5 M/S
PV PEAK VELOCITY: 1.03 M/S
RA MAJOR: 5.1 CM
RA PRESSURE: 3 MMHG
RA WIDTH: 3.1 CM
RIGHT VENTRICULAR END-DIASTOLIC DIMENSION: 2.84 CM
SINUS: 3.2 CM
STJ: 3.1 CM
TDI LATERAL: 0.11 M/S
TDI SEPTAL: 0.1 M/S
TDI: 0.11 M/S
TR MAX PG: 26 MMHG
TRICUSPID ANNULAR PLANE SYSTOLIC EXCURSION: 2.2 CM
TV REST PULMONARY ARTERY PRESSURE: 29 MMHG

## 2020-03-09 ENCOUNTER — HOSPITAL ENCOUNTER (OUTPATIENT)
Dept: CARDIOLOGY | Facility: HOSPITAL | Age: 70
Discharge: HOME | End: 2020-03-09
Attending: NURSE PRACTITIONER
Payer: MEDICARE

## 2020-03-09 VITALS
SYSTOLIC BLOOD PRESSURE: 152 MMHG | RESPIRATION RATE: 16 BRPM | BODY MASS INDEX: 29.35 KG/M2 | WEIGHT: 205 LBS | DIASTOLIC BLOOD PRESSURE: 80 MMHG | HEIGHT: 70 IN | HEART RATE: 56 BPM

## 2020-03-09 DIAGNOSIS — I10 ESSENTIAL HYPERTENSION: ICD-10-CM

## 2020-03-09 DIAGNOSIS — R53.83 LACK OF ENERGY: ICD-10-CM

## 2020-03-09 DIAGNOSIS — R53.83 FATIGUE, UNSPECIFIED TYPE: ICD-10-CM

## 2020-03-09 DIAGNOSIS — E78.2 MIXED HYPERLIPIDEMIA: ICD-10-CM

## 2020-03-09 DIAGNOSIS — R07.9 ACUTE CHEST PAIN: ICD-10-CM

## 2020-03-09 DIAGNOSIS — E88.810 METABOLIC SYNDROME: ICD-10-CM

## 2020-03-09 RX ORDER — REGADENOSON 0.08 MG/ML
0.4 INJECTION, SOLUTION INTRAVENOUS ONCE
Status: COMPLETED | OUTPATIENT
Start: 2020-03-09 | End: 2020-03-09

## 2020-03-09 RX ADMIN — REGADENOSON 0.4 MG: 0.08 INJECTION, SOLUTION INTRAVENOUS at 10:03

## 2020-03-10 LAB
CV STRESS BASE HR: 56 BPM
DIASTOLIC BLOOD PRESSURE: 80 MMHG
NUC REST EJECTION FRACTION: 51
NUC STRESS EJECTION FRACTION: 51 %
OHS CV CPX 1 MINUTE RECOVERY HEART RATE: 76 BPM
OHS CV CPX 85 PERCENT MAX PREDICTED HEART RATE MALE: 128
OHS CV CPX ESTIMATED METS: 1
OHS CV CPX MAX PREDICTED HEART RATE: 150
OHS CV CPX PATIENT IS FEMALE: 0
OHS CV CPX PATIENT IS MALE: 1
OHS CV CPX PEAK DIASTOLIC BLOOD PRESSURE: 79 MMHG
OHS CV CPX PEAK HEAR RATE: 81 BPM
OHS CV CPX PEAK RATE PRESSURE PRODUCT: NORMAL
OHS CV CPX PEAK SYSTOLIC BLOOD PRESSURE: 168 MMHG
OHS CV CPX PERCENT MAX PREDICTED HEART RATE ACHIEVED: 54
OHS CV CPX RATE PRESSURE PRODUCT PRESENTING: 8512
STRESS ECHO POST EXERCISE DUR MIN: 2 MINUTES
STRESS ECHO POST EXERCISE DUR SEC: 18 SECONDS
SYSTOLIC BLOOD PRESSURE: 152 MMHG

## 2020-03-11 ENCOUNTER — TELEPHONE (OUTPATIENT)
Dept: UROLOGY | Facility: CLINIC | Age: 70
End: 2020-03-11

## 2020-03-11 NOTE — TELEPHONE ENCOUNTER
----- Message from Pratibha Dugan sent at 3/11/2020 11:24 AM CDT -----  Contact: Lina  Type:  Sooner Apoointment Request  Caller is requesting a sooner appointment.  Caller declined first available appointment listed below.  Caller will not accept being placed on the waitlist and is requesting a message be sent to doctor.  Name of Caller:  Lina  When is the first available appointment?  4/8  Symptoms:  2 wk f/u  Best Call Back Number:  [phone]  Additional Information:  Please call to advise and schedule.  Thanks!

## 2020-03-16 ENCOUNTER — TELEPHONE (OUTPATIENT)
Dept: CARDIOLOGY | Facility: HOSPITAL | Age: 70
End: 2020-03-16

## 2020-03-16 ENCOUNTER — TELEPHONE (OUTPATIENT)
Dept: UROLOGY | Facility: CLINIC | Age: 70
End: 2020-03-16

## 2020-03-16 NOTE — TELEPHONE ENCOUNTER
The patient wishes to cx his test results appointment and have his results called to him.Message sent to Ani Srivastava to review .

## 2020-03-16 NOTE — TELEPHONE ENCOUNTER
----- Message from Nelly Salazar sent at 3/16/2020 11:24 AM CDT -----  Contact: nia Mills Pt is calling the staff regarding the pt test results  Pt call back to be advised [phone]    Thanks

## 2020-03-17 ENCOUNTER — TELEPHONE (OUTPATIENT)
Dept: CARDIOLOGY | Facility: HOSPITAL | Age: 70
End: 2020-03-17

## 2020-03-17 NOTE — TELEPHONE ENCOUNTER
The patient was called and notified of test results.Plan to contact office at later date and reschedule followup in a few months to follow Cholesterol.

## 2020-03-23 ENCOUNTER — TELEPHONE (OUTPATIENT)
Dept: UROLOGY | Facility: CLINIC | Age: 70
End: 2020-03-23

## 2020-03-23 NOTE — TELEPHONE ENCOUNTER
----- Message from Yeni Pearl sent at 3/23/2020 12:02 PM CDT -----  Contact: patient  Type: Needs Medical Advice    Who Called:  Patient  Best Call Back Number:   Additional Information: Per patient requesting a call back regarding upcoming appointment-please advise-thank you

## 2020-03-23 NOTE — TELEPHONE ENCOUNTER
Patient wanting to set up virtual visit. Advised would need my [facility] in order to do so. Sent link to set up [facility] and will change to virtual visit when done.

## 2020-04-07 ENCOUNTER — PATIENT OUTREACH (OUTPATIENT)
Dept: ADMINISTRATIVE | Facility: OTHER | Age: 70
End: 2020-04-07

## 2020-04-07 ENCOUNTER — TELEPHONE (OUTPATIENT)
Dept: UROLOGY | Facility: CLINIC | Age: 70
End: 2020-04-07

## 2020-06-15 ENCOUNTER — TELEPHONE (OUTPATIENT)
Dept: NEUROSURGERY | Facility: CLINIC | Age: 70
End: 2020-06-15

## 2020-06-15 DIAGNOSIS — G95.9 SPINAL CORD LESION: Primary | Chronic | ICD-10-CM

## 2020-06-15 NOTE — TELEPHONE ENCOUNTER
----- Message from Mary Keenan sent at 6/15/2020 10:23 AM CDT -----  Regarding: Spinal Cord Lesion Referral  Contact: pt  Type: Needs Medical Advice    Who Called:  PT  Best Call Back Number: [phone]  Additional Information: Requesting a call back regarding scheduling an appt for Spinal  Cord Lesion   Please Advise ---Thank you

## 2020-06-24 ENCOUNTER — TELEPHONE (OUTPATIENT)
Dept: FAMILY MEDICINE | Facility: CLINIC | Age: 70
End: 2020-06-24

## 2020-06-24 NOTE — TELEPHONE ENCOUNTER
Spoke with patient caretaker, patient has developed sore throat, temp 99.0, congested, please advise if patient needs appt or covid testing, ER precautions given to caretaker.

## 2020-06-24 NOTE — TELEPHONE ENCOUNTER
----- Message from Kristin Yap sent at 6/24/2020 12:50 PM CDT -----  Contact: wife(Lina)-[phone]  Would like to consult with nurse regarding taking a test for covid, patient is having sore throat, congested, coughing and headache,. Please call back at [phone]. Thanks/ar

## 2020-06-24 NOTE — TELEPHONE ENCOUNTER
I have signed for the following orders AND/OR meds.  Please call the patient and ask the patient to schedule the testing AND/OR inform about any medications that were sent.      Orders Placed This Encounter   Procedures    COVID-19 Routine Screening     Standing Status:   Future     Standing Expiration Date:   8/23/2021     Order Specific Question:   Is the patient symptomatic?     Answer:   Yes     Order Specific Question:   Does the patient have the ability to go to a drive thru location?     Answer:   Yes

## 2020-06-24 NOTE — TELEPHONE ENCOUNTER
----- Message from Kem Noble sent at 6/24/2020  4:30 PM CDT -----  Regarding: Patient  The patient is calling for the second time today. They would like to consult with the nurse in regards to whether or not they should schedule an appt. Please call back at [phone]

## 2020-06-25 ENCOUNTER — OFFICE VISIT (OUTPATIENT)
Dept: FAMILY MEDICINE | Facility: CLINIC | Age: 70
End: 2020-06-25
Payer: MEDICARE

## 2020-06-25 ENCOUNTER — CLINICAL SUPPORT (OUTPATIENT)
Dept: FAMILY MEDICINE | Facility: CLINIC | Age: 70
End: 2020-06-25
Payer: MEDICARE

## 2020-06-25 ENCOUNTER — TELEPHONE (OUTPATIENT)
Dept: FAMILY MEDICINE | Facility: CLINIC | Age: 70
End: 2020-06-25

## 2020-06-25 DIAGNOSIS — R09.81 NASAL CONGESTION: ICD-10-CM

## 2020-06-25 DIAGNOSIS — R09.89 CHEST CONGESTION: ICD-10-CM

## 2020-06-25 DIAGNOSIS — R05.9 COUGH: Primary | ICD-10-CM

## 2020-06-25 DIAGNOSIS — R50.9 FEVER, UNSPECIFIED FEVER CAUSE: ICD-10-CM

## 2020-06-25 PROCEDURE — 99442 PR PHYSICIAN TELEPHONE EVALUATION 11-20 MIN: ICD-10-PCS | Mod: HCNC,95,, | Performed by: NURSE PRACTITIONER

## 2020-06-25 PROCEDURE — U0003 INFECTIOUS AGENT DETECTION BY NUCLEIC ACID (DNA OR RNA); SEVERE ACUTE RESPIRATORY SYNDROME CORONAVIRUS 2 (SARS-COV-2) (CORONAVIRUS DISEASE [COVID-19]), AMPLIFIED PROBE TECHNIQUE, MAKING USE OF HIGH THROUGHPUT TECHNOLOGIES AS DESCRIBED BY CMS-2020-01-R: HCPCS | Mod: HCNC

## 2020-06-25 PROCEDURE — 99442 PR PHYSICIAN TELEPHONE EVALUATION 11-20 MIN: CPT | Mod: HCNC,95,, | Performed by: NURSE PRACTITIONER

## 2020-06-25 RX ORDER — CEPHALEXIN 500 MG/1
500 CAPSULE ORAL EVERY 12 HOURS
Qty: 20 CAPSULE | Refills: 0 | Status: SHIPPED | OUTPATIENT
Start: 2020-06-25 | End: 2020-07-05

## 2020-06-25 RX ORDER — AZELASTINE 1 MG/ML
1 SPRAY, METERED NASAL 2 TIMES DAILY
Qty: 30 ML | Refills: 0 | Status: SHIPPED | OUTPATIENT
Start: 2020-06-25 | End: 2021-03-19

## 2020-06-25 RX ORDER — BENZONATATE 200 MG/1
200 CAPSULE ORAL 3 TIMES DAILY PRN
Qty: 30 CAPSULE | Refills: 0 | Status: SHIPPED | OUTPATIENT
Start: 2020-06-25 | End: 2020-07-05

## 2020-06-25 NOTE — TELEPHONE ENCOUNTER
----- Message from Jia Modi sent at 6/25/2020  3:27 PM CDT -----  Regarding: Medication  Contact: pt wife  Stated she calling to see if pt medication has been call in to the pharmacy, she can be reached at [phone] Thanks         [facility]ill[facility] - [address] [facility] [place] [address]  Phone: [phone] Fax: [phone]

## 2020-06-25 NOTE — TELEPHONE ENCOUNTER
Patient notified, appt booked on nurse schedule, supplies at front door table for testing, testing nurse notified

## 2020-06-26 NOTE — PROGRESS NOTES
Subjective:       Patient ID: Yasir Jeffery is a 69 y.o. male.    Chief Complaint: No chief complaint on file.  Audio Only Telehealth Visit     The patient location is: [place]  The chief complaint leading to consultation is: cough, fever  Visit type: Virtual visit with audio only (telephone)  Total time spent with patient: 15 min     The reason for the audio only service rather than synchronous audio and video virtual visit was related to technical difficulties or patient preference/necessity.     Each patient to whom I provide medical services by telemedicine is:  (1) informed of the relationship between the physician and patient and the respective role of any other health care provider with respect to management of the patient; and (2) notified that they may decline to receive medical services by telemedicine and may withdraw from such care at any time. Patient verbally consented to receive this service via voice-only telephone call.     This service was not originating from a related E/M service provided within the previous 7 days nor will  to an E/M service or procedure within the next 24 hours or my soonest available appointment.  Prevailing standard of care was able to be met in this audio-only visit.      Cough  This is a new problem. The current episode started in the past 7 days. The problem has been unchanged. The problem occurs every few minutes. The cough is productive of sputum. Associated symptoms include a fever (101.9; 99.8 after tylenol) and nasal congestion. Pertinent negatives include no chest pain, chills, ear congestion, ear pain, headaches, heartburn, hemoptysis, myalgias, postnasal drip, rash, rhinorrhea, sore throat, shortness of breath, sweats, weight loss or wheezing. Nothing aggravates the symptoms. He has tried nothing for the symptoms. The treatment provided no relief. There is no history of asthma, bronchiectasis, bronchitis, COPD, emphysema, environmental allergies or  pneumonia.     Past Medical History:   Diagnosis Date    Back pain     Benign prostate hyperplasia     DDD (degenerative disc disease), thoracolumbar     Disorder of kidney and ureter     stage 3    Elevated PSA     Glaucoma     Hx of colonic polyps 2010    Hypertension     Malignant neoplasm of prostate 2019    Metabolic syndrome     Mixed hyperlipidemia 2018    Obesity     MARIN (obstructive sleep apnea)     Pneumonia     Psychophysiological insomnia      Social History     Socioeconomic History    Marital status:      Spouse name: Not on file    Number of children: Not on file    Years of education: Not on file    Highest education level: Not on file   Occupational History    Not on file   Social Needs    Financial resource strain: Not on file    Food insecurity     Worry: Not on file     Inability: Not on file    Transportation needs     Medical: Not on file     Non-medical: Not on file   Tobacco Use    Smoking status: Former Smoker     Years: 3.00     Types: Cigars     Quit date:      Years since quittin.    Smokeless tobacco: Never Used   Substance and Sexual Activity    Alcohol use: No    Drug use: No    Sexual activity: Not on file   Lifestyle    Physical activity     Days per week: Not on file     Minutes per session: Not on file    Stress: Not on file   Relationships    Social connections     Talks on phone: Not on file     Gets together: Not on file     Attends Synagogue service: Not on file     Active member of club or organization: Not on file     Attends meetings of clubs or organizations: Not on file     Relationship status: Not on file   Other Topics Concern    Not on file   Social History Narrative    Not on file     Past Surgical History:   Procedure Laterality Date    CYST REMOVAL      neck    CYSTOSCOPY N/A 2019    Procedure: CYSTOSCOPY;  Surgeon: Ulisses Serra MD;  Location: [facility] OR;  Service: Urology;  Laterality: N/A;     EYE SURGERY Bilateral     cataract surgery    GANGLION CYST EXCISION Right 1994    PROSTATE BIOPSY      TESTICLE SURGERY  1980    TRANSRECTAL BIOPSY OF PROSTATE WITH ULTRASOUND GUIDANCE Bilateral 7/17/2019    Procedure: BIOPSY, PROSTATE, RECTAL APPROACH, WITH US GUIDANCE;  Surgeon: Ulisses Serra MD;  [facility];  Service: Urology;  Laterality: Bilateral;    TRANSRECTAL BIOPSY OF PROSTATE WITH ULTRASOUND GUIDANCE N/A 3/11/2020    Procedure: BIOPSY, PROSTATE, RECTAL APPROACH, WITH US GUIDANCE;  Surgeon: Ulisses Serra MD;  Location: [facility];  Service: Urology;  Laterality: N/A;       Review of Systems   Constitutional: Positive for fever (101.9; 99.8 after tylenol). Negative for chills and weight loss.   HENT: Positive for nasal congestion. Negative for ear pain, postnasal drip, rhinorrhea and sore throat.    Eyes: Negative.    Respiratory: Positive for cough (chest congestion). Negative for hemoptysis, shortness of breath and wheezing.    Cardiovascular: Negative.  Negative for chest pain.   Gastrointestinal: Negative.  Negative for heartburn.   Endocrine: Negative.    Genitourinary: Negative.    Musculoskeletal: Negative.  Negative for myalgias.   Integumentary:  Negative for rash. Negative.   Allergic/Immunologic: Negative.  Negative for environmental allergies.   Neurological: Negative.  Negative for headaches.   Psychiatric/Behavioral: Negative.          Objective:      Physical Exam    Assessment:       1. Cough    2. Fever, unspecified fever cause    3. Chest congestion    4. Nasal congestion        Plan:           Diagnoses and all orders for this visit:    Cough  Fever, unspecified fever cause  Chest congestion  Nasal congestion  COVID-19 test ordered by MD prior to visit; test done today  -     cephALEXin (KEFLEX) 500 MG capsule; Take 1 capsule (500 mg total) by mouth every 12 (twelve) hours. for 10 days  -     benzonatate (TESSALON) 200 MG capsule; Take 1 capsule (200 mg total) by  mouth 3 (three) times daily as needed.  -     azelastine (ASTELIN) 137 mcg (0.1 %) nasal spray; 1 spray (137 mcg total) by Nasal route 2 (two) times daily. for 7 days  Report to ER immediately if symptoms worsen

## 2020-06-29 ENCOUNTER — TELEPHONE (OUTPATIENT)
Dept: FAMILY MEDICINE | Facility: CLINIC | Age: 70
End: 2020-06-29

## 2020-06-29 LAB — SARS-COV-2 RNA RESP QL NAA+PROBE: NOT DETECTED

## 2020-06-29 NOTE — TELEPHONE ENCOUNTER
----- Message from Mary Veronica NP sent at 6/29/2020 12:52 PM CDT -----  The COVID-19 was not ordered by me.   ----- Message -----  From: Jesus Alberto Green  Sent: 6/29/2020   9:03 AM CDT  To: Mary Veronica NP    Name of Who is Calling: pt    What is the request in detail: calling in regards to his test results. Please contact to further discuss and advise      Can the clinic reply by [facility]: no    What Number to Call Back if not in [facility]: [phone]

## 2020-06-29 NOTE — PROGRESS NOTES
Please CALL patient with results and Document verification.   [phone]  Your test was NEGATIVE for COVID-19 (coronavirus).  If you still have symptoms, treat with rest, fluids, and over-the-counter medications.  Continue to follow local guidelines and practice proper handwashing.

## 2020-07-02 ENCOUNTER — TELEPHONE (OUTPATIENT)
Dept: NEUROSURGERY | Facility: CLINIC | Age: 70
End: 2020-07-02

## 2020-07-02 NOTE — TELEPHONE ENCOUNTER
----- Message from Lana Deleon PA-C sent at 6/27/2020  4:13 PM CDT -----  This patient did not repeat the MRI. He also did not see neurology as recommended

## 2020-07-21 ENCOUNTER — HOSPITAL ENCOUNTER (OUTPATIENT)
Dept: RADIOLOGY | Facility: HOSPITAL | Age: 70
Discharge: HOME | End: 2020-07-21
Attending: STUDENT IN AN ORGANIZED HEALTH CARE EDUCATION/TRAINING PROGRAM
Payer: MEDICARE

## 2020-07-21 DIAGNOSIS — G95.9 SPINAL CORD LESION: Chronic | ICD-10-CM

## 2020-07-21 PROCEDURE — 72156 MRI NECK SPINE W/O & W/DYE: CPT | Mod: 26,HCNC,, | Performed by: RADIOLOGY

## 2020-07-21 PROCEDURE — 72156 MRI CERVICAL SPINE W WO CONTRAST: ICD-10-PCS | Mod: 26,HCNC,, | Performed by: RADIOLOGY

## 2020-07-21 PROCEDURE — A9585 GADOBUTROL INJECTION: HCPCS | Mod: HCNC,PO | Performed by: STUDENT IN AN ORGANIZED HEALTH CARE EDUCATION/TRAINING PROGRAM

## 2020-07-21 PROCEDURE — 72156 MRI NECK SPINE W/O & W/DYE: CPT | Mod: TC,HCNC,PO

## 2020-07-21 PROCEDURE — 25500020 PHARM REV CODE 255: Mod: HCNC,PO | Performed by: STUDENT IN AN ORGANIZED HEALTH CARE EDUCATION/TRAINING PROGRAM

## 2020-07-21 RX ORDER — GADOBUTROL 604.72 MG/ML
10 INJECTION INTRAVENOUS
Status: COMPLETED | OUTPATIENT
Start: 2020-07-21 | End: 2020-07-21

## 2020-07-21 RX ADMIN — GADOBUTROL 10 ML: 604.72 INJECTION INTRAVENOUS at 01:07

## 2020-07-31 ENCOUNTER — TELEPHONE (OUTPATIENT)
Dept: NEUROSURGERY | Facility: CLINIC | Age: 70
End: 2020-07-31

## 2020-07-31 DIAGNOSIS — G95.9 SPINAL CORD LESION: Primary | ICD-10-CM

## 2020-07-31 NOTE — TELEPHONE ENCOUNTER
Called pt wife and scheduled consult appt with Dr. Sibley per ZULLY Deleon. Date/time/location confirmed. Verbalized understanding.

## 2020-07-31 NOTE — TELEPHONE ENCOUNTER
----- Message from Lana Deleon PA-C sent at 7/21/2020  2:20 PM CDT -----  Stable lesion in the spinal cord. We recommend evaluation with neurology

## 2020-08-13 ENCOUNTER — OFFICE VISIT (OUTPATIENT)
Dept: FAMILY MEDICINE | Facility: CLINIC | Age: 70
End: 2020-08-13
Payer: MEDICARE

## 2020-08-13 VITALS
DIASTOLIC BLOOD PRESSURE: 80 MMHG | BODY MASS INDEX: 34.52 KG/M2 | SYSTOLIC BLOOD PRESSURE: 134 MMHG | WEIGHT: 214.81 LBS | TEMPERATURE: 98 F | HEART RATE: 72 BPM | HEIGHT: 66 IN

## 2020-08-13 DIAGNOSIS — M19.90 OSTEOARTHRITIS, UNSPECIFIED OSTEOARTHRITIS TYPE, UNSPECIFIED SITE: ICD-10-CM

## 2020-08-13 DIAGNOSIS — C61 MALIGNANT NEOPLASM OF PROSTATE: ICD-10-CM

## 2020-08-13 DIAGNOSIS — E78.2 MIXED HYPERLIPIDEMIA: Chronic | ICD-10-CM

## 2020-08-13 DIAGNOSIS — R39.12 BENIGN PROSTATIC HYPERPLASIA WITH WEAK URINARY STREAM: ICD-10-CM

## 2020-08-13 DIAGNOSIS — N18.30 CKD (CHRONIC KIDNEY DISEASE) STAGE 3, GFR 30-59 ML/MIN: Chronic | ICD-10-CM

## 2020-08-13 DIAGNOSIS — G47.33 OSA (OBSTRUCTIVE SLEEP APNEA): ICD-10-CM

## 2020-08-13 DIAGNOSIS — G95.9 SPINAL CORD LESION: ICD-10-CM

## 2020-08-13 DIAGNOSIS — I10 ESSENTIAL HYPERTENSION: Primary | ICD-10-CM

## 2020-08-13 DIAGNOSIS — N40.1 BENIGN PROSTATIC HYPERPLASIA WITH WEAK URINARY STREAM: ICD-10-CM

## 2020-08-13 DIAGNOSIS — E88.810 METABOLIC SYNDROME: ICD-10-CM

## 2020-08-13 DIAGNOSIS — E66.9 OBESITY (BMI 30-39.9): ICD-10-CM

## 2020-08-13 PROCEDURE — 99499 RISK ADDL DX/OHS AUDIT: ICD-10-PCS | Mod: HCNC,S$GLB,, | Performed by: NURSE PRACTITIONER

## 2020-08-13 PROCEDURE — G0439 PR MEDICARE ANNUAL WELLNESS SUBSEQUENT VISIT: ICD-10-PCS | Mod: HCNC,S$GLB,, | Performed by: NURSE PRACTITIONER

## 2020-08-13 PROCEDURE — 3079F DIAST BP 80-89 MM HG: CPT | Mod: HCNC,CPTII,S$GLB, | Performed by: NURSE PRACTITIONER

## 2020-08-13 PROCEDURE — 3075F PR MOST RECENT SYSTOLIC BLOOD PRESS GE 130-139MM HG: ICD-10-PCS | Mod: HCNC,CPTII,S$GLB, | Performed by: NURSE PRACTITIONER

## 2020-08-13 PROCEDURE — 99999 PR PBB SHADOW E&M-EST. PATIENT-LVL III: ICD-10-PCS | Mod: PBBFAC,HCNC,, | Performed by: NURSE PRACTITIONER

## 2020-08-13 PROCEDURE — G0439 PPPS, SUBSEQ VISIT: HCPCS | Mod: HCNC,S$GLB,, | Performed by: NURSE PRACTITIONER

## 2020-08-13 PROCEDURE — 3075F SYST BP GE 130 - 139MM HG: CPT | Mod: HCNC,CPTII,S$GLB, | Performed by: NURSE PRACTITIONER

## 2020-08-13 PROCEDURE — 99999 PR PBB SHADOW E&M-EST. PATIENT-LVL III: CPT | Mod: PBBFAC,HCNC,, | Performed by: NURSE PRACTITIONER

## 2020-08-13 PROCEDURE — 99499 UNLISTED E&M SERVICE: CPT | Mod: HCNC,S$GLB,, | Performed by: NURSE PRACTITIONER

## 2020-08-13 PROCEDURE — 3079F PR MOST RECENT DIASTOLIC BLOOD PRESSURE 80-89 MM HG: ICD-10-PCS | Mod: HCNC,CPTII,S$GLB, | Performed by: NURSE PRACTITIONER

## 2020-08-16 ENCOUNTER — PATIENT OUTREACH (OUTPATIENT)
Dept: ADMINISTRATIVE | Facility: OTHER | Age: 70
End: 2020-08-16

## 2020-08-16 NOTE — PROGRESS NOTES
Health Maintenance Due   Topic Date Due    Shingles Vaccine (2 of 3) 11/02/2017    Colorectal Cancer Screening  03/11/2020     Updates were requested from care everywhere.  Chart was reviewed for overdue Proactive [facility] Encounters ([facility]) topics (CRS, Breast Cancer Screening, Eye exam)  Health Maintenance has been updated.  LINKS immunization registry triggered.  Immunizations were reconciled.

## 2020-08-17 ENCOUNTER — OFFICE VISIT (OUTPATIENT)
Dept: NEUROLOGY | Facility: CLINIC | Age: 70
End: 2020-08-17
Payer: MEDICARE

## 2020-08-17 VITALS
RESPIRATION RATE: 18 BRPM | WEIGHT: 217.25 LBS | DIASTOLIC BLOOD PRESSURE: 90 MMHG | HEART RATE: 87 BPM | BODY MASS INDEX: 31.1 KG/M2 | SYSTOLIC BLOOD PRESSURE: 159 MMHG | TEMPERATURE: 98 F | HEIGHT: 70 IN

## 2020-08-17 DIAGNOSIS — G95.9 SPINAL CORD LESION: Primary | ICD-10-CM

## 2020-08-17 DIAGNOSIS — R20.0 BILATERAL HAND NUMBNESS: ICD-10-CM

## 2020-08-17 DIAGNOSIS — C61 MALIGNANT NEOPLASM OF PROSTATE: ICD-10-CM

## 2020-08-17 PROCEDURE — 3008F PR BODY MASS INDEX (BMI) DOCUMENTED: ICD-10-PCS | Mod: HCNC,CPTII,S$GLB, | Performed by: PSYCHIATRY & NEUROLOGY

## 2020-08-17 PROCEDURE — 3077F PR MOST RECENT SYSTOLIC BLOOD PRESSURE >= 140 MM HG: ICD-10-PCS | Mod: HCNC,CPTII,S$GLB, | Performed by: PSYCHIATRY & NEUROLOGY

## 2020-08-17 PROCEDURE — 1125F PR PAIN SEVERITY QUANTIFIED, PAIN PRESENT: ICD-10-PCS | Mod: HCNC,S$GLB,, | Performed by: PSYCHIATRY & NEUROLOGY

## 2020-08-17 PROCEDURE — 3077F SYST BP >= 140 MM HG: CPT | Mod: HCNC,CPTII,S$GLB, | Performed by: PSYCHIATRY & NEUROLOGY

## 2020-08-17 PROCEDURE — 99205 OFFICE O/P NEW HI 60 MIN: CPT | Mod: HCNC,S$GLB,, | Performed by: PSYCHIATRY & NEUROLOGY

## 2020-08-17 PROCEDURE — 99999 PR PBB SHADOW E&M-EST. PATIENT-LVL V: ICD-10-PCS | Mod: PBBFAC,HCNC,, | Performed by: PSYCHIATRY & NEUROLOGY

## 2020-08-17 PROCEDURE — 99205 PR OFFICE/OUTPT VISIT, NEW, LEVL V, 60-74 MIN: ICD-10-PCS | Mod: HCNC,S$GLB,, | Performed by: PSYCHIATRY & NEUROLOGY

## 2020-08-17 PROCEDURE — 3080F PR MOST RECENT DIASTOLIC BLOOD PRESSURE >= 90 MM HG: ICD-10-PCS | Mod: HCNC,CPTII,S$GLB, | Performed by: PSYCHIATRY & NEUROLOGY

## 2020-08-17 PROCEDURE — 3288F FALL RISK ASSESSMENT DOCD: CPT | Mod: HCNC,CPTII,S$GLB, | Performed by: PSYCHIATRY & NEUROLOGY

## 2020-08-17 PROCEDURE — 3008F BODY MASS INDEX DOCD: CPT | Mod: HCNC,CPTII,S$GLB, | Performed by: PSYCHIATRY & NEUROLOGY

## 2020-08-17 PROCEDURE — 1101F PT FALLS ASSESS-DOCD LE1/YR: CPT | Mod: HCNC,CPTII,S$GLB, | Performed by: PSYCHIATRY & NEUROLOGY

## 2020-08-17 PROCEDURE — 1159F MED LIST DOCD IN RCRD: CPT | Mod: HCNC,S$GLB,, | Performed by: PSYCHIATRY & NEUROLOGY

## 2020-08-17 PROCEDURE — 1159F PR MEDICATION LIST DOCUMENTED IN MEDICAL RECORD: ICD-10-PCS | Mod: HCNC,S$GLB,, | Performed by: PSYCHIATRY & NEUROLOGY

## 2020-08-17 PROCEDURE — 99999 PR PBB SHADOW E&M-EST. PATIENT-LVL V: CPT | Mod: PBBFAC,HCNC,, | Performed by: PSYCHIATRY & NEUROLOGY

## 2020-08-17 PROCEDURE — 1125F AMNT PAIN NOTED PAIN PRSNT: CPT | Mod: HCNC,S$GLB,, | Performed by: PSYCHIATRY & NEUROLOGY

## 2020-08-17 PROCEDURE — 3288F PR FALLS RISK ASSESSMENT DOCUMENTED: ICD-10-PCS | Mod: HCNC,CPTII,S$GLB, | Performed by: PSYCHIATRY & NEUROLOGY

## 2020-08-17 PROCEDURE — 1101F PR PT FALLS ASSESS DOC 0-1 FALLS W/OUT INJ PAST YR: ICD-10-PCS | Mod: HCNC,CPTII,S$GLB, | Performed by: PSYCHIATRY & NEUROLOGY

## 2020-08-17 PROCEDURE — 3080F DIAST BP >= 90 MM HG: CPT | Mod: HCNC,CPTII,S$GLB, | Performed by: PSYCHIATRY & NEUROLOGY

## 2020-08-17 NOTE — PATIENT INSTRUCTIONS
Will get an MRI of the brain and thoracic spine to see if there are any other spots like this.  The persistent enhancement without growth and without swelling suggests this could be vascular.  These can be difficult to definitively diagnose on imaging alone.      Depending on what this shows other testing may be necessary.  Will have to see what the imaging reveals.

## 2020-08-17 NOTE — PROGRESS NOTES
NEUROLOGY  Outpatient CONSULT    [facility]  [address] [facility] [address], [place] [address]  [phone] (office) / [phone] (fax)    Patient Name:  Yasir Jeffery Sr.  :  1950  MR #:  [id number]  Acct #:  [id number]    Date of Neurology Consult: 2020  Name of Neurologist: Steph Sibley D.O, ABPN, AOBNP, ABEM    Other Physicians:  Trevor Jarvis MD (Primary Care Physician); Lana Deleon PA-C (Referring)      Chief Complaint: Neurologic Problem (possible demylinating disease)      History of Present Illness (HPI):  Yasir Jeffery Sr. is a 70 y.o. male referred by neurosurgery for consultation regarding abnormal findings on MRI of the spine.    Patient states he has a history of prostate cancer.  He states he was asked if he had tingling in his hands.  He said yes and so he was referred to neurosurgery and they ordered imaging.  They have been following a spot seen in his neck at T1 since Aug 2019.  It has not changed.  He was referred to neurology.  Patient states he is not sure what the surgeon is concerned about.    He states the hands feel like they are falling asleep.  It can feel like electrical jolts shooting out his fingers.  He states the whole hand (all the fingers) feel asleep.  This feeling comes and goes.  He states this symptom has been present for the last couple years.  He has no other symptoms in his hands and arms.  He has bilateral shoulder pain.  He has been getting shoulder injections for this due to calcium build up and arthritis.  He also had numbness in his feet.  This has been present for the last couple years.  It will come and go.  It is a little more on the left than right.  He has left hip pain and feels like his left hip is going to give out on him when he first gets up out of a chair.  The feet and hands do not feel asleep at the same time.  He only has trouble walking first thing in the morning due to his hip issues.  He has no trouble after  this.  He has not had any falls.    He has no bowel or bladder issues.    He has no muscle spasms or twitching.  He will get an occasional calf cramp.    He has no history of significant illness or high fever infection.    He has no family history of Von Hippel Lindau or hemangioblastomas that he is aware of.  He does have a family history of prostate cancer.     He follows with urology for prostate cancer.  He has not needed chemotherapy for this.    Treatment to date:    N/A    Review of Systems:   General: Weight gain: No, Weight Loss: No, Fatigue: Yes,   Fever: No, Chills: No, Night Sweats: No, Insomnia: No, Excessive sleeping: No   Respiratory:  Cough: No, Shortness of Breath: No,   Wheezing: No, Excessive Snoring: Yes, Coughing up blood: No  Endocrine: Heat Intolerance: No, Cold Intolerance: No,   Excessive Thirst: No, Excessive Hunger: No,   Eyes:  Blurred Vision: Yes, Double Vision: No,   Light Sensitivity: No, Eye pain: Yes  Musculoskeletal: Muscle Aches/Pain: No, Joint Pain/Swelling: Yes, Muscle Cramps: No, Muscle Weakness: No, Neck Pain: Yes, Back Pain: Yes   Neurological: Difficulty Walking/Falls: No, Headache Migraine: No, Dizziness/Vertigo: Yes, Fainting: No, Difficulty with Speech: No, Weakness: No, Tingling/Numbness: Yes, Tremors: No, Memory Problems: No, Seizures: No, Difficulty Swallowing: No, Altered Taste: No.  Cardiovascular: Chest Pain: No, Shortness of Breath: No,   Palpitations: No,  Gastrointestinal: Nausea/Vomiting: No, Constipation: No, Diarrhea: No, Bloody Stools: No   Psych/Cog:  Depression: No, Anxiety: No, Hallucinations: No, Problems Concentrating: No  : Frequent Urination: No, Incontinence: No, Blood of Urine: No, Urinary Infections: No, Changes in Sex Drive: No   ENT:Hearing Loss: Yes, Earache: No, Ringing in Ears: Yes,   Facial Pain: No, Chronic Congestion: Yes   Immune: Seasonal Allergies: No, Hives and/or Rashes: No  The remainder of the review of twelve body systems was  reviewed and normal.    Past Medical, Surgical, Family & Social History:   Past Medical History:   Diagnosis Date    Back pain     Benign prostate hyperplasia     DDD (degenerative disc disease), thoracolumbar     Disorder of kidney and ureter     stage 3    Elevated PSA     Glaucoma     Hx of colonic polyps 2010    Hypertension     Malignant neoplasm of prostate 7/31/2019    Metabolic syndrome     Mixed hyperlipidemia 1/24/2018    Obesity     MARIN (obstructive sleep apnea)     Pneumonia     Psychophysiological insomnia      Past Surgical History:   Procedure Laterality Date    CYST REMOVAL  1993    neck    CYSTOSCOPY N/A 7/17/2019    Procedure: CYSTOSCOPY;  Surgeon: Ulisses Serra MD;  Location: [facility];  Service: Urology;  Laterality: N/A;    EYE SURGERY Bilateral     cataract surgery    GANGLION CYST EXCISION Right 1994    PROSTATE BIOPSY      TESTICLE SURGERY  1980    TRANSRECTAL BIOPSY OF PROSTATE WITH ULTRASOUND GUIDANCE Bilateral 7/17/2019    Procedure: BIOPSY, PROSTATE, RECTAL APPROACH, WITH US GUIDANCE;  Surgeon: Ulisses Serra MD;  Location: [facility];  Service: Urology;  Laterality: Bilateral;    TRANSRECTAL BIOPSY OF PROSTATE WITH ULTRASOUND GUIDANCE N/A 3/11/2020    Procedure: BIOPSY, PROSTATE, RECTAL APPROACH, WITH US GUIDANCE;  Surgeon: Ulisses Serra MD;  Location: [facility];  Service: Urology;  Laterality: N/A;     Family History   Problem Relation Age of Onset    Arthritis Mother     Prostate cancer Father     No Known Problems Sister     Alzheimer's disease Brother     Dementia Brother     Pacemaker/defibrilator Sister     Arthritis Sister      Alcohol use:  reports no history of alcohol use.   (Of note, 0.6 oz = 1 beer or 6 oz = 10 beers).  Tobacco use:  reports that he quit smoking about 25 years ago. His smoking use included cigars. He quit after 3.00 years of use. He has never used smokeless tobacco.  Street drug use:  reports no history of drug  "use.  Allergies: Benadryl allergy decongestant and Diphenhydramine.    Home Medications:     Current Outpatient Medications:     azelastine (ASTELIN) 137 mcg (0.1 %) nasal spray, 1 spray (137 mcg total) by Nasal route 2 (two) times daily. for 7 days, Disp: 30 mL, Rfl: 0    latanoprost 0.005 % ophthalmic solution, PUT ONE DROP IN THE RIGHT EYE AT BEDTIME, Disp: , Rfl: 6    aspirin (ECOTRIN) 81 MG EC tablet, Take 81 mg by mouth once daily., Disp: , Rfl:     meclizine (ANTIVERT) 12.5 mg tablet, Take 1 tablet (12.5 mg total) by mouth 3 (three) times daily as needed. (Patient not taking: Reported on 8/17/2020), Disp: 90 tablet, Rfl: 3    Physical Examination:  BP (!) 159/90   Pulse 87   Temp 97.5 °F (36.4 °C)   Resp 18   Ht 5' 10" (1.778 m)   Wt 98.5 kg (217 lb 4.2 oz)   BMI 31.17 kg/m²     GENERAL:  General appearance: Well, non-toxic appearing.  No apparent distress.  Fundi exam: normal.  Neck: supple.  Carotid auscultation: normal.  Heart auscultation: normal.  Peripheral pulses: normal.  Extremities: normal.    MENTAL STATUS:  Alertness, attention span & concentration: normal.  Language: normal.  Orientation to self, place & time:  normal.  Memory, recent & remote: normal.  Fund of knowledge: normal.    SPEECH:  Clear and fluent.  Follows complex commands.    CRANIAL NERVES:  Cranial Nerves II-XII were examined.  II - Visual fields: normal.  III, IV, VI: PERRL, EOMI, No ptosis, No nystagmus.  V - Facial sensation: normal.  VII - Face symmetry & mobility: normal.  VIII - Hearing: normal.  IX, X - Palate: mobile & midline.  XI - Shoulder shrug: normal.  XII - Tongue protrusion: normal.    GROSS MOTOR:  Gait & station: antalgic gait, no spasticity, circumduction, foot drop or scissor gait.  Tone: normal.  Abnormal movements: none.  Finger-nose & Heel-knee-shin: normal.  Rapid alternating movements & drift: normal.  Romberg: absent    MUSCLE STRENGTH:     Fascics Atrophy RIGHT    LEFT Atrophy Fascics     5 Neck " Ext. 5       5 Neck Flex 5       5 Deltoids 5       5 Sh.Ext.Rot. 5       5 Sh.Int.Rot. 5       5 Biceps 5       5 Triceps 5       5 Forearm.Pr. 5       5 Wrist Ext. 5       5 Wrist Flex 5       5 Finger Ext. 5       5 Finger Flex 5       5 FPL 5       5 Inteross. 5                         5 Iliopsoas 5       5 Hip Abduct 5       5 Hip Adduct 5       5 Quads 5       5 Hams 5       5 Dorsiflex 5       5 Plantar Flex 5       5 Ankle Hank 5       5 Ankle Invert 5       5 Toe Ext. 5       5 Toe Flex 5                         REFLEXES:    RIGHT Reflex   LEFT   2 Biceps 2   2 Brachiorad. 2   2 Triceps 2        2+ Patellar 2+   1 Ankle 1        Down PLANTAR Down     SENSORY:  Light touch: Normal throughout.  Sharp touch: slightly decreased both upper arms. Normal distal arms. Normal chest and back.  Normal legs.  Vibration: Normal throughout.      Diagnostic Data Reviewed:     Neurosurgery 8/9/19:  Yasir Jeffery is a 69 y.o. right handed male who presents for evaluation of neck and back pain. Patient reports several year history of pain. He reports nonspecific paresthesias in UEs with numbness and difficulty with hand function. He has attributed difficulty with hand dexterity and dropping objects to hand arthritis. He has been experiencing gait instability. He also complains of acute worsening of chronic low back pain with radiation into the left buttock/lateral hip starting a few weeks ago. He feels as though the left leg will give out on him when walking. He has not attended physical therapy or received injections with pain management.      Imaging independently reviewed. Spine XRs from 2016 reviewed. Degenerative changes throughout with anterior osteophytes and decreased disc height      On exam, patient has a slow gait. He is unable to complete tandem gait safely. He is full strength throughout with except of hand intrinsics. 2+ reflexes, negative leger. Sensation intact.     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  Anterior T1 lesion very high morbidity biopsy, do not recommend  Broad differential diagnosis  Will repeat MRI in December which will be just over 3 months with and without misty  Will refer to neurology for impression in further work up     Regarding with back pain, lumbar spondylosis, without surgical indication  He is referred to PT, a pain management referral has been placed for consultation      Impression MRI C spine:  1. 10 x 4 x 4 mm T1/T2 hyperintense lesion within the left paramidline thoracic spinal cord posterior to T1.  While this could relate to a focal demyelinating process or old cord insult (such as previous trauma or infarct), an intramedullary mass cannot be excluded.  Transverse myelitis and viral myelitis are considered unlikely given the short segment of involvement.  Additional characterization with contrast should be considered.  2. Multilevel degenerative change of the cervical spine resulting in multilevel central canal and neuroforaminal stenosis as detailed above.  This report was flagged in Epic as abnormal.  Electronically signed by: Toby Massey MD  Date:                                            08/27/2019    Impression MRI C spine:  1. There is a 10 x 4 x 5 mm enhancing lesion in the anterior cord at the level of T1.  There is no other abnormal enhancement within the cord.  This is nonspecific and could represent a single focus of demyelination.  There is no associated significant edema or mass effect.  There is no syrinx.  Focal myelitis or intramedullary mass cannot be completely excluded.  This report was flagged in Epic as abnormal.  Electronically signed by: Klaus Casas MD  Date:                                            08/30/2019    Impression MRI C spine:   1.  Redemonstration of a 5 x 4 x 9 mm T2 hyperintense, homogeneously enhancing intramedullary thoracic cord lesion, essentially unchanged as compared to the prior study dated 08/30/2019.  This lesion is indeterminate with differential considerations continuing to include a primary intramedullary neoplasm, metastasis, or single focus of inflammation/demyelination.  No new focal cord signal or abnormal intraspinal enhancement elsewhere.  2. Multilevel degenerative changes of the cervical spine, unchanged as compared to 08/30/2019.  Electronically signed by: Ricardo Nixon  Date:                                            12/16/2019    Impression MRI C spine:  1. Redemonstration of an enhancing intramedullary thoracic cord lesion at the level of T1, not significantly changed as compared to the prior study on 12/16/2019.  No new abnormal intraspinal enhancement or cord signal abnormality.  2. Multilevel degenerative changes of the cervical spine, as above.  Electronically signed by: Ricardo Nixon  Date:                                            07/21/2020    Assessment and Plan:  Yasir Jeffery Sr. is a 70 y.o., right handed man with a history of prostate cancer who has been found to have an intramedullary T1 cord lesion that has not changed since December 2019.  He has complaints of upper limbs abnormal sensation, however this was really only noted when it was brought to his attention.      Will get an MRI brain and thoracic spine to look for any other spots as might be seen in a demyelinating disease.    Depending on this an LP might be warranted.  This may also warrant referral to a neurological specialty.    His prostate cancer is unlikely to be involved here given that the lesion has not changed.  .    Important to note, also  has a past medical history of Back pain, Benign prostate hyperplasia, DDD (degenerative disc disease), thoracolumbar, Disorder of kidney and ureter, Elevated PSA, Glaucoma, colonic polyps (2010),  Hypertension, Malignant neoplasm of prostate (7/31/2019), Metabolic syndrome, Mixed hyperlipidemia (1/24/2018), Obesity, MARIN (obstructive sleep apnea), Pneumonia, and Psychophysiological insomnia.           Steph Sibley D.O, ABPN, AOBNP, ABEM    This note was created with voice recognition software.  Grammatical, syntax and spelling errors may be inevitable.

## 2020-08-17 NOTE — LETTER
December 28, 2020      Lana Deleon PA-C  [address] [facility] [address] [place] [address]           [facility]  [address] [place] [address]  Phone: [phone]  Fax: [phone]          Patient: Yasir Jeffery Sr.   MR Number: [id number]   YOB: 1950   Date of Visit: 8/17/2020       Dear Lana Deleon:    Thank you for referring Yasir Jeffery to me for evaluation. Attached you will find relevant portions of my assessment and plan of care.    If you have questions, please do not hesitate to call me. I look forward to following Yasir Jeffery along with you.    Sincerely,    Steph Sibley, DO    Enclosure  CC:  No Recipients    If you would like to receive this communication electronically, please contact [email or web address] or [phone] to request more information on [employer] [facility] access.    For providers and/or their staff who would like to refer a patient to [facility], please contact us through our one-stop-shop provider referral line, [facility] , at [phone].    If you feel you have received this communication in error or would no longer like to receive these types of communications, please e-mail [email or web address]

## 2020-08-21 NOTE — PROGRESS NOTES
"  Yasir Jeffery presented for a  Medicare AWV and comprehensive Health Risk Assessment today. The following components were reviewed and updated:    · Medical history  · Family History  · Social history  · Allergies and Current Medications  · Health Risk Assessment  · Health Maintenance  · Care Team     ** See Completed Assessments for Annual Wellness Visit within the encounter summary.**         The following assessments were completed:  · Living Situation  · CAGE  · Depression Screening  · Timed Get Up and Go  · Whisper Test  · Cognitive Function Screening  · Nutrition Screening  · ADL Screening  · PAQ Screening        Vitals:    08/13/20 1050   BP: 134/80   Pulse: 72   Temp: 98.4 °F (36.9 °C)   Weight: 97.4 kg (214 lb 12.8 oz)   Height: 5' 6" (1.676 m)     Body mass index is 34.67 kg/m².  Physical Exam  Vitals signs and nursing note reviewed.   Constitutional:       General: He is not in acute distress.     Appearance: He is well-developed.   HENT:      Head: Normocephalic and atraumatic.   Eyes:      Pupils: Pupils are equal, round, and reactive to light.   Neck:      Musculoskeletal: Normal range of motion and neck supple.   Cardiovascular:      Rate and Rhythm: Normal rate and regular rhythm.   Pulmonary:      Effort: Pulmonary effort is normal.      Breath sounds: Normal breath sounds.   Musculoskeletal: Normal range of motion.   Skin:     General: Skin is warm and dry.      Findings: No rash.   Neurological:      Mental Status: He is alert and oriented to person, place, and time.   Psychiatric:         Thought Content: Thought content normal.               Diagnoses and health risks identified today and associated recommendations/orders:    1. Essential hypertension  Stable and controlled   Continue medication as prescribed  Continue current treatment plan as previously prescribed with your PCP      2. Mixed hyperlipidemia  Stable and controlled   Continue medication as prescribed  Continue current " treatment plan as previously prescribed with your PCP    3. CKD (chronic kidney disease) stage 3, GFR 30-59 ml/min  Stable and controlled   Continue medication as prescribed  Continue current treatment plan as previously prescribed with your PCP    4. Benign prostatic hyperplasia with weak urinary stream  Stable and controlled, continue follow-up with urology  Continue medication as prescribed  Continue current treatment plan as previously prescribed with your PCP    5. Malignant neoplasm of prostate  Stable and controlled, continue follow-up with urology  Continue medication as prescribed  Continue current treatment plan as previously prescribed with your PCP    6. Obesity (BMI 30-39.9)  Stable-encourage healthy diet choices, cardiovascular activity, and weight loss    7. Metabolic syndrome  Stable and controlled  Continue medication as prescribed  Continue current treatment plan as previously prescribed with your PCP    8. Osteoarthritis, unspecified osteoarthritis type, unspecified site  Stable and controlled  Continue medication as prescribed  Continue current treatment plan as previously prescribed with your PCP    9. MARNI (obstructive sleep apnea)  Stable-routine follow-up and evaluation, continue use of CPAP    10. Spinal cord lesion  Stable-routine follow-up and evaluation      Provided Yasir with a 5-10 year written screening schedule and personal prevention plan. Recommendations were developed using the USPSTF age appropriate recommendations. Education, counseling, and referrals were provided as needed. After Visit Summary printed and given to patient which includes a list of additional screenings\tests needed.    Follow up for Routine scheduled appointment.    Joseluis Posada NP

## 2020-08-24 ENCOUNTER — OFFICE VISIT (OUTPATIENT)
Dept: FAMILY MEDICINE | Facility: CLINIC | Age: 70
End: 2020-08-24
Payer: MEDICARE

## 2020-08-24 ENCOUNTER — LAB VISIT (OUTPATIENT)
Dept: LAB | Facility: HOSPITAL | Age: 70
End: 2020-08-24
Attending: PSYCHIATRY & NEUROLOGY
Payer: MEDICARE

## 2020-08-24 VITALS
BODY MASS INDEX: 30.78 KG/M2 | WEIGHT: 215 LBS | SYSTOLIC BLOOD PRESSURE: 125 MMHG | HEART RATE: 58 BPM | TEMPERATURE: 98 F | HEIGHT: 70 IN | DIASTOLIC BLOOD PRESSURE: 78 MMHG

## 2020-08-24 DIAGNOSIS — R09.81 SINUS CONGESTION: ICD-10-CM

## 2020-08-24 DIAGNOSIS — G95.9 SPINAL CORD LESION: ICD-10-CM

## 2020-08-24 DIAGNOSIS — N18.30 CKD (CHRONIC KIDNEY DISEASE) STAGE 3, GFR 30-59 ML/MIN: Primary | Chronic | ICD-10-CM

## 2020-08-24 LAB
CREAT SERPL-MCNC: 1.3 MG/DL (ref 0.5–1.4)
EST. GFR  (AFRICAN AMERICAN): >60 ML/MIN/1.73 M^2
EST. GFR  (NON AFRICAN AMERICAN): 54.9 ML/MIN/1.73 M^2

## 2020-08-24 PROCEDURE — 36415 COLL VENOUS BLD VENIPUNCTURE: CPT | Mod: HCNC,PO

## 2020-08-24 PROCEDURE — 1126F AMNT PAIN NOTED NONE PRSNT: CPT | Mod: HCNC,S$GLB,, | Performed by: FAMILY MEDICINE

## 2020-08-24 PROCEDURE — 3074F PR MOST RECENT SYSTOLIC BLOOD PRESSURE < 130 MM HG: ICD-10-PCS | Mod: HCNC,CPTII,S$GLB, | Performed by: FAMILY MEDICINE

## 2020-08-24 PROCEDURE — 82565 ASSAY OF CREATININE: CPT | Mod: HCNC

## 2020-08-24 PROCEDURE — 1101F PR PT FALLS ASSESS DOC 0-1 FALLS W/OUT INJ PAST YR: ICD-10-PCS | Mod: HCNC,CPTII,S$GLB, | Performed by: FAMILY MEDICINE

## 2020-08-24 PROCEDURE — 1159F PR MEDICATION LIST DOCUMENTED IN MEDICAL RECORD: ICD-10-PCS | Mod: HCNC,S$GLB,, | Performed by: FAMILY MEDICINE

## 2020-08-24 PROCEDURE — 3008F BODY MASS INDEX DOCD: CPT | Mod: HCNC,CPTII,S$GLB, | Performed by: FAMILY MEDICINE

## 2020-08-24 PROCEDURE — 3078F PR MOST RECENT DIASTOLIC BLOOD PRESSURE < 80 MM HG: ICD-10-PCS | Mod: HCNC,CPTII,S$GLB, | Performed by: FAMILY MEDICINE

## 2020-08-24 PROCEDURE — 1101F PT FALLS ASSESS-DOCD LE1/YR: CPT | Mod: HCNC,CPTII,S$GLB, | Performed by: FAMILY MEDICINE

## 2020-08-24 PROCEDURE — 99999 PR PBB SHADOW E&M-EST. PATIENT-LVL III: CPT | Mod: PBBFAC,HCNC,, | Performed by: FAMILY MEDICINE

## 2020-08-24 PROCEDURE — 1159F MED LIST DOCD IN RCRD: CPT | Mod: HCNC,S$GLB,, | Performed by: FAMILY MEDICINE

## 2020-08-24 PROCEDURE — 99213 PR OFFICE/OUTPT VISIT, EST, LEVL III, 20-29 MIN: ICD-10-PCS | Mod: HCNC,S$GLB,, | Performed by: FAMILY MEDICINE

## 2020-08-24 PROCEDURE — 1126F PR PAIN SEVERITY QUANTIFIED, NO PAIN PRESENT: ICD-10-PCS | Mod: HCNC,S$GLB,, | Performed by: FAMILY MEDICINE

## 2020-08-24 PROCEDURE — 3074F SYST BP LT 130 MM HG: CPT | Mod: HCNC,CPTII,S$GLB, | Performed by: FAMILY MEDICINE

## 2020-08-24 PROCEDURE — 99999 PR PBB SHADOW E&M-EST. PATIENT-LVL III: ICD-10-PCS | Mod: PBBFAC,HCNC,, | Performed by: FAMILY MEDICINE

## 2020-08-24 PROCEDURE — 99213 OFFICE O/P EST LOW 20 MIN: CPT | Mod: HCNC,S$GLB,, | Performed by: FAMILY MEDICINE

## 2020-08-24 PROCEDURE — 3008F PR BODY MASS INDEX (BMI) DOCUMENTED: ICD-10-PCS | Mod: HCNC,CPTII,S$GLB, | Performed by: FAMILY MEDICINE

## 2020-08-24 PROCEDURE — 3078F DIAST BP <80 MM HG: CPT | Mod: HCNC,CPTII,S$GLB, | Performed by: FAMILY MEDICINE

## 2020-08-24 NOTE — ASSESSMENT & PLAN NOTE
Treating with over-the-counter Zyrtec and Flonase.  Follow-up if no improvement.Discussed condition course and signs and symptoms to expect.  Patient advised take Tylenol for pain or fever.  ER precautions.  Call MD or follow-up to clinic if not improving or worsening symptoms.

## 2020-08-24 NOTE — PROGRESS NOTES
PLAN:      Problem List Items Addressed This Visit     CKD (chronic kidney disease) stage 3, GFR 30-59 ml/min - Primary (Chronic)     Continue aspirin.  Blood pressure is well controlled on no medications.  Avoid anti-inflammatory medications.  Increase hydration with water.  Monitor metabolic panel routinely reviewed recent labs.  Update labs in six months prior to appointment.Complete history and physical was completed today.  Complete and thorough medication reconciliation was performed.  Discussed risks and benefits of medications.  Advised patient on orders and health maintenance.  We discussed old records and old labs if available.  Will request any records not available through epic.  Continue current medications listed on your summary sheet.           Relevant Orders    Comprehensive metabolic panel    Sinus congestion     Treating with over-the-counter Zyrtec and Flonase.  Follow-up if no improvement.Discussed condition course and signs and symptoms to expect.  Patient advised take Tylenol for pain or fever.  ER precautions.  Call MD or follow-up to clinic if not improving or worsening symptoms.               Future Appointments     Date Provider Specialty Appt Notes    9/1/2020  Radiology brain and t spine with and without    9/1/2020  Radiology brain and t spine with and without    2/24/2021 Trevor Jarvis MD Family Medicine annual           Medication List with Changes/Refills   Current Medications    ASPIRIN (ECOTRIN) 81 MG EC TABLET    Take 81 mg by mouth once daily.    AZELASTINE (ASTELIN) 137 MCG (0.1 %) NASAL SPRAY    1 spray (137 mcg total) by Nasal route 2 (two) times daily. for 7 days    LATANOPROST 0.005 % OPHTHALMIC SOLUTION    PUT ONE DROP IN THE RIGHT EYE AT BEDTIME    MECLIZINE (ANTIVERT) 12.5 MG TABLET    Take 1 tablet (12.5 mg total) by mouth 3 (three) times daily as needed.       Trevor Jarvis M.D.      ==========================================================================  Subjective:      Patient ID: Yasir Jeffery Sr. is a 70 y.o. male.  has a past medical history of Back pain, Benign prostate hyperplasia, DDD (degenerative disc disease), thoracolumbar, Disorder of kidney and ureter, Elevated PSA, Glaucoma, colonic polyps (2010), Hypertension, Malignant neoplasm of prostate (7/31/2019), Metabolic syndrome, Mixed hyperlipidemia (1/24/2018), Obesity, MARIN (obstructive sleep apnea), Pneumonia, and Psychophysiological insomnia.     Chief Complaint: Chronic Kidney Disease      Problem List Items Addressed This Visit     CKD (chronic kidney disease) stage 3, GFR 30-59 ml/min - Primary (Chronic)    Overview     Lab Results   Component Value Date    CREATININE 1.3 07/21/2020    BUN 21 02/24/2020     02/24/2020    K 4.3 02/24/2020     02/24/2020    CO2 23 02/24/2020    Chronic.  Stable.  Patient reports compliance with his aspirin.  Patient has been hydrating with water and avoiding anti-inflammatory medication.  Patient is dealing with a lot of pain with his neck and back currently.  Patient is undergoing MRI to assess the pain for his upper extremities.  Patient also reports significant pain in his hips and radiating into the right leg.           Current Assessment & Plan     Continue aspirin.  Blood pressure is well controlled on no medications.  Avoid anti-inflammatory medications.  Increase hydration with water.  Monitor metabolic panel routinely reviewed recent labs.  Update labs in six months prior to appointment.Complete history and physical was completed today.  Complete and thorough medication reconciliation was performed.  Discussed risks and benefits of medications.  Advised patient on orders and health maintenance.  We discussed old records and old labs if available.  Will request any records not available through epic.  Continue current medications listed on your summary sheet.            Sinus congestion    Overview     New problem.  Acute on chronic.  Patient reports worsening with weather change.  Patient reports that his ears feel like they are clogged.  Patient also has sinus pressure.         Current Assessment & Plan     Treating with over-the-counter Zyrtec and Flonase.  Follow-up if no improvement.Discussed condition course and signs and symptoms to expect.  Patient advised take Tylenol for pain or fever.  ER precautions.  Call MD or follow-up to clinic if not improving or worsening symptoms.                  Past Medical History:  Past Medical History:   Diagnosis Date    Back pain     Benign prostate hyperplasia     DDD (degenerative disc disease), thoracolumbar     Disorder of kidney and ureter     stage 3    Elevated PSA     Glaucoma     Hx of colonic polyps 2010    Hypertension     Malignant neoplasm of prostate 7/31/2019    Metabolic syndrome     Mixed hyperlipidemia 1/24/2018    Obesity     MARIN (obstructive sleep apnea)     Pneumonia     Psychophysiological insomnia      Past Surgical History:   Procedure Laterality Date    CYST REMOVAL  1993    neck    CYSTOSCOPY N/A 7/17/2019    Procedure: CYSTOSCOPY;  Surgeon: Ulisses Serra MD;  Location: [facility];  Service: Urology;  Laterality: N/A;    EYE SURGERY Bilateral     cataract surgery    GANGLION CYST EXCISION Right 1994    PROSTATE BIOPSY      TESTICLE SURGERY  1980    TRANSRECTAL BIOPSY OF PROSTATE WITH ULTRASOUND GUIDANCE Bilateral 7/17/2019    Procedure: BIOPSY, PROSTATE, RECTAL APPROACH, WITH US GUIDANCE;  Surgeon: Ulisses Serra MD;  Location: [facility];  Service: Urology;  Laterality: Bilateral;    TRANSRECTAL BIOPSY OF PROSTATE WITH ULTRASOUND GUIDANCE N/A 3/11/2020    Procedure: BIOPSY, PROSTATE, RECTAL APPROACH, WITH US GUIDANCE;  Surgeon: Ulisses Serra MD;  Location[facility];  Service: Urology;  Laterality: N/A;     Review of patient's allergies indicates:   Allergen Reactions    Benadryl  allergy decongestant      Opposite reaction, speeds him up   Opposite reaction, speeds him up     Diphenhydramine      Medication List with Changes/Refills   Current Medications    ASPIRIN (ECOTRIN) 81 MG EC TABLET    Take 81 mg by mouth once daily.    AZELASTINE (ASTELIN) 137 MCG (0.1 %) NASAL SPRAY    1 spray (137 mcg total) by Nasal route 2 (two) times daily. for 7 days    LATANOPROST 0.005 % OPHTHALMIC SOLUTION    PUT ONE DROP IN THE RIGHT EYE AT BEDTIME    MECLIZINE (ANTIVERT) 12.5 MG TABLET    Take 1 tablet (12.5 mg total) by mouth 3 (three) times daily as needed.      Social History     Tobacco Use    Smoking status: Former Smoker     Years: 3.00     Types: Cigars     Quit date:      Years since quittin.    Smokeless tobacco: Never Used   Substance Use Topics    Alcohol use: No      Family History   Problem Relation Age of Onset    Arthritis Mother     Prostate cancer Father     No Known Problems Sister     Alzheimer's disease Brother     Dementia Brother     Pacemaker/defibrilator Sister     Arthritis Sister        I have reviewed the complete PMH, social history, surgical history, allergies and medications.  As well as family history.    Review of Systems   Constitutional: Negative for chills, fatigue, fever and unexpected weight change.   HENT: Positive for congestion and hearing loss. Negative for ear pain and sore throat.    Eyes: Negative for redness and visual disturbance.   Respiratory: Negative for cough and shortness of breath.    Cardiovascular: Negative for chest pain and palpitations.   Gastrointestinal: Negative for nausea and vomiting.   Endocrine: Negative for cold intolerance and heat intolerance.   Genitourinary: Negative for difficulty urinating and hematuria.   Musculoskeletal: Positive for arthralgias, back pain and neck pain. Negative for myalgias.   Skin: Negative for rash and wound.   Allergic/Immunologic: Negative for environmental allergies and food allergies.  "  Neurological: Negative for weakness and headaches.   Hematological: Negative for adenopathy. Does not bruise/bleed easily.   Psychiatric/Behavioral: Negative for sleep disturbance. The patient is not nervous/anxious.      Objective:   /78   Pulse (!) 58   Temp 97.7 °F (36.5 °C) (Temporal)   Ht 5' 10" (1.778 m)   Wt 97.5 kg (215 lb)   BMI 30.85 kg/m²   Physical Exam  Vitals signs and nursing note reviewed.   Constitutional:       General: He is not in acute distress.     Appearance: He is well-developed.   HENT:      Head: Normocephalic and atraumatic.      Right Ear: External ear normal. A middle ear effusion is present. Tympanic membrane is not perforated, erythematous or bulging.      Left Ear: A middle ear effusion is present. Tympanic membrane is not perforated, erythematous or bulging.      Nose: Septal deviation, mucosal edema and congestion present.   Eyes:      Pupils: Pupils are equal, round, and reactive to light.   Neck:      Musculoskeletal: Normal range of motion and neck supple.   Cardiovascular:      Rate and Rhythm: Normal rate and regular rhythm.  Extrasystoles are present.     Heart sounds: Normal heart sounds.   Pulmonary:      Effort: Pulmonary effort is normal. No respiratory distress.      Breath sounds: Normal breath sounds. No wheezing.   Abdominal:      General: Bowel sounds are normal.      Palpations: Abdomen is soft.   Musculoskeletal: Normal range of motion.   Skin:     General: Skin is warm and dry.      Capillary Refill: Capillary refill takes less than 2 seconds.   Neurological:      Mental Status: He is alert and oriented to person, place, and time.      Cranial Nerves: No cranial nerve deficit.   Psychiatric:         Behavior: Behavior normal.         Assessment:     1. CKD (chronic kidney disease) stage 3, GFR 30-59 ml/min    2. Sinus congestion      MDM:     I have Reviewed and summarized old records.  I have performed thorough medication reconciliation today and " discussed risk and benefits of each medication.  I have reviewed labs and discussed with patient.  All questions were answered.  I am requesting old records and will review them once they are available.  Pain management, neurology    I have signed for the following orders AND/OR meds.  Orders Placed This Encounter   Procedures    Comprehensive metabolic panel     Standing Status:   Standing     Number of Occurrences:   99     Standing Expiration Date:   8/19/2040           Follow up in about 6 months (around 2/24/2021), or if symptoms worsen or fail to improve, for Med refills, LAB RESULTS.    If no improvement in symptoms or symptoms worsen, advised to call/follow-up at clinic or go to ER. Patient voiced understanding and all questions/concerns were addressed.     DISCLAIMER: This note was compiled by using a speech recognition dictation system and therefore please be aware that typographical / speech recognition errors can and do occur.  Please contact me if you see any errors specifically.    Trevor Jarvis M.D.       Office: [phone] [address] [place] [address]  FAX: [phone]

## 2020-08-24 NOTE — PATIENT INSTRUCTIONS
Follow up in about 6 months (around 2/24/2021), or if symptoms worsen or fail to improve, for Med refills, LAB RESULTS.     If no improvement in symptoms or symptoms worsen, please be advised to call MD, follow-up at clinic and/or go to ER if becomes severe.    Trevor Jarvis M.D.        We Offer TELEHEALTH & Same Day Appointments!   Book your Telehealth appointment with me through my nurse or   Clinic appointments on [employer]!    [address] [place] [address]    Office: [phone]   FAX: [phone]    Check out my Facebook Page and Follow Me at: [email or web address]    Check out my website at [email or web address] by clicking on: [email or web address]    To Schedule appointments online, go to [employer]: [email or web address]

## 2020-08-24 NOTE — ASSESSMENT & PLAN NOTE
Continue aspirin.  Blood pressure is well controlled on no medications.  Avoid anti-inflammatory medications.  Increase hydration with water.  Monitor metabolic panel routinely reviewed recent labs.  Update labs in six months prior to appointment.Complete history and physical was completed today.  Complete and thorough medication reconciliation was performed.  Discussed risks and benefits of medications.  Advised patient on orders and health maintenance.  We discussed old records and old labs if available.  Will request any records not available through epic.  Continue current medications listed on your summary sheet.

## 2020-08-24 NOTE — Clinical Note
Patient here today for routine visit. He is asking about his PSA lab. When would you like to recheck him?

## 2020-08-25 ENCOUNTER — PATIENT OUTREACH (OUTPATIENT)
Dept: ADMINISTRATIVE | Facility: HOSPITAL | Age: 70
End: 2020-08-25

## 2020-08-25 DIAGNOSIS — E78.2 MIXED HYPERLIPIDEMIA: Primary | Chronic | ICD-10-CM

## 2020-08-25 RX ORDER — ATORVASTATIN CALCIUM 10 MG/1
10 TABLET, FILM COATED ORAL DAILY
Qty: 90 TABLET | Refills: 3 | Status: SHIPPED | OUTPATIENT
Start: 2020-08-25 | End: 2021-03-19

## 2020-09-01 ENCOUNTER — HOSPITAL ENCOUNTER (OUTPATIENT)
Dept: RADIOLOGY | Facility: HOSPITAL | Age: 70
Discharge: HOME | End: 2020-09-01
Attending: PSYCHIATRY & NEUROLOGY
Payer: MEDICARE

## 2020-09-01 DIAGNOSIS — G95.9 SPINAL CORD LESION: ICD-10-CM

## 2020-09-01 PROCEDURE — 72157 MRI CHEST SPINE W/O & W/DYE: CPT | Mod: 26,HCNC,, | Performed by: RADIOLOGY

## 2020-09-01 PROCEDURE — 70553 MRI BRAIN DEMYELINATING W/ WO CONTRAST: ICD-10-PCS | Mod: 26,HCNC,, | Performed by: RADIOLOGY

## 2020-09-01 PROCEDURE — A9585 GADOBUTROL INJECTION: HCPCS | Mod: HCNC,PO | Performed by: PSYCHIATRY & NEUROLOGY

## 2020-09-01 PROCEDURE — 70553 MRI BRAIN STEM W/O & W/DYE: CPT | Mod: TC,HCNC,PO

## 2020-09-01 PROCEDURE — 70553 MRI BRAIN STEM W/O & W/DYE: CPT | Mod: 26,HCNC,, | Performed by: RADIOLOGY

## 2020-09-01 PROCEDURE — 72157 MRI THORACIC SPINE DEMYELINATING W W/O CONTRAST: ICD-10-PCS | Mod: 26,HCNC,, | Performed by: RADIOLOGY

## 2020-09-01 PROCEDURE — 25500020 PHARM REV CODE 255: Mod: HCNC,PO | Performed by: PSYCHIATRY & NEUROLOGY

## 2020-09-01 PROCEDURE — 72157 MRI CHEST SPINE W/O & W/DYE: CPT | Mod: TC,HCNC,PO

## 2020-09-01 RX ORDER — GADOBUTROL 604.72 MG/ML
9 INJECTION INTRAVENOUS
Status: COMPLETED | OUTPATIENT
Start: 2020-09-01 | End: 2020-09-01

## 2020-09-01 RX ADMIN — GADOBUTROL 9 ML: 604.72 INJECTION INTRAVENOUS at 03:09

## 2020-09-04 DIAGNOSIS — E27.9 ADRENAL ABNORMALITY: ICD-10-CM

## 2020-09-04 DIAGNOSIS — R93.89 ABNORMAL MRI: Primary | ICD-10-CM

## 2020-09-04 NOTE — PROGRESS NOTES
I have signed for the following orders AND/OR meds.  Please call the patient and ask the patient to schedule the testing AND/OR inform about any medications that were sent.      Orders Placed This Encounter   Procedures    CT Abdomen Pelvis W Wo Contrast     Standing Status:   Future     Standing Expiration Date:   9/4/2021     Order Specific Question:   Is the patient allergic to iodine or contrast?     Answer:   Routine     Order Specific Question:   Is the patient on ANY Metformin drug such as Glucophage/Glucovance?           Should be off drug 48 hours after contrast. Check renal function before restart.     Answer:   No     Order Specific Question:   History of Kidney Disease - including: decreased kidney function, dialysis, kidney transplay, single kidney, kidney cancer, kidney surgery?     Answer:   Decreased Kidney Function     Order Specific Question:   Does the patient have high blood pressure requiring medical treatment?     Answer:   No     Order Specific Question:   Diabetes?     Answer:   No     Order Specific Question:   May the Radiologist modify the order per protocol to meet the clinical needs of the patient?     Answer:   Yes     Order Specific Question:   Will this service be billed to a Worker's Comp policy?     Answer:   No     Order Specific Question:   Oral/Rectal Contrast instructions:     Answer:   NO Oral Contrast     Order Specific Question:   Special CT ABD Protocol Request?     Answer:   Routine     Comments:   CT Adrenal with and without contrast (washout)

## 2020-11-05 ENCOUNTER — TELEPHONE (OUTPATIENT)
Dept: UROLOGY | Facility: CLINIC | Age: 70
End: 2020-11-05

## 2020-11-05 DIAGNOSIS — C61 PROSTATE CANCER: Primary | ICD-10-CM

## 2020-11-18 ENCOUNTER — LAB VISIT (OUTPATIENT)
Dept: LAB | Facility: HOSPITAL | Age: 70
End: 2020-11-18
Attending: UROLOGY
Payer: MEDICARE

## 2020-11-18 DIAGNOSIS — C61 PROSTATE CANCER: ICD-10-CM

## 2020-11-18 LAB — COMPLEXED PSA SERPL-MCNC: 6.7 NG/ML (ref 0–4)

## 2020-11-18 PROCEDURE — 36415 COLL VENOUS BLD VENIPUNCTURE: CPT | Mod: HCNC,PO

## 2020-11-18 PROCEDURE — 84153 ASSAY OF PSA TOTAL: CPT | Mod: HCNC

## 2020-12-01 ENCOUNTER — PATIENT OUTREACH (OUTPATIENT)
Dept: ADMINISTRATIVE | Facility: OTHER | Age: 70
End: 2020-12-01

## 2020-12-01 NOTE — PROGRESS NOTES
Health Maintenance Due   Topic Date Due    Shingles Vaccine (2 of 3) 11/02/2017    Influenza Vaccine (1) 08/01/2020     Updates were requested from care everywhere.  Chart was reviewed for overdue Proactive [facility] Encounters ([facility]) topics (CRS, Breast Cancer Screening, Eye exam)  Health Maintenance has been updated.  LINKS immunization registry triggered.  LINKS not responding.

## 2020-12-02 ENCOUNTER — OFFICE VISIT (OUTPATIENT)
Dept: UROLOGY | Facility: CLINIC | Age: 70
End: 2020-12-02
Payer: MEDICARE

## 2020-12-02 VITALS
BODY MASS INDEX: 31.37 KG/M2 | WEIGHT: 219.13 LBS | HEART RATE: 75 BPM | HEIGHT: 70 IN | DIASTOLIC BLOOD PRESSURE: 79 MMHG | SYSTOLIC BLOOD PRESSURE: 139 MMHG

## 2020-12-02 DIAGNOSIS — C61 PROSTATE CANCER: Primary | ICD-10-CM

## 2020-12-02 LAB
BILIRUB SERPL-MCNC: NORMAL MG/DL
BLOOD URINE, POC: NORMAL
CLARITY, POC UA: CLEAR
COLOR, POC UA: YELLOW
GLUCOSE UR QL STRIP: NORMAL
KETONES UR QL STRIP: NORMAL
LEUKOCYTE ESTERASE URINE, POC: NORMAL
NITRITE, POC UA: NORMAL
PH, POC UA: 6
PROTEIN, POC: NORMAL
SPECIFIC GRAVITY, POC UA: 1.02
UROBILINOGEN, POC UA: 0.2

## 2020-12-02 PROCEDURE — 3008F BODY MASS INDEX DOCD: CPT | Mod: HCNC,CPTII,S$GLB, | Performed by: UROLOGY

## 2020-12-02 PROCEDURE — 1159F PR MEDICATION LIST DOCUMENTED IN MEDICAL RECORD: ICD-10-PCS | Mod: HCNC,S$GLB,, | Performed by: UROLOGY

## 2020-12-02 PROCEDURE — 3075F PR MOST RECENT SYSTOLIC BLOOD PRESS GE 130-139MM HG: ICD-10-PCS | Mod: HCNC,CPTII,S$GLB, | Performed by: UROLOGY

## 2020-12-02 PROCEDURE — 99213 OFFICE O/P EST LOW 20 MIN: CPT | Mod: HCNC,25,S$GLB, | Performed by: UROLOGY

## 2020-12-02 PROCEDURE — 1101F PR PT FALLS ASSESS DOC 0-1 FALLS W/OUT INJ PAST YR: ICD-10-PCS | Mod: HCNC,CPTII,S$GLB, | Performed by: UROLOGY

## 2020-12-02 PROCEDURE — 99999 PR PBB SHADOW E&M-EST. PATIENT-LVL III: ICD-10-PCS | Mod: PBBFAC,HCNC,, | Performed by: UROLOGY

## 2020-12-02 PROCEDURE — 81002 URINALYSIS NONAUTO W/O SCOPE: CPT | Mod: HCNC,S$GLB,, | Performed by: UROLOGY

## 2020-12-02 PROCEDURE — 1159F MED LIST DOCD IN RCRD: CPT | Mod: HCNC,S$GLB,, | Performed by: UROLOGY

## 2020-12-02 PROCEDURE — 3288F FALL RISK ASSESSMENT DOCD: CPT | Mod: HCNC,CPTII,S$GLB, | Performed by: UROLOGY

## 2020-12-02 PROCEDURE — 1101F PT FALLS ASSESS-DOCD LE1/YR: CPT | Mod: HCNC,CPTII,S$GLB, | Performed by: UROLOGY

## 2020-12-02 PROCEDURE — 99213 PR OFFICE/OUTPT VISIT, EST, LEVL III, 20-29 MIN: ICD-10-PCS | Mod: HCNC,25,S$GLB, | Performed by: UROLOGY

## 2020-12-02 PROCEDURE — 99999 PR PBB SHADOW E&M-EST. PATIENT-LVL III: CPT | Mod: PBBFAC,HCNC,, | Performed by: UROLOGY

## 2020-12-02 PROCEDURE — 1126F AMNT PAIN NOTED NONE PRSNT: CPT | Mod: HCNC,S$GLB,, | Performed by: UROLOGY

## 2020-12-02 PROCEDURE — 3078F DIAST BP <80 MM HG: CPT | Mod: HCNC,CPTII,S$GLB, | Performed by: UROLOGY

## 2020-12-02 PROCEDURE — 3075F SYST BP GE 130 - 139MM HG: CPT | Mod: HCNC,CPTII,S$GLB, | Performed by: UROLOGY

## 2020-12-02 PROCEDURE — 81002 POCT URINE DIPSTICK WITHOUT MICROSCOPE: ICD-10-PCS | Mod: HCNC,S$GLB,, | Performed by: UROLOGY

## 2020-12-02 PROCEDURE — 3078F PR MOST RECENT DIASTOLIC BLOOD PRESSURE < 80 MM HG: ICD-10-PCS | Mod: HCNC,CPTII,S$GLB, | Performed by: UROLOGY

## 2020-12-02 PROCEDURE — 1126F PR PAIN SEVERITY QUANTIFIED, NO PAIN PRESENT: ICD-10-PCS | Mod: HCNC,S$GLB,, | Performed by: UROLOGY

## 2020-12-02 PROCEDURE — 3288F PR FALLS RISK ASSESSMENT DOCUMENTED: ICD-10-PCS | Mod: HCNC,CPTII,S$GLB, | Performed by: UROLOGY

## 2020-12-02 PROCEDURE — 3008F PR BODY MASS INDEX (BMI) DOCUMENTED: ICD-10-PCS | Mod: HCNC,CPTII,S$GLB, | Performed by: UROLOGY

## 2020-12-02 NOTE — PROGRESS NOTES
Subjective:       Patient ID: Yasir Jeffery Sr. is a 70 y.o. male.    Chief Complaint: Follow-up    69 yo, on active surveillance for prostate cancer. Bx in March revealed Grade group 1 and group 2.  PSA 6.7 last month.  Voiding well.  Continues with right low back pain.  Happy on surveillance at this time.         Past Medical History:   Diagnosis Date    Back pain     Benign prostate hyperplasia     DDD (degenerative disc disease), thoracolumbar     Disorder of kidney and ureter     stage 3    Elevated PSA     Glaucoma     Hx of colonic polyps 2010    Hypertension     Malignant neoplasm of prostate 7/31/2019    Metabolic syndrome     Mixed hyperlipidemia 1/24/2018    Obesity     MARIN (obstructive sleep apnea)     Pneumonia     Psychophysiological insomnia       Past Surgical History:   Procedure Laterality Date    CYST REMOVAL  1993    neck    CYSTOSCOPY N/A 7/17/2019    Procedure: CYSTOSCOPY;  Surgeon: Ulisses Serra MD;  Location: [facility];  Service: Urology;  Laterality: N/A;    EYE SURGERY Bilateral     cataract surgery    GANGLION CYST EXCISION Right 1994    PROSTATE BIOPSY      TESTICLE SURGERY  1980    TRANSRECTAL BIOPSY OF PROSTATE WITH ULTRASOUND GUIDANCE Bilateral 7/17/2019    Procedure: BIOPSY, PROSTATE, RECTAL APPROACH, WITH US GUIDANCE;  Surgeon: Ulisses Serra MD;  Location: [facility];  Service: Urology;  Laterality: Bilateral;    TRANSRECTAL BIOPSY OF PROSTATE WITH ULTRASOUND GUIDANCE N/A 3/11/2020    Procedure: BIOPSY, PROSTATE, RECTAL APPROACH, WITH US GUIDANCE;  Surgeon: Ulisses Serra MD;  Location: [facility];  Service: Urology;  Laterality: N/A;     Social History     Socioeconomic History    Marital status:      Spouse name: Not on file    Number of children: Not on file    Years of education: Not on file    Highest education level: Not on file   Occupational History    Not on file   Social Needs    Financial resource strain: Not on file    Food  insecurity     Worry: Not on file     Inability: Not on file    Transportation needs     Medical: Not on file     Non-medical: Not on file   Tobacco Use    Smoking status: Former Smoker     Years: 3.00     Types: Cigars     Quit date:      Years since quittin.9    Smokeless tobacco: Never Used   Substance and Sexual Activity    Alcohol use: No    Drug use: No    Sexual activity: Not on file   Lifestyle    Physical activity     Days per week: Not on file     Minutes per session: Not on file    Stress: Not on file   Relationships    Social connections     Talks on phone: Not on file     Gets together: Not on file     Attends Mandaeism service: Not on file     Active member of club or organization: Not on file     Attends meetings of clubs or organizations: Not on file     Relationship status: Not on file   Other Topics Concern    Not on file   Social History Narrative    Not on file       Family History   Problem Relation Age of Onset    Arthritis Mother     Prostate cancer Father     No Known Problems Sister     Alzheimer's disease Brother     Dementia Brother     Pacemaker/defibrilator Sister     Arthritis Sister       Review of patient's allergies indicates:   Allergen Reactions    Benadryl allergy decongestant      Opposite reaction, speeds him up   Opposite reaction, speeds him up     Diphenhydramine      Medication List with Changes/Refills   Current Medications    ASPIRIN (ECOTRIN) 81 MG EC TABLET    Take 81 mg by mouth once daily.    ATORVASTATIN (LIPITOR) 10 MG TABLET    Take 1 tablet (10 mg total) by mouth once daily.    AZELASTINE (ASTELIN) 137 MCG (0.1 %) NASAL SPRAY    1 spray (137 mcg total) by Nasal route 2 (two) times daily. for 7 days    LATANOPROST 0.005 % OPHTHALMIC SOLUTION    PUT ONE DROP IN THE RIGHT EYE AT BEDTIME    MECLIZINE (ANTIVERT) 12.5 MG TABLET    Take 1 tablet (12.5 mg total) by mouth 3 (three) times daily as needed.      Review of Systems   Constitutional:  Negative for fatigue, fever and unexpected weight change.   HENT: Negative for congestion.    Eyes: Negative for visual disturbance.   Respiratory: Negative for cough, choking and shortness of breath.    Cardiovascular: Negative for chest pain and leg swelling.   Gastrointestinal: Negative for abdominal distention, constipation, diarrhea, nausea and vomiting.   Endocrine: Negative for polyuria.   Genitourinary: Negative for difficulty urinating, flank pain, hematuria and testicular pain.   Musculoskeletal: Positive for back pain.   Skin: Negative for color change and wound.   Allergic/Immunologic: Negative for immunocompromised state.   Neurological: Negative for weakness.   Hematological: Negative for adenopathy. Does not bruise/bleed easily.   Psychiatric/Behavioral: Negative for confusion.       Objective:      Physical Exam  Vitals signs and nursing note reviewed.   Constitutional:       General: He is not in acute distress.     Appearance: He is well-developed. He is not diaphoretic.   HENT:      Head: Normocephalic and atraumatic.   Eyes:      Pupils: Pupils are equal, round, and reactive to light.   Neck:      Musculoskeletal: Neck supple.   Cardiovascular:      Rate and Rhythm: Normal rate.   Pulmonary:      Effort: Pulmonary effort is normal. No respiratory distress.      Breath sounds: No wheezing.   Abdominal:      General: There is no distension.      Palpations: Abdomen is soft. There is no mass.      Tenderness: There is no abdominal tenderness. There is no guarding or rebound.      Hernia: There is no hernia in the right inguinal area or left inguinal area.   Genitourinary:     Penis: Normal and circumcised. No tenderness.       Scrotum/Testes: Normal.         Right: Tenderness or swelling not present.         Left: Tenderness or swelling not present.   Skin:     General: Skin is warm.   Neurological:      Mental Status: He is alert and oriented to person, place, and time.           Sodium   Date  Value Ref Range Status   02/24/2020 141 136 - 145 mmol/L Final     Potassium   Date Value Ref Range Status   02/24/2020 4.3 3.5 - 5.1 mmol/L Final     Chloride   Date Value Ref Range Status   02/24/2020 108 95 - 110 mmol/L Final     CO2   Date Value Ref Range Status   02/24/2020 23 23 - 29 mmol/L Final     Creatinine   Date Value Ref Range Status   08/24/2020 1.3 0.5 - 1.4 mg/dL Final     Glucose   Date Value Ref Range Status   02/24/2020 84 70 - 110 mg/dL Final     AST   Date Value Ref Range Status   02/24/2020 22 10 - 40 U/L Final     ALT   Date Value Ref Range Status   02/24/2020 10 10 - 44 U/L Final     WBC   Date Value Ref Range Status   02/24/2020 8.59 3.90 - 12.70 K/uL Final     Hemoglobin   Date Value Ref Range Status   02/24/2020 14.6 14.0 - 18.0 g/dL Final     Hematocrit   Date Value Ref Range Status   02/24/2020 43.8 40.0 - 54.0 % Final     Platelets   Date Value Ref Range Status   02/24/2020 175 150 - 350 K/uL Final          Assessment/Plan:       Problem List Items Addressed This Visit     None      Visit Diagnoses     Prostate cancer    -  Primary    Relevant Orders    POCT urine dipstick without microscope (Completed)          -discussed risks of AS including progression  -discussed possible biopsy next summer.  Pt agrees.  -f/u 4-5 months

## 2021-02-18 ENCOUNTER — LAB VISIT (OUTPATIENT)
Dept: LAB | Facility: HOSPITAL | Age: 71
End: 2021-02-18
Attending: FAMILY MEDICINE
Payer: MEDICARE

## 2021-02-18 DIAGNOSIS — E78.2 MIXED HYPERLIPIDEMIA: Chronic | ICD-10-CM

## 2021-02-18 LAB
CHOLEST SERPL-MCNC: 213 MG/DL (ref 120–199)
CHOLEST/HDLC SERPL: 6.1 {RATIO} (ref 2–5)
HDLC SERPL-MCNC: 35 MG/DL (ref 40–75)
HDLC SERPL: 16.4 % (ref 20–50)
LDLC SERPL CALC-MCNC: 155 MG/DL (ref 63–159)
NONHDLC SERPL-MCNC: 178 MG/DL
TRIGL SERPL-MCNC: 115 MG/DL (ref 30–150)

## 2021-02-18 PROCEDURE — 36415 COLL VENOUS BLD VENIPUNCTURE: CPT | Mod: PO

## 2021-02-18 PROCEDURE — 80061 LIPID PANEL: CPT

## 2021-02-24 ENCOUNTER — OFFICE VISIT (OUTPATIENT)
Dept: FAMILY MEDICINE | Facility: CLINIC | Age: 71
End: 2021-02-24
Payer: MEDICARE

## 2021-02-24 ENCOUNTER — HOSPITAL ENCOUNTER (OUTPATIENT)
Dept: RADIOLOGY | Facility: HOSPITAL | Age: 71
Discharge: HOME | End: 2021-02-24
Attending: FAMILY MEDICINE
Payer: MEDICARE

## 2021-02-24 VITALS
WEIGHT: 224.63 LBS | DIASTOLIC BLOOD PRESSURE: 64 MMHG | HEART RATE: 68 BPM | HEIGHT: 70 IN | SYSTOLIC BLOOD PRESSURE: 138 MMHG | BODY MASS INDEX: 32.16 KG/M2 | TEMPERATURE: 99 F

## 2021-02-24 DIAGNOSIS — E66.2 CLASS 1 OBESITY WITH ALVEOLAR HYPOVENTILATION, SERIOUS COMORBIDITY, AND BODY MASS INDEX (BMI) OF 32.0 TO 32.9 IN ADULT: ICD-10-CM

## 2021-02-24 DIAGNOSIS — R06.02 SHORTNESS OF BREATH: ICD-10-CM

## 2021-02-24 DIAGNOSIS — Z13.220 ENCOUNTER FOR LIPID SCREENING FOR CARDIOVASCULAR DISEASE: ICD-10-CM

## 2021-02-24 DIAGNOSIS — D52.0 DIETARY FOLATE DEFICIENCY ANEMIA: ICD-10-CM

## 2021-02-24 DIAGNOSIS — N18.31 STAGE 3A CHRONIC KIDNEY DISEASE: Chronic | ICD-10-CM

## 2021-02-24 DIAGNOSIS — E55.9 VITAMIN D DEFICIENCY, UNSPECIFIED: ICD-10-CM

## 2021-02-24 DIAGNOSIS — Z79.899 ENCOUNTER FOR LONG-TERM (CURRENT) USE OF MEDICATIONS: ICD-10-CM

## 2021-02-24 DIAGNOSIS — Z00.00 WELL ADULT EXAM: Primary | ICD-10-CM

## 2021-02-24 DIAGNOSIS — M25.552 LEFT HIP PAIN: ICD-10-CM

## 2021-02-24 DIAGNOSIS — C61 PROSTATE CANCER: ICD-10-CM

## 2021-02-24 DIAGNOSIS — G95.9 SPINAL CORD LESION: ICD-10-CM

## 2021-02-24 DIAGNOSIS — E53.8 B12 DEFICIENCY: ICD-10-CM

## 2021-02-24 DIAGNOSIS — E55.9 VITAMIN D DEFICIENCY: ICD-10-CM

## 2021-02-24 DIAGNOSIS — R53.83 FATIGUE, UNSPECIFIED TYPE: ICD-10-CM

## 2021-02-24 DIAGNOSIS — Z13.6 ENCOUNTER FOR LIPID SCREENING FOR CARDIOVASCULAR DISEASE: ICD-10-CM

## 2021-02-24 PROCEDURE — 1101F PT FALLS ASSESS-DOCD LE1/YR: CPT | Mod: CPTII,S$GLB,, | Performed by: FAMILY MEDICINE

## 2021-02-24 PROCEDURE — 73502 XR HIP 2 VIEW LEFT: ICD-10-PCS | Mod: 26,LT,, | Performed by: RADIOLOGY

## 2021-02-24 PROCEDURE — 3008F BODY MASS INDEX DOCD: CPT | Mod: CPTII,S$GLB,, | Performed by: FAMILY MEDICINE

## 2021-02-24 PROCEDURE — 99999 PR PBB SHADOW E&M-EST. PATIENT-LVL IV: ICD-10-PCS | Mod: PBBFAC,,, | Performed by: FAMILY MEDICINE

## 2021-02-24 PROCEDURE — 3075F SYST BP GE 130 - 139MM HG: CPT | Mod: CPTII,S$GLB,, | Performed by: FAMILY MEDICINE

## 2021-02-24 PROCEDURE — 99213 OFFICE O/P EST LOW 20 MIN: CPT | Mod: 25,S$GLB,, | Performed by: FAMILY MEDICINE

## 2021-02-24 PROCEDURE — 3075F PR MOST RECENT SYSTOLIC BLOOD PRESS GE 130-139MM HG: ICD-10-PCS | Mod: CPTII,S$GLB,, | Performed by: FAMILY MEDICINE

## 2021-02-24 PROCEDURE — 1125F PR PAIN SEVERITY QUANTIFIED, PAIN PRESENT: ICD-10-PCS | Mod: S$GLB,,, | Performed by: FAMILY MEDICINE

## 2021-02-24 PROCEDURE — 71046 X-RAY EXAM CHEST 2 VIEWS: CPT | Mod: TC,PO

## 2021-02-24 PROCEDURE — 71046 XR CHEST PA AND LATERAL: ICD-10-PCS | Mod: 26,,, | Performed by: RADIOLOGY

## 2021-02-24 PROCEDURE — 99499 RISK ADDL DX/OHS AUDIT: ICD-10-PCS | Mod: S$GLB,,, | Performed by: FAMILY MEDICINE

## 2021-02-24 PROCEDURE — 3008F PR BODY MASS INDEX (BMI) DOCUMENTED: ICD-10-PCS | Mod: CPTII,S$GLB,, | Performed by: FAMILY MEDICINE

## 2021-02-24 PROCEDURE — 99397 PER PM REEVAL EST PAT 65+ YR: CPT | Mod: S$GLB,,, | Performed by: FAMILY MEDICINE

## 2021-02-24 PROCEDURE — 3078F DIAST BP <80 MM HG: CPT | Mod: CPTII,S$GLB,, | Performed by: FAMILY MEDICINE

## 2021-02-24 PROCEDURE — 93010 ELECTROCARDIOGRAM REPORT: CPT | Mod: S$GLB,,, | Performed by: INTERNAL MEDICINE

## 2021-02-24 PROCEDURE — 1159F PR MEDICATION LIST DOCUMENTED IN MEDICAL RECORD: ICD-10-PCS | Mod: S$GLB,,, | Performed by: FAMILY MEDICINE

## 2021-02-24 PROCEDURE — 99213 PR OFFICE/OUTPT VISIT, EST, LEVL III, 20-29 MIN: ICD-10-PCS | Mod: 25,S$GLB,, | Performed by: FAMILY MEDICINE

## 2021-02-24 PROCEDURE — 93010 EKG 12-LEAD: ICD-10-PCS | Mod: S$GLB,,, | Performed by: INTERNAL MEDICINE

## 2021-02-24 PROCEDURE — 3288F FALL RISK ASSESSMENT DOCD: CPT | Mod: CPTII,S$GLB,, | Performed by: FAMILY MEDICINE

## 2021-02-24 PROCEDURE — 3288F PR FALLS RISK ASSESSMENT DOCUMENTED: ICD-10-PCS | Mod: CPTII,S$GLB,, | Performed by: FAMILY MEDICINE

## 2021-02-24 PROCEDURE — 71046 X-RAY EXAM CHEST 2 VIEWS: CPT | Mod: 26,,, | Performed by: RADIOLOGY

## 2021-02-24 PROCEDURE — 3078F PR MOST RECENT DIASTOLIC BLOOD PRESSURE < 80 MM HG: ICD-10-PCS | Mod: CPTII,S$GLB,, | Performed by: FAMILY MEDICINE

## 2021-02-24 PROCEDURE — 1101F PR PT FALLS ASSESS DOC 0-1 FALLS W/OUT INJ PAST YR: ICD-10-PCS | Mod: CPTII,S$GLB,, | Performed by: FAMILY MEDICINE

## 2021-02-24 PROCEDURE — 99397 PR PREVENTIVE VISIT,EST,65 & OVER: ICD-10-PCS | Mod: S$GLB,,, | Performed by: FAMILY MEDICINE

## 2021-02-24 PROCEDURE — 73502 X-RAY EXAM HIP UNI 2-3 VIEWS: CPT | Mod: 26,LT,, | Performed by: RADIOLOGY

## 2021-02-24 PROCEDURE — 93005 ELECTROCARDIOGRAM TRACING: CPT | Mod: S$GLB,,, | Performed by: FAMILY MEDICINE

## 2021-02-24 PROCEDURE — 99999 PR PBB SHADOW E&M-EST. PATIENT-LVL IV: CPT | Mod: PBBFAC,,, | Performed by: FAMILY MEDICINE

## 2021-02-24 PROCEDURE — 73502 X-RAY EXAM HIP UNI 2-3 VIEWS: CPT | Mod: TC,PO,LT

## 2021-02-24 PROCEDURE — 1159F MED LIST DOCD IN RCRD: CPT | Mod: S$GLB,,, | Performed by: FAMILY MEDICINE

## 2021-02-24 PROCEDURE — 1125F AMNT PAIN NOTED PAIN PRSNT: CPT | Mod: S$GLB,,, | Performed by: FAMILY MEDICINE

## 2021-02-24 PROCEDURE — 99499 UNLISTED E&M SERVICE: CPT | Mod: S$GLB,,, | Performed by: FAMILY MEDICINE

## 2021-02-24 PROCEDURE — 93005 EKG 12-LEAD: ICD-10-PCS | Mod: S$GLB,,, | Performed by: FAMILY MEDICINE

## 2021-02-25 ENCOUNTER — TELEPHONE (OUTPATIENT)
Dept: ORTHOPEDICS | Facility: CLINIC | Age: 71
End: 2021-02-25

## 2021-02-25 DIAGNOSIS — M16.0 BILATERAL HIP JOINT ARTHRITIS: Primary | ICD-10-CM

## 2021-03-01 ENCOUNTER — OFFICE VISIT (OUTPATIENT)
Dept: ORTHOPEDICS | Facility: CLINIC | Age: 71
End: 2021-03-01
Payer: MEDICARE

## 2021-03-01 VITALS — HEIGHT: 70 IN | BODY MASS INDEX: 32.07 KG/M2 | WEIGHT: 224 LBS

## 2021-03-01 DIAGNOSIS — M16.0 BILATERAL HIP JOINT ARTHRITIS: ICD-10-CM

## 2021-03-01 PROCEDURE — 3288F FALL RISK ASSESSMENT DOCD: CPT | Mod: CPTII,S$GLB,, | Performed by: ORTHOPAEDIC SURGERY

## 2021-03-01 PROCEDURE — 3008F BODY MASS INDEX DOCD: CPT | Mod: CPTII,S$GLB,, | Performed by: ORTHOPAEDIC SURGERY

## 2021-03-01 PROCEDURE — 99999 PR PBB SHADOW E&M-EST. PATIENT-LVL III: CPT | Mod: PBBFAC,,, | Performed by: ORTHOPAEDIC SURGERY

## 2021-03-01 PROCEDURE — 3288F PR FALLS RISK ASSESSMENT DOCUMENTED: ICD-10-PCS | Mod: CPTII,S$GLB,, | Performed by: ORTHOPAEDIC SURGERY

## 2021-03-01 PROCEDURE — 99213 OFFICE O/P EST LOW 20 MIN: CPT | Mod: S$GLB,,, | Performed by: ORTHOPAEDIC SURGERY

## 2021-03-01 PROCEDURE — 1159F PR MEDICATION LIST DOCUMENTED IN MEDICAL RECORD: ICD-10-PCS | Mod: S$GLB,,, | Performed by: ORTHOPAEDIC SURGERY

## 2021-03-01 PROCEDURE — 1125F AMNT PAIN NOTED PAIN PRSNT: CPT | Mod: S$GLB,,, | Performed by: ORTHOPAEDIC SURGERY

## 2021-03-01 PROCEDURE — 1159F MED LIST DOCD IN RCRD: CPT | Mod: S$GLB,,, | Performed by: ORTHOPAEDIC SURGERY

## 2021-03-01 PROCEDURE — 1125F PR PAIN SEVERITY QUANTIFIED, PAIN PRESENT: ICD-10-PCS | Mod: S$GLB,,, | Performed by: ORTHOPAEDIC SURGERY

## 2021-03-01 PROCEDURE — 1101F PT FALLS ASSESS-DOCD LE1/YR: CPT | Mod: CPTII,S$GLB,, | Performed by: ORTHOPAEDIC SURGERY

## 2021-03-01 PROCEDURE — 99999 PR PBB SHADOW E&M-EST. PATIENT-LVL III: ICD-10-PCS | Mod: PBBFAC,,, | Performed by: ORTHOPAEDIC SURGERY

## 2021-03-01 PROCEDURE — 3008F PR BODY MASS INDEX (BMI) DOCUMENTED: ICD-10-PCS | Mod: CPTII,S$GLB,, | Performed by: ORTHOPAEDIC SURGERY

## 2021-03-01 PROCEDURE — 99213 PR OFFICE/OUTPT VISIT, EST, LEVL III, 20-29 MIN: ICD-10-PCS | Mod: S$GLB,,, | Performed by: ORTHOPAEDIC SURGERY

## 2021-03-01 PROCEDURE — 1101F PR PT FALLS ASSESS DOC 0-1 FALLS W/OUT INJ PAST YR: ICD-10-PCS | Mod: CPTII,S$GLB,, | Performed by: ORTHOPAEDIC SURGERY

## 2021-03-03 ENCOUNTER — OFFICE VISIT (OUTPATIENT)
Dept: CARDIOLOGY | Facility: CLINIC | Age: 71
End: 2021-03-03
Payer: MEDICARE

## 2021-03-03 ENCOUNTER — HOSPITAL ENCOUNTER (OUTPATIENT)
Dept: CARDIOLOGY | Facility: HOSPITAL | Age: 71
Discharge: HOME | End: 2021-03-03
Payer: MEDICARE

## 2021-03-03 VITALS
BODY MASS INDEX: 32.37 KG/M2 | WEIGHT: 225.63 LBS | DIASTOLIC BLOOD PRESSURE: 70 MMHG | HEART RATE: 60 BPM | TEMPERATURE: 99 F | SYSTOLIC BLOOD PRESSURE: 150 MMHG

## 2021-03-03 DIAGNOSIS — E78.2 MIXED HYPERLIPIDEMIA: Chronic | ICD-10-CM

## 2021-03-03 DIAGNOSIS — R07.9 CHEST PAIN, UNSPECIFIED TYPE: Primary | ICD-10-CM

## 2021-03-03 DIAGNOSIS — E66.09 CLASS 1 OBESITY DUE TO EXCESS CALORIES WITH SERIOUS COMORBIDITY AND BODY MASS INDEX (BMI) OF 32.0 TO 32.9 IN ADULT: Chronic | ICD-10-CM

## 2021-03-03 DIAGNOSIS — R06.09 DOE (DYSPNEA ON EXERTION): ICD-10-CM

## 2021-03-03 DIAGNOSIS — I49.9 IRREGULAR HEART RHYTHM: ICD-10-CM

## 2021-03-03 PROBLEM — E66.811 CLASS 1 OBESITY DUE TO EXCESS CALORIES WITH SERIOUS COMORBIDITY AND BODY MASS INDEX (BMI) OF 32.0 TO 32.9 IN ADULT: Chronic | Status: ACTIVE | Noted: 2017-08-09

## 2021-03-03 PROCEDURE — 99999 PR PBB SHADOW E&M-EST. PATIENT-LVL III: ICD-10-PCS | Mod: PBBFAC,,, | Performed by: NURSE PRACTITIONER

## 2021-03-03 PROCEDURE — 3077F SYST BP >= 140 MM HG: CPT | Mod: CPTII,S$GLB,, | Performed by: NURSE PRACTITIONER

## 2021-03-03 PROCEDURE — 3288F FALL RISK ASSESSMENT DOCD: CPT | Mod: CPTII,S$GLB,, | Performed by: NURSE PRACTITIONER

## 2021-03-03 PROCEDURE — 3077F PR MOST RECENT SYSTOLIC BLOOD PRESSURE >= 140 MM HG: ICD-10-PCS | Mod: CPTII,S$GLB,, | Performed by: NURSE PRACTITIONER

## 2021-03-03 PROCEDURE — 1159F PR MEDICATION LIST DOCUMENTED IN MEDICAL RECORD: ICD-10-PCS | Mod: S$GLB,,, | Performed by: NURSE PRACTITIONER

## 2021-03-03 PROCEDURE — 3008F PR BODY MASS INDEX (BMI) DOCUMENTED: ICD-10-PCS | Mod: CPTII,S$GLB,, | Performed by: NURSE PRACTITIONER

## 2021-03-03 PROCEDURE — 3288F PR FALLS RISK ASSESSMENT DOCUMENTED: ICD-10-PCS | Mod: CPTII,S$GLB,, | Performed by: NURSE PRACTITIONER

## 2021-03-03 PROCEDURE — 99214 PR OFFICE/OUTPT VISIT, EST, LEVL IV, 30-39 MIN: ICD-10-PCS | Mod: S$GLB,,, | Performed by: NURSE PRACTITIONER

## 2021-03-03 PROCEDURE — 3078F DIAST BP <80 MM HG: CPT | Mod: CPTII,S$GLB,, | Performed by: NURSE PRACTITIONER

## 2021-03-03 PROCEDURE — 93005 ELECTROCARDIOGRAM TRACING: CPT | Mod: PO

## 2021-03-03 PROCEDURE — 93010 EKG 12-LEAD: ICD-10-PCS | Mod: S$GLB,,, | Performed by: INTERNAL MEDICINE

## 2021-03-03 PROCEDURE — 3078F PR MOST RECENT DIASTOLIC BLOOD PRESSURE < 80 MM HG: ICD-10-PCS | Mod: CPTII,S$GLB,, | Performed by: NURSE PRACTITIONER

## 2021-03-03 PROCEDURE — 1125F PR PAIN SEVERITY QUANTIFIED, PAIN PRESENT: ICD-10-PCS | Mod: S$GLB,,, | Performed by: NURSE PRACTITIONER

## 2021-03-03 PROCEDURE — 3008F BODY MASS INDEX DOCD: CPT | Mod: CPTII,S$GLB,, | Performed by: NURSE PRACTITIONER

## 2021-03-03 PROCEDURE — 1101F PR PT FALLS ASSESS DOC 0-1 FALLS W/OUT INJ PAST YR: ICD-10-PCS | Mod: CPTII,S$GLB,, | Performed by: NURSE PRACTITIONER

## 2021-03-03 PROCEDURE — 99999 PR PBB SHADOW E&M-EST. PATIENT-LVL III: CPT | Mod: PBBFAC,,, | Performed by: NURSE PRACTITIONER

## 2021-03-03 PROCEDURE — 1159F MED LIST DOCD IN RCRD: CPT | Mod: S$GLB,,, | Performed by: NURSE PRACTITIONER

## 2021-03-03 PROCEDURE — 93010 ELECTROCARDIOGRAM REPORT: CPT | Mod: S$GLB,,, | Performed by: INTERNAL MEDICINE

## 2021-03-03 PROCEDURE — 99214 OFFICE O/P EST MOD 30 MIN: CPT | Mod: S$GLB,,, | Performed by: NURSE PRACTITIONER

## 2021-03-03 PROCEDURE — 1101F PT FALLS ASSESS-DOCD LE1/YR: CPT | Mod: CPTII,S$GLB,, | Performed by: NURSE PRACTITIONER

## 2021-03-03 PROCEDURE — 1125F AMNT PAIN NOTED PAIN PRSNT: CPT | Mod: S$GLB,,, | Performed by: NURSE PRACTITIONER

## 2021-03-08 ENCOUNTER — HOSPITAL ENCOUNTER (OUTPATIENT)
Dept: CARDIOLOGY | Facility: HOSPITAL | Age: 71
Discharge: HOME | End: 2021-03-08
Attending: NURSE PRACTITIONER
Payer: MEDICARE

## 2021-03-08 VITALS
HEIGHT: 70 IN | SYSTOLIC BLOOD PRESSURE: 119 MMHG | TEMPERATURE: 97 F | DIASTOLIC BLOOD PRESSURE: 61 MMHG | BODY MASS INDEX: 31.5 KG/M2 | WEIGHT: 220 LBS | HEART RATE: 57 BPM

## 2021-03-08 DIAGNOSIS — R07.9 CHEST PAIN, UNSPECIFIED TYPE: ICD-10-CM

## 2021-03-08 DIAGNOSIS — R06.09 DOE (DYSPNEA ON EXERTION): ICD-10-CM

## 2021-03-08 PROCEDURE — 78452 HT MUSCLE IMAGE SPECT MULT: CPT | Mod: 26,,, | Performed by: INTERNAL MEDICINE

## 2021-03-08 PROCEDURE — 93016 NUCLEAR STRESS - 3RD PARTY (CUPID ONLY): ICD-10-PCS | Mod: ,,, | Performed by: INTERNAL MEDICINE

## 2021-03-08 PROCEDURE — 93017 CV STRESS TEST TRACING ONLY: CPT | Mod: PO

## 2021-03-08 PROCEDURE — 78452 NUCLEAR STRESS - 3RD PARTY (CUPID ONLY): ICD-10-PCS | Mod: 26,,, | Performed by: INTERNAL MEDICINE

## 2021-03-08 PROCEDURE — 93016 CV STRESS TEST SUPVJ ONLY: CPT | Mod: ,,, | Performed by: INTERNAL MEDICINE

## 2021-03-08 PROCEDURE — 93018 CV STRESS TEST I&R ONLY: CPT | Mod: ,,, | Performed by: INTERNAL MEDICINE

## 2021-03-08 PROCEDURE — 93018 NUCLEAR STRESS - 3RD PARTY (CUPID ONLY): ICD-10-PCS | Mod: ,,, | Performed by: INTERNAL MEDICINE

## 2021-03-08 PROCEDURE — A9500 TC99M SESTAMIBI: HCPCS | Mod: PO

## 2021-03-08 RX ORDER — REGADENOSON 0.08 MG/ML
0.4 INJECTION, SOLUTION INTRAVENOUS ONCE
Status: COMPLETED | OUTPATIENT
Start: 2021-03-08 | End: 2021-03-08

## 2021-03-08 RX ADMIN — REGADENOSON 0.4 MG: 0.08 INJECTION, SOLUTION INTRAVENOUS at 10:03

## 2021-03-10 ENCOUNTER — HOSPITAL ENCOUNTER (OUTPATIENT)
Dept: CARDIOLOGY | Facility: HOSPITAL | Age: 71
Discharge: HOME | End: 2021-03-10
Attending: NURSE PRACTITIONER
Payer: MEDICARE

## 2021-03-10 VITALS
DIASTOLIC BLOOD PRESSURE: 61 MMHG | HEART RATE: 61 BPM | SYSTOLIC BLOOD PRESSURE: 119 MMHG | WEIGHT: 225 LBS | BODY MASS INDEX: 32.21 KG/M2 | TEMPERATURE: 98 F | HEIGHT: 70 IN

## 2021-03-10 DIAGNOSIS — R06.09 DOE (DYSPNEA ON EXERTION): ICD-10-CM

## 2021-03-10 DIAGNOSIS — R07.9 CHEST PAIN, UNSPECIFIED TYPE: ICD-10-CM

## 2021-03-10 LAB
AORTIC ROOT ANNULUS: 3.49 CM
AV INDEX (PROSTH): 0.7
AV MEAN GRADIENT: 4 MMHG
AV PEAK GRADIENT: 7 MMHG
AV VALVE AREA: 3.01 CM2
AV VELOCITY RATIO: 0.73
BSA FOR ECHO PROCEDURE: 2.24 M2
CV ECHO LV RWT: 0.58 CM
DOP CALC AO PEAK VEL: 1.31 M/S
DOP CALC AO VTI: 28.75 CM
DOP CALC LVOT AREA: 4.3 CM2
DOP CALC LVOT DIAMETER: 2.35 CM
DOP CALC LVOT PEAK VEL: 0.95 M/S
DOP CALC LVOT STROKE VOLUME: 86.66 CM3
DOP CALC RVOT PEAK VEL: 0.6 M/S
DOP CALC RVOT VTI: 13 CM
DOP CALCLVOT PEAK VEL VTI: 19.99 CM
E WAVE DECELERATION TIME: 473.12 MSEC
E/A RATIO: 0.51
E/E' RATIO: 5.71 M/S
ECHO LV POSTERIOR WALL: 1.43 CM (ref 0.6–1.1)
FRACTIONAL SHORTENING: 38 % (ref 28–44)
INTERVENTRICULAR SEPTUM: 1.33 CM (ref 0.6–1.1)
IVRT: 168.41 MSEC
LA MAJOR: 6.24 CM
LA MINOR: 5.43 CM
LA WIDTH: 3.21 CM
LEFT ATRIUM SIZE: 4 CM
LEFT ATRIUM VOLUME INDEX: 28.9 ML/M2
LEFT ATRIUM VOLUME: 63.38 CM3
LEFT INTERNAL DIMENSION IN SYSTOLE: 3.04 CM (ref 2.1–4)
LEFT VENTRICLE DIASTOLIC VOLUME INDEX: 51.56 ML/M2
LEFT VENTRICLE DIASTOLIC VOLUME: 112.92 ML
LEFT VENTRICLE MASS INDEX: 126 G/M2
LEFT VENTRICLE SYSTOLIC VOLUME INDEX: 16.6 ML/M2
LEFT VENTRICLE SYSTOLIC VOLUME: 36.27 ML
LEFT VENTRICULAR INTERNAL DIMENSION IN DIASTOLE: 4.9 CM (ref 3.5–6)
LEFT VENTRICULAR MASS: 276.66 G
LV LATERAL E/E' RATIO: 5 M/S
LV SEPTAL E/E' RATIO: 6.67 M/S
MV A" WAVE DURATION": 8.56 MSEC
MV PEAK A VEL: 0.78 M/S
MV PEAK E VEL: 0.4 M/S
MV STENOSIS PRESSURE HALF TIME: 137.21 MS
MV VALVE AREA P 1/2 METHOD: 1.6 CM2
PISA TR MAX VEL: 2.82 M/S
PULM VEIN S/D RATIO: 1.5
PV MEAN GRADIENT: 1 MMHG
PV PEAK D VEL: 0.38 M/S
PV PEAK S VEL: 0.57 M/S
PV PEAK VELOCITY: 1.2 CM/S
RA MAJOR: 5.22 CM
RA PRESSURE: 3 MMHG
RA WIDTH: 2.97 CM
RIGHT VENTRICULAR END-DIASTOLIC DIMENSION: 2.29 CM
SINUS: 3.56 CM
STJ: 3.44 CM
TDI LATERAL: 0.08 M/S
TDI SEPTAL: 0.06 M/S
TDI: 0.07 M/S
TR MAX PG: 32 MMHG
TRICUSPID ANNULAR PLANE SYSTOLIC EXCURSION: 2.16 CM
TV REST PULMONARY ARTERY PRESSURE: 35 MMHG

## 2021-03-10 PROCEDURE — 93306 TTE W/DOPPLER COMPLETE: CPT | Mod: PO

## 2021-03-10 PROCEDURE — 93306 TTE W/DOPPLER COMPLETE: CPT | Mod: 26,,, | Performed by: INTERNAL MEDICINE

## 2021-03-10 PROCEDURE — 93306 ECHO (CUPID ONLY): ICD-10-PCS | Mod: 26,,, | Performed by: INTERNAL MEDICINE

## 2021-03-11 ENCOUNTER — CLINICAL SUPPORT (OUTPATIENT)
Dept: FAMILY MEDICINE | Facility: CLINIC | Age: 71
End: 2021-03-11
Payer: MEDICARE

## 2021-03-11 VITALS — SYSTOLIC BLOOD PRESSURE: 136 MMHG | HEART RATE: 56 BPM | DIASTOLIC BLOOD PRESSURE: 58 MMHG

## 2021-03-11 DIAGNOSIS — Z01.30 BP CHECK: Primary | ICD-10-CM

## 2021-03-11 LAB
CV PHARM DOSE: 0.4 MG
CV STRESS BASE HR: 57 BPM
DIASTOLIC BLOOD PRESSURE: 61 MMHG
NUC REST EJECTION FRACTION: 44
NUC STRESS EJECTION FRACTION: 44 %
OHS CV CPX 1 MINUTE RECOVERY HEART RATE: 81 BPM
OHS CV CPX 85 PERCENT MAX PREDICTED HEART RATE MALE: 127
OHS CV CPX ESTIMATED METS: 1
OHS CV CPX MAX PREDICTED HEART RATE: 149
OHS CV CPX PATIENT IS FEMALE: 0
OHS CV CPX PATIENT IS MALE: 1
OHS CV CPX PEAK DIASTOLIC BLOOD PRESSURE: 63 MMHG
OHS CV CPX PEAK HEAR RATE: 85 BPM
OHS CV CPX PEAK RATE PRESSURE PRODUCT: NORMAL
OHS CV CPX PEAK SYSTOLIC BLOOD PRESSURE: 130 MMHG
OHS CV CPX PERCENT MAX PREDICTED HEART RATE ACHIEVED: 57
OHS CV CPX RATE PRESSURE PRODUCT PRESENTING: 6783
STRESS ECHO POST EXERCISE DUR MIN: 2 MINUTES
STRESS ECHO POST EXERCISE DUR SEC: 2 SECONDS
SYSTOLIC BLOOD PRESSURE: 119 MMHG

## 2021-03-11 PROCEDURE — 99999 PR PBB SHADOW E&M-EST. PATIENT-LVL II: CPT | Mod: PBBFAC,,,

## 2021-03-11 PROCEDURE — 99999 PR PBB SHADOW E&M-EST. PATIENT-LVL II: ICD-10-PCS | Mod: PBBFAC,,,

## 2021-03-19 ENCOUNTER — OFFICE VISIT (OUTPATIENT)
Dept: CARDIOLOGY | Facility: CLINIC | Age: 71
End: 2021-03-19
Payer: MEDICARE

## 2021-03-19 DIAGNOSIS — C61 PROSTATE CANCER: Chronic | ICD-10-CM

## 2021-03-19 DIAGNOSIS — R53.83 FATIGUE, UNSPECIFIED TYPE: ICD-10-CM

## 2021-03-19 DIAGNOSIS — E66.09 CLASS 1 OBESITY DUE TO EXCESS CALORIES WITH SERIOUS COMORBIDITY AND BODY MASS INDEX (BMI) OF 32.0 TO 32.9 IN ADULT: Chronic | ICD-10-CM

## 2021-03-19 DIAGNOSIS — E53.8 B12 DEFICIENCY: Chronic | ICD-10-CM

## 2021-03-19 DIAGNOSIS — R40.0 DAYTIME SOMNOLENCE: ICD-10-CM

## 2021-03-19 DIAGNOSIS — I10 ESSENTIAL HYPERTENSION: ICD-10-CM

## 2021-03-19 DIAGNOSIS — E78.2 MIXED HYPERLIPIDEMIA: Primary | Chronic | ICD-10-CM

## 2021-03-19 DIAGNOSIS — E55.9 VITAMIN D DEFICIENCY: Chronic | ICD-10-CM

## 2021-03-19 DIAGNOSIS — Z79.899 NEW MEDICATION ADDED: ICD-10-CM

## 2021-03-19 PROCEDURE — 99443 PR PHYSICIAN TELEPHONE EVALUATION 21-30 MIN: ICD-10-PCS | Mod: 95,,, | Performed by: NURSE PRACTITIONER

## 2021-03-19 PROCEDURE — 1159F MED LIST DOCD IN RCRD: CPT | Mod: 95,,, | Performed by: NURSE PRACTITIONER

## 2021-03-19 PROCEDURE — 1159F PR MEDICATION LIST DOCUMENTED IN MEDICAL RECORD: ICD-10-PCS | Mod: 95,,, | Performed by: NURSE PRACTITIONER

## 2021-03-19 PROCEDURE — 99443 PR PHYSICIAN TELEPHONE EVALUATION 21-30 MIN: CPT | Mod: 95,,, | Performed by: NURSE PRACTITIONER

## 2021-03-19 RX ORDER — LANOLIN ALCOHOL/MO/W.PET/CERES
500 CREAM (GRAM) TOPICAL DAILY
COMMUNITY
Start: 2021-03-19 | End: 2022-02-17 | Stop reason: CLARIF

## 2021-03-19 RX ORDER — VALSARTAN 160 MG/1
160 TABLET ORAL DAILY
Qty: 30 TABLET | Refills: 11 | Status: SHIPPED | OUTPATIENT
Start: 2021-03-19 | End: 2021-04-22 | Stop reason: SDUPTHER

## 2021-03-30 ENCOUNTER — PATIENT MESSAGE (OUTPATIENT)
Dept: CARDIOLOGY | Facility: CLINIC | Age: 71
End: 2021-03-30

## 2021-04-05 ENCOUNTER — LAB VISIT (OUTPATIENT)
Dept: LAB | Facility: HOSPITAL | Age: 71
End: 2021-04-05
Attending: NURSE PRACTITIONER
Payer: MEDICARE

## 2021-04-05 DIAGNOSIS — Z79.899 NEW MEDICATION ADDED: ICD-10-CM

## 2021-04-05 DIAGNOSIS — I10 ESSENTIAL HYPERTENSION: ICD-10-CM

## 2021-04-05 LAB
ANION GAP SERPL CALC-SCNC: 6 MMOL/L (ref 8–16)
BUN SERPL-MCNC: 22 MG/DL (ref 8–23)
CALCIUM SERPL-MCNC: 9.1 MG/DL (ref 8.7–10.5)
CHLORIDE SERPL-SCNC: 109 MMOL/L (ref 95–110)
CO2 SERPL-SCNC: 25 MMOL/L (ref 23–29)
CREAT SERPL-MCNC: 1.4 MG/DL (ref 0.5–1.4)
EST. GFR  (AFRICAN AMERICAN): 58 ML/MIN/1.73 M^2
EST. GFR  (NON AFRICAN AMERICAN): 50.2 ML/MIN/1.73 M^2
GLUCOSE SERPL-MCNC: 95 MG/DL (ref 70–110)
POTASSIUM SERPL-SCNC: 4.5 MMOL/L (ref 3.5–5.1)
SODIUM SERPL-SCNC: 140 MMOL/L (ref 136–145)

## 2021-04-05 PROCEDURE — 80048 BASIC METABOLIC PNL TOTAL CA: CPT | Performed by: NURSE PRACTITIONER

## 2021-04-05 PROCEDURE — 36415 COLL VENOUS BLD VENIPUNCTURE: CPT | Mod: PO | Performed by: NURSE PRACTITIONER

## 2021-04-06 ENCOUNTER — TELEPHONE (OUTPATIENT)
Dept: CARDIOLOGY | Facility: CLINIC | Age: 71
End: 2021-04-06

## 2021-04-20 ENCOUNTER — PATIENT OUTREACH (OUTPATIENT)
Dept: ADMINISTRATIVE | Facility: OTHER | Age: 71
End: 2021-04-20

## 2021-04-20 ENCOUNTER — TELEPHONE (OUTPATIENT)
Dept: UROLOGY | Facility: CLINIC | Age: 71
End: 2021-04-20

## 2021-04-21 ENCOUNTER — PES CALL (OUTPATIENT)
Dept: ADMINISTRATIVE | Facility: CLINIC | Age: 71
End: 2021-04-21

## 2021-04-21 ENCOUNTER — OFFICE VISIT (OUTPATIENT)
Dept: UROLOGY | Facility: CLINIC | Age: 71
End: 2021-04-21
Payer: MEDICARE

## 2021-04-21 DIAGNOSIS — C61 MALIGNANT NEOPLASM OF PROSTATE: Primary | ICD-10-CM

## 2021-04-21 PROCEDURE — 99213 OFFICE O/P EST LOW 20 MIN: CPT | Mod: 95,,, | Performed by: UROLOGY

## 2021-04-21 PROCEDURE — 99213 PR OFFICE/OUTPT VISIT, EST, LEVL III, 20-29 MIN: ICD-10-PCS | Mod: 95,,, | Performed by: UROLOGY

## 2021-04-21 PROCEDURE — 1159F PR MEDICATION LIST DOCUMENTED IN MEDICAL RECORD: ICD-10-PCS | Mod: 95,,, | Performed by: UROLOGY

## 2021-04-21 PROCEDURE — 1159F MED LIST DOCD IN RCRD: CPT | Mod: 95,,, | Performed by: UROLOGY

## 2021-04-22 ENCOUNTER — OFFICE VISIT (OUTPATIENT)
Dept: CARDIOLOGY | Facility: CLINIC | Age: 71
End: 2021-04-22
Payer: MEDICARE

## 2021-04-22 VITALS
BODY MASS INDEX: 31.78 KG/M2 | HEART RATE: 63 BPM | OXYGEN SATURATION: 98 % | HEIGHT: 70 IN | SYSTOLIC BLOOD PRESSURE: 124 MMHG | WEIGHT: 222 LBS | DIASTOLIC BLOOD PRESSURE: 70 MMHG

## 2021-04-22 DIAGNOSIS — R06.02 SOB (SHORTNESS OF BREATH): ICD-10-CM

## 2021-04-22 DIAGNOSIS — E78.2 MIXED HYPERLIPIDEMIA: Chronic | ICD-10-CM

## 2021-04-22 DIAGNOSIS — N18.31 STAGE 3A CHRONIC KIDNEY DISEASE: Chronic | ICD-10-CM

## 2021-04-22 DIAGNOSIS — I47.19 ECTOPIC ATRIAL TACHYCARDIA: ICD-10-CM

## 2021-04-22 DIAGNOSIS — R00.2 PALPITATIONS: ICD-10-CM

## 2021-04-22 DIAGNOSIS — E66.09 CLASS 1 OBESITY DUE TO EXCESS CALORIES WITH SERIOUS COMORBIDITY AND BODY MASS INDEX (BMI) OF 31.0 TO 31.9 IN ADULT: ICD-10-CM

## 2021-04-22 DIAGNOSIS — I49.1 PREMATURE ATRIAL CONTRACTIONS: ICD-10-CM

## 2021-04-22 DIAGNOSIS — I10 ESSENTIAL HYPERTENSION: Primary | ICD-10-CM

## 2021-04-22 DIAGNOSIS — R53.83 FATIGUE, UNSPECIFIED TYPE: ICD-10-CM

## 2021-04-22 PROBLEM — E66.811 CLASS 1 OBESITY DUE TO EXCESS CALORIES WITH SERIOUS COMORBIDITY AND BODY MASS INDEX (BMI) OF 31.0 TO 31.9 IN ADULT: Status: ACTIVE | Noted: 2017-08-09

## 2021-04-22 PROCEDURE — 99999 PR PBB SHADOW E&M-EST. PATIENT-LVL IV: ICD-10-PCS | Mod: PBBFAC,,, | Performed by: NURSE PRACTITIONER

## 2021-04-22 PROCEDURE — 99214 PR OFFICE/OUTPT VISIT, EST, LEVL IV, 30-39 MIN: ICD-10-PCS | Mod: S$GLB,,, | Performed by: NURSE PRACTITIONER

## 2021-04-22 PROCEDURE — 3078F PR MOST RECENT DIASTOLIC BLOOD PRESSURE < 80 MM HG: ICD-10-PCS | Mod: CPTII,S$GLB,, | Performed by: NURSE PRACTITIONER

## 2021-04-22 PROCEDURE — 99499 RISK ADDL DX/OHS AUDIT: ICD-10-PCS | Mod: S$GLB,,, | Performed by: NURSE PRACTITIONER

## 2021-04-22 PROCEDURE — 3008F PR BODY MASS INDEX (BMI) DOCUMENTED: ICD-10-PCS | Mod: CPTII,S$GLB,, | Performed by: NURSE PRACTITIONER

## 2021-04-22 PROCEDURE — 3074F SYST BP LT 130 MM HG: CPT | Mod: CPTII,S$GLB,, | Performed by: NURSE PRACTITIONER

## 2021-04-22 PROCEDURE — 3008F BODY MASS INDEX DOCD: CPT | Mod: CPTII,S$GLB,, | Performed by: NURSE PRACTITIONER

## 2021-04-22 PROCEDURE — 3078F DIAST BP <80 MM HG: CPT | Mod: CPTII,S$GLB,, | Performed by: NURSE PRACTITIONER

## 2021-04-22 PROCEDURE — 3074F PR MOST RECENT SYSTOLIC BLOOD PRESSURE < 130 MM HG: ICD-10-PCS | Mod: CPTII,S$GLB,, | Performed by: NURSE PRACTITIONER

## 2021-04-22 PROCEDURE — 99214 OFFICE O/P EST MOD 30 MIN: CPT | Mod: S$GLB,,, | Performed by: NURSE PRACTITIONER

## 2021-04-22 PROCEDURE — 1159F PR MEDICATION LIST DOCUMENTED IN MEDICAL RECORD: ICD-10-PCS | Mod: S$GLB,,, | Performed by: NURSE PRACTITIONER

## 2021-04-22 PROCEDURE — 99499 UNLISTED E&M SERVICE: CPT | Mod: S$GLB,,, | Performed by: NURSE PRACTITIONER

## 2021-04-22 PROCEDURE — 99999 PR PBB SHADOW E&M-EST. PATIENT-LVL IV: CPT | Mod: PBBFAC,,, | Performed by: NURSE PRACTITIONER

## 2021-04-22 PROCEDURE — 1159F MED LIST DOCD IN RCRD: CPT | Mod: S$GLB,,, | Performed by: NURSE PRACTITIONER

## 2021-04-22 RX ORDER — VALSARTAN 320 MG/1
320 TABLET ORAL DAILY
Qty: 90 TABLET | Refills: 3 | Status: SHIPPED | OUTPATIENT
Start: 2021-04-22 | End: 2022-03-08

## 2021-04-23 ENCOUNTER — HOSPITAL ENCOUNTER (OUTPATIENT)
Dept: CARDIOLOGY | Facility: HOSPITAL | Age: 71
Discharge: HOME | End: 2021-04-23
Attending: NURSE PRACTITIONER
Payer: MEDICARE

## 2021-04-23 DIAGNOSIS — R00.2 PALPITATIONS: ICD-10-CM

## 2021-04-23 DIAGNOSIS — R53.83 FATIGUE, UNSPECIFIED TYPE: ICD-10-CM

## 2021-04-23 DIAGNOSIS — R06.02 SOB (SHORTNESS OF BREATH): ICD-10-CM

## 2021-04-23 PROCEDURE — 93225 XTRNL ECG REC<48 HRS REC: CPT | Mod: PO

## 2021-04-23 PROCEDURE — 93227 XTRNL ECG REC<48 HR R&I: CPT | Mod: ,,, | Performed by: INTERNAL MEDICINE

## 2021-04-23 PROCEDURE — 93227 HOLTER MONITOR - 48 HOUR (CUPID ONLY): ICD-10-PCS | Mod: ,,, | Performed by: INTERNAL MEDICINE

## 2021-04-27 LAB
OHS CV EVENT MONITOR DAY: 2
OHS CV HOLTER LENGTH DECIMAL HOURS: 96
OHS CV HOLTER LENGTH HOURS: 48
OHS CV HOLTER LENGTH MINUTES: 0

## 2021-04-29 ENCOUNTER — TELEPHONE (OUTPATIENT)
Dept: CARDIOLOGY | Facility: CLINIC | Age: 71
End: 2021-04-29

## 2021-04-29 ENCOUNTER — CLINICAL SUPPORT (OUTPATIENT)
Dept: CARDIOLOGY | Facility: CLINIC | Age: 71
End: 2021-04-29
Payer: MEDICARE

## 2021-04-29 VITALS — DIASTOLIC BLOOD PRESSURE: 70 MMHG | SYSTOLIC BLOOD PRESSURE: 120 MMHG

## 2021-04-29 PROCEDURE — 99999 PR PBB SHADOW E&M-EST. PATIENT-LVL I: CPT | Mod: PBBFAC,,, | Performed by: NURSE PRACTITIONER

## 2021-04-29 PROCEDURE — 99999 PR PBB SHADOW E&M-EST. PATIENT-LVL I: ICD-10-PCS | Mod: PBBFAC,,, | Performed by: NURSE PRACTITIONER

## 2021-04-29 RX ORDER — CARVEDILOL 6.25 MG/1
6.25 TABLET ORAL 2 TIMES DAILY WITH MEALS
Qty: 60 TABLET | Refills: 11 | Status: SHIPPED | OUTPATIENT
Start: 2021-04-29 | End: 2023-05-05 | Stop reason: SDUPTHER

## 2021-05-03 ENCOUNTER — OFFICE VISIT (OUTPATIENT)
Dept: PULMONOLOGY | Facility: CLINIC | Age: 71
End: 2021-05-03
Payer: MEDICARE

## 2021-05-03 VITALS
HEIGHT: 70 IN | SYSTOLIC BLOOD PRESSURE: 100 MMHG | DIASTOLIC BLOOD PRESSURE: 74 MMHG | BODY MASS INDEX: 31.34 KG/M2 | RESPIRATION RATE: 16 BRPM | HEART RATE: 69 BPM | OXYGEN SATURATION: 97 % | WEIGHT: 218.94 LBS

## 2021-05-03 DIAGNOSIS — R06.83 SNORING: ICD-10-CM

## 2021-05-03 DIAGNOSIS — E66.09 CLASS 1 OBESITY DUE TO EXCESS CALORIES WITH SERIOUS COMORBIDITY AND BODY MASS INDEX (BMI) OF 32.0 TO 32.9 IN ADULT: Chronic | ICD-10-CM

## 2021-05-03 DIAGNOSIS — G47.30 SLEEP-DISORDERED BREATHING: Primary | ICD-10-CM

## 2021-05-03 DIAGNOSIS — Z86.69 HISTORY OF SLEEP APNEA: ICD-10-CM

## 2021-05-03 DIAGNOSIS — R40.0 DAYTIME SOMNOLENCE: ICD-10-CM

## 2021-05-03 DIAGNOSIS — R00.2 PALPITATION: ICD-10-CM

## 2021-05-03 DIAGNOSIS — R53.83 FATIGUE, UNSPECIFIED TYPE: ICD-10-CM

## 2021-05-03 PROCEDURE — 3008F BODY MASS INDEX DOCD: CPT | Mod: CPTII,S$GLB,, | Performed by: NURSE PRACTITIONER

## 2021-05-03 PROCEDURE — 99999 PR PBB SHADOW E&M-EST. PATIENT-LVL IV: CPT | Mod: PBBFAC,,, | Performed by: NURSE PRACTITIONER

## 2021-05-03 PROCEDURE — 99204 OFFICE O/P NEW MOD 45 MIN: CPT | Mod: S$GLB,,, | Performed by: NURSE PRACTITIONER

## 2021-05-03 PROCEDURE — 99999 PR PBB SHADOW E&M-EST. PATIENT-LVL IV: ICD-10-PCS | Mod: PBBFAC,,, | Performed by: NURSE PRACTITIONER

## 2021-05-03 PROCEDURE — 1159F MED LIST DOCD IN RCRD: CPT | Mod: S$GLB,,, | Performed by: NURSE PRACTITIONER

## 2021-05-03 PROCEDURE — 1159F PR MEDICATION LIST DOCUMENTED IN MEDICAL RECORD: ICD-10-PCS | Mod: S$GLB,,, | Performed by: NURSE PRACTITIONER

## 2021-05-03 PROCEDURE — 3008F PR BODY MASS INDEX (BMI) DOCUMENTED: ICD-10-PCS | Mod: CPTII,S$GLB,, | Performed by: NURSE PRACTITIONER

## 2021-05-03 PROCEDURE — 99204 PR OFFICE/OUTPT VISIT, NEW, LEVL IV, 45-59 MIN: ICD-10-PCS | Mod: S$GLB,,, | Performed by: NURSE PRACTITIONER

## 2021-05-05 DIAGNOSIS — Z20.822 ENCOUNTER FOR LABORATORY TESTING FOR COVID-19 VIRUS: ICD-10-CM

## 2021-05-10 ENCOUNTER — CLINICAL SUPPORT (OUTPATIENT)
Dept: FAMILY MEDICINE | Facility: CLINIC | Age: 71
End: 2021-05-10
Payer: MEDICARE

## 2021-05-10 DIAGNOSIS — Z20.822 ENCOUNTER FOR LABORATORY TESTING FOR COVID-19 VIRUS: ICD-10-CM

## 2021-05-10 LAB — SARS-COV-2 RNA RESP QL NAA+PROBE: NOT DETECTED

## 2021-05-10 PROCEDURE — U0005 INFEC AGEN DETEC AMPLI PROBE: HCPCS | Performed by: INTERNAL MEDICINE

## 2021-05-10 PROCEDURE — U0003 INFECTIOUS AGENT DETECTION BY NUCLEIC ACID (DNA OR RNA); SEVERE ACUTE RESPIRATORY SYNDROME CORONAVIRUS 2 (SARS-COV-2) (CORONAVIRUS DISEASE [COVID-19]), AMPLIFIED PROBE TECHNIQUE, MAKING USE OF HIGH THROUGHPUT TECHNOLOGIES AS DESCRIBED BY CMS-2020-01-R: HCPCS | Performed by: INTERNAL MEDICINE

## 2021-05-13 ENCOUNTER — HOSPITAL ENCOUNTER (OUTPATIENT)
Dept: SLEEP MEDICINE | Facility: HOSPITAL | Age: 71
Discharge: HOME | End: 2021-05-13
Attending: NURSE PRACTITIONER
Payer: MEDICARE

## 2021-05-13 DIAGNOSIS — E66.09 CLASS 1 OBESITY DUE TO EXCESS CALORIES WITH SERIOUS COMORBIDITY AND BODY MASS INDEX (BMI) OF 32.0 TO 32.9 IN ADULT: Chronic | ICD-10-CM

## 2021-05-13 DIAGNOSIS — G47.61 PERIODIC LIMB MOVEMENT DISORDER (PLMD): ICD-10-CM

## 2021-05-13 DIAGNOSIS — Z86.69 HISTORY OF SLEEP APNEA: ICD-10-CM

## 2021-05-13 DIAGNOSIS — F51.04 PSYCHOPHYSIOLOGICAL INSOMNIA: ICD-10-CM

## 2021-05-13 DIAGNOSIS — G47.33 OBSTRUCTIVE SLEEP APNEA: Primary | ICD-10-CM

## 2021-05-13 DIAGNOSIS — R40.0 DAYTIME SOMNOLENCE: ICD-10-CM

## 2021-05-13 DIAGNOSIS — R00.2 PALPITATION: ICD-10-CM

## 2021-05-13 DIAGNOSIS — R06.83 SNORING: ICD-10-CM

## 2021-05-13 DIAGNOSIS — R53.83 FATIGUE, UNSPECIFIED TYPE: ICD-10-CM

## 2021-05-13 DIAGNOSIS — Z72.821 INADEQUATE SLEEP HYGIENE: ICD-10-CM

## 2021-05-13 PROCEDURE — 95810 POLYSOM 6/> YRS 4/> PARAM: CPT

## 2021-05-13 PROCEDURE — 95810 POLYSOM 6/> YRS 4/> PARAM: CPT | Mod: 26,,, | Performed by: PSYCHOLOGIST

## 2021-05-13 PROCEDURE — 95810 PR POLYSOMNOGRAPHY, 4 OR MORE: ICD-10-PCS | Mod: 26,,, | Performed by: PSYCHOLOGIST

## 2021-05-20 ENCOUNTER — OFFICE VISIT (OUTPATIENT)
Dept: CARDIOLOGY | Facility: CLINIC | Age: 71
End: 2021-05-20
Payer: MEDICARE

## 2021-05-20 VITALS
HEART RATE: 60 BPM | BODY MASS INDEX: 32.43 KG/M2 | SYSTOLIC BLOOD PRESSURE: 128 MMHG | WEIGHT: 226 LBS | DIASTOLIC BLOOD PRESSURE: 60 MMHG

## 2021-05-20 DIAGNOSIS — C61 PROSTATE CANCER: Chronic | ICD-10-CM

## 2021-05-20 DIAGNOSIS — G47.33 OBSTRUCTIVE SLEEP APNEA: Primary | ICD-10-CM

## 2021-05-20 DIAGNOSIS — G47.33 OBSTRUCTIVE SLEEP APNEA: ICD-10-CM

## 2021-05-20 DIAGNOSIS — E78.2 MIXED HYPERLIPIDEMIA: Chronic | ICD-10-CM

## 2021-05-20 DIAGNOSIS — E66.09 CLASS 1 OBESITY DUE TO EXCESS CALORIES WITH SERIOUS COMORBIDITY AND BODY MASS INDEX (BMI) OF 31.0 TO 31.9 IN ADULT: ICD-10-CM

## 2021-05-20 DIAGNOSIS — I10 ESSENTIAL HYPERTENSION: Primary | ICD-10-CM

## 2021-05-20 DIAGNOSIS — E53.8 B12 DEFICIENCY: Chronic | ICD-10-CM

## 2021-05-20 DIAGNOSIS — E88.810 METABOLIC SYNDROME: ICD-10-CM

## 2021-05-20 DIAGNOSIS — E55.9 VITAMIN D DEFICIENCY: Chronic | ICD-10-CM

## 2021-05-20 DIAGNOSIS — N18.31 STAGE 3A CHRONIC KIDNEY DISEASE: Chronic | ICD-10-CM

## 2021-05-20 PROCEDURE — 1101F PT FALLS ASSESS-DOCD LE1/YR: CPT | Mod: CPTII,S$GLB,, | Performed by: NURSE PRACTITIONER

## 2021-05-20 PROCEDURE — 1126F AMNT PAIN NOTED NONE PRSNT: CPT | Mod: S$GLB,,, | Performed by: NURSE PRACTITIONER

## 2021-05-20 PROCEDURE — 99999 PR PBB SHADOW E&M-EST. PATIENT-LVL III: CPT | Mod: PBBFAC,,, | Performed by: NURSE PRACTITIONER

## 2021-05-20 PROCEDURE — 99499 RISK ADDL DX/OHS AUDIT: ICD-10-PCS | Mod: S$GLB,,, | Performed by: NURSE PRACTITIONER

## 2021-05-20 PROCEDURE — 1126F PR PAIN SEVERITY QUANTIFIED, NO PAIN PRESENT: ICD-10-PCS | Mod: S$GLB,,, | Performed by: NURSE PRACTITIONER

## 2021-05-20 PROCEDURE — 1159F MED LIST DOCD IN RCRD: CPT | Mod: S$GLB,,, | Performed by: NURSE PRACTITIONER

## 2021-05-20 PROCEDURE — 3078F PR MOST RECENT DIASTOLIC BLOOD PRESSURE < 80 MM HG: ICD-10-PCS | Mod: CPTII,S$GLB,, | Performed by: NURSE PRACTITIONER

## 2021-05-20 PROCEDURE — 99214 PR OFFICE/OUTPT VISIT, EST, LEVL IV, 30-39 MIN: ICD-10-PCS | Mod: S$GLB,,, | Performed by: NURSE PRACTITIONER

## 2021-05-20 PROCEDURE — 3008F BODY MASS INDEX DOCD: CPT | Mod: CPTII,S$GLB,, | Performed by: NURSE PRACTITIONER

## 2021-05-20 PROCEDURE — 3078F DIAST BP <80 MM HG: CPT | Mod: CPTII,S$GLB,, | Performed by: NURSE PRACTITIONER

## 2021-05-20 PROCEDURE — 3288F PR FALLS RISK ASSESSMENT DOCUMENTED: ICD-10-PCS | Mod: CPTII,S$GLB,, | Performed by: NURSE PRACTITIONER

## 2021-05-20 PROCEDURE — 3288F FALL RISK ASSESSMENT DOCD: CPT | Mod: CPTII,S$GLB,, | Performed by: NURSE PRACTITIONER

## 2021-05-20 PROCEDURE — 99499 UNLISTED E&M SERVICE: CPT | Mod: S$GLB,,, | Performed by: NURSE PRACTITIONER

## 2021-05-20 PROCEDURE — 1159F PR MEDICATION LIST DOCUMENTED IN MEDICAL RECORD: ICD-10-PCS | Mod: S$GLB,,, | Performed by: NURSE PRACTITIONER

## 2021-05-20 PROCEDURE — 3008F PR BODY MASS INDEX (BMI) DOCUMENTED: ICD-10-PCS | Mod: CPTII,S$GLB,, | Performed by: NURSE PRACTITIONER

## 2021-05-20 PROCEDURE — 1101F PR PT FALLS ASSESS DOC 0-1 FALLS W/OUT INJ PAST YR: ICD-10-PCS | Mod: CPTII,S$GLB,, | Performed by: NURSE PRACTITIONER

## 2021-05-20 PROCEDURE — 99214 OFFICE O/P EST MOD 30 MIN: CPT | Mod: S$GLB,,, | Performed by: NURSE PRACTITIONER

## 2021-05-20 PROCEDURE — 3074F SYST BP LT 130 MM HG: CPT | Mod: CPTII,S$GLB,, | Performed by: NURSE PRACTITIONER

## 2021-05-20 PROCEDURE — 3074F PR MOST RECENT SYSTOLIC BLOOD PRESSURE < 130 MM HG: ICD-10-PCS | Mod: CPTII,S$GLB,, | Performed by: NURSE PRACTITIONER

## 2021-05-20 PROCEDURE — 99999 PR PBB SHADOW E&M-EST. PATIENT-LVL III: ICD-10-PCS | Mod: PBBFAC,,, | Performed by: NURSE PRACTITIONER

## 2021-05-31 ENCOUNTER — TELEPHONE (OUTPATIENT)
Dept: ADMINISTRATIVE | Facility: HOSPITAL | Age: 71
End: 2021-05-31

## 2021-06-03 ENCOUNTER — LAB VISIT (OUTPATIENT)
Dept: LAB | Facility: HOSPITAL | Age: 71
End: 2021-06-03
Attending: UROLOGY
Payer: MEDICARE

## 2021-06-03 DIAGNOSIS — C61 MALIGNANT NEOPLASM OF PROSTATE: ICD-10-CM

## 2021-06-03 LAB — COMPLEXED PSA SERPL-MCNC: 9.3 NG/ML (ref 0–4)

## 2021-06-03 PROCEDURE — 84153 ASSAY OF PSA TOTAL: CPT | Performed by: UROLOGY

## 2021-06-03 PROCEDURE — 36415 COLL VENOUS BLD VENIPUNCTURE: CPT | Mod: PO | Performed by: UROLOGY

## 2021-06-07 ENCOUNTER — TELEPHONE (OUTPATIENT)
Dept: FAMILY MEDICINE | Facility: CLINIC | Age: 71
End: 2021-06-07

## 2021-06-07 DIAGNOSIS — I12.9 BENIGN HYPERTENSIVE KIDNEY DISEASE WITH CHRONIC KIDNEY DISEASE STAGE I THROUGH STAGE IV, OR UNSPECIFIED: ICD-10-CM

## 2021-06-07 DIAGNOSIS — N18.31 STAGE 3A CHRONIC KIDNEY DISEASE: Primary | ICD-10-CM

## 2021-07-27 ENCOUNTER — LAB VISIT (OUTPATIENT)
Dept: LAB | Facility: HOSPITAL | Age: 71
End: 2021-07-27
Attending: FAMILY MEDICINE
Payer: MEDICARE

## 2021-07-27 DIAGNOSIS — E55.9 AVITAMINOSIS D: ICD-10-CM

## 2021-07-27 DIAGNOSIS — D51.9 VITAMIN B12 DEFICIENCY ANEMIA: ICD-10-CM

## 2021-07-27 DIAGNOSIS — E78.5 HYPERLIPEMIA: ICD-10-CM

## 2021-07-27 DIAGNOSIS — R53.83 FATIGUE: Primary | ICD-10-CM

## 2021-07-27 DIAGNOSIS — G47.00 INSOMNIA: ICD-10-CM

## 2021-07-27 DIAGNOSIS — Z79.899 ENCOUNTER FOR LONG-TERM (CURRENT) USE OF OTHER MEDICATIONS: ICD-10-CM

## 2021-07-27 DIAGNOSIS — I10 ESSENTIAL HYPERTENSION, MALIGNANT: ICD-10-CM

## 2021-07-27 DIAGNOSIS — E29.1 3-OXO-5 ALPHA-STEROID DELTA 4-DEHYDROGENASE DEFICIENCY: ICD-10-CM

## 2021-07-27 LAB — 25(OH)D3+25(OH)D2 SERPL-MCNC: 31 NG/ML (ref 30–96)

## 2021-07-27 PROCEDURE — 82306 VITAMIN D 25 HYDROXY: CPT | Performed by: FAMILY MEDICINE

## 2021-07-27 PROCEDURE — 82728 ASSAY OF FERRITIN: CPT | Performed by: FAMILY MEDICINE

## 2021-07-27 PROCEDURE — 84443 ASSAY THYROID STIM HORMONE: CPT | Performed by: FAMILY MEDICINE

## 2021-07-27 PROCEDURE — 36415 COLL VENOUS BLD VENIPUNCTURE: CPT | Mod: PO | Performed by: FAMILY MEDICINE

## 2021-07-27 PROCEDURE — 84403 ASSAY OF TOTAL TESTOSTERONE: CPT | Performed by: FAMILY MEDICINE

## 2021-07-27 PROCEDURE — 82746 ASSAY OF FOLIC ACID SERUM: CPT | Performed by: FAMILY MEDICINE

## 2021-07-27 PROCEDURE — 84481 FREE ASSAY (FT-3): CPT | Performed by: FAMILY MEDICINE

## 2021-07-27 PROCEDURE — 82533 TOTAL CORTISOL: CPT | Performed by: FAMILY MEDICINE

## 2021-07-27 PROCEDURE — 86376 MICROSOMAL ANTIBODY EACH: CPT | Performed by: FAMILY MEDICINE

## 2021-07-27 PROCEDURE — 84439 ASSAY OF FREE THYROXINE: CPT | Performed by: FAMILY MEDICINE

## 2021-07-27 PROCEDURE — 86800 THYROGLOBULIN ANTIBODY: CPT | Performed by: FAMILY MEDICINE

## 2021-07-27 PROCEDURE — 83540 ASSAY OF IRON: CPT | Performed by: FAMILY MEDICINE

## 2021-07-27 PROCEDURE — 84402 ASSAY OF FREE TESTOSTERONE: CPT | Performed by: FAMILY MEDICINE

## 2021-07-27 PROCEDURE — 82607 VITAMIN B-12: CPT | Performed by: FAMILY MEDICINE

## 2021-07-28 LAB
CORTIS SERPL-MCNC: 9.7 UG/DL
FERRITIN SERPL-MCNC: 283 NG/ML (ref 20–300)
FOLATE SERPL-MCNC: 7.6 NG/ML (ref 4–24)
IRON SERPL-MCNC: 62 UG/DL (ref 45–160)
SATURATED IRON: 21 % (ref 20–50)
T3FREE SERPL-MCNC: 2.6 PG/ML (ref 2.3–4.2)
T4 FREE SERPL-MCNC: 1.14 NG/DL (ref 0.71–1.51)
TESTOST SERPL-MCNC: 518 NG/DL (ref 304–1227)
THYROGLOB AB SERPL IA-ACNC: <4 IU/ML (ref 0–3.9)
THYROPEROXIDASE IGG SERPL-ACNC: <6 IU/ML
TOTAL IRON BINDING CAPACITY: 293 UG/DL (ref 250–450)
TRANSFERRIN SERPL-MCNC: 198 MG/DL (ref 200–375)
TSH SERPL DL<=0.005 MIU/L-ACNC: 1.06 UIU/ML (ref 0.4–4)
VIT B12 SERPL-MCNC: 466 PG/ML (ref 210–950)

## 2021-08-02 LAB — TESTOST FREE SERPL-MCNC: 7.9 PG/ML

## 2021-08-23 PROBLEM — C61 CARCINOMA OF PROSTATE: Status: ACTIVE | Noted: 2021-07-14

## 2021-08-23 PROBLEM — E11.22 TYPE 2 DIABETES MELLITUS WITH DIABETIC CHRONIC KIDNEY DISEASE: Status: ACTIVE | Noted: 2021-07-14

## 2021-10-18 ENCOUNTER — LAB VISIT (OUTPATIENT)
Dept: LAB | Facility: HOSPITAL | Age: 71
End: 2021-10-18
Attending: UROLOGY
Payer: MEDICARE

## 2021-10-18 DIAGNOSIS — C61 MALIGNANT NEOPLASM OF PROSTATE: Primary | ICD-10-CM

## 2021-10-18 LAB — COMPLEXED PSA SERPL-MCNC: 12.5 NG/ML (ref 0–4)

## 2021-10-18 PROCEDURE — 84153 ASSAY OF PSA TOTAL: CPT | Mod: HCNC | Performed by: UROLOGY

## 2021-10-18 PROCEDURE — 36415 COLL VENOUS BLD VENIPUNCTURE: CPT | Mod: HCNC,PO | Performed by: UROLOGY

## 2021-12-07 ENCOUNTER — PES CALL (OUTPATIENT)
Dept: ADMINISTRATIVE | Facility: CLINIC | Age: 71
End: 2021-12-07
Payer: MEDICARE

## 2022-01-25 ENCOUNTER — LAB VISIT (OUTPATIENT)
Dept: LAB | Facility: HOSPITAL | Age: 72
End: 2022-01-25
Attending: UROLOGY
Payer: MEDICARE

## 2022-01-25 DIAGNOSIS — C61 PROSTATE CANCER: ICD-10-CM

## 2022-01-25 LAB — COMPLEXED PSA SERPL-MCNC: 10.9 NG/ML (ref 0–4)

## 2022-01-25 PROCEDURE — 84153 ASSAY OF PSA TOTAL: CPT | Mod: HCNC | Performed by: UROLOGY

## 2022-01-25 PROCEDURE — 36415 COLL VENOUS BLD VENIPUNCTURE: CPT | Mod: HCNC,PO | Performed by: UROLOGY

## 2022-03-08 ENCOUNTER — LAB VISIT (OUTPATIENT)
Dept: LAB | Facility: HOSPITAL | Age: 72
End: 2022-03-08
Attending: FAMILY MEDICINE
Payer: MEDICARE

## 2022-03-08 ENCOUNTER — OFFICE VISIT (OUTPATIENT)
Dept: FAMILY MEDICINE | Facility: CLINIC | Age: 72
End: 2022-03-08
Payer: MEDICARE

## 2022-03-08 VITALS
DIASTOLIC BLOOD PRESSURE: 80 MMHG | BODY MASS INDEX: 32.26 KG/M2 | WEIGHT: 225.31 LBS | TEMPERATURE: 97 F | HEART RATE: 59 BPM | HEIGHT: 70 IN | SYSTOLIC BLOOD PRESSURE: 128 MMHG | OXYGEN SATURATION: 97 %

## 2022-03-08 DIAGNOSIS — E66.2 CLASS 1 OBESITY WITH ALVEOLAR HYPOVENTILATION, SERIOUS COMORBIDITY, AND BODY MASS INDEX (BMI) OF 32.0 TO 32.9 IN ADULT: ICD-10-CM

## 2022-03-08 DIAGNOSIS — E53.8 B12 DEFICIENCY: Chronic | ICD-10-CM

## 2022-03-08 DIAGNOSIS — R53.83 FATIGUE, UNSPECIFIED TYPE: Chronic | ICD-10-CM

## 2022-03-08 DIAGNOSIS — Z79.899 ENCOUNTER FOR LONG-TERM (CURRENT) USE OF MEDICATIONS: ICD-10-CM

## 2022-03-08 DIAGNOSIS — G47.33 OSA (OBSTRUCTIVE SLEEP APNEA): ICD-10-CM

## 2022-03-08 DIAGNOSIS — C61 PROSTATE CANCER: ICD-10-CM

## 2022-03-08 DIAGNOSIS — R53.83 FATIGUE, UNSPECIFIED TYPE: Primary | Chronic | ICD-10-CM

## 2022-03-08 DIAGNOSIS — Z90.79 S/P TURP: ICD-10-CM

## 2022-03-08 DIAGNOSIS — G95.9 SPINAL CORD LESION: ICD-10-CM

## 2022-03-08 DIAGNOSIS — I47.19 ECTOPIC ATRIAL TACHYCARDIA: ICD-10-CM

## 2022-03-08 DIAGNOSIS — C61 MALIGNANT NEOPLASM OF PROSTATE: ICD-10-CM

## 2022-03-08 PROBLEM — N18.30 CKD (CHRONIC KIDNEY DISEASE) STAGE 3, GFR 30-59 ML/MIN: Chronic | Status: RESOLVED | Noted: 2017-08-09 | Resolved: 2022-03-08

## 2022-03-08 PROBLEM — E11.22 TYPE 2 DIABETES MELLITUS WITH DIABETIC CHRONIC KIDNEY DISEASE: Status: RESOLVED | Noted: 2021-07-14 | Resolved: 2022-03-08

## 2022-03-08 LAB
ALBUMIN SERPL BCP-MCNC: 3.7 G/DL (ref 3.5–5.2)
ALP SERPL-CCNC: 59 U/L (ref 55–135)
ALT SERPL W/O P-5'-P-CCNC: 10 U/L (ref 10–44)
ANION GAP SERPL CALC-SCNC: 10 MMOL/L (ref 8–16)
AST SERPL-CCNC: 18 U/L (ref 10–40)
BILIRUB SERPL-MCNC: 0.5 MG/DL (ref 0.1–1)
BUN SERPL-MCNC: 25 MG/DL (ref 8–23)
CALCIUM SERPL-MCNC: 9.4 MG/DL (ref 8.7–10.5)
CHLORIDE SERPL-SCNC: 106 MMOL/L (ref 95–110)
CO2 SERPL-SCNC: 23 MMOL/L (ref 23–29)
COMPLEXED PSA SERPL-MCNC: 9.3 NG/ML (ref 0–4)
CREAT SERPL-MCNC: 1.4 MG/DL (ref 0.5–1.4)
EST. GFR  (AFRICAN AMERICAN): 57.6 ML/MIN/1.73 M^2
EST. GFR  (NON AFRICAN AMERICAN): 49.8 ML/MIN/1.73 M^2
GLUCOSE SERPL-MCNC: 99 MG/DL (ref 70–110)
LDH SERPL L TO P-CCNC: 154 U/L (ref 110–260)
POTASSIUM SERPL-SCNC: 4.8 MMOL/L (ref 3.5–5.1)
PROT SERPL-MCNC: 7.1 G/DL (ref 6–8.4)
SODIUM SERPL-SCNC: 139 MMOL/L (ref 136–145)
TSH SERPL DL<=0.005 MIU/L-ACNC: 2.26 UIU/ML (ref 0.4–4)
URATE SERPL-MCNC: 6 MG/DL (ref 3.4–7)
VIT B12 SERPL-MCNC: 341 PG/ML (ref 210–950)

## 2022-03-08 PROCEDURE — 3008F BODY MASS INDEX DOCD: CPT | Mod: HCNC,CPTII,S$GLB, | Performed by: FAMILY MEDICINE

## 2022-03-08 PROCEDURE — 3079F PR MOST RECENT DIASTOLIC BLOOD PRESSURE 80-89 MM HG: ICD-10-PCS | Mod: HCNC,CPTII,S$GLB, | Performed by: FAMILY MEDICINE

## 2022-03-08 PROCEDURE — 1125F PR PAIN SEVERITY QUANTIFIED, PAIN PRESENT: ICD-10-PCS | Mod: HCNC,CPTII,S$GLB, | Performed by: FAMILY MEDICINE

## 2022-03-08 PROCEDURE — 3288F PR FALLS RISK ASSESSMENT DOCUMENTED: ICD-10-PCS | Mod: HCNC,CPTII,S$GLB, | Performed by: FAMILY MEDICINE

## 2022-03-08 PROCEDURE — 84550 ASSAY OF BLOOD/URIC ACID: CPT | Mod: HCNC | Performed by: FAMILY MEDICINE

## 2022-03-08 PROCEDURE — 3044F PR MOST RECENT HEMOGLOBIN A1C LEVEL <7.0%: ICD-10-PCS | Mod: HCNC,CPTII,S$GLB, | Performed by: FAMILY MEDICINE

## 2022-03-08 PROCEDURE — 3288F FALL RISK ASSESSMENT DOCD: CPT | Mod: HCNC,CPTII,S$GLB, | Performed by: FAMILY MEDICINE

## 2022-03-08 PROCEDURE — 3044F HG A1C LEVEL LT 7.0%: CPT | Mod: HCNC,CPTII,S$GLB, | Performed by: FAMILY MEDICINE

## 2022-03-08 PROCEDURE — 3074F PR MOST RECENT SYSTOLIC BLOOD PRESSURE < 130 MM HG: ICD-10-PCS | Mod: HCNC,CPTII,S$GLB, | Performed by: FAMILY MEDICINE

## 2022-03-08 PROCEDURE — 1160F PR REVIEW ALL MEDS BY PRESCRIBER/CLIN PHARMACIST DOCUMENTED: ICD-10-PCS | Mod: HCNC,CPTII,S$GLB, | Performed by: FAMILY MEDICINE

## 2022-03-08 PROCEDURE — 36415 COLL VENOUS BLD VENIPUNCTURE: CPT | Mod: HCNC,PO | Performed by: UROLOGY

## 2022-03-08 PROCEDURE — 84443 ASSAY THYROID STIM HORMONE: CPT | Mod: HCNC | Performed by: FAMILY MEDICINE

## 2022-03-08 PROCEDURE — 99999 PR PBB SHADOW E&M-EST. PATIENT-LVL IV: ICD-10-PCS | Mod: PBBFAC,HCNC,, | Performed by: FAMILY MEDICINE

## 2022-03-08 PROCEDURE — 82607 VITAMIN B-12: CPT | Mod: HCNC | Performed by: FAMILY MEDICINE

## 2022-03-08 PROCEDURE — 3008F PR BODY MASS INDEX (BMI) DOCUMENTED: ICD-10-PCS | Mod: HCNC,CPTII,S$GLB, | Performed by: FAMILY MEDICINE

## 2022-03-08 PROCEDURE — 3079F DIAST BP 80-89 MM HG: CPT | Mod: HCNC,CPTII,S$GLB, | Performed by: FAMILY MEDICINE

## 2022-03-08 PROCEDURE — 1125F AMNT PAIN NOTED PAIN PRSNT: CPT | Mod: HCNC,CPTII,S$GLB, | Performed by: FAMILY MEDICINE

## 2022-03-08 PROCEDURE — 1101F PR PT FALLS ASSESS DOC 0-1 FALLS W/OUT INJ PAST YR: ICD-10-PCS | Mod: HCNC,CPTII,S$GLB, | Performed by: FAMILY MEDICINE

## 2022-03-08 PROCEDURE — 99499 RISK ADDL DX/OHS AUDIT: ICD-10-PCS | Mod: HCNC,S$GLB,, | Performed by: FAMILY MEDICINE

## 2022-03-08 PROCEDURE — 84153 ASSAY OF PSA TOTAL: CPT | Mod: HCNC | Performed by: UROLOGY

## 2022-03-08 PROCEDURE — 99499 UNLISTED E&M SERVICE: CPT | Mod: HCNC,S$GLB,, | Performed by: FAMILY MEDICINE

## 2022-03-08 PROCEDURE — 1159F MED LIST DOCD IN RCRD: CPT | Mod: HCNC,CPTII,S$GLB, | Performed by: FAMILY MEDICINE

## 2022-03-08 PROCEDURE — 85025 COMPLETE CBC W/AUTO DIFF WBC: CPT | Mod: HCNC | Performed by: FAMILY MEDICINE

## 2022-03-08 PROCEDURE — 99214 PR OFFICE/OUTPT VISIT, EST, LEVL IV, 30-39 MIN: ICD-10-PCS | Mod: HCNC,S$GLB,, | Performed by: FAMILY MEDICINE

## 2022-03-08 PROCEDURE — 99214 OFFICE O/P EST MOD 30 MIN: CPT | Mod: HCNC,S$GLB,, | Performed by: FAMILY MEDICINE

## 2022-03-08 PROCEDURE — 1159F PR MEDICATION LIST DOCUMENTED IN MEDICAL RECORD: ICD-10-PCS | Mod: HCNC,CPTII,S$GLB, | Performed by: FAMILY MEDICINE

## 2022-03-08 PROCEDURE — 85651 RBC SED RATE NONAUTOMATED: CPT | Mod: HCNC,PO | Performed by: FAMILY MEDICINE

## 2022-03-08 PROCEDURE — 83615 LACTATE (LD) (LDH) ENZYME: CPT | Mod: HCNC | Performed by: FAMILY MEDICINE

## 2022-03-08 PROCEDURE — 1160F RVW MEDS BY RX/DR IN RCRD: CPT | Mod: HCNC,CPTII,S$GLB, | Performed by: FAMILY MEDICINE

## 2022-03-08 PROCEDURE — 1101F PT FALLS ASSESS-DOCD LE1/YR: CPT | Mod: HCNC,CPTII,S$GLB, | Performed by: FAMILY MEDICINE

## 2022-03-08 PROCEDURE — 3074F SYST BP LT 130 MM HG: CPT | Mod: HCNC,CPTII,S$GLB, | Performed by: FAMILY MEDICINE

## 2022-03-08 PROCEDURE — 99999 PR PBB SHADOW E&M-EST. PATIENT-LVL IV: CPT | Mod: PBBFAC,HCNC,, | Performed by: FAMILY MEDICINE

## 2022-03-08 PROCEDURE — 80053 COMPREHEN METABOLIC PANEL: CPT | Mod: HCNC | Performed by: FAMILY MEDICINE

## 2022-03-08 RX ORDER — TAMSULOSIN HYDROCHLORIDE 0.4 MG/1
0.4 CAPSULE ORAL NIGHTLY
Qty: 90 CAPSULE | Refills: 4 | Status: SHIPPED | OUTPATIENT
Start: 2022-03-08 | End: 2023-07-28

## 2022-03-08 RX ORDER — LANOLIN ALCOHOL/MO/W.PET/CERES
1000 CREAM (GRAM) TOPICAL DAILY
Qty: 90 TABLET | Refills: 3 | Status: SHIPPED | OUTPATIENT
Start: 2022-03-08 | End: 2023-03-08

## 2022-03-08 NOTE — PATIENT INSTRUCTIONS
Follow up in about 6 months (around 9/8/2022), or if symptoms worsen or fail to improve, for Annual Wellness Exam.     Dear patient,   As a result of recent federal legislation (The Federal Cures Act), you may receive lab or pathology results from your visit in your [facility] account before your physician is able to contact you. Your physician or their representative will relay the results to you with their recommendations at their soonest availability.     If no improvement in symptoms or symptoms worsen, please be advised to call MD, follow-up at clinic and/or go to ER if becomes severe.    Trevor Jarvis M.D.        We Offer TELEHEALTH & Same Day Appointments!   Book your Telehealth appointment with me through my nurse or   Clinic appointments on [employer]!    [address] [place] [address]    Office: [phone]   FAX: [phone]    Check out my Facebook Page and Follow Me at: [email or web address]    Check out my website at [email or web address] by clicking on: [email or web address]    To Schedule appointments online, go to [employer]: [email or web address]

## 2022-03-08 NOTE — ASSESSMENT & PLAN NOTE
I recommended the patient start CPAP.  Patient defers at this time.  If patient changes his mind or after no improvement I recommend that he follow-up with sleep disorders clinic.

## 2022-03-08 NOTE — ASSESSMENT & PLAN NOTE
Check labs.  See MARIN recommendations.  Follow-up with Urology concerning prostate cancer.    I recommended lifestyle modification with diet changes and increase in exercise.

## 2022-03-08 NOTE — PROGRESS NOTES
PLAN:      Problem List Items Addressed This Visit     Class 1 obesity with alveolar hypoventilation, serious comorbidity, and body mass index (BMI) of 32.0 to 32.9 in adult (Chronic)     Discussed lifestyle modification with diet and exercise.  Monitoring BMI           Malignant neoplasm of prostate (Chronic)     Follow-up with Urology as soon as possible.  Starting Flomax to assist with his urinary hesitancy.  Monitor PSA.           Relevant Medications    tamsulosin (FLOMAX) 0.4 mg Cap    Spinal cord lesion (Chronic)     Chronic.  Unchanged appearance on recent MRI.  Patient following with specialist concerning this condition.           Encounter for long-term (current) use of medications (Chronic)     Complete history and physical was completed today.  Complete and thorough medication reconciliation was performed.  Discussed risks and benefits of medications.  Advised patient on orders and health maintenance.  We discussed old records and old labs if available.  Will request any records not available through epic.  Continue current medications listed on your summary sheet.             Fatigue - Primary (Chronic)     Check labs.  See MARIN recommendations.  Follow-up with Urology concerning prostate cancer.    I recommended lifestyle modification with diet changes and increase in exercise.           Relevant Orders    TSH    CBC auto differential    Lactate dehydrogenase (LDH)    Uric acid    Sedimentation rate, manual    Comprehensive metabolic panel    B12 deficiency (Chronic)     Increase B12 supplement 1000 micrograms daily.           Relevant Medications    cyanocobalamin (VITAMIN B-12) 1000 MCG tablet    Other Relevant Orders    Vitamin B12    Ectopic atrial tachycardia (Chronic)     Follow-up with Cardiology.  Restart beta-blocker if palpitations occur.  ER precautions.           S/P TURP (Chronic)    Relevant Medications    tamsulosin (FLOMAX) 0.4 mg Cap    MARIN (obstructive sleep apnea)     I recommended  the patient start CPAP.  Patient defers at this time.  If patient changes his mind or after no improvement I recommend that he follow-up with sleep disorders clinic.               Future Appointments     Date Provider Specialty Appt Notes    9/8/2022 Trevor Jarvis MD Family Medicine Annual         Medication Management for assessment above:   Medication List with Changes/Refills   New Medications    CYANOCOBALAMIN (VITAMIN B-12) 1000 MCG TABLET    Take 1 tablet (1,000 mcg total) by mouth once daily.    TAMSULOSIN (FLOMAX) 0.4 MG CAP    Take 1 capsule (0.4 mg total) by mouth every evening.   Current Medications    ACETAMINOPHEN (TYLENOL) 500 MG TABLET    Take 500 mg by mouth every 6 (six) hours as needed for Pain.    CARVEDILOL (COREG) 6.25 MG TABLET    Take 1 tablet (6.25 mg total) by mouth 2 (two) times daily with meals.    LATANOPROST 0.005 % OPHTHALMIC SOLUTION    Place 1 drop into both eyes every evening.   Discontinued Medications    BERB BRADSHAW/HERBAL COMPLEX NO.18 (BERBERINE-HERBAL COMB NO.18 ORAL)    Take 500 mg by mouth 2 (two) times a day.    HYDROCODONE-ACETAMINOPHEN (NORCO) 5-325 MG PER TABLET    Take 1 tablet by mouth every 6 (six) hours as needed for Pain.    SULFAMETHOXAZOLE-TRIMETHOPRIM 800-160MG (BACTRIM DS) 800-160 MG TAB    Take 1 tablet by mouth 2 (two) times daily.    TRAZODONE (DESYREL) 50 MG TABLET    Take 50 mg by mouth nightly as needed.    VALSARTAN (DIOVAN) 320 MG TABLET    Take 1 tablet (320 mg total) by mouth once daily.       Trevor Jarvis M.D.  ==========================================================================  Subjective:   Patient ID: Yasir Jeffery Sr. is a 71 y.o. male.  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   Chief Complaint: Fatigue (On going issue )      Problem List Items Addressed This Visit     Class 1 obesity with alveolar hypoventilation, serious comorbidity, and body mass index (BMI) of 32.0 to 32.9 in adult (Chronic)    Overview     BMI Readings from Last 10 Encounters:   03/08/22 32.33 kg/m²   02/18/22 30.84 kg/m²   05/20/21 32.43 kg/m²   05/03/21 31.41 kg/m²   04/22/21 31.85 kg/m²   03/10/21 32.28 kg/m²   03/08/21 31.57 kg/m²   03/03/21 32.37 kg/m²   03/01/21 32.14 kg/m²   02/24/21 32.23 kg/m²                Current Assessment & Plan     Discussed lifestyle modification with diet and exercise.  Monitoring BMI           Malignant neoplasm of prostate (Chronic)    Overview     Chronic.  Stable.  Managed by Urology.  PSA has been monitored.  No symptoms.  Patient with recent TURP procedure by Urology.  Patient states no improvement in his urination.     Latest Reference Range & Units 06/27/19 11:27 12/18/19 09:55 11/18/20 09:56 06/03/21 13:23 10/18/21 11:31 01/25/22 09:20   PSA Diagnostic 0.00 - 4.00 ng/mL 6.5 (H) [1] 7.6 (H) [2] 6.7 (H) [3] 9.3 (H) [4] 12.5 (H) [5] 10.9 (H) [6]              Current Assessment & Plan     Follow-up with Urology as soon as possible.  Starting Flomax to assist with his urinary hesitancy.  Monitor PSA.           Spinal cord lesion (Chronic)    Overview     Impression       1. Redemonstration of a 5 x 4 x 9 mm T2 hyperintense, homogeneously enhancing intramedullary thoracic cord lesion, essentially unchanged as compared to the prior study dated 08/30/2019.  This lesion is indeterminate with differential considerations continuing to include a primary intramedullary neoplasm, metastasis, or single focus of inflammation/demyelination.  No new focal cord signal or abnormal intraspinal enhancement elsewhere.  2.  Multilevel degenerative changes of the cervical spine, unchanged as compared to 08/30/2019.      Electronically signed by: Ricardo Nixon  Date: 12/16/2019  Time: 16:02                Current Assessment & Plan     Chronic.  Unchanged appearance on recent MRI.  Patient following with specialist concerning this condition.           Encounter for long-term (current) use of medications (Chronic)    Overview     CHRONIC. Stable. Compliant with medications for managed conditions. See medication list. No SE reported.   Routine lab analysis is being monitored. Refills were addressed.  March 2022:  Reviewed labs.  Lab Results   Component Value Date    WBC 6.10 02/18/2022    HGB 14.7 02/18/2022    HCT 42.0 02/18/2022    MCV 86 02/18/2022     02/18/2022         Chemistry        Component Value Date/Time     02/18/2022 1025    K 4.3 02/18/2022 1025     02/18/2022 1025    CO2 24 02/18/2022 1025    BUN 24 (H) 02/18/2022 1025    CREATININE 1.20 02/18/2022 1025     02/18/2022 1025        Component Value Date/Time    CALCIUM 8.7 02/18/2022 1025    ALKPHOS 67 02/24/2021 1115    AST 19 02/24/2021 1115    ALT 12 02/24/2021 1115    BILITOT 0.4 02/24/2021 1115    ESTGFRAFRICA >60 02/18/2022 1025    EGFRNONAA >60 02/18/2022 1025          Lab Results   Component Value Date    TSH 1.057 07/27/2021    FREET4 1.14 07/27/2021    T3FREE 2.6 07/27/2021                Current Assessment & Plan     Complete history and physical was completed today.  Complete and thorough medication reconciliation was performed.  Discussed risks and benefits of medications.  Advised patient on orders and health maintenance.  We discussed old records and old labs if available.  Will request any records not available through epic.  Continue current medications listed on your summary sheet.             Fatigue - Primary (Chronic)    Overview     Chronic.  Worsening.  No improvement.  Patient reports that he is deficient in B12 and vitamin-D.   Checking levels.  Patient takes supplement as needed.    March 2022:  Patient reports still having significant amount of fatigue.  Patient does have a history of B12 deficiency and prostate cancer.  Patient also has untreated MARIN.  I discussed these conditions in detail with the patient.  Patient defers treatment for MARIN currently.  He is willing to increase his B12 supplement and follow-up with Urology concerning prostate cancer.  Lab Results   Component Value Date    WBC 6.10 02/18/2022    HGB 14.7 02/18/2022    HCT 42.0 02/18/2022    MCV 86 02/18/2022     02/18/2022       Lab Results   Component Value Date    IRON 62 07/27/2021    TIBC 293 07/27/2021    FERRITIN 283 07/27/2021     Lab Results   Component Value Date    MOMDLJIV34 466 07/27/2021     Lab Results   Component Value Date    FOLATE 7.6 07/27/2021     Lab Results   Component Value Date    TSH 1.057 07/27/2021    FREET4 1.14 07/27/2021                Current Assessment & Plan     Check labs.  See MARIN recommendations.  Follow-up with Urology concerning prostate cancer.    I recommended lifestyle modification with diet changes and increase in exercise.           B12 deficiency (Chronic)    Overview     Lab Results   Component Value Date    IRON 62 07/27/2021    TIBC 293 07/27/2021    FERRITIN 283 07/27/2021     Lab Results   Component Value Date    YYXBXBCJ75 466 07/27/2021     Lab Results   Component Value Date    FOLATE 7.6 07/27/2021                Current Assessment & Plan     Increase B12 supplement 1000 micrograms daily.           Ectopic atrial tachycardia (Chronic)    Overview     Chronic.  Stable.  Patient reports that he stop taking his blood pressure medications and beta-blocker because he was feeling fine except for fatigue.    Heart rate is within normal limits today.           Current Assessment & Plan     Follow-up with Cardiology.  Restart beta-blocker if palpitations occur.  ER precautions.           S/P TURP (Chronic)    Overview      General Information    Date: 2/18/2022 Time: 1300 Status: Posted   Location: [facility] OR Room: [facility] OR  Service: Urology   Patient class: OP- Hospital Outpatient Surgery Case classification: E - Elective        Panel Information      Panel 1    Surgeon/Provider Role Start Time End Time   Ulisses Serra MD Primary [id number]    Procedure Laterality Anesthesia   TURP, USING GREEN LIGHT LASER N/A General                   MARIN (obstructive sleep apnea)    Overview     5/13/2021 PSG The diagnostic polysomnography revealed a borderline obstructive sleep apnea / hypopnea syndrome (A + H Index = 5.3 events / hr asleep with 0.5 respiratory event - related arousals / hr asleep for the study, and no RERAs (respiratory effort - related arousals).  The mean SpO2 value was 92.8 %, moderate, minimum oxygen saturation during sleep was 88.0 %, and waking baseline SpO2 was 98 %.  Sporadic, mild snoring was noted.  A CPAP titration polysomnography is recommended.   5/20/2021 orders CPAP titration    March 2022:  Patient here today complaining of fatigue.  Patient is not wearing CPAP and defers at this time.  Patient states he had mild sleep apnea and does not think that this is his problem.           Current Assessment & Plan     I recommended the patient start CPAP.  Patient defers at this time.  If patient changes his mind or after no improvement I recommend that he follow-up with sleep disorders clinic.                  Review of patient's allergies indicates:   Allergen Reactions    Benadryl allergy decongestant      Opposite reaction, speeds him up   Opposite reaction, speeds him up     Diphenhydramine      Opposite reaction, speeds him up      Current Outpatient Medications   Medication Instructions    acetaminophen (TYLENOL) 500 mg, Oral, Every 6 hours PRN    carvediloL (COREG) 6.25 mg, Oral, 2 times daily with meals    cyanocobalamin (VITAMIN B-12) 1,000 mcg, Oral, Daily    latanoprost 0.005 % ophthalmic solution 1  "drop, Both Eyes, Nightly    tamsulosin (FLOMAX) 0.4 mg, Oral, Nightly      I have reviewed the PMH, social history, FamilyHx, surgical history, allergies and medications documented / confirmed by the patient at the time of this visit.  Review of Systems   Constitutional: Positive for fatigue. Negative for chills, fever and unexpected weight change.   HENT: Negative for ear pain and sore throat.    Eyes: Negative for redness and visual disturbance.   Respiratory: Positive for shortness of breath (With exertion). Negative for cough.    Cardiovascular: Negative for chest pain and palpitations.   Gastrointestinal: Negative for nausea and vomiting.   Endocrine: Negative for cold intolerance and heat intolerance.   Genitourinary: Negative for difficulty urinating and hematuria.   Musculoskeletal: Positive for arthralgias, back pain and myalgias.   Skin: Negative for rash and wound.   Allergic/Immunologic: Negative for environmental allergies and food allergies.   Neurological: Negative for weakness and headaches.   Hematological: Negative for adenopathy. Does not bruise/bleed easily.   Psychiatric/Behavioral: Negative for sleep disturbance. The patient is not nervous/anxious.      Objective:   /80   Pulse (!) 59   Temp 97.4 °F (36.3 °C) (Other (see comments))   Ht 5' 10" (1.778 m)   Wt 102.2 kg (225 lb 4.8 oz)   SpO2 97%   BMI 32.33 kg/m²   Physical Exam  Vitals and nursing note reviewed.   Constitutional:       General: He is not in acute distress.     Appearance: He is well-developed.   HENT:      Head: Normocephalic and atraumatic.      Right Ear: External ear normal.      Left Ear: External ear normal.      Nose: Nose normal. No rhinorrhea.   Eyes:      Extraocular Movements: Extraocular movements intact.      Pupils: Pupils are equal, round, and reactive to light.   Cardiovascular:      Rate and Rhythm: Normal rate.      Pulses: Normal pulses.   Pulmonary:      Effort: Pulmonary effort is normal. No " respiratory distress.      Breath sounds: Normal breath sounds.   Musculoskeletal:         General: Tenderness and deformity present. Normal range of motion.      Cervical back: Normal range of motion and neck supple.   Skin:     General: Skin is warm and dry.      Capillary Refill: Capillary refill takes less than 2 seconds.   Neurological:      General: No focal deficit present.      Mental Status: He is alert and oriented to person, place, and time.   Psychiatric:         Mood and Affect: Mood normal.         Behavior: Behavior normal.        Patient is wearing a mask which limits physical exam due to COVID restrictions.   Assessment:     1. Fatigue, unspecified type    2. B12 deficiency    3. Encounter for long-term (current) use of medications    4. Ectopic atrial tachycardia    5. Malignant neoplasm of prostate    6. Class 1 obesity with alveolar hypoventilation, serious comorbidity, and body mass index (BMI) of 32.0 to 32.9 in adult    7. Spinal cord lesion    8. S/P TURP    9. MARIN (obstructive sleep apnea)      MDM:   Moderate complexity.  Moderate risk.  Total time: 31 minutes.  This includes total time spent on the encounter, which includes face to face time and non-face to face time preparing to see the patient (eg, review of previous medical records, tests), Obtaining and/or reviewing separately obtained history, documenting clinical information in the electronic or other health record, independently interpreting results (not separately reported)/communicating results to the patient/family/caregiver, and/or care coordination (not separately reported).    I have Reviewed and summarized old records.  I have performed thorough medication reconciliation today and discussed risk and benefits of medications.  I have reviewed labs and discussed with patient.  All questions were answered.  I am requesting old records and will review them once they are available.    I have signed for the following orders AND/OR  meds.  Orders Placed This Encounter   Procedures    Vitamin B12     Standing Status:   Future     Number of Occurrences:   1     Standing Expiration Date:   5/7/2023    TSH     Standing Status:   Future     Number of Occurrences:   1     Standing Expiration Date:   5/7/2023    CBC auto differential     Standing Status:   Future     Number of Occurrences:   1     Standing Expiration Date:   3/8/2023    Lactate dehydrogenase (LDH)     Standing Status:   Future     Number of Occurrences:   1     Standing Expiration Date:   3/8/2023    Uric acid     Standing Status:   Future     Number of Occurrences:   1     Standing Expiration Date:   3/8/2023    Sedimentation rate, manual     Standing Status:   Future     Number of Occurrences:   1     Standing Expiration Date:   3/8/2023    Comprehensive metabolic panel     Standing Status:   Future     Number of Occurrences:   1     Standing Expiration Date:   3/8/2023     Medications Ordered This Encounter   Medications    cyanocobalamin (VITAMIN B-12) 1000 MCG tablet     Sig: Take 1 tablet (1,000 mcg total) by mouth once daily.     Dispense:  90 tablet     Refill:  3    tamsulosin (FLOMAX) 0.4 mg Cap     Sig: Take 1 capsule (0.4 mg total) by mouth every evening.     Dispense:  90 capsule     Refill:  4        Follow up in about 6 months (around 9/8/2022), or if symptoms worsen or fail to improve, for Annual Wellness Exam.    If no improvement in symptoms or symptoms worsen, advised to call/follow-up at clinic or go to ER. Patient voiced understanding and all questions/concerns were addressed.   DISCLAIMER: This note was compiled by using a speech recognition dictation system and therefore please be aware that typographical / speech recognition errors can and do occur.  Please contact me if you see any errors specifically.    Trevor Jarvis M.D.       Office: [phone] [address] [place] [address]  FAX: [phone]

## 2022-03-08 NOTE — ASSESSMENT & PLAN NOTE
Follow-up with Urology as soon as possible.  Starting Flomax to assist with his urinary hesitancy.  Monitor PSA.

## 2022-03-09 LAB
BASOPHILS # BLD AUTO: 0.08 K/UL (ref 0–0.2)
BASOPHILS NFR BLD: 1.3 % (ref 0–1.9)
DIFFERENTIAL METHOD: ABNORMAL
EOSINOPHIL # BLD AUTO: 0.1 K/UL (ref 0–0.5)
EOSINOPHIL NFR BLD: 2.1 % (ref 0–8)
ERYTHROCYTE [DISTWIDTH] IN BLOOD BY AUTOMATED COUNT: 12.9 % (ref 11.5–14.5)
ERYTHROCYTE [SEDIMENTATION RATE] IN BLOOD BY WESTERGREN METHOD: 11 MM/HR (ref 0–23)
HCT VFR BLD AUTO: 45.2 % (ref 40–54)
HGB BLD-MCNC: 14.7 G/DL (ref 14–18)
IMM GRANULOCYTES # BLD AUTO: 0.04 K/UL (ref 0–0.04)
IMM GRANULOCYTES NFR BLD AUTO: 0.7 % (ref 0–0.5)
LYMPHOCYTES # BLD AUTO: 1.9 K/UL (ref 1–4.8)
LYMPHOCYTES NFR BLD: 30.7 % (ref 18–48)
MCH RBC QN AUTO: 30.8 PG (ref 27–31)
MCHC RBC AUTO-ENTMCNC: 32.5 G/DL (ref 32–36)
MCV RBC AUTO: 95 FL (ref 82–98)
MONOCYTES # BLD AUTO: 0.5 K/UL (ref 0.3–1)
MONOCYTES NFR BLD: 8.7 % (ref 4–15)
NEUTROPHILS # BLD AUTO: 3.4 K/UL (ref 1.8–7.7)
NEUTROPHILS NFR BLD: 56.5 % (ref 38–73)
NRBC BLD-RTO: 0 /100 WBC
PLATELET # BLD AUTO: 179 K/UL (ref 150–450)
PMV BLD AUTO: 8.8 FL (ref 9.2–12.9)
RBC # BLD AUTO: 4.77 M/UL (ref 4.6–6.2)
WBC # BLD AUTO: 6.06 K/UL (ref 3.9–12.7)

## 2022-03-09 NOTE — PROGRESS NOTES
Make follow-up lab appointment per recommendation below.  Check to see if patient has seen the results through my chart.  If not then,  #CALL THE PATIENT# to discuss results/see if they have questions and document verification of contact. Make F/U appt if needed. [phone]    #My interpretation that was sent to them through [employer]:  Bill, I have reviewed your recent blood work.     PSA level as decrease slightly from previous.  Follow-up with Urology.    Inflammation markers sedimentation rate is within normal limits.  Your B12 level is too low.  I recommend increasing vitamin B12 to 1000 micrograms daily.  If no improvement may need to consider B12 injections.  Your complete blood count is stable, no anemia.    Your metabolic panel which shows your glucose, kidney function, electrolytes, and liver function is stable.   Thyroid study is normal.   Uric acid level is normal.  LDH level was normal.    See B12 recommendations above.  Recheck B12 level in six months after starting therapy.  =========================  Also please address any outstanding health maintenance that may be due: COVID-19 Vaccine(1) Never done  Shingles Vaccine(2 of 3) due on 11/02/2017  Influenza Vaccine(1) Never done

## 2022-03-10 ENCOUNTER — TELEPHONE (OUTPATIENT)
Dept: FAMILY MEDICINE | Facility: CLINIC | Age: 72
End: 2022-03-10
Payer: MEDICARE

## 2022-03-10 DIAGNOSIS — E53.8 B12 DEFICIENCY: Primary | ICD-10-CM

## 2022-03-10 NOTE — TELEPHONE ENCOUNTER
I have signed for the following orders AND/OR meds.  Please call the patient and ask the patient to schedule the testing AND/OR inform about any medications that were sent.      Orders Placed This Encounter   Procedures    Vitamin B12     Standing Status:   Future     Standing Expiration Date:   5/9/2023

## 2022-05-31 ENCOUNTER — PATIENT MESSAGE (OUTPATIENT)
Dept: FAMILY MEDICINE | Facility: CLINIC | Age: 72
End: 2022-05-31
Payer: MEDICARE

## 2022-07-19 ENCOUNTER — PES CALL (OUTPATIENT)
Dept: ADMINISTRATIVE | Facility: CLINIC | Age: 72
End: 2022-07-19
Payer: MEDICARE

## 2022-07-22 ENCOUNTER — TELEPHONE (OUTPATIENT)
Dept: ADMINISTRATIVE | Facility: CLINIC | Age: 72
End: 2022-07-22
Payer: MEDICARE

## 2022-07-22 NOTE — TELEPHONE ENCOUNTER
Called pt, informed pt I was calling to remind pt of his in office EAWV on 7/25/22; clinic location provided to patient; pt confirmed appointment

## 2022-07-25 ENCOUNTER — OFFICE VISIT (OUTPATIENT)
Dept: FAMILY MEDICINE | Facility: CLINIC | Age: 72
End: 2022-07-25
Payer: MEDICARE

## 2022-07-25 VITALS
HEIGHT: 70 IN | BODY MASS INDEX: 32.16 KG/M2 | SYSTOLIC BLOOD PRESSURE: 139 MMHG | TEMPERATURE: 98 F | WEIGHT: 224.63 LBS | DIASTOLIC BLOOD PRESSURE: 80 MMHG | HEART RATE: 67 BPM

## 2022-07-25 DIAGNOSIS — F51.04 PSYCHOPHYSIOLOGICAL INSOMNIA: ICD-10-CM

## 2022-07-25 DIAGNOSIS — E53.8 B12 DEFICIENCY: Chronic | ICD-10-CM

## 2022-07-25 DIAGNOSIS — Z79.899 ENCOUNTER FOR LONG-TERM (CURRENT) USE OF MEDICATIONS: Chronic | ICD-10-CM

## 2022-07-25 DIAGNOSIS — M19.90 OSTEOARTHRITIS, UNSPECIFIED OSTEOARTHRITIS TYPE, UNSPECIFIED SITE: ICD-10-CM

## 2022-07-25 DIAGNOSIS — M51.36 DDD (DEGENERATIVE DISC DISEASE), LUMBAR: ICD-10-CM

## 2022-07-25 DIAGNOSIS — M51.34 DDD (DEGENERATIVE DISC DISEASE), THORACIC: ICD-10-CM

## 2022-07-25 DIAGNOSIS — I10 ESSENTIAL HYPERTENSION: ICD-10-CM

## 2022-07-25 DIAGNOSIS — C61 MALIGNANT NEOPLASM OF PROSTATE: Chronic | ICD-10-CM

## 2022-07-25 DIAGNOSIS — E66.2 CLASS 1 OBESITY WITH ALVEOLAR HYPOVENTILATION, SERIOUS COMORBIDITY, AND BODY MASS INDEX (BMI) OF 32.0 TO 32.9 IN ADULT: Chronic | ICD-10-CM

## 2022-07-25 DIAGNOSIS — G47.61 PERIODIC LIMB MOVEMENT DISORDER (PLMD): ICD-10-CM

## 2022-07-25 DIAGNOSIS — C61 CARCINOMA OF PROSTATE: ICD-10-CM

## 2022-07-25 DIAGNOSIS — R39.12 BENIGN PROSTATIC HYPERPLASIA WITH WEAK URINARY STREAM: ICD-10-CM

## 2022-07-25 DIAGNOSIS — N40.1 BENIGN PROSTATIC HYPERPLASIA WITH WEAK URINARY STREAM: ICD-10-CM

## 2022-07-25 DIAGNOSIS — G47.33 OSA (OBSTRUCTIVE SLEEP APNEA): ICD-10-CM

## 2022-07-25 DIAGNOSIS — E88.810 METABOLIC SYNDROME: ICD-10-CM

## 2022-07-25 DIAGNOSIS — M50.30 DDD (DEGENERATIVE DISC DISEASE), CERVICAL: ICD-10-CM

## 2022-07-25 DIAGNOSIS — E78.2 MIXED HYPERLIPIDEMIA: Chronic | ICD-10-CM

## 2022-07-25 DIAGNOSIS — Z00.00 ENCOUNTER FOR PREVENTIVE CARE: Primary | ICD-10-CM

## 2022-07-25 DIAGNOSIS — E55.9 VITAMIN D DEFICIENCY: Chronic | ICD-10-CM

## 2022-07-25 DIAGNOSIS — I47.19 ECTOPIC ATRIAL TACHYCARDIA: Chronic | ICD-10-CM

## 2022-07-25 DIAGNOSIS — G95.9 SPINAL CORD LESION: Chronic | ICD-10-CM

## 2022-07-25 PROCEDURE — 3079F PR MOST RECENT DIASTOLIC BLOOD PRESSURE 80-89 MM HG: ICD-10-PCS | Mod: CPTII,S$GLB,, | Performed by: NURSE PRACTITIONER

## 2022-07-25 PROCEDURE — 1160F PR REVIEW ALL MEDS BY PRESCRIBER/CLIN PHARMACIST DOCUMENTED: ICD-10-PCS | Mod: CPTII,S$GLB,, | Performed by: NURSE PRACTITIONER

## 2022-07-25 PROCEDURE — 3288F FALL RISK ASSESSMENT DOCD: CPT | Mod: CPTII,S$GLB,, | Performed by: NURSE PRACTITIONER

## 2022-07-25 PROCEDURE — G0439 PR MEDICARE ANNUAL WELLNESS SUBSEQUENT VISIT: ICD-10-PCS | Mod: S$GLB,,, | Performed by: NURSE PRACTITIONER

## 2022-07-25 PROCEDURE — 1101F PR PT FALLS ASSESS DOC 0-1 FALLS W/OUT INJ PAST YR: ICD-10-PCS | Mod: CPTII,S$GLB,, | Performed by: NURSE PRACTITIONER

## 2022-07-25 PROCEDURE — 3079F DIAST BP 80-89 MM HG: CPT | Mod: CPTII,S$GLB,, | Performed by: NURSE PRACTITIONER

## 2022-07-25 PROCEDURE — 3008F BODY MASS INDEX DOCD: CPT | Mod: CPTII,S$GLB,, | Performed by: NURSE PRACTITIONER

## 2022-07-25 PROCEDURE — 3075F PR MOST RECENT SYSTOLIC BLOOD PRESS GE 130-139MM HG: ICD-10-PCS | Mod: CPTII,S$GLB,, | Performed by: NURSE PRACTITIONER

## 2022-07-25 PROCEDURE — 3044F PR MOST RECENT HEMOGLOBIN A1C LEVEL <7.0%: ICD-10-PCS | Mod: CPTII,S$GLB,, | Performed by: NURSE PRACTITIONER

## 2022-07-25 PROCEDURE — 1125F AMNT PAIN NOTED PAIN PRSNT: CPT | Mod: CPTII,S$GLB,, | Performed by: NURSE PRACTITIONER

## 2022-07-25 PROCEDURE — 3288F PR FALLS RISK ASSESSMENT DOCUMENTED: ICD-10-PCS | Mod: CPTII,S$GLB,, | Performed by: NURSE PRACTITIONER

## 2022-07-25 PROCEDURE — 1159F MED LIST DOCD IN RCRD: CPT | Mod: CPTII,S$GLB,, | Performed by: NURSE PRACTITIONER

## 2022-07-25 PROCEDURE — 1101F PT FALLS ASSESS-DOCD LE1/YR: CPT | Mod: CPTII,S$GLB,, | Performed by: NURSE PRACTITIONER

## 2022-07-25 PROCEDURE — 3044F HG A1C LEVEL LT 7.0%: CPT | Mod: CPTII,S$GLB,, | Performed by: NURSE PRACTITIONER

## 2022-07-25 PROCEDURE — 99999 PR PBB SHADOW E&M-EST. PATIENT-LVL IV: ICD-10-PCS | Mod: PBBFAC,,, | Performed by: NURSE PRACTITIONER

## 2022-07-25 PROCEDURE — G0439 PPPS, SUBSEQ VISIT: HCPCS | Mod: S$GLB,,, | Performed by: NURSE PRACTITIONER

## 2022-07-25 PROCEDURE — 3075F SYST BP GE 130 - 139MM HG: CPT | Mod: CPTII,S$GLB,, | Performed by: NURSE PRACTITIONER

## 2022-07-25 PROCEDURE — 1160F RVW MEDS BY RX/DR IN RCRD: CPT | Mod: CPTII,S$GLB,, | Performed by: NURSE PRACTITIONER

## 2022-07-25 PROCEDURE — 3008F PR BODY MASS INDEX (BMI) DOCUMENTED: ICD-10-PCS | Mod: CPTII,S$GLB,, | Performed by: NURSE PRACTITIONER

## 2022-07-25 PROCEDURE — 1159F PR MEDICATION LIST DOCUMENTED IN MEDICAL RECORD: ICD-10-PCS | Mod: CPTII,S$GLB,, | Performed by: NURSE PRACTITIONER

## 2022-07-25 PROCEDURE — 99999 PR PBB SHADOW E&M-EST. PATIENT-LVL IV: CPT | Mod: PBBFAC,,, | Performed by: NURSE PRACTITIONER

## 2022-07-25 PROCEDURE — 1125F PR PAIN SEVERITY QUANTIFIED, PAIN PRESENT: ICD-10-PCS | Mod: CPTII,S$GLB,, | Performed by: NURSE PRACTITIONER

## 2022-07-25 NOTE — PATIENT INSTRUCTIONS
Continue current medications  F/U with PCP, specialists as advised  Report to ER immediately if symptoms worsen or persist

## 2022-07-27 NOTE — PROGRESS NOTES
"  Yasir Jeffery presented for a follow-up Medicare AWV today. The following components were reviewed and updated:    · Medical history  · Family History  · Social history  · Allergies and Current Medications  · Health Risk Assessment  · Health Maintenance  · Care Team    **See Completed Assessments for Annual Wellness visit with in the encounter summary    The following assessments were completed:  · Depression Screening  · Cognitive function Screening  · Timed Get Up Test  · Whisper Test  · PHQ-2  · Nutrition screen  · ADL  · PAQ  · Osteoporosis risk  · CAGE  · Living situation       Vitals:    07/25/22 1054   BP: 139/80   Pulse: 67   Temp: 98.3 °F (36.8 °C)   TempSrc: Oral   Weight: 101.9 kg (224 lb 9.6 oz)   Height: 5' 10" (1.778 m)     Body mass index is 32.23 kg/m².   ]        Diagnoses and health risks identified today and associated recommendations/orders:  1. Encounter for preventive care  Stable  Up to date    2. Malignant neoplasm of prostate  Stable  Managed by urology; sees q6m  Continue current medications, plan of care  F/U with specialist as advised    3. Carcinoma of prostate  Stable  Managed by urology; sees q6m  Continue current medications, plan of care  F/U with specialist as advised    4. Ectopic atrial tachycardia  Stable  Managed by cardiology; last visit 5/2021  Continue current medications, plan of care  F/U with specialist as advised    5. Spinal cord lesion  Stable  Managed by PCP, pain management  Continue current medications, plan of care  F/U with PCP, specialist as advised    6. Class 1 obesity with alveolar hypoventilation, serious comorbidity, and body mass index (BMI) of 32.0 to 32.9 in adult  Unstable  Managed by PCP  Healthy diet, weight loss recommended  Consider dietician    7. Essential hypertension  Stable  Managed by PCP  Low sodium diet recommended  Continue current medications, plan of care  F/U with PCP as advised    8. Mixed hyperlipidemia  Stable  Managed by PCP  Low " cholesterol diet recommended  Continue current medications, plan of care  F/U with PCP as advised    9. MARIN (obstructive sleep apnea)  Unstable  Managed by pulmonology  Pt not using CPAP  Pulmonology f/u recommended    10. Periodic limb movement disorder (PLMD)  Stable  Managed by PCP  Continue current medications, plan of care  F/U with PCP as advised    11. Osteoarthritis, unspecified osteoarthritis type, unspecified site  Stable  Managed by PCP  Continue current medications, plan of care  F/U with PCP as advised    12. Metabolic syndrome  Stable  Managed by PCP  Continue current medications, plan of care  F/U with PCP as advised    13. B12 deficiency  Stable  Managed by PCP  Continue current medications, plan of care  F/U with PCP as advised    14. Vitamin D deficiency  Stable  Managed by PCP  Continue current medications, plan of care  F/U with PCP as advised    15. Benign prostatic hyperplasia with weak urinary stream  Stable  Managed by urology  Continue current medications, plan of care  F/U with PCP as advised    16. DDD (degenerative disc disease), cervical  Stable  Managed by pain management  Continue current medications, plan of care  F/U with specialist as advised    17. DDD (degenerative disc disease), thoracic  Stable  Managed by pain management  Continue current medications, plan of care  F/U with specialist as advised    18. DDD (degenerative disc disease), lumbar  Stable  Managed by pain management  Continue current medications, plan of care  F/U with specialist as advised    19. Psychophysiological insomnia  Stable  Managed by PCP  Continue current medications, plan of care  F/U with PCP as advised    20. Encounter for long-term (current) use of medications  Stable  Managed by PCP  Continue current medications, plan of care  F/U with PCP as advised      I offered to discuss end of life issues, including information on how to make advance directives that the patient could use to name someone who  would make medical decisions on their behalf if they became too ill to make themselves.    ___Patient declined - already done.    _x__Patient is interested, I provided paperwork and offered to discuss  Provided Yasir with a 5-10 year written screening schedule and personal prevention plan. Recommendations were developed using the USPSTF age appropriate recommendations. Education, counseling, and referrals were provided as needed.  After Visit Summary printed and given to patient which includes a list of additional screenings\tests needed.    No follow-ups on file.      Mary Veronica NP

## 2022-09-08 ENCOUNTER — LAB VISIT (OUTPATIENT)
Dept: LAB | Facility: HOSPITAL | Age: 72
End: 2022-09-08
Attending: FAMILY MEDICINE
Payer: MEDICARE

## 2022-09-08 ENCOUNTER — OFFICE VISIT (OUTPATIENT)
Dept: UROLOGY | Facility: CLINIC | Age: 72
End: 2022-09-08
Payer: MEDICARE

## 2022-09-08 ENCOUNTER — OFFICE VISIT (OUTPATIENT)
Dept: FAMILY MEDICINE | Facility: CLINIC | Age: 72
End: 2022-09-08
Payer: MEDICARE

## 2022-09-08 VITALS
WEIGHT: 227.63 LBS | TEMPERATURE: 97 F | BODY MASS INDEX: 32.59 KG/M2 | DIASTOLIC BLOOD PRESSURE: 88 MMHG | SYSTOLIC BLOOD PRESSURE: 130 MMHG | HEIGHT: 70 IN | HEART RATE: 59 BPM | RESPIRATION RATE: 16 BRPM | OXYGEN SATURATION: 97 %

## 2022-09-08 DIAGNOSIS — Z90.79 S/P TURP: Primary | ICD-10-CM

## 2022-09-08 DIAGNOSIS — E78.5 HYPERLIPIDEMIA, UNSPECIFIED HYPERLIPIDEMIA TYPE: ICD-10-CM

## 2022-09-08 DIAGNOSIS — R97.20 ELEVATED PSA: ICD-10-CM

## 2022-09-08 DIAGNOSIS — C61 PROSTATE CA: ICD-10-CM

## 2022-09-08 DIAGNOSIS — R53.82 CHRONIC FATIGUE: Chronic | ICD-10-CM

## 2022-09-08 DIAGNOSIS — Z13.6 ENCOUNTER FOR LIPID SCREENING FOR CARDIOVASCULAR DISEASE: ICD-10-CM

## 2022-09-08 DIAGNOSIS — N18.30 CKD (CHRONIC KIDNEY DISEASE) STAGE 3, GFR 30-59 ML/MIN: Chronic | ICD-10-CM

## 2022-09-08 DIAGNOSIS — Z13.220 ENCOUNTER FOR LIPID SCREENING FOR CARDIOVASCULAR DISEASE: ICD-10-CM

## 2022-09-08 DIAGNOSIS — Z79.899 ENCOUNTER FOR LONG-TERM (CURRENT) USE OF MEDICATIONS: ICD-10-CM

## 2022-09-08 DIAGNOSIS — Z00.00 WELL ADULT EXAM: Primary | ICD-10-CM

## 2022-09-08 DIAGNOSIS — C61 PROSTATE CANCER: ICD-10-CM

## 2022-09-08 DIAGNOSIS — Z00.00 WELL ADULT EXAM: ICD-10-CM

## 2022-09-08 DIAGNOSIS — E53.8 B12 DEFICIENCY: Chronic | ICD-10-CM

## 2022-09-08 LAB
ALBUMIN SERPL BCP-MCNC: 4.2 G/DL (ref 3.5–5.2)
ALP SERPL-CCNC: 61 U/L (ref 55–135)
ALT SERPL W/O P-5'-P-CCNC: 13 U/L (ref 10–44)
ANION GAP SERPL CALC-SCNC: 10 MMOL/L (ref 8–16)
AST SERPL-CCNC: 22 U/L (ref 10–40)
BILIRUB SERPL-MCNC: 0.9 MG/DL (ref 0.1–1)
BUN SERPL-MCNC: 16 MG/DL (ref 8–23)
CALCIUM SERPL-MCNC: 9.9 MG/DL (ref 8.7–10.5)
CHLORIDE SERPL-SCNC: 105 MMOL/L (ref 95–110)
CHOLEST SERPL-MCNC: 248 MG/DL (ref 120–199)
CHOLEST/HDLC SERPL: 7.3 {RATIO} (ref 2–5)
CO2 SERPL-SCNC: 26 MMOL/L (ref 23–29)
COMPLEXED PSA SERPL-MCNC: 12.3 NG/ML (ref 0–4)
CREAT SERPL-MCNC: 1.6 MG/DL (ref 0.5–1.4)
EST. GFR  (NO RACE VARIABLE): 45.2 ML/MIN/1.73 M^2
ESTIMATED AVG GLUCOSE: 108 MG/DL (ref 68–131)
GLUCOSE SERPL-MCNC: 98 MG/DL (ref 70–110)
HBA1C MFR BLD: 5.4 % (ref 4–5.6)
HDLC SERPL-MCNC: 34 MG/DL (ref 40–75)
HDLC SERPL: 13.7 % (ref 20–50)
LDLC SERPL CALC-MCNC: 176.6 MG/DL (ref 63–159)
NONHDLC SERPL-MCNC: 214 MG/DL
POTASSIUM SERPL-SCNC: 5.2 MMOL/L (ref 3.5–5.1)
PROT SERPL-MCNC: 7.4 G/DL (ref 6–8.4)
SODIUM SERPL-SCNC: 141 MMOL/L (ref 136–145)
TRIGL SERPL-MCNC: 187 MG/DL (ref 30–150)

## 2022-09-08 PROCEDURE — 36415 COLL VENOUS BLD VENIPUNCTURE: CPT | Mod: PO | Performed by: UROLOGY

## 2022-09-08 PROCEDURE — 3044F PR MOST RECENT HEMOGLOBIN A1C LEVEL <7.0%: ICD-10-PCS | Mod: CPTII,S$GLB,,

## 2022-09-08 PROCEDURE — 1160F RVW MEDS BY RX/DR IN RCRD: CPT | Mod: CPTII,S$GLB,,

## 2022-09-08 PROCEDURE — 99214 PR OFFICE/OUTPT VISIT, EST, LEVL IV, 30-39 MIN: ICD-10-PCS | Mod: S$GLB,,,

## 2022-09-08 PROCEDURE — 3288F PR FALLS RISK ASSESSMENT DOCUMENTED: ICD-10-PCS | Mod: CPTII,S$GLB,,

## 2022-09-08 PROCEDURE — 3008F BODY MASS INDEX DOCD: CPT | Mod: CPTII,S$GLB,, | Performed by: FAMILY MEDICINE

## 2022-09-08 PROCEDURE — 3079F DIAST BP 80-89 MM HG: CPT | Mod: CPTII,S$GLB,, | Performed by: FAMILY MEDICINE

## 2022-09-08 PROCEDURE — 3075F PR MOST RECENT SYSTOLIC BLOOD PRESS GE 130-139MM HG: ICD-10-PCS | Mod: CPTII,S$GLB,, | Performed by: FAMILY MEDICINE

## 2022-09-08 PROCEDURE — 99999 PR PBB SHADOW E&M-EST. PATIENT-LVL V: CPT | Mod: PBBFAC,,, | Performed by: FAMILY MEDICINE

## 2022-09-08 PROCEDURE — 80053 COMPREHEN METABOLIC PANEL: CPT | Performed by: FAMILY MEDICINE

## 2022-09-08 PROCEDURE — 99397 PR PREVENTIVE VISIT,EST,65 & OVER: ICD-10-PCS | Mod: S$GLB,,, | Performed by: FAMILY MEDICINE

## 2022-09-08 PROCEDURE — 99999 PR PBB SHADOW E&M-EST. PATIENT-LVL II: ICD-10-PCS | Mod: PBBFAC,,,

## 2022-09-08 PROCEDURE — 3044F HG A1C LEVEL LT 7.0%: CPT | Mod: CPTII,S$GLB,,

## 2022-09-08 PROCEDURE — 3288F FALL RISK ASSESSMENT DOCD: CPT | Mod: CPTII,S$GLB,, | Performed by: FAMILY MEDICINE

## 2022-09-08 PROCEDURE — 99499 UNLISTED E&M SERVICE: CPT | Mod: S$GLB,,,

## 2022-09-08 PROCEDURE — 99999 PR PBB SHADOW E&M-EST. PATIENT-LVL II: CPT | Mod: PBBFAC,,,

## 2022-09-08 PROCEDURE — 1101F PR PT FALLS ASSESS DOC 0-1 FALLS W/OUT INJ PAST YR: ICD-10-PCS | Mod: CPTII,S$GLB,,

## 2022-09-08 PROCEDURE — 3044F PR MOST RECENT HEMOGLOBIN A1C LEVEL <7.0%: ICD-10-PCS | Mod: CPTII,S$GLB,, | Performed by: FAMILY MEDICINE

## 2022-09-08 PROCEDURE — 99999 PR PBB SHADOW E&M-EST. PATIENT-LVL V: ICD-10-PCS | Mod: PBBFAC,,, | Performed by: FAMILY MEDICINE

## 2022-09-08 PROCEDURE — 1125F PR PAIN SEVERITY QUANTIFIED, PAIN PRESENT: ICD-10-PCS | Mod: CPTII,S$GLB,, | Performed by: FAMILY MEDICINE

## 2022-09-08 PROCEDURE — 84153 ASSAY OF PSA TOTAL: CPT | Performed by: UROLOGY

## 2022-09-08 PROCEDURE — 3079F PR MOST RECENT DIASTOLIC BLOOD PRESSURE 80-89 MM HG: ICD-10-PCS | Mod: CPTII,S$GLB,, | Performed by: FAMILY MEDICINE

## 2022-09-08 PROCEDURE — 1101F PT FALLS ASSESS-DOCD LE1/YR: CPT | Mod: CPTII,S$GLB,,

## 2022-09-08 PROCEDURE — 1159F PR MEDICATION LIST DOCUMENTED IN MEDICAL RECORD: ICD-10-PCS | Mod: CPTII,S$GLB,, | Performed by: FAMILY MEDICINE

## 2022-09-08 PROCEDURE — 99397 PER PM REEVAL EST PAT 65+ YR: CPT | Mod: S$GLB,,, | Performed by: FAMILY MEDICINE

## 2022-09-08 PROCEDURE — 83036 HEMOGLOBIN GLYCOSYLATED A1C: CPT | Performed by: FAMILY MEDICINE

## 2022-09-08 PROCEDURE — 3288F FALL RISK ASSESSMENT DOCD: CPT | Mod: CPTII,S$GLB,,

## 2022-09-08 PROCEDURE — 3008F PR BODY MASS INDEX (BMI) DOCUMENTED: ICD-10-PCS | Mod: CPTII,S$GLB,, | Performed by: FAMILY MEDICINE

## 2022-09-08 PROCEDURE — 1159F MED LIST DOCD IN RCRD: CPT | Mod: CPTII,S$GLB,,

## 2022-09-08 PROCEDURE — 1160F RVW MEDS BY RX/DR IN RCRD: CPT | Mod: CPTII,S$GLB,, | Performed by: FAMILY MEDICINE

## 2022-09-08 PROCEDURE — 1101F PR PT FALLS ASSESS DOC 0-1 FALLS W/OUT INJ PAST YR: ICD-10-PCS | Mod: CPTII,S$GLB,, | Performed by: FAMILY MEDICINE

## 2022-09-08 PROCEDURE — 3044F HG A1C LEVEL LT 7.0%: CPT | Mod: CPTII,S$GLB,, | Performed by: FAMILY MEDICINE

## 2022-09-08 PROCEDURE — 1101F PT FALLS ASSESS-DOCD LE1/YR: CPT | Mod: CPTII,S$GLB,, | Performed by: FAMILY MEDICINE

## 2022-09-08 PROCEDURE — 1125F AMNT PAIN NOTED PAIN PRSNT: CPT | Mod: CPTII,S$GLB,, | Performed by: FAMILY MEDICINE

## 2022-09-08 PROCEDURE — 1159F PR MEDICATION LIST DOCUMENTED IN MEDICAL RECORD: ICD-10-PCS | Mod: CPTII,S$GLB,,

## 2022-09-08 PROCEDURE — 1159F MED LIST DOCD IN RCRD: CPT | Mod: CPTII,S$GLB,, | Performed by: FAMILY MEDICINE

## 2022-09-08 PROCEDURE — 80061 LIPID PANEL: CPT | Performed by: FAMILY MEDICINE

## 2022-09-08 PROCEDURE — 99214 OFFICE O/P EST MOD 30 MIN: CPT | Mod: S$GLB,,,

## 2022-09-08 PROCEDURE — 3288F PR FALLS RISK ASSESSMENT DOCUMENTED: ICD-10-PCS | Mod: CPTII,S$GLB,, | Performed by: FAMILY MEDICINE

## 2022-09-08 PROCEDURE — 1160F PR REVIEW ALL MEDS BY PRESCRIBER/CLIN PHARMACIST DOCUMENTED: ICD-10-PCS | Mod: CPTII,S$GLB,, | Performed by: FAMILY MEDICINE

## 2022-09-08 PROCEDURE — 1160F PR REVIEW ALL MEDS BY PRESCRIBER/CLIN PHARMACIST DOCUMENTED: ICD-10-PCS | Mod: CPTII,S$GLB,,

## 2022-09-08 PROCEDURE — 3075F SYST BP GE 130 - 139MM HG: CPT | Mod: CPTII,S$GLB,, | Performed by: FAMILY MEDICINE

## 2022-09-08 RX ORDER — DORZOLAMIDE HYDROCHLORIDE AND TIMOLOL MALEATE 20; 5 MG/ML; MG/ML
1 SOLUTION/ DROPS OPHTHALMIC NIGHTLY
COMMUNITY
Start: 2022-08-03

## 2022-09-08 RX ORDER — AMLODIPINE BESYLATE 10 MG/1
5 TABLET ORAL DAILY
COMMUNITY
Start: 2022-08-03 | End: 2023-05-05 | Stop reason: DRUGHIGH

## 2022-09-08 NOTE — PROGRESS NOTES
This note is specifically for wellness visit performed today.   WELLNESS EXAM    Patient ID: Yasir Jeffery Sr. is a 72 y.o. male.  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   Chief Complaint:  Encounter for wellness exam    Well Adult Physical: Patient here for a comprehensive physical exam.The patient reports chronic problems.   The patient's last visit with me was on 3/8/2022.    Reviewed Care team:Previous PCP:  Dr. Ritchie Rivero   Urologist: Dr. Ulisses Serra   GI:  Dr. Ziyad Durbin MD Nephrology; Now Dr. Bueno     September 2022:  Patient reports that he is doing well other than chronic fatigue.  Patient continues on B12 orally.    Lab Results   Component Value Date    WBC 6.06 03/08/2022    HGB 14.7 03/08/2022    HCT 45.2 03/08/2022    MCV 95 03/08/2022     03/08/2022       Lab Results   Component Value Date    IRON 62 07/27/2021    TIBC 293 07/27/2021    FERRITIN 283 07/27/2021     Lab Results   Component Value Date    [id number] 341 03/08/2022     Lab Results   Component Value Date    FOLATE 7.6 07/27/2021     Hypertension:CHRONIC. STABLE. BP Reviewed.  Compliant with BP medications. No SE reported.   (-) CP, SOB, palpitations, dizziness, lightheadedness, HA, arm numbness, tingling or weakness, syncope.  Creatinine   Date Value Ref Range Status   03/08/2022 1.4 0.5 - 1.4 mg/dL Final         Do you take any herbs or supplements that were not prescribed by a doctor? no   Are you taking calcium supplements? no    History:  History of prostate cancer managed by  Urology Dr. Ulisses Serra , he is following with urology for elevated PSA diagnostic.    Date last PSA: No results found for: PSA   Lab Results   Component Value Date    TESTOSTERONE 7.9 07/27/2021      Testosterone, Free (pg/mL)   Date Value   07/27/2021 7.9     Testosterone, Total (ng/dL)   Date Value   07/27/2021 518      Colon cancer screening:      Health Maintenance Topics with due status: Not Due       Topic Last Completion Date    Colorectal Cancer Screening 10/20/2014    TETANUS VACCINE 06/20/2015    Lipid Panel 02/18/2021      ==============================================  History reviewed.   Health Maintenance Due   Topic Date Due    COVID-19 Vaccine (1) Never done    Shingles Vaccine (1 of 2) 11/02/2017    Influenza Vaccine (1) Never done       Past Medical History:  Past Medical History:   Diagnosis Date    Back pain     Benign prostate hyperplasia     DDD (degenerative disc disease), thoracolumbar     Disorder of kidney and ureter     stage 3    Elevated PSA     Glaucoma     Hx of colonic polyps 2010    Hypertension     Malignant neoplasm of prostate 7/31/2019    Metabolic syndrome     Mixed hyperlipidemia 1/24/2018    Obesity     MARIN (obstructive sleep apnea)     don't use a C-PAP    Pneumonia     Psychophysiological insomnia     Type 2 diabetes mellitus with diabetic chronic kidney disease 7/14/2021     Past Surgical History:   Procedure Laterality Date    CYST REMOVAL  1993    neck    CYSTOSCOPY N/A 7/17/2019    Procedure: CYSTOSCOPY;  Surgeon: Ulisses Serra MD;  Location: [facility] OR;  Service: Urology;  Laterality: N/A;    EYE SURGERY Bilateral     cataract surgery    GANGLION CYST EXCISION Right 1994    PROSTATE BIOPSY      TESTICLE SURGERY  1980    TRANSRECTAL BIOPSY OF PROSTATE WITH ULTRASOUND GUIDANCE Bilateral 7/17/2019    Procedure: BIOPSY, PROSTATE, RECTAL APPROACH, WITH US GUIDANCE;  Surgeon: Ulisses Serra MD;  Location: [facility] OR;  Service: Urology;  Laterality: Bilateral;     TRANSRECTAL BIOPSY OF PROSTATE WITH ULTRASOUND GUIDANCE N/A 3/11/2020    Procedure: BIOPSY, PROSTATE, RECTAL APPROACH, WITH US GUIDANCE;  Surgeon: Ulisses Serra MD;  Location: [facility];  Service: Urology;  Laterality: N/A;    TRANSURETHRAL SURGICAL REMOVAL OF PROSTATE (TURP) USING GREEN LIGHT LASER N/A 2022    Procedure: TURP, USING GREEN LIGHT LASER;  Surgeon: Ulisses Serra MD;  Location: [facility];  Service: Urology;  Laterality: N/A;     Review of patient's allergies indicates:   Allergen Reactions    Benadryl allergy decongestant      Opposite reaction, speeds him up   Opposite reaction, speeds him up     Diphenhydramine      Opposite reaction, speeds him up      Current Outpatient Medications on File Prior to Visit   Medication Sig Dispense Refill    acetaminophen (TYLENOL) 500 MG tablet Take 500 mg by mouth every 6 (six) hours as needed for Pain.      amLODIPine (NORVASC) 10 MG tablet       carvediloL (COREG) 6.25 MG tablet Take 1 tablet (6.25 mg total) by mouth 2 (two) times daily with meals. 60 tablet 11    cyanocobalamin (VITAMIN B-12) 1000 MCG tablet Take 1 tablet (1,000 mcg total) by mouth once daily. 90 tablet 3    dorzolamide-timolol 2-0.5% (COSOPT) 22.3-6.8 mg/mL ophthalmic solution       latanoprost 0.005 % ophthalmic solution Place 1 drop into both eyes every evening.  6    tamsulosin (FLOMAX) 0.4 mg Cap Take 1 capsule (0.4 mg total) by mouth every evening. 90 capsule 4     No current facility-administered medications on file prior to visit.     Social History     Socioeconomic History    Marital status:    Tobacco Use    Smoking status: Former     Types: Cigars     Start date:      Quit date:      Years since quittin.7     Passive exposure: Never    Smokeless tobacco: Never    Tobacco comments:     cigars sporadically x 3 years   Substance and Sexual Activity    Alcohol use: No    Drug use: Never     Family History   Problem Relation Age of Onset    Arthritis Mother      Prostate cancer Father     No Known Problems Sister     Alzheimer's disease Brother     Dementia Brother     Pacemaker/defibrilator Sister     Arthritis Sister        Review of Systems   Constitutional:  Positive for fatigue. Negative for chills, fever and unexpected weight change.   HENT:  Negative for ear pain and sore throat.    Eyes:  Negative for redness and visual disturbance.   Respiratory:  Negative for cough, shortness of breath and wheezing.    Cardiovascular:  Negative for chest pain and palpitations.   Gastrointestinal:  Negative for nausea and vomiting.   Endocrine: Negative for cold intolerance and heat intolerance.   Genitourinary:  Negative for difficulty urinating and hematuria.   Musculoskeletal:  Positive for arthralgias, back pain and myalgias.   Skin:  Negative for rash and wound.   Allergic/Immunologic: Negative for environmental allergies and food allergies.   Neurological:  Negative for weakness and headaches.   Hematological:  Negative for adenopathy. Does not bruise/bleed easily.   Psychiatric/Behavioral:  Negative for sleep disturbance. The patient is not nervous/anxious.       Objective:     Vitals:    09/08/22 0908   BP: 130/88   Pulse: (!) 59   Resp: 16   Temp: 97 °F (36.1 °C)    Body mass index is 32.66 kg/m².  Physical Exam  Vitals and nursing note reviewed.   Constitutional:       General: He is not in acute distress.     Appearance: He is well-developed.   HENT:      Head: Normocephalic and atraumatic.      Right Ear: External ear normal.      Left Ear: External ear normal.      Nose: Nose normal. No rhinorrhea.   Eyes:      Extraocular Movements: Extraocular movements intact.      Pupils: Pupils are equal, round, and reactive to light.   Cardiovascular:      Rate and Rhythm: Normal rate.      Pulses: Normal pulses.      Heart sounds: No murmur heard.  Pulmonary:      Effort: Pulmonary effort is normal. No respiratory distress.      Breath sounds: Normal breath sounds. No  wheezing or rales.   Chest:      Chest wall: No tenderness.   Abdominal:      General: Bowel sounds are normal.      Palpations: Abdomen is soft.   Musculoskeletal:         General: Tenderness (Left hip) and deformity present. Normal range of motion.      Cervical back: Normal range of motion and neck supple.   Skin:     General: Skin is warm and dry.      Capillary Refill: Capillary refill takes less than 2 seconds.   Neurological:      General: No focal deficit present.      Mental Status: He is alert and oriented to person, place, and time.   Psychiatric:         Mood and Affect: Mood normal.         Behavior: Behavior normal.        Lab Visit on 03/08/2022   Component Date Value Ref Range Status    PSA Diagnostic 03/08/2022 9.3 (H)  0.00 - 4.00 ng/mL Final    Comment: PSA Expected levels:  Hormonal Therapy: <0.05 ng/ml  Prostatectomy: <0.01 ng/ml  Radiation Therapy: <1.00 ng/ml      Vitamin B-12 03/08/2022 341  210 - 950 pg/mL Final    TSH 03/08/2022 2.261  0.400 - 4.000 uIU/mL Final    WBC 03/08/2022 6.06  3.90 - 12.70 K/uL Final    RBC 03/08/2022 4.77  4.60 - 6.20 M/uL Final    Hemoglobin 03/08/2022 14.7  14.0 - 18.0 g/dL Final    Hematocrit 03/08/2022 45.2  40.0 - 54.0 % Final    MCV 03/08/2022 95  82 - 98 fL Final    MCH 03/08/2022 30.8  27.0 - 31.0 pg Final    MCHC 03/08/2022 32.5  32.0 - 36.0 g/dL Final    RDW 03/08/2022 12.9  11.5 - 14.5 % Final    Platelets 03/08/2022 179  150 - 450 K/uL Final    MPV 03/08/2022 8.8 (L)  9.2 - 12.9 fL Final    Immature Granulocytes 03/08/2022 0.7 (H)  0.0 - 0.5 % Final    Gran # (ANC) 03/08/2022 3.4  1.8 - 7.7 K/uL Final    Immature Grans (Abs) 03/08/2022 0.04  0.00 - 0.04 K/uL Final    Comment: Mild elevation in immature granulocytes is non specific and   can be seen in a variety of conditions including stress response,   acute inflammation, trauma and pregnancy. Correlation with other   laboratory and clinical findings is essential.      Lymph # 03/08/2022 1.9  1.0 -  4.8 K/uL Final    Mono # 03/08/2022 0.5  0.3 - 1.0 K/uL Final    Eos # 03/08/2022 0.1  0.0 - 0.5 K/uL Final    Baso # 03/08/2022 0.08  0.00 - 0.20 K/uL Final    nRBC 03/08/2022 0  0 /100 WBC Final    Gran % 03/08/2022 56.5  38.0 - 73.0 % Final    Lymph % 03/08/2022 30.7  18.0 - 48.0 % Final    Mono % 03/08/2022 8.7  4.0 - 15.0 % Final    Eosinophil % 03/08/2022 2.1  0.0 - 8.0 % Final    Basophil % 03/08/2022 1.3  0.0 - 1.9 % Final    Differential Method 03/08/2022 Automated   Final    LD 03/08/2022 154  110 - 260 U/L Final    Results are increased in hemolyzed samples.    Uric Acid 03/08/2022 6.0  3.4 - 7.0 mg/dL Final    Sed Rate 03/08/2022 11  0 - 23 mm/Hr Final    Sodium 03/08/2022 139  136 - 145 mmol/L Final    Potassium 03/08/2022 4.8  3.5 - 5.1 mmol/L Final    Chloride 03/08/2022 106  95 - 110 mmol/L Final    CO2 03/08/2022 23  23 - 29 mmol/L Final    Glucose 03/08/2022 99  70 - 110 mg/dL Final    BUN 03/08/2022 25 (H)  8 - 23 mg/dL Final    Creatinine 03/08/2022 1.4  0.5 - 1.4 mg/dL Final    Calcium 03/08/2022 9.4  8.7 - 10.5 mg/dL Final    Total Protein 03/08/2022 7.1  6.0 - 8.4 g/dL Final    Albumin 03/08/2022 3.7  3.5 - 5.2 g/dL Final    Total Bilirubin 03/08/2022 0.5  0.1 - 1.0 mg/dL Final    Comment: For infants and newborns, interpretation of results should be based  on gestational age, weight and in agreement with clinical  observations.    Premature Infant recommended reference ranges:  Up to 24 hours.............<8.0 mg/dL  Up to 48 hours............<12.0 mg/dL  3-5 days..................<15.0 mg/dL  6-29 days.................<15.0 mg/dL      Alkaline Phosphatase 03/08/2022 59  55 - 135 U/L Final    AST 03/08/2022 18  10 - 40 U/L Final    ALT 03/08/2022 10  10 - 44 U/L Final    Anion Gap 03/08/2022 10  8 - 16 mmol/L Final    eGFR if African American 03/08/2022 57.6 (A)  >60 mL/min/1.73 m^2 Final    eGFR if non African American 03/08/2022 49.8 (A)  >60 mL/min/1.73 m^2 Final    Comment: Calculation  used to obtain the estimated glomerular filtration  rate (eGFR) is the CKD-EPI equation.           Assessment / Plan:    1.  Routine health exam-patient here for annual wellness exam.  Labs ordered.  Health maintenance was reviewed and ordered.  Anticipatory guidance: Don't smoke.  Healthy diet and regular exercise recommended. Vaccine recommendations discussed.  See orders.  Reviewed Anticipatory guidance, risk factor reduction interventions or counseling as needed, Complete history , physical was completed today.  Complete and thorough medication reconciliation was performed.  Discussed risks and benefits of medications.  Advised patient on orders and health maintenance.  We discussed old records and old labs if available.  Will request any records not available through epic.  Continue current medications listed on your summary sheet.  All questions were answered. Patient had no further concerns. Advised of diagnoses and plan. Follow up as planned or return sooner if symptoms persist or worsen.   Chronic fatigue with B12 deficiency:  If B12 remains low or fatigue persist consider switching to B12 injections.  Check thyroid.  Orders Placed This Encounter   Procedures    TSH     Standing Status:   Standing     Number of Occurrences:   99     Standing Expiration Date:   9/3/2042    Vitamin B12     Standing Status:   Standing     Number of Occurrences:   99     Standing Expiration Date:   11/7/2043             Future Appointments       Date Provider Specialty Appt Notes    9/8/2022 Kadi Sousa NP Urology prostate    9/8/2022  Lab lab           Trevor Jarvis MD

## 2022-09-08 NOTE — PROGRESS NOTES
[facility] Note  Staff: JEFF Suárez    PCP: MD Matheus    Chief Complaint: Prostate Cancer    Subjective:        HPI: Yasir Jeffery Sr. is a 72 y.o. male new patient to me presents today for evaluation of an elevated PSA and prostate cancer. He is accompanied by his wife whom is helpful with his medical history and interview. He has been followed by Dr. Serra for years for these problems. He has a history of three prostate biopsies- first in 2016 with Dr. Aly (negative), 2019 with Dr. Serra (negative), and again in 03/2020 with Dr. Serra showing a Henna score 3+3=6 in 4 core samples and [place] 3+4=7 in 1 core sample. He elected active surveillance and states he monitors with a PSA every 6 months. He updated his PSA today but results are not in Epic yet. He recently had a greenlight TURP with Dr. Serra on 2/18/2022 with Dr. Serra. Since that procedure he has not been taking tamsulosin. He denies any new or worsening urinary complaints such as dysuria, hematuria, urgency, frequency, and feeling of incomplete bladder emptying. He does have a family history of prostate cancer. He denies a history of kidney stones.     Questions asked the pt during ov today:  Urgency: No, urge incontinence? No  NTF: 1-2x night  Dysuria: No  Gross Hematuria:No  Straining:No, Hesistancy:No, Intermittency:No}, Weak stream:No    Last PSA Screening:   Lab Results   Component Value Date    PSADIAG 9.3 (H) 03/08/2022    PSADIAG 10.9 (H) 01/25/2022    PSADIAG 12.5 (H) 10/18/2021       History of Kidney Stones?:  No    Constipation issues?:  No    REVIEW OF SYSTEMS:  Review of Systems   Constitutional: Negative.  Negative for chills and fever.   HENT: Negative.     Eyes: Negative.    Respiratory: Negative.     Cardiovascular: Negative.    Gastrointestinal: Negative.  Negative for abdominal pain, nausea and vomiting.   Genitourinary: Negative.  Negative for dysuria, flank pain, frequency,  hematuria and urgency.   Musculoskeletal: Negative.  Negative for back pain.   Skin: Negative.    Neurological: Negative.    Endo/Heme/Allergies: Negative.    Psychiatric/Behavioral: Negative.       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       PSHx:  Past Surgical History:   Procedure Laterality Date    CYST REMOVAL  1993    neck    CYSTOSCOPY N/A 7/17/2019    Procedure: CYSTOSCOPY;  Surgeon: Ulisses Serra MD;  Location: [facility];  Service: Urology;  Laterality: N/A;    EYE SURGERY Bilateral     cataract surgery    GANGLION CYST EXCISION Right 1994    PROSTATE BIOPSY      TESTICLE SURGERY  1980    TRANSRECTAL BIOPSY OF PROSTATE WITH ULTRASOUND GUIDANCE Bilateral 7/17/2019    Procedure: BIOPSY, PROSTATE, RECTAL APPROACH, WITH US GUIDANCE;  Surgeon: Ulisses Serra MD;  Location: [facility];  Service: Urology;  Laterality: Bilateral;    TRANSRECTAL BIOPSY OF PROSTATE WITH ULTRASOUND GUIDANCE N/A 3/11/2020    Procedure: BIOPSY, PROSTATE, RECTAL APPROACH, WITH US GUIDANCE;  Surgeon: Ulisses Serra MD;  Location: [facility];  Service: Urology;  Laterality: N/A;    TRANSURETHRAL SURGICAL REMOVAL OF PROSTATE (TURP) USING GREEN LIGHT LASER N/A 2/18/2022    Procedure: TURP, USING GREEN LIGHT LASER;  Surgeon: Ulisses Serra MD;  Location: [facility];  Service: Urology;  Laterality: N/A;       Fam Hx:   malignancies: Yes - father prostate cancer    kidney stones: No     Soc Hx:  , lives in  [place]    Allergies:  Benadryl allergy decongestant and Diphenhydramine    Medications: reviewed     Objective:   There were no vitals filed for this visit.    Physical Exam  Constitutional:       Appearance: Normal appearance.   HENT:      Head: Normocephalic.   Eyes:      Conjunctiva/sclera: Conjunctivae normal.   Pulmonary:      Effort: Pulmonary effort is normal.   Abdominal:      General: There is no distension.      Palpations: Abdomen is soft.      Tenderness: There is no abdominal tenderness.   Musculoskeletal:         General: Normal range of motion.      Cervical back: Normal range of motion.   Skin:     General: Skin is warm.   Neurological:      Mental Status: He is alert and oriented to person, place, and time.   Psychiatric:         Mood and Affect: Mood normal.         Behavior: Behavior normal.      EXAM performed by me in office today: deferred by pt    LABS REVIEW:  UA today:  Color:Clear, Yellow  Spec. Grav.  1.020  PH  7.0  Negative for leukocytes, nitrates, glucose, ketones, urobili, bili, and blood.  Trace protein    Assessment:       1. S/P TURP    2. Prostate CA    3. Elevated PSA          Plan:     Will call pt with PSA results tomorrow when resulted  We discussed if PSA remains elevated >10 will consider repeat prostate biopsy for staging    F/u Every 6 months as needed    [facility]: Active    JEFF Suárez

## 2022-09-08 NOTE — PATIENT INSTRUCTIONS
Follow up in 1 year (on 9/8/2023), or if symptoms worsen or fail to improve, for Annual Wellness Exam.     Dear patient,   As a result of recent federal legislation (The Federal Cures Act), you may receive lab or pathology results from your visit in your [facility] account before your physician is able to contact you. Your physician or their representative will relay the results to you with their recommendations at their soonest availability.     If no improvement in symptoms or symptoms worsen, please be advised to call MD, follow-up at clinic and/or go to ER if becomes severe.    Trevor Jarvis M.D.        We Offer TELEHEALTH & Same Day Appointments!   Book your Telehealth appointment with me through my nurse or   Clinic appointments on [email or web address]!    [address] [place] [address]    Office: [phone]   FAX: [phone]    Check out my Facebook Page and Follow Me at: [email or web address]    Check out my website at [employer] by clicking on: [email or web address]    To Schedule appointments online, go to [email or web address]: [email or web address]

## 2022-09-09 NOTE — PROGRESS NOTES
Make follow-up lab appointment per recommendation below.  Check to see if patient has seen the results through my chart.  If not then,  #CALL THE PATIENT# to discuss results/see if they have questions and document verification of contact. Make F/U appt if needed. [phone]    #My interpretation that was sent to them through [employer]:  Bill, I have reviewed your recent blood work.     PSA diagnostic test is elevated from previous.  Now greater than 12.  Follow-up with Urology for further evaluation and treatment.  Your metabolic panel which shows your glucose, kidney function, electrolytes, and liver function is abnormal.  Potassium level is elevated, kidney function is decreased which indicates possible dehydration versus progression toward chronic kidney disease.  I recommend increase hydration with water avoid high potassium foods and recheck metabolic panel.  Your cholesterol is significantly elevated from previous.  The 10 year risk score for having a heart attack or stroke is significantly elevated.  I recommend starting medication to lower LDL and total cholesterol.  Please let me know if patient is in agreement and I will send the medication to the pharmacy.  Your hemoglobin A1c is normal.  This test is gold standard screening test for diabetes.  It is a measures 3 months of your average blood sugar.    =========================  Also please address any outstanding health maintenance that may be due: COVID-19 Vaccine(1) Never done  Shingles Vaccine(1 of 2) due on 11/02/2017  Influenza Vaccine(1) Never done

## 2022-09-12 ENCOUNTER — TELEPHONE (OUTPATIENT)
Dept: UROLOGY | Facility: CLINIC | Age: 72
End: 2022-09-12
Payer: MEDICARE

## 2022-09-12 DIAGNOSIS — C61 PROSTATE CA: Primary | ICD-10-CM

## 2022-09-12 NOTE — TELEPHONE ENCOUNTER
Spoke with patient advised Prostate MRI scheduled for 9/19/2022 @ 730 am, patient expressed understanding.

## 2022-09-22 ENCOUNTER — TELEPHONE (OUTPATIENT)
Dept: FAMILY MEDICINE | Facility: CLINIC | Age: 72
End: 2022-09-22
Payer: MEDICARE

## 2022-09-22 DIAGNOSIS — I25.10 ASCVD (ARTERIOSCLEROTIC CARDIOVASCULAR DISEASE): Primary | ICD-10-CM

## 2022-09-22 DIAGNOSIS — E78.5 HYPERLIPIDEMIA, UNSPECIFIED HYPERLIPIDEMIA TYPE: ICD-10-CM

## 2022-09-22 RX ORDER — ATORVASTATIN CALCIUM 40 MG/1
40 TABLET, FILM COATED ORAL DAILY
Qty: 90 TABLET | Refills: 3 | Status: SHIPPED | OUTPATIENT
Start: 2022-09-22 | End: 2023-08-21 | Stop reason: SDUPTHER

## 2022-09-22 NOTE — TELEPHONE ENCOUNTER
I have signed for the following orders AND/OR meds.  Please call the patient and ask the patient to schedule the testing AND/OR inform about any medications that were sent.      No orders of the defined types were placed in this encounter.      Medications Ordered This Encounter   Medications    atorvastatin (LIPITOR) 40 MG tablet     Sig: Take 1 tablet (40 mg total) by mouth once daily.     Dispense:  90 tablet     Refill:  3

## 2022-09-22 NOTE — TELEPHONE ENCOUNTER
Patient is in agreement with starting cholesterol medication, he would like the prescription sent to [facility].

## 2022-09-26 ENCOUNTER — TELEPHONE (OUTPATIENT)
Dept: FAMILY MEDICINE | Facility: CLINIC | Age: 72
End: 2022-09-26
Payer: MEDICARE

## 2022-09-26 DIAGNOSIS — E53.8 B12 DEFICIENCY: Primary | ICD-10-CM

## 2022-09-26 RX ORDER — CYANOCOBALAMIN 1000 UG/ML
1000 INJECTION, SOLUTION INTRAMUSCULAR; SUBCUTANEOUS WEEKLY
Qty: 4 ML | Refills: 1 | Status: SHIPPED | OUTPATIENT
Start: 2022-09-26 | End: 2022-10-25 | Stop reason: SDUPTHER

## 2022-09-26 NOTE — TELEPHONE ENCOUNTER
I have signed for the following orders AND/OR meds.  Please call the patient and ask the patient to schedule the testing AND/OR inform about any medications that were sent.      Orders Placed This Encounter   Procedures    Vitamin B12     Standing Status:   Future     Standing Expiration Date:   11/25/2023       Medications Ordered This Encounter   Medications    cyanocobalamin 1,000 mcg/mL injection     Sig: Inject 1 mL (1,000 mcg total) into the muscle once a week.     Dispense:  4 mL     Refill:  1

## 2022-09-26 NOTE — TELEPHONE ENCOUNTER
----- Message from Ruby Prescott MA sent at 9/26/2022  4:05 PM CDT -----  Patient informed and verbalized understanding. Pt reports he is in agreement to start the  B12 injections at home. Please place order and send medication to [facility]

## 2022-10-11 ENCOUNTER — TELEPHONE (OUTPATIENT)
Dept: UROLOGY | Facility: CLINIC | Age: 72
End: 2022-10-11
Payer: MEDICARE

## 2022-10-11 NOTE — TELEPHONE ENCOUNTER
Returned pt call and spoke with his wife Lina.  Pt spouse v/u MRI test results and pt is to repeat prostate biopsy for staging within the next year.

## 2022-10-11 NOTE — TELEPHONE ENCOUNTER
----- Message from Frida Cynthia sent at 10/10/2022 11:43 AM CDT -----  Contact: Spouse/ Lina  Type: Needs Medical Advice    Who Called:  Spouse/ Lina  Symptoms (please be specific):  MRI results     Best Call Back Number: [phone]   Additional Information: The pt stated that the pt never received results from his MRI on 9/19 regarding his prostate. Please call caller back ASAP. Thanks.

## 2022-10-25 DIAGNOSIS — E53.8 B12 DEFICIENCY: ICD-10-CM

## 2022-10-25 RX ORDER — CYANOCOBALAMIN 1000 UG/ML
1000 INJECTION, SOLUTION INTRAMUSCULAR; SUBCUTANEOUS WEEKLY
Qty: 4 ML | Refills: 5 | Status: SHIPPED | OUTPATIENT
Start: 2022-10-25 | End: 2023-05-12 | Stop reason: SDUPTHER

## 2022-10-25 NOTE — TELEPHONE ENCOUNTER
----- Message from Jia Hall sent at 10/24/2022  3:54 PM CDT -----  Contact: Lina  The patient spouse is requesting a callback from the nurse in regards to knowing if the patient shall continue to take the B-12 injections     Please all Lina at [phone]    Thanks

## 2023-02-09 DIAGNOSIS — Z00.00 ENCOUNTER FOR MEDICARE ANNUAL WELLNESS EXAM: ICD-10-CM

## 2023-03-16 ENCOUNTER — TELEPHONE (OUTPATIENT)
Dept: UROLOGY | Facility: CLINIC | Age: 73
End: 2023-03-16
Payer: MEDICARE

## 2023-03-16 DIAGNOSIS — R97.20 ELEVATED PSA: Primary | ICD-10-CM

## 2023-03-21 ENCOUNTER — LAB VISIT (OUTPATIENT)
Dept: LAB | Facility: HOSPITAL | Age: 73
End: 2023-03-21
Payer: MEDICARE

## 2023-03-21 DIAGNOSIS — R97.20 ELEVATED PSA: ICD-10-CM

## 2023-03-21 LAB — COMPLEXED PSA SERPL-MCNC: 13.3 NG/ML (ref 0–4)

## 2023-03-21 PROCEDURE — 84153 ASSAY OF PSA TOTAL: CPT | Mod: HCNC

## 2023-03-21 PROCEDURE — 36415 COLL VENOUS BLD VENIPUNCTURE: CPT | Mod: HCNC,PO

## 2023-03-22 ENCOUNTER — TELEPHONE (OUTPATIENT)
Dept: UROLOGY | Facility: CLINIC | Age: 73
End: 2023-03-22
Payer: MEDICARE

## 2023-03-22 NOTE — TELEPHONE ENCOUNTER
Spoke to the pt's wife and she verbally understood that Kadi will speak to both of them at the time of his appt on 3-28-23 about the PSA results.

## 2023-03-22 NOTE — TELEPHONE ENCOUNTER
----- Message from Kadi Sousa NP sent at 3/22/2023  9:17 AM CDT -----  Will discuss at upcoming appt

## 2023-03-28 ENCOUNTER — TELEPHONE (OUTPATIENT)
Dept: UROLOGY | Facility: CLINIC | Age: 73
End: 2023-03-28

## 2023-03-28 ENCOUNTER — OFFICE VISIT (OUTPATIENT)
Dept: UROLOGY | Facility: CLINIC | Age: 73
End: 2023-03-28
Payer: MEDICARE

## 2023-03-28 VITALS — HEIGHT: 70 IN | WEIGHT: 235.44 LBS | BODY MASS INDEX: 33.7 KG/M2

## 2023-03-28 DIAGNOSIS — C61 PROSTATE CA: ICD-10-CM

## 2023-03-28 DIAGNOSIS — Z90.79 S/P TURP: ICD-10-CM

## 2023-03-28 DIAGNOSIS — R97.20 ELEVATED PSA: Primary | ICD-10-CM

## 2023-03-28 PROCEDURE — 99999 PR PBB SHADOW E&M-EST. PATIENT-LVL III: CPT | Mod: PBBFAC,HCNC,,

## 2023-03-28 PROCEDURE — 1101F PT FALLS ASSESS-DOCD LE1/YR: CPT | Mod: HCNC,CPTII,S$GLB,

## 2023-03-28 PROCEDURE — 1159F MED LIST DOCD IN RCRD: CPT | Mod: HCNC,CPTII,S$GLB,

## 2023-03-28 PROCEDURE — 99214 PR OFFICE/OUTPT VISIT, EST, LEVL IV, 30-39 MIN: ICD-10-PCS | Mod: HCNC,S$GLB,,

## 2023-03-28 PROCEDURE — 99214 OFFICE O/P EST MOD 30 MIN: CPT | Mod: HCNC,S$GLB,,

## 2023-03-28 PROCEDURE — 1160F RVW MEDS BY RX/DR IN RCRD: CPT | Mod: HCNC,CPTII,S$GLB,

## 2023-03-28 PROCEDURE — 1126F PR PAIN SEVERITY QUANTIFIED, NO PAIN PRESENT: ICD-10-PCS | Mod: HCNC,CPTII,S$GLB,

## 2023-03-28 PROCEDURE — 3008F BODY MASS INDEX DOCD: CPT | Mod: HCNC,CPTII,S$GLB,

## 2023-03-28 PROCEDURE — 3288F FALL RISK ASSESSMENT DOCD: CPT | Mod: HCNC,CPTII,S$GLB,

## 2023-03-28 PROCEDURE — 99999 PR PBB SHADOW E&M-EST. PATIENT-LVL III: ICD-10-PCS | Mod: PBBFAC,HCNC,,

## 2023-03-28 PROCEDURE — 3288F PR FALLS RISK ASSESSMENT DOCUMENTED: ICD-10-PCS | Mod: HCNC,CPTII,S$GLB,

## 2023-03-28 PROCEDURE — 1126F AMNT PAIN NOTED NONE PRSNT: CPT | Mod: HCNC,CPTII,S$GLB,

## 2023-03-28 PROCEDURE — 3008F PR BODY MASS INDEX (BMI) DOCUMENTED: ICD-10-PCS | Mod: HCNC,CPTII,S$GLB,

## 2023-03-28 PROCEDURE — 1160F PR REVIEW ALL MEDS BY PRESCRIBER/CLIN PHARMACIST DOCUMENTED: ICD-10-PCS | Mod: HCNC,CPTII,S$GLB,

## 2023-03-28 PROCEDURE — 1101F PR PT FALLS ASSESS DOC 0-1 FALLS W/OUT INJ PAST YR: ICD-10-PCS | Mod: HCNC,CPTII,S$GLB,

## 2023-03-28 PROCEDURE — 1159F PR MEDICATION LIST DOCUMENTED IN MEDICAL RECORD: ICD-10-PCS | Mod: HCNC,CPTII,S$GLB,

## 2023-03-28 NOTE — PROGRESS NOTES
[facility] Note  Staff: JEFF Suárez    PCP: MD Matheus    Chief Complaint: Elevated PSA    Subjective:        HPI: Yasir Jeffery Sr. is a 72 y.o. male presents today for follow up elevated PSA. He is accompanied by his wife whom is helpful with the interview. His most recent PSA from 3/21/2023 was 13.3. Prior PSA from 9/8/2022 was 12.3. He has a history of prostate cancer diagnosed by Dr. Serra. He has a history of three prostate biopsies- first in 2016 with Dr. Aly (negative), 2019 with Dr. Serra (negative), and again in 03/2020 with Dr. Serra showing a Henna score 3+3=6 in 4 core samples and [place] 3+4=7 in 1 core sample. He elected active surveillance and states he monitors with a PSA every 6 months. He updated his MRI of his prostate 9/19/2022 which showed The prostate volume is estimated at 42 cc. A defect is present to the   transitional zone base and was not present on the prior study. The appearance  is most consistent with an interval TURP or similar prostatic resection.   Lesions 1 and 2 are once again visualized with specific details listed below.  Lesion 3 is not clearly visible on today's study.      Lesion 1: The relatively diffuse lesion previously seen to the left   peripheral zone has decreased in size and conspicuity and may be down graded   from highly concerning to moderately concerning for malignancy (BI-RADS 3/5).  It measures approximately 12 millimeters x 10 millimeters x 5 millimeters in   size and is present in the medial and lateral segments of the left peripheral  zone base      Lesion 2: A 15 millimeter x 13 millimeter x 12 millimeter lesion is present   to the left transitional zone base and the anterior fibromuscular stroma   immediately adjacent to the transitional zone defect. The multi para metric   characteristics have worsened since the prior study and although the overall   size appears to have decreased, this likely reflects  better differentiation   between a smudgy architectural component and the relatively benign   transitional zone architecture adjacent to it. The lesion may be upgraded   from moderately concerning to highly concerning for malignancy (PI-RADS 4/5).  The anterior left transitional zone base would represent the ideal target for  biopsy if direct tissue acquisition is desired. The anterior capsule bulges   outward but does not appear visibly disrupted.      Stable nodularity present to the posterior columns of the acetabula.     I reviewed imaging with his today. Recommended he update staging with an updated prostate biopsy and he agrees to this plan. He denies any new or worsening urinary complaints such as dysuria, hematuria, flank pain, abd pain, urgency, frequency, incontinence, and difficulty urinating. He states he is experiencing a weaker urinary stream and restarted tamsulosin 0.4mg but his wife states he is not consistent in taking this.     Questions asked the pt during ov today:  Urgency: No, urge incontinence? No  NTF: 1-2x night  Dysuria: No  Gross Hematuria:No  Straining:No, Hesistancy:No, Intermittency:No}, Weak stream:Yes    Last PSA Screening:   Lab Results   Component Value Date    PSADIAG 13.3 (H) 03/21/2023    PSADIAG 12.3 (H) 09/08/2022    PSADIAG 9.3 (H) 03/08/2022       History of Kidney Stones?:  No    Constipation issues?:  No    REVIEW OF SYSTEMS:  Review of Systems   Constitutional: Negative.  Negative for chills and fever.   HENT: Negative.     Eyes: Negative.    Respiratory: Negative.     Cardiovascular: Negative.    Gastrointestinal: Negative.  Negative for abdominal pain, constipation, diarrhea, nausea and vomiting.   Genitourinary: Negative.  Negative for dysuria, flank pain, frequency, hematuria and urgency.   Musculoskeletal: Negative.  Negative for back pain.   Skin: Negative.    Neurological: Negative.    Endo/Heme/Allergies: Negative.    Psychiatric/Behavioral: Negative.        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       PSHx:  Past Surgical History:   Procedure Laterality Date    CYST REMOVAL  1993    neck    CYSTOSCOPY N/A 7/17/2019    Procedure: CYSTOSCOPY;  Surgeon: Ulisses Serra MD;  Location: [facility];  Service: Urology;  Laterality: N/A;    EYE SURGERY Bilateral     cataract surgery    GANGLION CYST EXCISION Right 1994    PROSTATE BIOPSY      TESTICLE SURGERY  1980    TRANSRECTAL BIOPSY OF PROSTATE WITH ULTRASOUND GUIDANCE Bilateral 7/17/2019    Procedure: BIOPSY, PROSTATE, RECTAL APPROACH, WITH US GUIDANCE;  Surgeon: Ulisses Serra MD;  Location: [facility];  Service: Urology;  Laterality: Bilateral;    TRANSRECTAL BIOPSY OF PROSTATE WITH ULTRASOUND GUIDANCE N/A 3/11/2020    Procedure: BIOPSY, PROSTATE, RECTAL APPROACH, WITH US GUIDANCE;  Surgeon: Ulisses Serra MD;  Location: [facility];  Service: Urology;  Laterality: N/A;    TRANSURETHRAL SURGICAL REMOVAL OF PROSTATE (TURP) USING GREEN LIGHT LASER N/A 2/18/2022    Procedure: TURP, USING GREEN LIGHT LASER;  Surgeon: Ulisses Serra MD;  Location: [facility];  Service: Urology;  Laterality: N/A;       Fam Hx:   malignancies: Yes - father prostate cancer    kidney stones: No     Soc Hx:  , lives in [place]    Allergies:  Benadryl allergy decongestant and Diphenhydramine    Medications: reviewed     Objective:   There were no vitals filed for this visit.    Physical Exam  Constitutional:       Appearance:  Normal appearance.   HENT:      Head: Normocephalic.      Mouth/Throat:      Mouth: Mucous membranes are moist.   Eyes:      Conjunctiva/sclera: Conjunctivae normal.   Pulmonary:      Effort: Pulmonary effort is normal.   Abdominal:      General: There is no distension.      Palpations: Abdomen is soft.      Tenderness: There is no abdominal tenderness. There is no right CVA tenderness or left CVA tenderness.   Musculoskeletal:         General: Normal range of motion.      Cervical back: Normal range of motion.   Skin:     General: Skin is warm.   Neurological:      Mental Status: He is alert and oriented to person, place, and time.   Psychiatric:         Mood and Affect: Mood normal.         Behavior: Behavior normal.        EXAM performed by me in office today:  deferred by pt    LABS REVIEW:  UA today:  Color:Clear, Yellow  Spec. Grav.  1.020  PH  5.5  Negative for nitrates, protein, glucose, ketones, urobili, bili, and blood.  Trace leuks    Assessment:       1. Elevated PSA    2. Prostate CA    3. S/P TURP          Plan:     Will proceed with URONAV prostate biopsy with urologists to update staging  Continue tamsulosin 0.4mg daily as prescribed    F/u As Needed per Treatment Plan    [facility]: Active    JEFF Suárez

## 2023-03-29 RX ORDER — LEVOFLOXACIN 500 MG/1
500 TABLET, FILM COATED ORAL DAILY
Qty: 3 TABLET | Refills: 0 | Status: SHIPPED | OUTPATIENT
Start: 2023-03-29 | End: 2023-05-12

## 2023-04-10 ENCOUNTER — TELEPHONE (OUTPATIENT)
Dept: UROLOGY | Facility: CLINIC | Age: 73
End: 2023-04-10
Payer: MEDICARE

## 2023-04-10 NOTE — TELEPHONE ENCOUNTER
----- Message from Ga Wells MA sent at 4/10/2023  9:09 AM CDT -----  Regarding: FW: general info  Contact: Wife, Lina  Please Advise  ----- Message -----  From: Fozia Nova  Sent: 4/10/2023   8:58 AM CDT  To: EJ Chan Staff [facility]  Subject: general info                                     Lina want to speak with a nurse regarding questions about upcoming procedure, please call back at [phone] (home) [phone]

## 2023-04-10 NOTE — TELEPHONE ENCOUNTER
Spoke with patient wife, pre op scheduled for tomorrow 4/11 at 3 pm , patient wife expressed understanding.

## 2023-04-24 ENCOUNTER — TELEPHONE (OUTPATIENT)
Dept: UROLOGY | Facility: CLINIC | Age: 73
End: 2023-04-24
Payer: MEDICARE

## 2023-04-24 ENCOUNTER — TELEPHONE (OUTPATIENT)
Dept: ADMINISTRATIVE | Facility: HOSPITAL | Age: 73
End: 2023-04-24
Payer: MEDICARE

## 2023-04-24 NOTE — TELEPHONE ENCOUNTER
----- Message from Dana Lockhart sent at 4/24/2023 12:35 PM CDT -----  Type:  Sooner Appointment Request    Caller is requesting a sooner appointment.  Caller declined first available appointment listed below.  Caller will not accept being placed on the waitlist and is requesting a message be sent to doctor.    Name of Caller:  pt wife beverley   When is the first available appointment?  Unk   Symptoms:  biopsy  test results   Best Call Back Number:  [phone] (home)     Additional Information:  requesting a call back please advise thank you

## 2023-05-04 NOTE — PROGRESS NOTES
echSubjective:    Patient ID:  Yasir Jeffery Sr. is a 72 y.o. male who presents for evaluation of Follow-up and Fatigue      HPI: Mr. Yasir Jeffery Sr. presents to the clinic with his wife for follow-up of HTN, mixed HLP. He reports ALVARADO with walking around or getting dressed. He has fatigue every day. His wife stated that he will do an activity for about 5 minutes before he has to stop and rest. He denied any chest pain. He denied orthopnea, PND, but occasional edema when on his feet. He denied palpitations, lightheadedness, dizziness, or near syncope. He ran out of B12 and did not know if he should continue it. He has been out for some time now.  He does have prostate cancer for which he has been doing active surveillance.  He never went for MARIN titration study.        Medications: he is missing doses of medications. He is taking aspirin.  He has been out of coreg for a few weeks. He is taking valsartan 320 mg p.o. q.day and amlodipine 5mg for the last 2 weeks.  He has been taking lipitor for about 2 weeks.  Sodium: he does not add salt to foods,  he is not reading labels for sodium content. He denied eating salty foods.  Diet: stopped eating oatmeal. Eating two meals per day. Chicken, occasional red meat. Cooks with a little butter. He generally cooks his own meals from scratch. He is not drinking juice or sodas. He may eat a donut at [employer]; he likes [employer].  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 He limits climbing due to osteoarthritis pain-shoulders, hips, and knees and fatigue.  Tobacco: hasn't smoked in years. no alcohol use  Coffee: one-3 cups/day.     Weight: 104.6 kg (230 lb 9.6 oz) he states that his daily weights has been stable. Body mass index is 32.62 kg/m².  Wt Readings from Last 3 Encounters:   05/05/23 104.6 kg (230 lb 9.6 oz)   04/14/23 104 kg (229 lb 4.5 oz)   04/11/23 105.3 kg (232 lb 2.3 oz)     BP log:  None. He stated that it runs 130-140/70s-80s, but  he hasn't been taking medications as directed.    Review of Systems   Constitutional: Positive for malaise/fatigue. Negative for chills, decreased appetite, diaphoresis, fever, night sweats, weight gain and weight loss.   HENT:          Chronic sinus problems   Cardiovascular:  Positive for dyspnea on exertion. Negative for chest pain, claudication, cyanosis, irregular heartbeat, near-syncope, orthopnea, palpitations, paroxysmal nocturnal dyspnea and syncope.   Respiratory:  Negative for cough, hemoptysis, shortness of breath, sputum production and wheezing.    Hematologic/Lymphatic: Negative for adenopathy and bleeding problem. Does not bruise/bleed easily.   Skin:  Negative for color change and nail changes.   Gastrointestinal:  Negative for bloating, abdominal pain, change in bowel habit, heartburn, hematochezia, melena, nausea and vomiting.   Genitourinary:  Negative for hematuria.   Neurological:  Negative for dizziness and light-headedness.   Psychiatric/Behavioral:  Negative for altered mental status.      Objective:   Physical Exam  Constitutional:       General: He is not in acute distress.     Appearance: He is well-developed. He is not diaphoretic.   HENT:      Head: Normocephalic and atraumatic.   Eyes:      General: No scleral icterus.     Conjunctiva/sclera: Conjunctivae normal.   Neck:      Thyroid: No thyromegaly.      Vascular: No JVD.      Trachea: No tracheal deviation.   Cardiovascular:      Rate and Rhythm: Normal rate and regular rhythm.      Pulses: Intact distal pulses.      Heart sounds: Normal heart sounds. No murmur heard.    No friction rub. No gallop.   Pulmonary:      Effort: Pulmonary effort is normal. No respiratory distress.      Breath sounds: Normal breath sounds. No wheezing or rales.   Chest:      Chest wall: No tenderness.   Abdominal:      General: Bowel sounds are normal. There is no distension.      Palpations: Abdomen is soft. There is no mass.      Tenderness: There is no  abdominal tenderness. There is no guarding or rebound.   Musculoskeletal:         General: Normal range of motion.      Cervical back: Neck supple.      Right lower leg: Edema (trace pretibial edema.He stated that his right leg does tend to swell more readily than his left; no injury/trauma to that leg.) present.      Left lower leg: No edema.   Lymphadenopathy:      Cervical: No cervical adenopathy.   Skin:     General: Skin is warm and dry.      Coloration: Skin is not pale.      Findings: No erythema or rash.      Comments: Theodore   Neurological:      Mental Status: He is alert and oriented to person, place, and time.      Latest Reference Range & Units 09/08/22 09:34 03/21/23 10:35 04/11/23 15:17   Sodium 136 - 145 mmol/L 141  138   Potassium 3.5 - 5.1 mmol/L 5.2 (H)  4.8   Chloride 95 - 110 mmol/L 105  102   CO2 22 - 31 mmol/L 26  27   Anion Gap 8 - 16 mmol/L 10  9   BUN 9 - 21 mg/dL 16  25 (H)   Creatinine 0.50 - 1.40 mg/dL 1.6 (H)  1.48 (H)   eGFR >60 mL/min/1.73 m^2 45.2 !  50 !   Glucose 70 - 110 mg/dL 98  95   Calcium 8.4 - 10.2 mg/dL 9.9  9.3   Alkaline Phosphatase 55 - 135 U/L 61     PROTEIN TOTAL 6.0 - 8.4 g/dL 7.4     Albumin 3.5 - 5.2 g/dL 4.2     BILIRUBIN TOTAL 0.1 - 1.0 mg/dL 0.9     AST 10 - 40 U/L 22     ALT 10 - 44 U/L 13     Cholesterol 120 - 199 mg/dL 248 (H)     HDL 40 - 75 mg/dL 34 (L)     HDL/Cholesterol Ratio 20.0 - 50.0 % 13.7 (L)     LDL Cholesterol External 63.0 - 159.0 mg/dL 176.6 (H)     Non-HDL Cholesterol mg/dL 214     Total Cholesterol/HDL Ratio 2.0 - 5.0  7.3 (H)     Triglycerides 30 - 150 mg/dL 187 (H)     Hemoglobin A1C External 4.0 - 5.6 % 5.4     Estimated Avg Glucose mg/dL 108  114   Hemoglobin A1c referral test <=5.6 %   5.6   PSA Diagnostic 0.00 - 4.00 ng/mL 12.3 (H) 13.3 (H)    (H): Data is abnormally high  !: Data is abnormal  (L): Data is abnormally low        48-hour Holter April 23, 2021:  Supraventricular ectopic activity consisted of 93954 beats, of which, 35 were in 9  runs of EAT, 400 were in atrial couplets, 24  were late beats, 71953 were single PACs, 3006 were in bigeminy, 65 were in trigeminy. The longest R-R interval was 1.5 seconds  occurring at 9:15:33 AM D2. The longest N-N interval was 1.4 seconds occurring at 5:38:50 AM D1. The longest supraventricular  run occurred at 2:27:57 AM D1 consisting of 6 beats, with maximum heart rate of 118 BPM. The fastest supraventricular run  occurred at 8:09:00 PM D2, consisting of 3 beats, with maximum heart rate of 169 BPM.PVCs.    CXR 2/24/2021: FINDINGS:  NO SIGNIFICANT INTERVAL CHANGE.  LUNGS SYMMETRICALLY AERATED THERE IS AGAIN NOTED TO BE BILATERAL GRANULOMAS.  NO NEW OR DEVELOPING AREA OF CONSOLIDATION OR EFFUSION.  HEART AND PULMONARY VASCULATURE WITHIN NORMAL LIMITS.  MILD DEGENERATIVE CHANGE THROUGHOUT THE SPINE WITH MINIMAL UNDERLYING SCOLIOSIS.  MULTILEVEL BRIDGING SYNDESMOPHYTES PRESENT.   Impression:   STABLE EXAM WITHOUT ACUTE INFILTRATE.    Pharmacologic nuclear medicine stress test 03/08/2021:Conclusion       Abnormal myocardial perfusion scan.    There is a mild intensity, fixed defect consistent with scar in the inferoapical wall(s).    The gated perfusion images showed an ejection fraction of 44% at rest. The gated perfusion images showed an ejection fraction of 44% post stress. Normal ejection fraction is greater than 54%.    There is mild global hypokinesis at rest and stress.    LV cavity size is mildly enlarged at rest and mildly enlarged at stress.    The EKG portion of this study is negative for ischemia.    The patient reported no chest pain during the stress test.   Spoke with Dr. Chavira no reversible ischemia.      Echo 3/10/2021: Conclusion  The left ventricle is normal in size with concentric hypertrophy and normal systolic function. The estimated ejection fraction is 55%  Indeterminate left ventricular diastolic function.  Normal right ventricular size with normal right ventricular systolic  function.  Normal central venous pressure (3 mmHg).  The estimated PA systolic pressure is 35 mmHg.       Echocardiogram 03/05/2020:Conclusion  Concentric left ventricular remodeling.  Normal left ventricular systolic function. The estimated ejection fraction is 55%.  Normal LV diastolic function.  Normal central venous pressure (3 mmHg).  The estimated PA systolic pressure is 29 mmHg.     Echo 11/02/17: CONCLUSIONS     1 - Concentric hypertrophy.     2 - No wall motion abnormalities.     3 - Low normal to mildly depressed left ventricular systolic function (EF 50-55%).     4 - Normal left ventricular diastolic function.     5 - Normal right ventricular systolic function .     6 - The estimated PA systolic pressure is 31 mmHg.     7 - Mild mitral regurgitation.     Assessment:      1. Essential hypertension    2. Mixed hyperlipidemia    3. Vitamin D deficiency    4. B12 deficiency    5. Metabolic syndrome    6. Class 1 obesity due to excess calories with serious comorbidity and body mass index (BMI) of 31.0 to 31.9 in adult    7. Obstructive sleep apnea    8. Stage 3a chronic kidney disease    9. Palpitations    10. Ectopic atrial tachycardia    11. Premature atrial contractions    12. ALVARADO (dyspnea on exertion)    13. Fatigue, unspecified type    14. Prostate cancer      Plan:   Essential hypertension  -     carvediloL (COREG) 6.25 MG tablet; Take 1 tablet (6.25 mg total) by mouth 2 (two) times daily with meals.  Dispense: 180 tablet; Refill: 3  -     amLODIPine (NORVASC) 5 MG tablet; Take 1 tablet (5 mg total) by mouth once daily.  Dispense: 30 tablet; Refill: 11  -     Echo; Future    Mixed hyperlipidemia    Vitamin D deficiency    B12 deficiency    Metabolic syndrome    Class 1 obesity due to excess calories with serious comorbidity and body mass index (BMI) of 31.0 to 31.9 in adult    Obstructive sleep apnea    Stage 3a chronic kidney disease    Palpitations  -     carvediloL (COREG) 6.25 MG tablet; Take 1  tablet (6.25 mg total) by mouth 2 (two) times daily with meals.  Dispense: 180 tablet; Refill: 3    Ectopic atrial tachycardia  -     carvediloL (COREG) 6.25 MG tablet; Take 1 tablet (6.25 mg total) by mouth 2 (two) times daily with meals.  Dispense: 180 tablet; Refill: 3    Premature atrial contractions  -     carvediloL (COREG) 6.25 MG tablet; Take 1 tablet (6.25 mg total) by mouth 2 (two) times daily with meals.  Dispense: 180 tablet; Refill: 3    ALVARADO (dyspnea on exertion)  -     IN OFFICE EKG 12-LEAD (to Muse)  -     Nuclear Stress - 3rd Party; Future  -     Echo; Future    Fatigue, unspecified type  -     Nuclear Stress - 3rd Party; Future  -     Echo; Future    Prostate cancer      EKG today: Sinus rhythm @ 74 BPM. Q waves in 2, 3, aVF.  Will get nuclear stress test and complete echo to evaluate ALVARADO and fatigue/lack of energy.  Will restart Coreg 6.25mg p.o. b.i.d.; due to bradycardia in the past we have not increased this dose-hypertension, PACs and PVCs.  Continue amlodipine 5mg QD, valsartan 320 mg p.o. q.day-hypertension.  Monitor BP at home-his wife is a nurse and checks it manually.  Blood pressure log.  Sodium restriction encouraged.  Encouraged them to read labels for sodium content.  Follow up with Dr. Jarvis regarding B12 status. Message sent to his staff for appointment.  Encouraged walking daily.  Whole foods diet recommended.  Continue lipitor - HLP  Continue ASA daily.  Follow-up in 1 month for test results and BP log or call sooner for any problems.  Ani Srivastava NP  [facility]    This note has been prepared using a combination of [employer]by dictation device and typing.  It has been checked for errors but some errors may still exist within the note as a result of speech recognition errors and/or typographical errors.     Addendum May 25, 2023 8:29am:  Stress test and echo look ok; referral to pulmonology for evaluation of ALVARADO. He prefers [place].  Ani Srivastava NP  [facility]

## 2023-05-05 ENCOUNTER — HOSPITAL ENCOUNTER (OUTPATIENT)
Dept: CARDIOLOGY | Facility: HOSPITAL | Age: 73
Discharge: HOME | End: 2023-05-05
Payer: MEDICARE

## 2023-05-05 ENCOUNTER — OFFICE VISIT (OUTPATIENT)
Dept: CARDIOLOGY | Facility: CLINIC | Age: 73
End: 2023-05-05
Payer: MEDICARE

## 2023-05-05 ENCOUNTER — TELEPHONE (OUTPATIENT)
Dept: FAMILY MEDICINE | Facility: CLINIC | Age: 73
End: 2023-05-05
Payer: MEDICARE

## 2023-05-05 VITALS
SYSTOLIC BLOOD PRESSURE: 114 MMHG | WEIGHT: 230.63 LBS | DIASTOLIC BLOOD PRESSURE: 58 MMHG | HEART RATE: 72 BPM | HEIGHT: 71 IN | BODY MASS INDEX: 32.29 KG/M2

## 2023-05-05 DIAGNOSIS — C61 PROSTATE CANCER: ICD-10-CM

## 2023-05-05 DIAGNOSIS — I47.19 ECTOPIC ATRIAL TACHYCARDIA: ICD-10-CM

## 2023-05-05 DIAGNOSIS — R00.2 PALPITATIONS: ICD-10-CM

## 2023-05-05 DIAGNOSIS — E55.9 VITAMIN D DEFICIENCY: ICD-10-CM

## 2023-05-05 DIAGNOSIS — E78.2 MIXED HYPERLIPIDEMIA: ICD-10-CM

## 2023-05-05 DIAGNOSIS — G47.33 OBSTRUCTIVE SLEEP APNEA: ICD-10-CM

## 2023-05-05 DIAGNOSIS — E88.810 METABOLIC SYNDROME: ICD-10-CM

## 2023-05-05 DIAGNOSIS — R53.83 FATIGUE, UNSPECIFIED TYPE: ICD-10-CM

## 2023-05-05 DIAGNOSIS — I10 ESSENTIAL HYPERTENSION: Primary | ICD-10-CM

## 2023-05-05 DIAGNOSIS — E66.09 CLASS 1 OBESITY DUE TO EXCESS CALORIES WITH SERIOUS COMORBIDITY AND BODY MASS INDEX (BMI) OF 31.0 TO 31.9 IN ADULT: ICD-10-CM

## 2023-05-05 DIAGNOSIS — I49.1 PREMATURE ATRIAL CONTRACTIONS: ICD-10-CM

## 2023-05-05 DIAGNOSIS — R06.09 DOE (DYSPNEA ON EXERTION): ICD-10-CM

## 2023-05-05 DIAGNOSIS — N18.31 STAGE 3A CHRONIC KIDNEY DISEASE: ICD-10-CM

## 2023-05-05 DIAGNOSIS — E53.8 B12 DEFICIENCY: ICD-10-CM

## 2023-05-05 PROCEDURE — 1101F PT FALLS ASSESS-DOCD LE1/YR: CPT | Mod: CPTII,S$GLB,, | Performed by: NURSE PRACTITIONER

## 2023-05-05 PROCEDURE — 93010 ELECTROCARDIOGRAM REPORT: CPT | Mod: S$GLB,,, | Performed by: INTERNAL MEDICINE

## 2023-05-05 PROCEDURE — 1159F PR MEDICATION LIST DOCUMENTED IN MEDICAL RECORD: ICD-10-PCS | Mod: CPTII,S$GLB,, | Performed by: NURSE PRACTITIONER

## 2023-05-05 PROCEDURE — 1160F PR REVIEW ALL MEDS BY PRESCRIBER/CLIN PHARMACIST DOCUMENTED: ICD-10-PCS | Mod: CPTII,S$GLB,, | Performed by: NURSE PRACTITIONER

## 2023-05-05 PROCEDURE — 3008F PR BODY MASS INDEX (BMI) DOCUMENTED: ICD-10-PCS | Mod: CPTII,S$GLB,, | Performed by: NURSE PRACTITIONER

## 2023-05-05 PROCEDURE — 3288F PR FALLS RISK ASSESSMENT DOCUMENTED: ICD-10-PCS | Mod: CPTII,S$GLB,, | Performed by: NURSE PRACTITIONER

## 2023-05-05 PROCEDURE — 4010F PR ACE/ARB THEARPY RXD/TAKEN: ICD-10-PCS | Mod: CPTII,S$GLB,, | Performed by: NURSE PRACTITIONER

## 2023-05-05 PROCEDURE — 3074F SYST BP LT 130 MM HG: CPT | Mod: CPTII,S$GLB,, | Performed by: NURSE PRACTITIONER

## 2023-05-05 PROCEDURE — 93005 ELECTROCARDIOGRAM TRACING: CPT | Mod: PO

## 2023-05-05 PROCEDURE — 99214 OFFICE O/P EST MOD 30 MIN: CPT | Mod: S$GLB,,, | Performed by: NURSE PRACTITIONER

## 2023-05-05 PROCEDURE — 1101F PR PT FALLS ASSESS DOC 0-1 FALLS W/OUT INJ PAST YR: ICD-10-PCS | Mod: CPTII,S$GLB,, | Performed by: NURSE PRACTITIONER

## 2023-05-05 PROCEDURE — 3288F FALL RISK ASSESSMENT DOCD: CPT | Mod: CPTII,S$GLB,, | Performed by: NURSE PRACTITIONER

## 2023-05-05 PROCEDURE — 3008F BODY MASS INDEX DOCD: CPT | Mod: CPTII,S$GLB,, | Performed by: NURSE PRACTITIONER

## 2023-05-05 PROCEDURE — 93010 EKG 12-LEAD: ICD-10-PCS | Mod: S$GLB,,, | Performed by: INTERNAL MEDICINE

## 2023-05-05 PROCEDURE — 99999 PR PBB SHADOW E&M-EST. PATIENT-LVL IV: CPT | Mod: PBBFAC,HCNC,, | Performed by: NURSE PRACTITIONER

## 2023-05-05 PROCEDURE — 3078F DIAST BP <80 MM HG: CPT | Mod: CPTII,S$GLB,, | Performed by: NURSE PRACTITIONER

## 2023-05-05 PROCEDURE — 3078F PR MOST RECENT DIASTOLIC BLOOD PRESSURE < 80 MM HG: ICD-10-PCS | Mod: CPTII,S$GLB,, | Performed by: NURSE PRACTITIONER

## 2023-05-05 PROCEDURE — 4010F ACE/ARB THERAPY RXD/TAKEN: CPT | Mod: CPTII,S$GLB,, | Performed by: NURSE PRACTITIONER

## 2023-05-05 PROCEDURE — 3044F PR MOST RECENT HEMOGLOBIN A1C LEVEL <7.0%: ICD-10-PCS | Mod: CPTII,S$GLB,, | Performed by: NURSE PRACTITIONER

## 2023-05-05 PROCEDURE — 99214 PR OFFICE/OUTPT VISIT, EST, LEVL IV, 30-39 MIN: ICD-10-PCS | Mod: S$GLB,,, | Performed by: NURSE PRACTITIONER

## 2023-05-05 PROCEDURE — 99999 PR PBB SHADOW E&M-EST. PATIENT-LVL IV: ICD-10-PCS | Mod: PBBFAC,HCNC,, | Performed by: NURSE PRACTITIONER

## 2023-05-05 PROCEDURE — 3044F HG A1C LEVEL LT 7.0%: CPT | Mod: CPTII,S$GLB,, | Performed by: NURSE PRACTITIONER

## 2023-05-05 PROCEDURE — 1159F MED LIST DOCD IN RCRD: CPT | Mod: CPTII,S$GLB,, | Performed by: NURSE PRACTITIONER

## 2023-05-05 PROCEDURE — 3074F PR MOST RECENT SYSTOLIC BLOOD PRESSURE < 130 MM HG: ICD-10-PCS | Mod: CPTII,S$GLB,, | Performed by: NURSE PRACTITIONER

## 2023-05-05 PROCEDURE — 1160F RVW MEDS BY RX/DR IN RCRD: CPT | Mod: CPTII,S$GLB,, | Performed by: NURSE PRACTITIONER

## 2023-05-05 RX ORDER — ASPIRIN 325 MG
325 TABLET ORAL DAILY
COMMUNITY
End: 2024-01-04

## 2023-05-05 RX ORDER — AMLODIPINE BESYLATE 5 MG/1
5 TABLET ORAL DAILY
Qty: 30 TABLET | Refills: 11
Start: 2023-05-05 | End: 2023-07-28 | Stop reason: SDUPTHER

## 2023-05-05 RX ORDER — MULTIVIT WITH MINERALS/HERBS
1 TABLET ORAL DAILY
COMMUNITY

## 2023-05-05 RX ORDER — VALSARTAN 320 MG/1
320 TABLET ORAL DAILY
COMMUNITY

## 2023-05-05 RX ORDER — CARVEDILOL 6.25 MG/1
6.25 TABLET ORAL 2 TIMES DAILY WITH MEALS
Qty: 180 TABLET | Refills: 3 | Status: SHIPPED | OUTPATIENT
Start: 2023-05-05 | End: 2023-08-21 | Stop reason: SDUPTHER

## 2023-05-05 NOTE — PATIENT INSTRUCTIONS
Resume oatmeal daily; flaxseed.  Add green leafy vegetables to eat daily and work toward twice daily-chew your food until liquid before swallowing.  Take blood pressure medications every day.  See Dr. Jarvis regarding B12 and follow his instructions.  Continue atorvastatin every day.

## 2023-05-05 NOTE — TELEPHONE ENCOUNTER
----- Message from Ani Srivastava NP sent at 5/5/2023  9:05 AM CDT -----  I spoke with Dr. Jarvis about fatigue and he wants to see him next week. Can you schedule an appointment for this patient?  Ani Srivastava NP

## 2023-05-10 ENCOUNTER — TELEPHONE (OUTPATIENT)
Dept: CARDIOLOGY | Facility: HOSPITAL | Age: 73
End: 2023-05-10
Payer: MEDICARE

## 2023-05-11 ENCOUNTER — TELEPHONE (OUTPATIENT)
Dept: HEMATOLOGY/ONCOLOGY | Facility: CLINIC | Age: 73
End: 2023-05-11
Payer: MEDICARE

## 2023-05-11 DIAGNOSIS — C61 MALIGNANT NEOPLASM OF PROSTATE: Primary | Chronic | ICD-10-CM

## 2023-05-11 NOTE — NURSING
Spoke with wife to schedule patient in [facility].  Wife agreed to appt on 5/19 and verbalized understanding of date, time and location.  New path 4/17/2023

## 2023-05-11 NOTE — TELEPHONE ENCOUNTER
----- Message from Augustus Gomes LPN sent at 5/11/2023  8:48 AM CDT -----  Regarding: Multi D Appt needed  This patient recently had a prostate biopsy done. Dr. Chan would like for them to be added to his Multi D clinic for pathology results and to speak with Oncology. Please inform patient of their appt as they can not access their MyChart. Best form of contact is to call the wife's phone. Thank you

## 2023-05-12 ENCOUNTER — OFFICE VISIT (OUTPATIENT)
Dept: FAMILY MEDICINE | Facility: CLINIC | Age: 73
End: 2023-05-12
Payer: MEDICARE

## 2023-05-12 ENCOUNTER — LAB VISIT (OUTPATIENT)
Dept: LAB | Facility: HOSPITAL | Age: 73
End: 2023-05-12
Attending: FAMILY MEDICINE
Payer: MEDICARE

## 2023-05-12 ENCOUNTER — TELEPHONE (OUTPATIENT)
Dept: HEMATOLOGY/ONCOLOGY | Facility: CLINIC | Age: 73
End: 2023-05-12
Payer: MEDICARE

## 2023-05-12 VITALS
HEIGHT: 70 IN | DIASTOLIC BLOOD PRESSURE: 65 MMHG | BODY MASS INDEX: 33.36 KG/M2 | RESPIRATION RATE: 16 BRPM | HEART RATE: 69 BPM | SYSTOLIC BLOOD PRESSURE: 114 MMHG | WEIGHT: 233 LBS

## 2023-05-12 DIAGNOSIS — E66.2 CLASS 1 OBESITY WITH ALVEOLAR HYPOVENTILATION, SERIOUS COMORBIDITY, AND BODY MASS INDEX (BMI) OF 32.0 TO 32.9 IN ADULT: ICD-10-CM

## 2023-05-12 DIAGNOSIS — R53.83 FATIGUE, UNSPECIFIED TYPE: Primary | ICD-10-CM

## 2023-05-12 DIAGNOSIS — E53.8 B12 DEFICIENCY: ICD-10-CM

## 2023-05-12 DIAGNOSIS — D53.9 NUTRITIONAL ANEMIA, UNSPECIFIED: ICD-10-CM

## 2023-05-12 DIAGNOSIS — R53.83 FATIGUE, UNSPECIFIED TYPE: ICD-10-CM

## 2023-05-12 DIAGNOSIS — Z79.899 ENCOUNTER FOR LONG-TERM (CURRENT) USE OF MEDICATIONS: ICD-10-CM

## 2023-05-12 DIAGNOSIS — G95.9 SPINAL CORD LESION: ICD-10-CM

## 2023-05-12 LAB — TSH SERPL DL<=0.005 MIU/L-ACNC: 1.81 UIU/ML (ref 0.4–4)

## 2023-05-12 PROCEDURE — 99999 PR PBB SHADOW E&M-EST. PATIENT-LVL IV: CPT | Mod: PBBFAC,,, | Performed by: FAMILY MEDICINE

## 2023-05-12 PROCEDURE — 99499 UNLISTED E&M SERVICE: CPT | Mod: HCNC,S$GLB,, | Performed by: FAMILY MEDICINE

## 2023-05-12 PROCEDURE — 1101F PR PT FALLS ASSESS DOC 0-1 FALLS W/OUT INJ PAST YR: ICD-10-PCS | Mod: CPTII,S$GLB,, | Performed by: FAMILY MEDICINE

## 2023-05-12 PROCEDURE — 99499 RISK ADDL DX/OHS AUDIT: ICD-10-PCS | Mod: HCNC,S$GLB,, | Performed by: FAMILY MEDICINE

## 2023-05-12 PROCEDURE — 3074F PR MOST RECENT SYSTOLIC BLOOD PRESSURE < 130 MM HG: ICD-10-PCS | Mod: CPTII,S$GLB,, | Performed by: FAMILY MEDICINE

## 2023-05-12 PROCEDURE — 3078F PR MOST RECENT DIASTOLIC BLOOD PRESSURE < 80 MM HG: ICD-10-PCS | Mod: CPTII,S$GLB,, | Performed by: FAMILY MEDICINE

## 2023-05-12 PROCEDURE — 1101F PT FALLS ASSESS-DOCD LE1/YR: CPT | Mod: CPTII,S$GLB,, | Performed by: FAMILY MEDICINE

## 2023-05-12 PROCEDURE — 84443 ASSAY THYROID STIM HORMONE: CPT | Performed by: FAMILY MEDICINE

## 2023-05-12 PROCEDURE — 1159F MED LIST DOCD IN RCRD: CPT | Mod: CPTII,S$GLB,, | Performed by: FAMILY MEDICINE

## 2023-05-12 PROCEDURE — 3074F SYST BP LT 130 MM HG: CPT | Mod: CPTII,S$GLB,, | Performed by: FAMILY MEDICINE

## 2023-05-12 PROCEDURE — 3078F DIAST BP <80 MM HG: CPT | Mod: CPTII,S$GLB,, | Performed by: FAMILY MEDICINE

## 2023-05-12 PROCEDURE — 1159F PR MEDICATION LIST DOCUMENTED IN MEDICAL RECORD: ICD-10-PCS | Mod: CPTII,S$GLB,, | Performed by: FAMILY MEDICINE

## 2023-05-12 PROCEDURE — 1160F RVW MEDS BY RX/DR IN RCRD: CPT | Mod: CPTII,S$GLB,, | Performed by: FAMILY MEDICINE

## 2023-05-12 PROCEDURE — 3044F PR MOST RECENT HEMOGLOBIN A1C LEVEL <7.0%: ICD-10-PCS | Mod: CPTII,S$GLB,, | Performed by: FAMILY MEDICINE

## 2023-05-12 PROCEDURE — 3044F HG A1C LEVEL LT 7.0%: CPT | Mod: CPTII,S$GLB,, | Performed by: FAMILY MEDICINE

## 2023-05-12 PROCEDURE — 3288F FALL RISK ASSESSMENT DOCD: CPT | Mod: CPTII,S$GLB,, | Performed by: FAMILY MEDICINE

## 2023-05-12 PROCEDURE — 3288F PR FALLS RISK ASSESSMENT DOCUMENTED: ICD-10-PCS | Mod: CPTII,S$GLB,, | Performed by: FAMILY MEDICINE

## 2023-05-12 PROCEDURE — 3008F BODY MASS INDEX DOCD: CPT | Mod: CPTII,S$GLB,, | Performed by: FAMILY MEDICINE

## 2023-05-12 PROCEDURE — 4010F ACE/ARB THERAPY RXD/TAKEN: CPT | Mod: CPTII,S$GLB,, | Performed by: FAMILY MEDICINE

## 2023-05-12 PROCEDURE — 99999 PR PBB SHADOW E&M-EST. PATIENT-LVL IV: ICD-10-PCS | Mod: PBBFAC,,, | Performed by: FAMILY MEDICINE

## 2023-05-12 PROCEDURE — 4010F PR ACE/ARB THEARPY RXD/TAKEN: ICD-10-PCS | Mod: CPTII,S$GLB,, | Performed by: FAMILY MEDICINE

## 2023-05-12 PROCEDURE — 99214 PR OFFICE/OUTPT VISIT, EST, LEVL IV, 30-39 MIN: ICD-10-PCS | Mod: S$GLB,,, | Performed by: FAMILY MEDICINE

## 2023-05-12 PROCEDURE — 1160F PR REVIEW ALL MEDS BY PRESCRIBER/CLIN PHARMACIST DOCUMENTED: ICD-10-PCS | Mod: CPTII,S$GLB,, | Performed by: FAMILY MEDICINE

## 2023-05-12 PROCEDURE — 1125F AMNT PAIN NOTED PAIN PRSNT: CPT | Mod: CPTII,S$GLB,, | Performed by: FAMILY MEDICINE

## 2023-05-12 PROCEDURE — 99214 OFFICE O/P EST MOD 30 MIN: CPT | Mod: S$GLB,,, | Performed by: FAMILY MEDICINE

## 2023-05-12 PROCEDURE — 3008F PR BODY MASS INDEX (BMI) DOCUMENTED: ICD-10-PCS | Mod: CPTII,S$GLB,, | Performed by: FAMILY MEDICINE

## 2023-05-12 PROCEDURE — 36415 COLL VENOUS BLD VENIPUNCTURE: CPT | Mod: PO | Performed by: FAMILY MEDICINE

## 2023-05-12 PROCEDURE — 1125F PR PAIN SEVERITY QUANTIFIED, PAIN PRESENT: ICD-10-PCS | Mod: CPTII,S$GLB,, | Performed by: FAMILY MEDICINE

## 2023-05-12 RX ORDER — NAPROXEN SODIUM 220 MG/1
81 TABLET, FILM COATED ORAL DAILY
COMMUNITY

## 2023-05-12 RX ORDER — CYANOCOBALAMIN 1000 UG/ML
1000 INJECTION, SOLUTION INTRAMUSCULAR; SUBCUTANEOUS WEEKLY
Qty: 4 ML | Refills: 5 | Status: SHIPPED | OUTPATIENT
Start: 2023-05-12 | End: 2024-01-04

## 2023-05-12 NOTE — PATIENT INSTRUCTIONS
Follow up in about 6 months (around 11/12/2023), or if symptoms worsen or fail to improve, for Med refills.     Dear patient,   As a result of recent federal legislation (The Federal Cures Act), you may receive lab or pathology results from your visit in your [facility] account before your physician is able to contact you. Your physician or their representative will relay the results to you with their recommendations at their soonest availability.     If no improvement in symptoms or symptoms worsen, please be advised to call MD, follow-up at clinic and/or go to ER if becomes severe.    Trevor Jarvis M.D.        We Offer TELEHEALTH & Same Day Appointments!   Book your Telehealth appointment with me through my nurse or   Clinic appointments on [employer]!    [address] [place] [address]    Office: [phone]   FAX: [phone]    Check out my Facebook Page and Follow Me at: [email or web address]    Check out my website at [employer] by clicking on: [email or web address]    To Schedule appointments online, go to [facility]: [email or web address]

## 2023-05-12 NOTE — TELEPHONE ENCOUNTER
----- Message from Klaus Viera RN sent at 5/11/2023  4:42 PM CDT -----  Regarding: FW: Multi D Appt needed    ----- Message -----  From: EJ Chan MD  Sent: 5/11/2023   3:51 PM CDT  To: Augustus Gomes LPN, [facility], #  Subject: RE: Multi D Appt needed                          Needs follow-up with radiation oncology.  Medical oncology not necessary    ----- Message -----  From: Augustus Gomes LPN  Sent: 5/11/2023   8:51 AM CDT  To: EJ Chan MD, #  Subject: Multi D Appt needed                              This patient recently had a prostate biopsy done. Dr. Chan would like for them to be added to his Multi D clinic for pathology results and to speak with Oncology. Please inform patient of their appt as they can not access their MyChart. Best form of contact is to call the wife's phone. Thank you

## 2023-05-15 ENCOUNTER — HOSPITAL ENCOUNTER (OUTPATIENT)
Dept: CARDIOLOGY | Facility: HOSPITAL | Age: 73
Discharge: HOME | End: 2023-05-15
Attending: NURSE PRACTITIONER
Payer: MEDICARE

## 2023-05-15 VITALS
BODY MASS INDEX: 32.2 KG/M2 | HEART RATE: 72 BPM | HEIGHT: 71 IN | DIASTOLIC BLOOD PRESSURE: 61 MMHG | SYSTOLIC BLOOD PRESSURE: 125 MMHG | WEIGHT: 230 LBS

## 2023-05-15 DIAGNOSIS — R53.83 FATIGUE, UNSPECIFIED TYPE: ICD-10-CM

## 2023-05-15 DIAGNOSIS — R06.09 DOE (DYSPNEA ON EXERTION): ICD-10-CM

## 2023-05-15 PROCEDURE — A4216 STERILE WATER/SALINE, 10 ML: HCPCS | Mod: PO | Performed by: NURSE PRACTITIONER

## 2023-05-15 PROCEDURE — 93018 NUCLEAR STRESS - 3RD PARTY (CUPID ONLY): ICD-10-PCS | Mod: ,,, | Performed by: INTERNAL MEDICINE

## 2023-05-15 PROCEDURE — 78452 HT MUSCLE IMAGE SPECT MULT: CPT | Mod: 26,,, | Performed by: INTERNAL MEDICINE

## 2023-05-15 PROCEDURE — 93016 NUCLEAR STRESS - 3RD PARTY (CUPID ONLY): ICD-10-PCS | Mod: ,,, | Performed by: INTERNAL MEDICINE

## 2023-05-15 PROCEDURE — 93017 CV STRESS TEST TRACING ONLY: CPT | Mod: PO

## 2023-05-15 PROCEDURE — A9500 TC99M SESTAMIBI: HCPCS | Mod: PO

## 2023-05-15 PROCEDURE — 93016 CV STRESS TEST SUPVJ ONLY: CPT | Mod: ,,, | Performed by: INTERNAL MEDICINE

## 2023-05-15 PROCEDURE — 78452 NUCLEAR STRESS - 3RD PARTY (CUPID ONLY): ICD-10-PCS | Mod: 26,,, | Performed by: INTERNAL MEDICINE

## 2023-05-15 PROCEDURE — 25000003 PHARM REV CODE 250: Mod: PO | Performed by: NURSE PRACTITIONER

## 2023-05-15 PROCEDURE — 63600175 PHARM REV CODE 636 W HCPCS: Mod: PO | Performed by: NURSE PRACTITIONER

## 2023-05-15 PROCEDURE — 93018 CV STRESS TEST I&R ONLY: CPT | Mod: ,,, | Performed by: INTERNAL MEDICINE

## 2023-05-15 RX ORDER — REGADENOSON 0.08 MG/ML
0.4 INJECTION, SOLUTION INTRAVENOUS ONCE
Status: COMPLETED | OUTPATIENT
Start: 2023-05-15 | End: 2023-05-15

## 2023-05-15 RX ORDER — SODIUM CHLORIDE 0.9 % (FLUSH) 0.9 %
10 SYRINGE (ML) INJECTION
Status: SHIPPED | OUTPATIENT
Start: 2023-05-15

## 2023-05-15 RX ADMIN — Medication 10 ML: at 10:05

## 2023-05-15 RX ADMIN — REGADENOSON 0.4 MG: 0.08 INJECTION, SOLUTION INTRAVENOUS at 10:05

## 2023-05-15 NOTE — ASSESSMENT & PLAN NOTE
Patient's weight is worsening.  We discussed that his fatigue is multifactorial.  Patient should focus on lifestyle modification with diet and exercise.  Monitoring BMI.

## 2023-05-15 NOTE — PROGRESS NOTES
PLAN:      Problem List Items Addressed This Visit       Class 1 obesity with alveolar hypoventilation, serious comorbidity, and body mass index (BMI) of 32.0 to 32.9 in adult (Chronic)     Patient's weight is worsening.  We discussed that his fatigue is multifactorial.  Patient should focus on lifestyle modification with diet and exercise.  Monitoring BMI.           Spinal cord lesion (Chronic)     Chronic.  Unchanged appearance on MRI.  Patient following with specialist concerning this condition.           Encounter for long-term (current) use of medications (Chronic)     Complete history and physical was completed today.  Complete and thorough medication reconciliation was performed.  Discussed risks and benefits of medications.  Advised patient on orders and health maintenance.  We discussed old records and old labs if available.  Will request any records not available through epic.  Continue current medications listed on your summary sheet.  *           Relevant Orders    CBC Without Differential    Comprehensive Metabolic Panel    TSH (Completed)    Hemoglobin A1C    Lipid Panel    Fatigue - Primary (Chronic)     Optimized B12 B12 injections.  Follow-up with Urology and Cardiology for further evaluation.  Follow-up sooner if no improvement.  Update labs.           Relevant Orders    CBC Without Differential    Comprehensive Metabolic Panel    TSH (Completed)    Hemoglobin A1C    Lipid Panel    Iron and TIBC    Ferritin    Vitamin B12    Folate    B12 deficiency (Chronic)     Restart B12 injections           Relevant Medications    cyanocobalamin 1,000 mcg/mL injection    Nutritional anemia, unspecified    Relevant Orders    Iron and TIBC    Ferritin    Vitamin B12    Folate     Future Appointments       Date Provider Specialty Appt Notes    5/18/2023  Cardiology Srivastava    5/19/2023 EJ Chan MD Urology Prostate Ca//[place]    5/19/2023 Suzan Borrero MD Radiation Oncology Prostate  Ca//[place]    6/9/2023 Ani Srivastava NP Cardiology 1 month f/u    8/11/2023  Lab     8/18/2023 Trevor Jarvis MD Family Medicine 3 month follow up            Medication Management for assessment above:   Medication List with Changes/Refills   Current Medications    ACETAMINOPHEN (TYLENOL) 500 MG TABLET    Take 500 mg by mouth every 6 (six) hours as needed for Pain.    AMLODIPINE (NORVASC) 5 MG TABLET    Take 1 tablet (5 mg total) by mouth once daily.    ASPIRIN 325 MG TABLET    Take 325 mg by mouth once daily.    ASPIRIN 81 MG CHEW    Take 81 mg by mouth once daily.    ATORVASTATIN (LIPITOR) 40 MG TABLET    Take 1 tablet (40 mg total) by mouth once daily.    B COMPLEX VITAMINS TABLET    Take 1 tablet by mouth once daily.    CARVEDILOL (COREG) 6.25 MG TABLET    Take 1 tablet (6.25 mg total) by mouth 2 (two) times daily with meals.    DORZOLAMIDE-TIMOLOL 2-0.5% (COSOPT) 22.3-6.8 MG/ML OPHTHALMIC SOLUTION    Place 1 drop into both eyes nightly.    LATANOPROST 0.005 % OPHTHALMIC SOLUTION    Place 1 drop into both eyes every evening.    TAMSULOSIN (FLOMAX) 0.4 MG CAP    Take 1 capsule (0.4 mg total) by mouth every evening.    VALSARTAN (DIOVAN) 320 MG TABLET    Take 320 mg by mouth once daily.   Changed and/or Refilled Medications    Modified Medication Previous Medication    CYANOCOBALAMIN 1,000 MCG/ML INJECTION cyanocobalamin 1,000 mcg/mL injection       Inject 1 mL (1,000 mcg total) into the muscle once a week.    Inject 1 mL (1,000 mcg total) into the muscle once a week.   Discontinued Medications    LEVOFLOXACIN (LEVAQUIN) 500 MG TABLET    Take 1 tablet (500 mg total) by mouth once daily.       Trevor Jarvis M.D.  ==========================================================================  Subjective:   Patient ID: Yasir Jeffery Sr. is a 72 y.o. male.  has a past medical history of Back pain, Benign prostate hyperplasia, DDD (degenerative disc disease), thoracolumbar, Disorder of kidney and ureter,  Elevated PSA, Glaucoma, colonic polyps (2010), Hypertension, Malignant neoplasm of prostate (07/31/2019), Metabolic syndrome, Mixed hyperlipidemia (01/24/2018), Obesity, MARIN (obstructive sleep apnea), Pneumonia, and Psychophysiological insomnia.   Chief Complaint: Fatigue      Problem List Items Addressed This Visit       Class 1 obesity with alveolar hypoventilation, serious comorbidity, and body mass index (BMI) of 32.0 to 32.9 in adult (Chronic)    Overview     BMI Readings from Last 10 Encounters:   05/12/23 33.43 kg/m²   05/05/23 32.62 kg/m²   04/14/23 32.43 kg/m²   04/11/23 32.84 kg/m²   03/28/23 33.78 kg/m²   09/08/22 32.66 kg/m²   07/25/22 32.23 kg/m²   03/08/22 32.33 kg/m²   02/18/22 30.84 kg/m²   05/20/21 32.43 kg/m²   May 2023: Patient reports fatigue.         Current Assessment & Plan     Patient's weight is worsening.  We discussed that his fatigue is multifactorial.  Patient should focus on lifestyle modification with diet and exercise.  Monitoring BMI.           Spinal cord lesion (Chronic)    Overview     Narrative & Impression  EXAMINATION:  MRI THORACIC SPINE DEMYELINATING W W/O CONTRAST     CLINICAL HISTORY:  patient with T1 enhancing lesion, screening for other lesions for diagnosis;  Disease of spinal cord, unspecified     TECHNIQUE:  Multiplanar, multisequence MRI of thoracic spine.  Contrast was not administered.     COMPARISON:  MRI cervical spine 07/21/2020, 08/27/2019     FINDINGS:  Alignment: Normal.     Vertebrae: Curvilinear T1 hypointense and T2/STIR hyperintense signal along the superior endplate of T3 without corresponding vertebral body height loss, new in comparison to the prior cervical spine MRI from 12/16/2019.  This is nonspecific and may reflect degenerative endplate change.  However, if there is concern for fracture, further evaluation with CT is recommended.  Remaining vertebral body heights are also well maintained.  Ovoid 1 cm T1/T2 hyperintense lesion within the T9  vertebra, favored to represent an intraosseous hemangioma.  There is a 1.4 cm T2/STIR hyperintense and T1 isointense/hyperintense lesion within the T11 vertebral body, also favored represent an atypical hemangioma.     Discs: Multilevel degenerative disc disease with anterior marginal osteophyte formation, disc desiccation, and endplate changes.  Small left disc protrusion at T6-7 and T7-8.  Minimal right asymmetric disc bulge at T11-12.     Cord: Redemonstration of a T2 hyperintense enhancing nodular intramedullary focus within the left anterior paramedian aspect of the thoracic cord at the level of the T1 vertebra, grossly unchanged in size as compared to multiple prior studies dating back to 08/27/2019.  This measures approximately 5 x 4 mm in AP by TV dimensions, unchanged.  Minimal focal expansion of the cord at this level without significant adjacent edema, unchanged.  No cord syrinx.  No additional abnormal cord signal or enhancement.     Degenerative findings: Multilevel degenerative facet arthropathy and ligamentum flavum infolding, most pronounced in the mid to distal thoracic spine.  Mild right-sided neural foraminal stenosis at T2-3, T3-4, T10-11 and-T11-12.  no spinal canal stenosis at any level.     Paraspinal muscles & soft tissues: There is a 1.7 cm ovoid T1 hypointense/T2 hyperintense right adrenal lesion, which is nonspecific and could represent an adrenal cyst or adenoma.     Impression:     1. Stable subcentimeter enhancing intramedullary lesion within the thoracic cord at the level of the T1 vertebral body, not significantly changed in appearance as compared to several prior studies dating back to 08/27/2019.  No additional abnormal cord signal or enhancement.  2. Curvilinear T2/STIR hyperintensity along the superior endplate of T3 without corresponding vertebral body height loss, new in comparison to the prior cervical spine MRI on 12/16/2019.  Findings are nonspecific and may reflect  degenerative endplate change; however, if there is concern for fracture, then further evaluation with CT is recommended.  3. Probable intraosseous hemangiomas within the T9 and T11 vertebral bodies.  4. 1.7 cm right adrenal lesion, possibly representing an adrenal cyst or adenoma.  This report was flagged in Epic as abnormal.     Yellow Adrenal 2017 Alert:     Yellow Actionable Finding Adrenal 2017 (J Am Agustin Radiol 2017;14:[id number]). Imaging cannot be used to distinguish hyperfunctioning from non-hyperfunctioning adrenal masses.     UNEXPECTED FINDINGS: Incidental Asymptomatic Adrenal Mass (>=1 cm - < 4 cm) with Indeterminate Imaging Positive Cancer History Isolated (no other known metastatic disease) (2,4 or 5)     RECOMMENDATIONS:  CT Adrenal with and without contrast (washout)        Electronically signed by: Ricardo Nixon  Date:                                            09/01/2020  Time:                                           16:58           Exam Ended: 09/01/20 15:05               Impression       1. Redemonstration of a 5 x 4 x 9 mm T2 hyperintense, homogeneously enhancing intramedullary thoracic cord lesion, essentially unchanged as compared to the prior study dated 08/30/2019.  This lesion is indeterminate with differential considerations continuing to include a primary intramedullary neoplasm, metastasis, or single focus of inflammation/demyelination.  No new focal cord signal or abnormal intraspinal enhancement elsewhere.  2. Multilevel degenerative changes of the cervical spine, unchanged as compared to 08/30/2019.      Electronically signed by: Ricardo Nixon  Date: 12/16/2019  Time: 16:02              Current Assessment & Plan     Chronic.  Unchanged appearance on MRI.  Patient following with specialist concerning this condition.           Encounter for long-term (current) use of medications (Chronic)    Overview     May 2023: Reviewed labs.  CHRONIC. Stable. Compliant with medications for managed  conditions. See medication list. No SE reported.   Routine lab analysis is being monitored. Refills were addressed.  March 2022:  Reviewed labs.  Lab Results   Component Value Date    WBC 6.88 04/11/2023    HGB 15.2 04/11/2023    HCT 45.0 04/11/2023    MCV 89 04/11/2023     04/11/2023       Chemistry        Component Value Date/Time     04/11/2023 1517    K 4.8 04/11/2023 1517     04/11/2023 1517    CO2 27 04/11/2023 1517    BUN 25 (H) 04/11/2023 1517    CREATININE 1.48 (H) 04/11/2023 1517    GLU 95 04/11/2023 1517        Component Value Date/Time    CALCIUM 9.3 04/11/2023 1517    ALKPHOS 61 09/08/2022 0934    AST 22 09/08/2022 0934    ALT 13 09/08/2022 0934    BILITOT 0.9 09/08/2022 0934    ESTGFRAFRICA 57.6 (A) 03/08/2022 0933    EGFRNONAA 49.8 (A) 03/08/2022 0933          Lab Results   Component Value Date    TSH 1.808 05/12/2023    FREET4 1.14 07/27/2021    T3FREE 2.6 07/27/2021            Current Assessment & Plan     Complete history and physical was completed today.  Complete and thorough medication reconciliation was performed.  Discussed risks and benefits of medications.  Advised patient on orders and health maintenance.  We discussed old records and old labs if available.  Will request any records not available through epic.  Continue current medications listed on your summary sheet.  *           Fatigue - Primary (Chronic)    Overview     Chronic.  May 2023: Patient reports that he stopped is vitamin B12 injections.  He has been having increasing fatigue.  He is being followed by Urology for prostate cancer.  He is also being evaluated by Cardiology for EKG.    Results for orders placed or performed in visit on 05/05/23   IN OFFICE EKG 12-LEAD (to [place])    Collection Time: 05/05/23  8:25 AM    Narrative    Test Reason : R06.09,    Vent. Rate : 074 BPM     Atrial Rate : 074 BPM     P-R Int : 198 ms          QRS Dur : 078 ms      QT Int : 380 ms       P-R-T Axes : 053 017 018 degrees      QTc Int : 421 ms    Normal sinus rhythm  Inferior infarct ,age undetermined  Abnormal ECG  When compared with ECG of 08-MAR-2021 09:52,  Previous ECG has undetermined rhythm, needs review  Inferior infarct is now Present  Confirmed by Diogenes Cote MD ([id number]) on 5/8/2023 5:51:29 PM    Referred By: LEI LOPEZ           Confirmed By:Diogenes Cote MD         Previous history:  Worsening.  No improvement.  Patient reports that he is deficient in B12 and vitamin-D.  Checking levels.  Patient takes supplement as needed.    March 2022:  Patient reports still having significant amount of fatigue.  Patient does have a history of B12 deficiency and prostate cancer.  Patient also has untreated MARIN.  I discussed these conditions in detail with the patient.  Patient defers treatment for MARIN currently.  He is willing to increase his B12 supplement and follow-up with Urology concerning prostate cancer.  Lab Results   Component Value Date    WBC 6.88 04/11/2023    HGB 15.2 04/11/2023    HCT 45.0 04/11/2023    MCV 89 04/11/2023     04/11/2023       Lab Results   Component Value Date    IRON 62 07/27/2021    TIBC 293 07/27/2021    FERRITIN 283 07/27/2021     Lab Results   Component Value Date    XOLAWWVB05 544 09/08/2022     Lab Results   Component Value Date    FOLATE 7.6 07/27/2021     Lab Results   Component Value Date    TSH 1.808 05/12/2023    FREET4 1.14 07/27/2021              Current Assessment & Plan     Optimized B12 B12 injections.  Follow-up with Urology and Cardiology for further evaluation.  Follow-up sooner if no improvement.  Update labs.           B12 deficiency (Chronic)    Overview     Lab Results   Component Value Date    IRON 62 07/27/2021    TIBC 293 07/27/2021    FERRITIN 283 07/27/2021     Lab Results   Component Value Date    FBFFHVJX75 544 09/08/2022     Lab Results   Component Value Date    FOLATE 7.6 07/27/2021            Current Assessment & Plan     Restart B12 injections            Nutritional anemia, unspecified        Review of patient's allergies indicates:   Allergen Reactions    Benadryl allergy decongestant      Opposite reaction, speeds him up   Opposite reaction, speeds him up     Diphenhydramine      Opposite reaction, speeds him up      Current Outpatient Medications   Medication Instructions    acetaminophen (TYLENOL) 500 mg, Oral, Every 6 hours PRN    amLODIPine (NORVASC) 5 mg, Oral, Daily    aspirin 325 mg, Oral, Daily    aspirin 81 mg, Oral, Daily    atorvastatin (LIPITOR) 40 mg, Oral, Daily    b complex vitamins tablet 1 tablet, Oral, Daily    carvediloL (COREG) 6.25 mg, Oral, 2 times daily with meals    cyanocobalamin 1,000 mcg, Intramuscular, Weekly    dorzolamide-timolol 2-0.5% (COSOPT) 22.3-6.8 mg/mL ophthalmic solution 1 drop, Both Eyes, Nightly    latanoprost 0.005 % ophthalmic solution 1 drop, Both Eyes, Nightly    tamsulosin (FLOMAX) 0.4 mg, Oral, Nightly    valsartan (DIOVAN) 320 mg, Oral, Daily      I have reviewed the PMH, social history, FamilyHx, surgical history, allergies and medications documented / confirmed by the patient at the time of this visit.  Review of Systems   Constitutional:  Positive for fatigue (Chronic). Negative for chills, fever and unexpected weight change.   HENT:  Negative for ear pain and sore throat.    Eyes:  Negative for redness and visual disturbance.   Respiratory:  Negative for cough, shortness of breath and wheezing.    Cardiovascular:  Negative for chest pain and palpitations.   Gastrointestinal:  Negative for nausea and vomiting.   Endocrine: Negative for cold intolerance and heat intolerance.   Genitourinary:  Negative for difficulty urinating and hematuria.   Musculoskeletal:  Positive for arthralgias, back pain and myalgias.   Skin:  Negative for rash and wound.   Allergic/Immunologic: Negative for environmental allergies and food allergies.   Neurological:  Negative for weakness and headaches.   Hematological:  Negative for  "adenopathy. Does not bruise/bleed easily.   Psychiatric/Behavioral:  Negative for sleep disturbance. The patient is not nervous/anxious.    Objective:   /65 (BP Location: Right arm, Patient Position: Sitting, BP Method: Large (Automatic))   Pulse 69   Resp 16   Ht 5' 10" (1.778 m)   Wt 105.7 kg (233 lb)   BMI 33.43 kg/m²   Physical Exam  Vitals and nursing note reviewed.   Constitutional:       General: He is not in acute distress.     Appearance: He is well-developed. He is obese.      Comments: Here with his wife   ALISSA:      Head: Normocephalic and atraumatic.      Right Ear: External ear normal.      Left Ear: External ear normal.      Nose: Nose normal. No rhinorrhea.   Eyes:      Extraocular Movements: Extraocular movements intact.      Pupils: Pupils are equal, round, and reactive to light.   Cardiovascular:      Rate and Rhythm: Normal rate.      Pulses: Normal pulses.   Pulmonary:      Effort: Pulmonary effort is normal. No respiratory distress.      Breath sounds: Normal breath sounds.   Abdominal:      General: Bowel sounds are normal.      Palpations: Abdomen is soft.   Musculoskeletal:         General: Normal range of motion.      Cervical back: Normal range of motion and neck supple.   Skin:     General: Skin is warm and dry.      Capillary Refill: Capillary refill takes less than 2 seconds.   Neurological:      General: No focal deficit present.      Mental Status: He is alert and oriented to person, place, and time. Mental status is at baseline.      Cranial Nerves: No cranial nerve deficit.      Motor: No weakness.      Gait: Gait normal.   Psychiatric:         Mood and Affect: Mood normal.         Behavior: Behavior normal.       Assessment:     1. Fatigue, unspecified type    2. Encounter for long-term (current) use of medications    3. Class 1 obesity with alveolar hypoventilation, serious comorbidity, and body mass index (BMI) of 32.0 to 32.9 in adult    4. Spinal cord lesion    5. " Nutritional anemia, unspecified    6. B12 deficiency      MDM:   Moderate medical complexity.  Moderate risk.  Total time: 31 minutes.  This includes total time spent on the encounter, which includes face to face time and non-face to face time preparing to see the patient (eg, review of previous medical records, tests), Obtaining and/or reviewing separately obtained history, documenting clinical information in the electronic or other health record, independently interpreting results (not separately reported)/communicating results to the patient/family/caregiver, and/or care coordination (not separately reported).    I have Reviewed and summarized old records.  I have performed thorough medication reconciliation today and discussed risk and benefits of medications.  I have reviewed labs and discussed with patient.  All questions were answered.  I am requesting old records and will review them once they are available.  Cardiology    I have signed for the following orders AND/OR meds.  Orders Placed This Encounter   Procedures    CBC Without Differential     Standing Status:   Future     Standing Expiration Date:   7/10/2024    Comprehensive Metabolic Panel     Standing Status:   Future     Standing Expiration Date:   7/10/2024    TSH     Standing Status:   Future     Number of Occurrences:   1     Standing Expiration Date:   7/10/2024    Hemoglobin A1C     Standing Status:   Future     Standing Expiration Date:   7/10/2024    Lipid Panel     Standing Status:   Future     Standing Expiration Date:   7/10/2024    Iron and TIBC     Standing Status:   Future     Standing Expiration Date:   7/10/2024    Ferritin     Standing Status:   Future     Standing Expiration Date:   7/10/2024    Vitamin B12     Standing Status:   Future     Standing Expiration Date:   7/10/2024    Folate     Standing Status:   Future     Standing Expiration Date:   7/10/2024     Medications Ordered This Encounter   Medications    cyanocobalamin  1,000 mcg/mL injection     Sig: Inject 1 mL (1,000 mcg total) into the muscle once a week.     Dispense:  4 mL     Refill:  5        Follow up in about 6 months (around 11/12/2023), or if symptoms worsen or fail to improve, for Med refills.  Future Appointments       Date Provider Specialty Appt Notes    5/18/2023  Cardiology Srivastava    5/19/2023 EJ Chan MD Urology Prostate Ca//[place]    5/19/2023 Suzan Borrero MD Radiation Oncology Prostate Ca//[place]    6/9/2023 Ani Srivastava NP Cardiology 1 month f/u    8/11/2023  Lab     8/18/2023 Trevor Jarvis MD Family Medicine 3 month follow up           If no improvement in symptoms or symptoms worsen, advised to call/follow-up at clinic or go to ER. Patient voiced understanding and all questions/concerns were addressed.   DISCLAIMER: This note was compiled by using a speech recognition dictation system and therefore please be aware that typographical / speech recognition errors can and do occur.  Please contact me if you see any errors specifically.    Trevor Jarvis M.D.       Office: [phone] [address] [place] [address]  FAX: [phone]

## 2023-05-15 NOTE — ASSESSMENT & PLAN NOTE
Complete history and physical was completed today.  Complete and thorough medication reconciliation was performed.  Discussed risks and benefits of medications.  Advised patient on orders and health maintenance.  We discussed old records and old labs if available.  Will request any records not available through epic.  Continue current medications listed on your summary sheet.  *

## 2023-05-15 NOTE — PROGRESS NOTES
1st check to see if patient has seen the results.  If not then  CALL patient with results and Document verification.  Schedule follow-up if needed.  [phone]    TSH lab test for thyroid screening is normal.

## 2023-05-15 NOTE — ASSESSMENT & PLAN NOTE
Chronic.  Unchanged appearance on MRI.  Patient following with specialist concerning this condition.

## 2023-05-15 NOTE — ASSESSMENT & PLAN NOTE
Optimized B12 B12 injections.  Follow-up with Urology and Cardiology for further evaluation.  Follow-up sooner if no improvement.  Update labs.

## 2023-05-16 LAB
CV PHARM DOSE: 0.4 MG
CV STRESS BASE HR: 57 BPM
DIASTOLIC BLOOD PRESSURE: 61 MMHG
NUC REST EJECTION FRACTION: 46
NUC STRESS EJECTION FRACTION: 6 %
OHS CV CPX 1 MINUTE RECOVERY HEART RATE: 74 BPM
OHS CV CPX 85 PERCENT MAX PREDICTED HEART RATE MALE: 125
OHS CV CPX ESTIMATED METS: 1
OHS CV CPX MAX PREDICTED HEART RATE: 147
OHS CV CPX PATIENT IS FEMALE: 0
OHS CV CPX PATIENT IS MALE: 1
OHS CV CPX PEAK DIASTOLIC BLOOD PRESSURE: 68 MMHG
OHS CV CPX PEAK HEAR RATE: 86 BPM
OHS CV CPX PEAK RATE PRESSURE PRODUCT: NORMAL
OHS CV CPX PEAK SYSTOLIC BLOOD PRESSURE: 137 MMHG
OHS CV CPX PERCENT MAX PREDICTED HEART RATE ACHIEVED: 59
OHS CV CPX RATE PRESSURE PRODUCT PRESENTING: 7125
STRESS ECHO POST EXERCISE DUR MIN: 1 MINUTES
STRESS ECHO POST EXERCISE DUR SEC: 30 SECONDS
SYSTOLIC BLOOD PRESSURE: 125 MMHG

## 2023-05-17 ENCOUNTER — TELEPHONE (OUTPATIENT)
Dept: CARDIOLOGY | Facility: CLINIC | Age: 73
End: 2023-05-17
Payer: MEDICARE

## 2023-05-17 NOTE — PROGRESS NOTES
[facility]   Radiation Oncology Consultation    Assessment   This is a 72 y.o. y/o male with Grade Group 1 (Group 2 on prior biopsies), PSA 13.3, favorable intermediate risk (based on PSA >10) adenocarcinoma of the prostate.  He presents today to discuss definitve management with radiation therapy.        Plan     Treatment options were discussed with the patient including prostatectomy, radiation therapy (brachytherapy, SBRT, and standard fractionated radiation therapy), or active surveillance.  We discussed the goals of treatment to be palliative.  The risks, benefits, scheduling, alternatives to and rationale of radiation therapy were explained in detail.  Indication, course, and potential toxicities of pelvic radiation therapy reviewed in detail, including but not limited to fatigue, increased frequency, urgency, or pain with urination, increased frequency or urgency of bowel movements, diarrhea, pelvic bone fragility, erectile dysfunction, decreased volume of ejaculate, remote risk of secondary malignancy.   After this discussion, he elected to proceed with active surveillance.  He understands that this will require semiannual PSA testing, and a repeat biopsy in the next 2 years. Additionally, he understands that around half of men on surveillance will go on to definitive treatment at some point in the future, but even in these men, surveillance allows for delay of any treatment-related toxicities.  He also understands that there is a small risk of disease progression while on surveillance which could change recommended management or prognosis.   PSA q6 months  with Dr. Michael.   He was given our contact information, and he was told that he could call our clinic at any time if he has any questions or concerns.      Radiation Treatment Details:   No radiation therapy planned at this time.       Chief Complaint   Patient presents with    Prostate Cancer         Oncology History    No  history exists.       HPI: The patient is a 72 y.o. year old male with a diagnosis of prostate cancer.  He was noted to have an elevated PSA of 13.3 on 3/21/23.  Previous PSA history as follows:   Latest Reference Range & Units Most Recent 06/03/21 13:23 10/18/21 11:31 01/25/22 09:20 03/08/22 09:33 09/08/22 09:34 03/21/23 10:35   PSA Diagnostic 0.00 - 4.00 ng/mL 13.3 (H)  3/21/23 10:35 9.3 (H) 12.5 (H) 10.9 (H) 9.3 (H) 12.3 (H) 13.3 (H)   (H): Data is abnormally high    He has prior biopsies:  7/17/19: Group 1 involving right apex, left mid and left apex, <5% of tissue    3/11/2020: 1 targeted core Group 1, 1 targeted core Group 2 (20% pattern 4) and 1 core Group 1 right lat apex    9/19/23 MRI prostate: 2 suspicious lesions: L TZ base and AFS 1.5cm and left PZ base 1.2cm  On 4/14/23 he underwent TRUS-guided biopsy of the prostate, with pathology as follows:  Target 1: hg-PIN  Targer 2: Group 1 in 2 of 3 cores, <5% of tissue  Left mid: Group 1 in 1 of 2 cores, 10% of tissue    Prostate size estimated at 42cc.  He presents today to discuss the potential role of radiation therapy in his cancer care.     History of prior irradiation: no  History of prior systemic anti-cancer therapy: no  History of collagen vascular disease: no  Implanted electronic device (pacer/defib/nerve stimulator): no    Past Medical History:   Diagnosis Date    Back pain     Benign prostate hyperplasia     DDD (degenerative disc disease), thoracolumbar     Disorder of kidney and ureter     stage 3    Elevated PSA     Glaucoma     Hx of colonic polyps 2010    Hypertension     Malignant neoplasm of prostate 07/31/2019    Metabolic syndrome     Mixed hyperlipidemia 01/24/2018    Obesity     MARIN (obstructive sleep apnea)     don't use a C-PAP    Pneumonia     Psychophysiological insomnia        Past Surgical History:   Procedure Laterality Date    BIOPSY, PROSTATE, USING PROSTATE MAPPING N/A 4/14/2023    Procedure: BIOPSY, PROSTATE, USING PROSTATE  MAPPING;  Surgeon: EJ Chan MD;  Location: [facility];  Service: Urology;  Laterality: N/A;    CYST REMOVAL      neck    CYSTOSCOPY N/A 2019    Procedure: CYSTOSCOPY;  Surgeon: Ulisses Serra MD;  Location: [facility];  Service: Urology;  Laterality: N/A;    EYE SURGERY Bilateral     cataract surgery    GANGLION CYST EXCISION Right     PROSTATE BIOPSY      TESTICLE SURGERY      TRANSRECTAL BIOPSY OF PROSTATE WITH ULTRASOUND GUIDANCE Bilateral 2019    Procedure: BIOPSY, PROSTATE, RECTAL APPROACH, WITH US GUIDANCE;  Surgeon: Ulisses Serra MD;  Location: [facility];  Service: Urology;  Laterality: Bilateral;    TRANSRECTAL BIOPSY OF PROSTATE WITH ULTRASOUND GUIDANCE N/A 3/11/2020    Procedure: BIOPSY, PROSTATE, RECTAL APPROACH, WITH US GUIDANCE;  Surgeon: Ulisses Serra MD;  Location: [facility];  Service: Urology;  Laterality: N/A;    TRANSURETHRAL SURGICAL REMOVAL OF PROSTATE (TURP) USING GREEN LIGHT LASER N/A 2022    Procedure: TURP, USING GREEN LIGHT LASER;  Surgeon: Ulisses Serra MD;  Location: [facility];  Service: Urology;  Laterality: N/A;       Social History     Tobacco Use    Smoking status: Former     Types: Cigars     Start date:      Quit date:      Years since quittin.4     Passive exposure: Never    Smokeless tobacco: Never    Tobacco comments:     cigars sporadically x 3 years   Substance Use Topics    Alcohol use: No    Drug use: Never       Cancer-related family history includes Prostate cancer in his father.    Current Outpatient Medications on File Prior to Visit   Medication Sig Dispense Refill    acetaminophen (TYLENOL) 500 MG tablet Take 500 mg by mouth every 6 (six) hours as needed for Pain.      amLODIPine (NORVASC) 5 MG tablet Take 1 tablet (5 mg total) by mouth once daily. 30 tablet 11    aspirin 325 MG tablet Take 325 mg by mouth once daily.      aspirin 81 MG Chew Take 81 mg by mouth once daily.      atorvastatin (LIPITOR) 40 MG tablet  "Take 1 tablet (40 mg total) by mouth once daily. 90 tablet 3    b complex vitamins tablet Take 1 tablet by mouth once daily.      carvediloL (COREG) 6.25 MG tablet Take 1 tablet (6.25 mg total) by mouth 2 (two) times daily with meals. 180 tablet 3    cyanocobalamin 1,000 mcg/mL injection Inject 1 mL (1,000 mcg total) into the muscle once a week. 4 mL 5    dorzolamide-timolol 2-0.5% (COSOPT) 22.3-6.8 mg/mL ophthalmic solution Place 1 drop into both eyes nightly.      latanoprost 0.005 % ophthalmic solution Place 1 drop into both eyes every evening.  6    tamsulosin (FLOMAX) 0.4 mg Cap Take 1 capsule (0.4 mg total) by mouth every evening. 90 capsule 4    valsartan (DIOVAN) 320 MG tablet Take 320 mg by mouth once daily.       Current Facility-Administered Medications on File Prior to Visit   Medication Dose Route Frequency Provider Last Rate Last Admin    sodium chloride 0.9% flush 10 mL  10 mL Intravenous PRN Ani Srivastava NP   10 mL at 05/15/23 1045       Review of patient's allergies indicates:   Allergen Reactions    Benadryl allergy decongestant      Opposite reaction, speeds him up   Opposite reaction, speeds him up     Diphenhydramine      Opposite reaction, speeds him up        Review of Systems   Constitutional:  Positive for malaise/fatigue (chronic, unknow etiology).   Respiratory:  Negative for cough and shortness of breath.    Cardiovascular:  Negative for chest pain.   Gastrointestinal:  Negative for abdominal pain.   Genitourinary:  Negative for dysuria, frequency, hematuria and urgency.   Musculoskeletal:  Positive for back pain and joint pain.      Vital Signs: /64 (BP Location: Left arm, Patient Position: Sitting, BP Method: Large (Manual))   Pulse 66   Temp 98.2 °F (36.8 °C) (Oral)   Ht 5' 10" (1.778 m)   Wt 107.2 kg (236 lb 5.3 oz)   SpO2 97%   BMI 33.91 kg/m²     ECOG Performance Status: 0 - Fully Active    Physical Exam  Vitals and nursing note reviewed.   Constitutional:       " General: He is not in acute distress.     Appearance: Normal appearance. He is not ill-appearing or toxic-appearing.   HENT:      Head: Normocephalic and atraumatic.      Nose: Nose normal.   Eyes:      Extraocular Movements: Extraocular movements intact.      Conjunctiva/sclera: Conjunctivae normal.      Pupils: Pupils are equal, round, and reactive to light.   Pulmonary:      Effort: Pulmonary effort is normal.   Skin:     General: Skin is warm.   Neurological:      General: No focal deficit present.      Mental Status: He is alert and oriented to person, place, and time.      Gait: Gait normal.   Psychiatric:         Mood and Affect: Mood normal.         Behavior: Behavior normal.         Thought Content: Thought content normal.         Judgment: Judgment normal.          Imaging: I have personally reviewed the patient's available images and reports and summarized pertinent findings above in HPI.     Pathology: I have personally reviewed the patient's available pathology and summarized pertinent findings above in HPI.     This case was discussed with Dr. Chan.      - Thank you for allowing me to participate in the care of your patient.    Suzan Borrero MD

## 2023-05-17 NOTE — TELEPHONE ENCOUNTER
Spoke with pt and discussed stress test results. Pt verbalized understanding and will call back with any further questions or concerns.     ----- Message from Ani Srivastava NP sent at 5/17/2023 10:18 AM CDT -----  Stress test is negative. Awaiting echo which is scheduled for tomorrow.

## 2023-05-18 ENCOUNTER — HOSPITAL ENCOUNTER (OUTPATIENT)
Dept: CARDIOLOGY | Facility: HOSPITAL | Age: 73
Discharge: HOME | End: 2023-05-18
Attending: NURSE PRACTITIONER
Payer: MEDICARE

## 2023-05-18 VITALS
SYSTOLIC BLOOD PRESSURE: 114 MMHG | DIASTOLIC BLOOD PRESSURE: 58 MMHG | BODY MASS INDEX: 32.2 KG/M2 | HEIGHT: 71 IN | HEART RATE: 63 BPM | WEIGHT: 230 LBS

## 2023-05-18 DIAGNOSIS — I10 ESSENTIAL HYPERTENSION: ICD-10-CM

## 2023-05-18 DIAGNOSIS — R06.09 DOE (DYSPNEA ON EXERTION): ICD-10-CM

## 2023-05-18 DIAGNOSIS — R53.83 FATIGUE, UNSPECIFIED TYPE: ICD-10-CM

## 2023-05-18 LAB
AORTIC ROOT ANNULUS: 3.49 CM
ASCENDING AORTA: 2.71 CM
AV INDEX (PROSTH): 0.77
AV MEAN GRADIENT: 4 MMHG
AV PEAK GRADIENT: 6 MMHG
AV VALVE AREA: 2.58 CM2
AV VELOCITY RATIO: 0.75
BSA FOR ECHO PROCEDURE: 2.28 M2
CV ECHO LV RWT: 0.36 CM
DOP CALC AO PEAK VEL: 1.27 M/S
DOP CALC AO VTI: 28.5 CM
DOP CALC LVOT AREA: 3.4 CM2
DOP CALC LVOT DIAMETER: 2.07 CM
DOP CALC LVOT PEAK VEL: 0.95 M/S
DOP CALC LVOT STROKE VOLUME: 73.66 CM3
DOP CALC RVOT PEAK VEL: 0.65 M/S
DOP CALC RVOT VTI: 16.3 CM
DOP CALCLVOT PEAK VEL VTI: 21.9 CM
E WAVE DECELERATION TIME: 244.37 MSEC
E/A RATIO: 0.72
E/E' RATIO: 6.47 M/S
ECHO LV POSTERIOR WALL: 0.92 CM (ref 0.6–1.1)
EJECTION FRACTION: 55 %
FRACTIONAL SHORTENING: 29 % (ref 28–44)
INTERVENTRICULAR SEPTUM: 0.94 CM (ref 0.6–1.1)
IVC DIAMETER: 1.64 CM
IVRT: 91.34 MSEC
LA MAJOR: 5.17 CM
LA MINOR: 4.82 CM
LA WIDTH: 3.4 CM
LEFT ATRIUM SIZE: 4.09 CM
LEFT ATRIUM VOLUME INDEX MOD: 23.3 ML/M2
LEFT ATRIUM VOLUME INDEX: 26.4 ML/M2
LEFT ATRIUM VOLUME MOD: 52.05 CM3
LEFT ATRIUM VOLUME: 58.97 CM3
LEFT INTERNAL DIMENSION IN SYSTOLE: 3.65 CM (ref 2.1–4)
LEFT VENTRICLE DIASTOLIC VOLUME INDEX: 55.74 ML/M2
LEFT VENTRICLE DIASTOLIC VOLUME: 124.29 ML
LEFT VENTRICLE MASS INDEX: 77 G/M2
LEFT VENTRICLE SYSTOLIC VOLUME INDEX: 25.3 ML/M2
LEFT VENTRICLE SYSTOLIC VOLUME: 56.35 ML
LEFT VENTRICULAR INTERNAL DIMENSION IN DIASTOLE: 5.11 CM (ref 3.5–6)
LEFT VENTRICULAR MASS: 171.31 G
LV LATERAL E/E' RATIO: 6.11 M/S
LV SEPTAL E/E' RATIO: 6.88 M/S
LVOT MG: 2.04 MMHG
LVOT MV: 0.67 CM/S
MV PEAK A VEL: 0.76 M/S
MV PEAK E VEL: 0.55 M/S
MV STENOSIS PRESSURE HALF TIME: 70.87 MS
MV VALVE AREA P 1/2 METHOD: 3.1 CM2
PISA TR MAX VEL: 2.67 M/S
PULM VEIN S/D RATIO: 1.66
PV MEAN GRADIENT: 0.87 MMHG
PV PEAK D VEL: 0.32 M/S
PV PEAK S VEL: 0.53 M/S
PV PEAK VELOCITY: 1.09 CM/S
RA MAJOR: 4.65 CM
RA PRESSURE: 3 MMHG
RIGHT VENTRICULAR END-DIASTOLIC DIMENSION: 3.22 CM
STJ: 3.13 CM
TDI LATERAL: 0.09 M/S
TDI SEPTAL: 0.08 M/S
TDI: 0.09 M/S
TR MAX PG: 29 MMHG
TRICUSPID ANNULAR PLANE SYSTOLIC EXCURSION: 2.2 CM
TV REST PULMONARY ARTERY PRESSURE: 32 MMHG

## 2023-05-18 PROCEDURE — 93306 TTE W/DOPPLER COMPLETE: CPT | Mod: 26,,, | Performed by: INTERNAL MEDICINE

## 2023-05-18 PROCEDURE — 93306 TTE W/DOPPLER COMPLETE: CPT | Mod: PO

## 2023-05-18 PROCEDURE — 93306 ECHO (CUPID ONLY): ICD-10-PCS | Mod: 26,,, | Performed by: INTERNAL MEDICINE

## 2023-05-19 ENCOUNTER — TELEPHONE (OUTPATIENT)
Dept: CARDIOLOGY | Facility: CLINIC | Age: 73
End: 2023-05-19
Payer: MEDICARE

## 2023-05-19 ENCOUNTER — TELEPHONE (OUTPATIENT)
Dept: HEMATOLOGY/ONCOLOGY | Facility: CLINIC | Age: 73
End: 2023-05-19
Payer: MEDICARE

## 2023-05-19 ENCOUNTER — OFFICE VISIT (OUTPATIENT)
Dept: RADIATION ONCOLOGY | Facility: CLINIC | Age: 73
End: 2023-05-19
Payer: MEDICARE

## 2023-05-19 ENCOUNTER — OFFICE VISIT (OUTPATIENT)
Dept: UROLOGY | Facility: CLINIC | Age: 73
End: 2023-05-19
Payer: MEDICARE

## 2023-05-19 VITALS
OXYGEN SATURATION: 97 % | OXYGEN SATURATION: 97 % | WEIGHT: 236.31 LBS | TEMPERATURE: 98 F | SYSTOLIC BLOOD PRESSURE: 122 MMHG | SYSTOLIC BLOOD PRESSURE: 122 MMHG | HEART RATE: 66 BPM | HEIGHT: 70 IN | BODY MASS INDEX: 33.83 KG/M2 | HEIGHT: 70 IN | BODY MASS INDEX: 33.83 KG/M2 | DIASTOLIC BLOOD PRESSURE: 64 MMHG | TEMPERATURE: 98 F | DIASTOLIC BLOOD PRESSURE: 64 MMHG | WEIGHT: 236.31 LBS | HEART RATE: 66 BPM

## 2023-05-19 DIAGNOSIS — C61 PROSTATE CANCER: Primary | ICD-10-CM

## 2023-05-19 DIAGNOSIS — C61 MALIGNANT NEOPLASM OF PROSTATE: Chronic | ICD-10-CM

## 2023-05-19 PROCEDURE — 3074F PR MOST RECENT SYSTOLIC BLOOD PRESSURE < 130 MM HG: ICD-10-PCS | Mod: CPTII,,, | Performed by: RADIOLOGY

## 2023-05-19 PROCEDURE — 3008F PR BODY MASS INDEX (BMI) DOCUMENTED: ICD-10-PCS | Mod: CPTII,,, | Performed by: RADIOLOGY

## 2023-05-19 PROCEDURE — 3008F BODY MASS INDEX DOCD: CPT | Mod: CPTII,,, | Performed by: RADIOLOGY

## 2023-05-19 PROCEDURE — 1101F PT FALLS ASSESS-DOCD LE1/YR: CPT | Mod: CPTII,,, | Performed by: UROLOGY

## 2023-05-19 PROCEDURE — 99999 PR PBB SHADOW E&M-EST. PATIENT-LVL III: CPT | Mod: PBBFAC,,, | Performed by: RADIOLOGY

## 2023-05-19 PROCEDURE — 99999 PR PBB SHADOW E&M-EST. PATIENT-LVL III: ICD-10-PCS | Mod: PBBFAC,,, | Performed by: RADIOLOGY

## 2023-05-19 PROCEDURE — 4010F PR ACE/ARB THEARPY RXD/TAKEN: ICD-10-PCS | Mod: CPTII,,, | Performed by: UROLOGY

## 2023-05-19 PROCEDURE — 1101F PR PT FALLS ASSESS DOC 0-1 FALLS W/OUT INJ PAST YR: ICD-10-PCS | Mod: CPTII,,, | Performed by: UROLOGY

## 2023-05-19 PROCEDURE — 3288F FALL RISK ASSESSMENT DOCD: CPT | Mod: CPTII,,, | Performed by: UROLOGY

## 2023-05-19 PROCEDURE — 3044F PR MOST RECENT HEMOGLOBIN A1C LEVEL <7.0%: ICD-10-PCS | Mod: CPTII,,, | Performed by: UROLOGY

## 2023-05-19 PROCEDURE — 99999 PR PBB SHADOW E&M-EST. PATIENT-LVL III: CPT | Mod: PBBFAC,,, | Performed by: UROLOGY

## 2023-05-19 PROCEDURE — 1125F AMNT PAIN NOTED PAIN PRSNT: CPT | Mod: CPTII,,, | Performed by: UROLOGY

## 2023-05-19 PROCEDURE — 3074F PR MOST RECENT SYSTOLIC BLOOD PRESSURE < 130 MM HG: ICD-10-PCS | Mod: CPTII,,, | Performed by: UROLOGY

## 2023-05-19 PROCEDURE — 1125F PR PAIN SEVERITY QUANTIFIED, PAIN PRESENT: ICD-10-PCS | Mod: CPTII,,, | Performed by: UROLOGY

## 2023-05-19 PROCEDURE — 3288F PR FALLS RISK ASSESSMENT DOCUMENTED: ICD-10-PCS | Mod: CPTII,,, | Performed by: UROLOGY

## 2023-05-19 PROCEDURE — 99213 OFFICE O/P EST LOW 20 MIN: CPT | Mod: 27,PN | Performed by: UROLOGY

## 2023-05-19 PROCEDURE — 3078F PR MOST RECENT DIASTOLIC BLOOD PRESSURE < 80 MM HG: ICD-10-PCS | Mod: CPTII,,, | Performed by: UROLOGY

## 2023-05-19 PROCEDURE — 3044F HG A1C LEVEL LT 7.0%: CPT | Mod: CPTII,,, | Performed by: RADIOLOGY

## 2023-05-19 PROCEDURE — 1159F MED LIST DOCD IN RCRD: CPT | Mod: CPTII,,, | Performed by: UROLOGY

## 2023-05-19 PROCEDURE — 3008F BODY MASS INDEX DOCD: CPT | Mod: CPTII,,, | Performed by: UROLOGY

## 2023-05-19 PROCEDURE — 4010F ACE/ARB THERAPY RXD/TAKEN: CPT | Mod: CPTII,,, | Performed by: RADIOLOGY

## 2023-05-19 PROCEDURE — 4010F PR ACE/ARB THEARPY RXD/TAKEN: ICD-10-PCS | Mod: CPTII,,, | Performed by: RADIOLOGY

## 2023-05-19 PROCEDURE — 99214 OFFICE O/P EST MOD 30 MIN: CPT | Mod: ,,, | Performed by: UROLOGY

## 2023-05-19 PROCEDURE — 99204 OFFICE O/P NEW MOD 45 MIN: CPT | Mod: ,,, | Performed by: RADIOLOGY

## 2023-05-19 PROCEDURE — 3074F SYST BP LT 130 MM HG: CPT | Mod: CPTII,,, | Performed by: RADIOLOGY

## 2023-05-19 PROCEDURE — 99213 OFFICE O/P EST LOW 20 MIN: CPT | Mod: PN | Performed by: RADIOLOGY

## 2023-05-19 PROCEDURE — 99999 PR PBB SHADOW E&M-EST. PATIENT-LVL III: ICD-10-PCS | Mod: PBBFAC,,, | Performed by: UROLOGY

## 2023-05-19 PROCEDURE — 1125F PR PAIN SEVERITY QUANTIFIED, PAIN PRESENT: ICD-10-PCS | Mod: CPTII,,, | Performed by: RADIOLOGY

## 2023-05-19 PROCEDURE — 99214 PR OFFICE/OUTPT VISIT, EST, LEVL IV, 30-39 MIN: ICD-10-PCS | Mod: ,,, | Performed by: UROLOGY

## 2023-05-19 PROCEDURE — 3078F PR MOST RECENT DIASTOLIC BLOOD PRESSURE < 80 MM HG: ICD-10-PCS | Mod: CPTII,,, | Performed by: RADIOLOGY

## 2023-05-19 PROCEDURE — 1125F AMNT PAIN NOTED PAIN PRSNT: CPT | Mod: CPTII,,, | Performed by: RADIOLOGY

## 2023-05-19 PROCEDURE — 3078F DIAST BP <80 MM HG: CPT | Mod: CPTII,,, | Performed by: UROLOGY

## 2023-05-19 PROCEDURE — 3074F SYST BP LT 130 MM HG: CPT | Mod: CPTII,,, | Performed by: UROLOGY

## 2023-05-19 PROCEDURE — 3078F DIAST BP <80 MM HG: CPT | Mod: CPTII,,, | Performed by: RADIOLOGY

## 2023-05-19 PROCEDURE — 3044F PR MOST RECENT HEMOGLOBIN A1C LEVEL <7.0%: ICD-10-PCS | Mod: CPTII,,, | Performed by: RADIOLOGY

## 2023-05-19 PROCEDURE — 3044F HG A1C LEVEL LT 7.0%: CPT | Mod: CPTII,,, | Performed by: UROLOGY

## 2023-05-19 PROCEDURE — 1159F PR MEDICATION LIST DOCUMENTED IN MEDICAL RECORD: ICD-10-PCS | Mod: CPTII,,, | Performed by: UROLOGY

## 2023-05-19 PROCEDURE — 4010F ACE/ARB THERAPY RXD/TAKEN: CPT | Mod: CPTII,,, | Performed by: UROLOGY

## 2023-05-19 PROCEDURE — 3008F PR BODY MASS INDEX (BMI) DOCUMENTED: ICD-10-PCS | Mod: CPTII,,, | Performed by: UROLOGY

## 2023-05-19 PROCEDURE — 99204 PR OFFICE/OUTPT VISIT, NEW, LEVL IV, 45-59 MIN: ICD-10-PCS | Mod: ,,, | Performed by: RADIOLOGY

## 2023-05-19 NOTE — PROGRESS NOTES
Subjective:       Patient ID: Yasir Jeffery Sr. is a 72 y.o. male.    Chief Complaint: Other (/Prostate talk )    HPI    72 year old with prostate cancer on active surveillance.  He underwent previous biopsy by Dr. Serra.  He had grade group 2 prostate cancer.  He had repeat prostate MRI which noted a PiRADS 4 and PiRADS 3 lesions.  Prostate volume was 42 mL.  He underwent repeat targeted biopsy.  Target 1 noted high-grade PIN.  Target to noted 2 cores positive for grade group 1 prostate cancer.  One other core and a standard template biopsy was also positive for grade group 1 prostate cancer.   He is seen today with his wife.  We discussed all treatment options for prostate cancer including radical prostatectomy, radiation therapy, androgen deprivation therapy, and active surveillance.  We discussed potential complications including the risk of incontinence and erectile dysfunction.  He has no ED now.  I answered all questions to his satisfaction.      MRI volume 42 ml      Component PSA Diagnostic   Latest Ref Rng & Units 0.00 - 4.00 ng/mL   3/21/2023 13.3 (H)   9/8/2022 12.3 (H)   3/8/2022 9.3 (H)   1/25/2022 10.9 (H)   10/18/2021 12.5 (H)   6/3/2021 9.3 (H)       1. PROSTATE, TARGET #1, CORE BIOPSY   - HIGH GRADE PROSTATIC INTRAEPITHELIAL NEOPLASIA     2. PROSTATE, TARGET #2, CORE BIOPSY   - PROSTATIC ADENOCARCINOMA, JASON SCORE 3+3=6 (GRADE GROUP 1)   - 1 MM IN AGGREGATE (LESS THAN 5% OF SUBMITTED TISSUE), 2 OF 3 CORES   - NEGATIVE FOR PERINEURAL INVASION     3. PROSTATE, LEFT BASE, CORE BIOPSY   - BENIGN PROSTATIC GLANDS AND STROMA     4. PROSTATE, LEFT MID, CORE BIOPSY   - PROSTATIC ADENOCARCINOMA, JASON SCORE 3+3=6 (GRADE GROUP 1)   - 2.5 MM IN AGGREGATE (10% OF SUBMITTED TISSUE), 1 OF 2 CORES   - NEGATIVE FOR PERINEURAL INVASION     5. PROSTATE, LEFT APEX, CORE BIOPSY   - HIGH GRADE PROSTATIC INTRAEPITHELIAL NEOPLASIA     6. PROSTATE, RIGHT BASE, CORE BIOPSY   - ATYPICAL SMALL ACINAR PROLIFERATION      7. PROSTATE, RIGHT MID, CORE BIOPSY   - BENIGN PROSTATIC GLANDS AND STROMA     8. PROSTATE, RIGHT APEX, CORE BIOPSY   - BENIGN PROSTATIC GLANDS AND STROMA       Review of Systems    Objective:      Physical Exam    Assessment:       1. Prostate cancer        Plan:       Prostate cancer      He wants to continue to observe.  Follow up 6 months with repeat PSA

## 2023-05-19 NOTE — TELEPHONE ENCOUNTER
Spoke with pt's wife. She states that they want a pulmonary referral and that they would prefer to go to [place] for that appointment. Please place referral.    --------------------------------------------  Attempted without success to contact pt to discuss results. Left voicemail for pt to call back to discuss.      ----- Message from Ani Srivastava NP sent at 5/18/2023  4:20 PM CDT -----  Echo looks pretty good. How would he feel about a referral to pulmonology to evaluate his ALVARADO?

## 2023-05-25 DIAGNOSIS — R53.83 FATIGUE, UNSPECIFIED TYPE: ICD-10-CM

## 2023-05-25 DIAGNOSIS — R06.09 DOE (DYSPNEA ON EXERTION): Primary | ICD-10-CM

## 2023-06-08 NOTE — PROGRESS NOTES
echSubjective:    Patient ID:  Yasir Jeffery Sr. is a 72 y.o. male who presents for evaluation of Follow-up      HPI: Mr. Yasir Jeffery Sr. presents to the clinic with his wife for follow-up of test results and ALVARADO. Stress test was negative for ischemia. Echo with normal EF and diastolic function. PAS 32.  Has not yet been scheduled to see Pulmonary for dyspnea on exertion, fatigue, and untreated sleep apnea.  He stated lately he is having more problems with pain in his shoulders in his hips.  This pain is what causes him to stop activities to rest; he experiences dyspnea on exertion but he thinks it is the pain that causes him to have to stop.  Did receive B12 medication; however, he did not receive the syringes to do the injections, so he is going back to get those today.    He reports ALVARADO with walking around or getting dressed. He has fatigue every day. His wife stated that he will do an activity for about 5 minutes before he has to stop and rest. He denied any chest pain. He denied orthopnea, PND, but occasional edema when on his feet. He denied palpitations, lightheadedness, dizziness, or near syncope.   He does have prostate cancer for which he has been doing active surveillance.    MARIN: He never went for MARIN titration study.      Medications: he is not missing doses of medications. He is taking aspirin.  Taking Coreg as directed twice a day.  He is taking valsartan 320 mg p.o. q.day and amlodipine 5mg for the last 2 weeks.  He is taking lipitor.  Sodium: he does not add salt to foods,  he is not reading labels for sodium content. He denied eating salty foods.  Diet: stopped eating oatmeal. Eating two meals per day. Chicken, occasional red meat. Cooks with a little butter. He generally cooks his own meals from scratch. He is not drinking juice or sodas. He may eat a donut at [employer]; he likes slim [employer].  He drinks about a gallon of milk over a couple of days.  Exercise: he does not; he works in his shop,  carrying things, walking. He limits climbing due to osteoarthritis pain-shoulders, hips, and knees and fatigue.  Tobacco: hasn't smoked in years. no alcohol use  Coffee: one-3 cups/day.     Weight: 105.5 kg (232 lb 8 oz) he states that his daily weights has been stable. Body mass index is 33.36 kg/m².  Wt Readings from Last 3 Encounters:   06/09/23 105.5 kg (232 lb 8 oz)   05/19/23 107.2 kg (236 lb 5.3 oz)   05/19/23 107.2 kg (236 lb 5.3 oz)     BP log:            Review of Systems   Constitutional: Positive for malaise/fatigue. Negative for chills, decreased appetite, diaphoresis, fever, night sweats, weight gain and weight loss.   HENT:          Chronic sinus problems   Cardiovascular:  Positive for dyspnea on exertion. Negative for chest pain, claudication, cyanosis, irregular heartbeat, near-syncope, orthopnea, palpitations, paroxysmal nocturnal dyspnea and syncope.   Respiratory:  Negative for cough, hemoptysis, shortness of breath, sputum production and wheezing.    Hematologic/Lymphatic: Negative for adenopathy and bleeding problem. Does not bruise/bleed easily.   Skin:  Negative for color change and nail changes.   Gastrointestinal:  Negative for bloating, abdominal pain, change in bowel habit, heartburn, hematochezia, melena, nausea and vomiting.   Genitourinary:  Negative for hematuria.   Neurological:  Negative for dizziness and light-headedness.   Psychiatric/Behavioral:  Negative for altered mental status.      Objective:   Physical Exam  Constitutional:       General: He is not in acute distress.     Appearance: He is well-developed. He is not diaphoretic.   HENT:      Head: Normocephalic and atraumatic.   Eyes:      General: No scleral icterus.     Conjunctiva/sclera: Conjunctivae normal.   Neck:      Thyroid: No thyromegaly.      Vascular: No JVD.      Trachea: No tracheal deviation.   Cardiovascular:      Rate and Rhythm: Normal rate and regular rhythm.      Pulses: Intact distal pulses.      Heart  sounds: Normal heart sounds. No murmur heard.    No friction rub. No gallop.   Pulmonary:      Effort: Pulmonary effort is normal. No respiratory distress.      Breath sounds: Normal breath sounds. No wheezing or rales.   Chest:      Chest wall: No tenderness.   Abdominal:      General: Bowel sounds are normal. There is no distension.      Palpations: Abdomen is soft. There is no mass.      Tenderness: There is no abdominal tenderness. There is no guarding or rebound.   Musculoskeletal:         General: Normal range of motion.      Cervical back: Neck supple.      Right lower leg: Edema (trace pretibial edema.He stated that his right leg does tend to swell more readily than his left; no injury/trauma to that leg.) present.      Left lower leg: No edema.   Lymphadenopathy:      Cervical: No cervical adenopathy.   Skin:     General: Skin is warm and dry.      Coloration: Skin is not pale.      Findings: No erythema or rash.      Comments: [place]   Neurological:      Mental Status: He is alert and oriented to person, place, and time.      Latest Reference Range & Units 05/12/23 11:40   TSH 0.400 - 4.000 uIU/mL 1.808        Latest Reference Range & Units 09/08/22 09:34 03/21/23 10:35 04/11/23 15:17   Sodium 136 - 145 mmol/L 141  138   Potassium 3.5 - 5.1 mmol/L 5.2 (H)  4.8   Chloride 95 - 110 mmol/L 105  102   CO2 22 - 31 mmol/L 26  27   Anion Gap 8 - 16 mmol/L 10  9   BUN 9 - 21 mg/dL 16  25 (H)   Creatinine 0.50 - 1.40 mg/dL 1.6 (H)  1.48 (H)   eGFR >60 mL/min/1.73 m^2 45.2 !  50 !   Glucose 70 - 110 mg/dL 98  95   Calcium 8.4 - 10.2 mg/dL 9.9  9.3   Alkaline Phosphatase 55 - 135 U/L 61     PROTEIN TOTAL 6.0 - 8.4 g/dL 7.4     Albumin 3.5 - 5.2 g/dL 4.2     BILIRUBIN TOTAL 0.1 - 1.0 mg/dL 0.9     AST 10 - 40 U/L 22     ALT 10 - 44 U/L 13     Cholesterol 120 - 199 mg/dL 248 (H)     HDL 40 - 75 mg/dL 34 (L)     HDL/Cholesterol Ratio 20.0 - 50.0 % 13.7 (L)     LDL Cholesterol External 63.0 - 159.0 mg/dL 176.6 (H)      Non-HDL Cholesterol mg/dL 214     Total Cholesterol/HDL Ratio 2.0 - 5.0  7.3 (H)     Triglycerides 30 - 150 mg/dL 187 (H)     Hemoglobin A1C External 4.0 - 5.6 % 5.4     Estimated Avg Glucose mg/dL 108  114   Hemoglobin A1c referral test <=5.6 %   5.6   PSA Diagnostic 0.00 - 4.00 ng/mL 12.3 (H) 13.3 (H)    (H): Data is abnormally high  !: Data is abnormal  (L): Data is abnormally low        48-hour Holter April 23, 2021:  Supraventricular ectopic activity consisted of 21464 beats, of which, 35 were in 9 runs of EAT, 400 were in atrial couplets, 24  were late beats, 84071 were single PACs, 3006 were in bigeminy, 65 were in trigeminy. The longest R-R interval was 1.5 seconds  occurring at 9:15:33 AM D2. The longest N-N interval was 1.4 seconds occurring at 5:38:50 AM D1. The longest supraventricular  run occurred at 2:27:57 AM D1 consisting of 6 beats, with maximum heart rate of 118 BPM. The fastest supraventricular run  occurred at 8:09:00 PM D2, consisting of 3 beats, with maximum heart rate of 169 BPM.PVCs.    CXR 2/24/2021: FINDINGS:  NO SIGNIFICANT INTERVAL CHANGE.  LUNGS SYMMETRICALLY AERATED THERE IS AGAIN NOTED TO BE BILATERAL GRANULOMAS.  NO NEW OR DEVELOPING AREA OF CONSOLIDATION OR EFFUSION.  HEART AND PULMONARY VASCULATURE WITHIN NORMAL LIMITS.  MILD DEGENERATIVE CHANGE THROUGHOUT THE SPINE WITH MINIMAL UNDERLYING SCOLIOSIS.  MULTILEVEL BRIDGING SYNDESMOPHYTES PRESENT.   Impression:   STABLE EXAM WITHOUT ACUTE INFILTRATE.    Lexiscan Nuclear Stress test 5/15/23: Conclusion     Normal myocardial perfusion scan. There is no evidence of myocardial ischemia or infarction.    There is mild apical thinning which is a normal variant.    The gated perfusion images showed an ejection fraction of 46% at rest. The gated perfusion images showed an ejection fraction of 6% post stress.    There is normal wall motion at rest and post stress.    LV cavity size is normal at rest and normal at stress.    The ECG portion  of the study is negative for ischemia.    The patient reported chest pain during the stress test.    Pharmacologic nuclear medicine stress test 03/08/2021:Conclusion       Abnormal myocardial perfusion scan.    There is a mild intensity, fixed defect consistent with scar in the inferoapical wall(s).    The gated perfusion images showed an ejection fraction of 44% at rest. The gated perfusion images showed an ejection fraction of 44% post stress. Normal ejection fraction is greater than 54%.    There is mild global hypokinesis at rest and stress.    LV cavity size is mildly enlarged at rest and mildly enlarged at stress.    The EKG portion of this study is negative for ischemia.    The patient reported no chest pain during the stress test.   Spoke with Dr. Chavira no reversible ischemia.    Echo 5/18/23: Summary  The left ventricle is normal in size with normal systolic function.  The estimated ejection fraction is 55%.  Normal left ventricular diastolic function.  Normal right ventricular size with normal right ventricular systolic function.  Mild tricuspid regurgitation.  Normal central venous pressure (3 mmHg).  The estimated PA systolic pressure is 32 mmHg.  Mild aortic regurgitation.    Echo 3/10/2021: Conclusion  The left ventricle is normal in size with concentric hypertrophy and normal systolic function. The estimated ejection fraction is 55%  Indeterminate left ventricular diastolic function.  Normal right ventricular size with normal right ventricular systolic function.  Normal central venous pressure (3 mmHg).  The estimated PA systolic pressure is 35 mmHg.       Echocardiogram 03/05/2020:Conclusion  Concentric left ventricular remodeling.  Normal left ventricular systolic function. The estimated ejection fraction is 55%.  Normal LV diastolic function.  Normal central venous pressure (3 mmHg).  The estimated PA systolic pressure is 29 mmHg.     Echo 11/02/17: CONCLUSIONS     1 - Concentric hypertrophy.      2 - No wall motion abnormalities.     3 - Low normal to mildly depressed left ventricular systolic function (EF 50-55%).     4 - Normal left ventricular diastolic function.     5 - Normal right ventricular systolic function .     6 - The estimated PA systolic pressure is 31 mmHg.     7 - Mild mitral regurgitation.     Assessment:      1. ALVARADO (dyspnea on exertion)    2. Fatigue, unspecified type    3. Essential hypertension    4. Mixed hyperlipidemia    5. B12 deficiency    6. Vitamin D insufficiency    7. Metabolic syndrome    8. Class 1 obesity due to excess calories with serious comorbidity and body mass index (BMI) of 31.0 to 31.9 in adult    9. Obstructive sleep apnea    10. Stage 3a chronic kidney disease      Plan:   ALVARADO (dyspnea on exertion)    Fatigue, unspecified type    Essential hypertension  -     Comprehensive Metabolic Panel; Future; Expected date: 09/11/2023  -     Magnesium, RBC; Future; Expected date: 09/11/2023    Mixed hyperlipidemia  -     Lipid Panel; Future; Expected date: 09/11/2023    B12 deficiency    Vitamin D insufficiency  -     Vitamin D; Future; Expected date: 09/11/2023    Metabolic syndrome    Class 1 obesity due to excess calories with serious comorbidity and body mass index (BMI) of 31.0 to 31.9 in adult    Obstructive sleep apnea  Comments:  Never went for CPAP titration study.    Stage 3a chronic kidney disease        Discussed stress test and echocardiogram reports with Mr. And Mrs. Jeffery and answered all of their questions.  Continue Coreg 6.25mg p.o. b.i.d.; due to bradycardia in the past we have not increased this dose-hypertension, PACs and PVCs.  Continue amlodipine 5mg QD, valsartan 320 mg p.o. q.day-hypertension.  Monitor BP at home-his wife is a nurse and checks it manually.  Blood pressure log.  Sodium restriction encouraged.  Encouraged them to read labels for sodium content.  B12 injections as directed.  Discussed rationale and they verbalized understanding of this.     Encouraged walking daily.  Whole foods diet recommended.  Continue lipitor - HLP  Continue ASA daily.  Will get labs in September: FLP, CMP, magnesium.  Can try 4-8 ounces of lemon water and sip during meals, as vitamin-D insufficiency and B12 insufficiency may be related to insufficient stomach acid.  Appointment with Pulmonary to discuss sleep apnea, dyspnea on exertion and easy fatigability.  Follow-up in 4 months with BP log or call sooner for any problems.  Ani Srivastava NP  [facility]    This note has been prepared using a combination of MML[employer] dictation device and typing.  It has been checked for errors but some errors may still exist within the note as a result of speech recognition errors and/or typographical errors.     Addendum December 11, 2023 10:30 am:  Nurse Betty Boykin contacted patient to discuss elevated cholesterol and low vitamin D found on labs. He refuses to take cholesterol medication and rarely takes vitamin D. He does not want to take any more medications. Will discuss further at next visit.  Ani Srivastava NP  [facility]

## 2023-06-09 ENCOUNTER — OFFICE VISIT (OUTPATIENT)
Dept: CARDIOLOGY | Facility: CLINIC | Age: 73
End: 2023-06-09
Payer: MEDICARE

## 2023-06-09 VITALS
DIASTOLIC BLOOD PRESSURE: 64 MMHG | WEIGHT: 232.5 LBS | HEIGHT: 70 IN | HEART RATE: 60 BPM | BODY MASS INDEX: 33.28 KG/M2 | SYSTOLIC BLOOD PRESSURE: 118 MMHG | OXYGEN SATURATION: 96 %

## 2023-06-09 DIAGNOSIS — I10 ESSENTIAL HYPERTENSION: ICD-10-CM

## 2023-06-09 DIAGNOSIS — E53.8 B12 DEFICIENCY: ICD-10-CM

## 2023-06-09 DIAGNOSIS — E88.810 METABOLIC SYNDROME: ICD-10-CM

## 2023-06-09 DIAGNOSIS — G47.33 OBSTRUCTIVE SLEEP APNEA: ICD-10-CM

## 2023-06-09 DIAGNOSIS — E55.9 VITAMIN D INSUFFICIENCY: ICD-10-CM

## 2023-06-09 DIAGNOSIS — E66.09 CLASS 1 OBESITY DUE TO EXCESS CALORIES WITH SERIOUS COMORBIDITY AND BODY MASS INDEX (BMI) OF 31.0 TO 31.9 IN ADULT: ICD-10-CM

## 2023-06-09 DIAGNOSIS — R06.09 DOE (DYSPNEA ON EXERTION): Primary | ICD-10-CM

## 2023-06-09 DIAGNOSIS — R53.83 FATIGUE, UNSPECIFIED TYPE: ICD-10-CM

## 2023-06-09 DIAGNOSIS — N18.31 STAGE 3A CHRONIC KIDNEY DISEASE: ICD-10-CM

## 2023-06-09 DIAGNOSIS — E78.2 MIXED HYPERLIPIDEMIA: ICD-10-CM

## 2023-06-09 PROCEDURE — 1125F PR PAIN SEVERITY QUANTIFIED, PAIN PRESENT: ICD-10-PCS | Mod: CPTII,S$GLB,, | Performed by: NURSE PRACTITIONER

## 2023-06-09 PROCEDURE — 99999 PR PBB SHADOW E&M-EST. PATIENT-LVL V: CPT | Mod: PBBFAC,,, | Performed by: NURSE PRACTITIONER

## 2023-06-09 PROCEDURE — 3008F BODY MASS INDEX DOCD: CPT | Mod: CPTII,S$GLB,, | Performed by: NURSE PRACTITIONER

## 2023-06-09 PROCEDURE — 99999 PR PBB SHADOW E&M-EST. PATIENT-LVL V: ICD-10-PCS | Mod: PBBFAC,,, | Performed by: NURSE PRACTITIONER

## 2023-06-09 PROCEDURE — 3044F PR MOST RECENT HEMOGLOBIN A1C LEVEL <7.0%: ICD-10-PCS | Mod: CPTII,S$GLB,, | Performed by: NURSE PRACTITIONER

## 2023-06-09 PROCEDURE — 3078F PR MOST RECENT DIASTOLIC BLOOD PRESSURE < 80 MM HG: ICD-10-PCS | Mod: CPTII,S$GLB,, | Performed by: NURSE PRACTITIONER

## 2023-06-09 PROCEDURE — 3074F SYST BP LT 130 MM HG: CPT | Mod: CPTII,S$GLB,, | Performed by: NURSE PRACTITIONER

## 2023-06-09 PROCEDURE — 3288F PR FALLS RISK ASSESSMENT DOCUMENTED: ICD-10-PCS | Mod: CPTII,S$GLB,, | Performed by: NURSE PRACTITIONER

## 2023-06-09 PROCEDURE — 1160F RVW MEDS BY RX/DR IN RCRD: CPT | Mod: CPTII,S$GLB,, | Performed by: NURSE PRACTITIONER

## 2023-06-09 PROCEDURE — 99214 OFFICE O/P EST MOD 30 MIN: CPT | Mod: S$GLB,,, | Performed by: NURSE PRACTITIONER

## 2023-06-09 PROCEDURE — 4010F PR ACE/ARB THEARPY RXD/TAKEN: ICD-10-PCS | Mod: CPTII,S$GLB,, | Performed by: NURSE PRACTITIONER

## 2023-06-09 PROCEDURE — 1101F PT FALLS ASSESS-DOCD LE1/YR: CPT | Mod: CPTII,S$GLB,, | Performed by: NURSE PRACTITIONER

## 2023-06-09 PROCEDURE — 1159F PR MEDICATION LIST DOCUMENTED IN MEDICAL RECORD: ICD-10-PCS | Mod: CPTII,S$GLB,, | Performed by: NURSE PRACTITIONER

## 2023-06-09 PROCEDURE — 1160F PR REVIEW ALL MEDS BY PRESCRIBER/CLIN PHARMACIST DOCUMENTED: ICD-10-PCS | Mod: CPTII,S$GLB,, | Performed by: NURSE PRACTITIONER

## 2023-06-09 PROCEDURE — 99214 PR OFFICE/OUTPT VISIT, EST, LEVL IV, 30-39 MIN: ICD-10-PCS | Mod: S$GLB,,, | Performed by: NURSE PRACTITIONER

## 2023-06-09 PROCEDURE — 1125F AMNT PAIN NOTED PAIN PRSNT: CPT | Mod: CPTII,S$GLB,, | Performed by: NURSE PRACTITIONER

## 2023-06-09 PROCEDURE — 3074F PR MOST RECENT SYSTOLIC BLOOD PRESSURE < 130 MM HG: ICD-10-PCS | Mod: CPTII,S$GLB,, | Performed by: NURSE PRACTITIONER

## 2023-06-09 PROCEDURE — 3288F FALL RISK ASSESSMENT DOCD: CPT | Mod: CPTII,S$GLB,, | Performed by: NURSE PRACTITIONER

## 2023-06-09 PROCEDURE — 3008F PR BODY MASS INDEX (BMI) DOCUMENTED: ICD-10-PCS | Mod: CPTII,S$GLB,, | Performed by: NURSE PRACTITIONER

## 2023-06-09 PROCEDURE — 1159F MED LIST DOCD IN RCRD: CPT | Mod: CPTII,S$GLB,, | Performed by: NURSE PRACTITIONER

## 2023-06-09 PROCEDURE — 4010F ACE/ARB THERAPY RXD/TAKEN: CPT | Mod: CPTII,S$GLB,, | Performed by: NURSE PRACTITIONER

## 2023-06-09 PROCEDURE — 1101F PR PT FALLS ASSESS DOC 0-1 FALLS W/OUT INJ PAST YR: ICD-10-PCS | Mod: CPTII,S$GLB,, | Performed by: NURSE PRACTITIONER

## 2023-06-09 PROCEDURE — 3044F HG A1C LEVEL LT 7.0%: CPT | Mod: CPTII,S$GLB,, | Performed by: NURSE PRACTITIONER

## 2023-06-09 PROCEDURE — 3078F DIAST BP <80 MM HG: CPT | Mod: CPTII,S$GLB,, | Performed by: NURSE PRACTITIONER

## 2023-06-15 ENCOUNTER — OFFICE VISIT (OUTPATIENT)
Dept: PULMONOLOGY | Facility: CLINIC | Age: 73
End: 2023-06-15
Payer: MEDICARE

## 2023-06-15 VITALS
SYSTOLIC BLOOD PRESSURE: 126 MMHG | BODY MASS INDEX: 32.62 KG/M2 | OXYGEN SATURATION: 95 % | DIASTOLIC BLOOD PRESSURE: 80 MMHG | RESPIRATION RATE: 17 BRPM | WEIGHT: 227.88 LBS | HEIGHT: 70 IN | HEART RATE: 78 BPM

## 2023-06-15 DIAGNOSIS — R06.02 SOB (SHORTNESS OF BREATH): ICD-10-CM

## 2023-06-15 DIAGNOSIS — R53.83 FATIGUE, UNSPECIFIED TYPE: Chronic | ICD-10-CM

## 2023-06-15 DIAGNOSIS — G47.33 OSA (OBSTRUCTIVE SLEEP APNEA): Primary | ICD-10-CM

## 2023-06-15 DIAGNOSIS — E66.2 CLASS 1 OBESITY WITH ALVEOLAR HYPOVENTILATION, SERIOUS COMORBIDITY, AND BODY MASS INDEX (BMI) OF 32.0 TO 32.9 IN ADULT: Chronic | ICD-10-CM

## 2023-06-15 DIAGNOSIS — I10 ESSENTIAL HYPERTENSION: ICD-10-CM

## 2023-06-15 PROCEDURE — 3079F DIAST BP 80-89 MM HG: CPT | Mod: CPTII,S$GLB,, | Performed by: PHYSICIAN ASSISTANT

## 2023-06-15 PROCEDURE — 1160F RVW MEDS BY RX/DR IN RCRD: CPT | Mod: CPTII,S$GLB,, | Performed by: PHYSICIAN ASSISTANT

## 2023-06-15 PROCEDURE — 3074F PR MOST RECENT SYSTOLIC BLOOD PRESSURE < 130 MM HG: ICD-10-PCS | Mod: CPTII,S$GLB,, | Performed by: PHYSICIAN ASSISTANT

## 2023-06-15 PROCEDURE — 3008F BODY MASS INDEX DOCD: CPT | Mod: CPTII,S$GLB,, | Performed by: PHYSICIAN ASSISTANT

## 2023-06-15 PROCEDURE — 3079F PR MOST RECENT DIASTOLIC BLOOD PRESSURE 80-89 MM HG: ICD-10-PCS | Mod: CPTII,S$GLB,, | Performed by: PHYSICIAN ASSISTANT

## 2023-06-15 PROCEDURE — 1160F PR REVIEW ALL MEDS BY PRESCRIBER/CLIN PHARMACIST DOCUMENTED: ICD-10-PCS | Mod: CPTII,S$GLB,, | Performed by: PHYSICIAN ASSISTANT

## 2023-06-15 PROCEDURE — 3288F PR FALLS RISK ASSESSMENT DOCUMENTED: ICD-10-PCS | Mod: CPTII,S$GLB,, | Performed by: PHYSICIAN ASSISTANT

## 2023-06-15 PROCEDURE — 99999 PR PBB SHADOW E&M-EST. PATIENT-LVL IV: ICD-10-PCS | Mod: PBBFAC,,, | Performed by: PHYSICIAN ASSISTANT

## 2023-06-15 PROCEDURE — 1159F MED LIST DOCD IN RCRD: CPT | Mod: CPTII,S$GLB,, | Performed by: PHYSICIAN ASSISTANT

## 2023-06-15 PROCEDURE — 99214 PR OFFICE/OUTPT VISIT, EST, LEVL IV, 30-39 MIN: ICD-10-PCS | Mod: S$GLB,,, | Performed by: PHYSICIAN ASSISTANT

## 2023-06-15 PROCEDURE — 3044F PR MOST RECENT HEMOGLOBIN A1C LEVEL <7.0%: ICD-10-PCS | Mod: CPTII,S$GLB,, | Performed by: PHYSICIAN ASSISTANT

## 2023-06-15 PROCEDURE — 3074F SYST BP LT 130 MM HG: CPT | Mod: CPTII,S$GLB,, | Performed by: PHYSICIAN ASSISTANT

## 2023-06-15 PROCEDURE — 1101F PT FALLS ASSESS-DOCD LE1/YR: CPT | Mod: CPTII,S$GLB,, | Performed by: PHYSICIAN ASSISTANT

## 2023-06-15 PROCEDURE — 1101F PR PT FALLS ASSESS DOC 0-1 FALLS W/OUT INJ PAST YR: ICD-10-PCS | Mod: CPTII,S$GLB,, | Performed by: PHYSICIAN ASSISTANT

## 2023-06-15 PROCEDURE — 99999 PR PBB SHADOW E&M-EST. PATIENT-LVL IV: CPT | Mod: PBBFAC,,, | Performed by: PHYSICIAN ASSISTANT

## 2023-06-15 PROCEDURE — 3044F HG A1C LEVEL LT 7.0%: CPT | Mod: CPTII,S$GLB,, | Performed by: PHYSICIAN ASSISTANT

## 2023-06-15 PROCEDURE — 3288F FALL RISK ASSESSMENT DOCD: CPT | Mod: CPTII,S$GLB,, | Performed by: PHYSICIAN ASSISTANT

## 2023-06-15 PROCEDURE — 4010F ACE/ARB THERAPY RXD/TAKEN: CPT | Mod: CPTII,S$GLB,, | Performed by: PHYSICIAN ASSISTANT

## 2023-06-15 PROCEDURE — 99214 OFFICE O/P EST MOD 30 MIN: CPT | Mod: S$GLB,,, | Performed by: PHYSICIAN ASSISTANT

## 2023-06-15 PROCEDURE — 1159F PR MEDICATION LIST DOCUMENTED IN MEDICAL RECORD: ICD-10-PCS | Mod: CPTII,S$GLB,, | Performed by: PHYSICIAN ASSISTANT

## 2023-06-15 PROCEDURE — 3008F PR BODY MASS INDEX (BMI) DOCUMENTED: ICD-10-PCS | Mod: CPTII,S$GLB,, | Performed by: PHYSICIAN ASSISTANT

## 2023-06-15 PROCEDURE — 4010F PR ACE/ARB THEARPY RXD/TAKEN: ICD-10-PCS | Mod: CPTII,S$GLB,, | Performed by: PHYSICIAN ASSISTANT

## 2023-06-15 NOTE — ASSESSMENT & PLAN NOTE
History of mild MARIN 2021  Has gained weight since last sleep study  worsening fatigue  Will get repeat sleep study, he requests home study

## 2023-06-15 NOTE — PROGRESS NOTES
Subjective:       Patient ID: Yasir Jeffery Sr. is a 72 y.o. male.    Chief Complaint: Sleep Apnea (/)    71yo male referred by Ani Srivastava NP for fatigue  Fatigue started 5 years ago; gutted a burned building with [employer], feels like this is when the fatigue started  He was able to do heavy activity like yard work prior to this without issues  Has a constant post nasal drip  He denies cough  No smoking history  Retired   Does not have consistent sleep schedule  Feels like a light sleeper  Snores  History of mild MARIN diagnosed in 2021, does not wear CPAP, has had weight gain since last study  Fatigue has gradually worsened over the last few years  He complains of shortness of breath but describes this as more of full body fatigue and not out of breath  History of active prostate cancer, follows with oncology for surveillance      BP Readings from Last 3 Encounters:   06/15/23 126/80   06/09/23 118/64   05/19/23 122/64     Snoring / Sleep:     [place] Questionnaire (validated MARIN screening questionnaire)    yes -- Snoring/apnea    yes -- Fatigue    Body mass index is 32.69 kg/m².  (>25 is overweight, >30 is obese)    Blood Pressure = Hypertension  (PreHTN 120-139/80-89, Stg1 140-159/90-99, Stg2 >160/>100)  [place] = 3 of three MARIN categories are positive (high risk is 2-3 positive categories)     [place] Sleepiness Scale TOTAL =   2  (validated sleepiness questionnaire with a higher score indicating greater sleepiness; range 0-24)  [place] SLEEPINESS SCALE 6/15/2023   Sitting and reading 0   Watching TV 0   Sitting, inactive in a public place (e.g. a theatre or a meeting) 0   As a passenger in a car for an hour without a break 0   Lying down to rest in the afternoon when circumstances permit 2   Sitting and talking to someone 0   Sitting quietly after a lunch without alcohol 0   In a car, while stopped for a few minutes in traffic 0   Total score 2         STOP-Bang Questionnaire  (validated MARIN screening questionnaire)  Negative unless checked off.  [x] Snoring    [x]  Tired/Fatigued/Sleepy  [] Obstruction (apneas/choking)  [x] Pressure (HTN)  [x] BMI >35  [x] Age >50  [x] Neck >40 cm  [x] Gender male   STOP-Bang = 7 (low risk 0-2,high risk 3-8)    Immunization History   Administered Date(s) Administered    Pneumococcal Conjugate - 13 Valent 06/07/2019    Pneumococcal Polysaccharide - 23 Valent 09/07/2017    Tdap 06/20/2015    Zoster 09/07/2017      Tobacco Use: Medium Risk    Smoking Tobacco Use: Former    Smokeless Tobacco Use: Never    Passive Exposure: Never      Past Medical History:   Diagnosis Date    Back pain     Benign prostate hyperplasia     DDD (degenerative disc disease), thoracolumbar     Disorder of kidney and ureter     stage 3    Elevated PSA     Glaucoma     Hx of colonic polyps 2010    Hypertension     Malignant neoplasm of prostate 07/31/2019    Metabolic syndrome     Mixed hyperlipidemia 01/24/2018    Obesity     MARIN (obstructive sleep apnea)     don't use a C-PAP    Pneumonia     Psychophysiological insomnia       Current Outpatient Medications on File Prior to Visit   Medication Sig Dispense Refill    acetaminophen (TYLENOL) 500 MG tablet Take 500 mg by mouth every 6 (six) hours as needed for Pain.      amLODIPine (NORVASC) 5 MG tablet Take 1 tablet (5 mg total) by mouth once daily. 30 tablet 11    aspirin 325 MG tablet Take 325 mg by mouth once daily.      aspirin 81 MG Chew Take 81 mg by mouth once daily.      atorvastatin (LIPITOR) 40 MG tablet Take 1 tablet (40 mg total) by mouth once daily. 90 tablet 3    b complex vitamins tablet Take 1 tablet by mouth once daily.      carvediloL (COREG) 6.25 MG tablet Take 1 tablet (6.25 mg total) by mouth 2 (two) times daily with meals. 180 tablet 3    cyanocobalamin 1,000 mcg/mL injection Inject 1 mL (1,000 mcg total) into the muscle once a week. 4 mL 5    dorzolamide-timolol 2-0.5% (COSOPT) 22.3-6.8 mg/mL ophthalmic solution  "Place 1 drop into both eyes nightly.      latanoprost 0.005 % ophthalmic solution Place 1 drop into both eyes every evening.  6    tamsulosin (FLOMAX) 0.4 mg Cap Take 1 capsule (0.4 mg total) by mouth every evening. 90 capsule 4    valsartan (DIOVAN) 320 MG tablet Take 320 mg by mouth once daily.       Current Facility-Administered Medications on File Prior to Visit   Medication Dose Route Frequency Provider Last Rate Last Admin    sodium chloride 0.9% flush 10 mL  10 mL Intravenous PRN Ani Srivastava NP   10 mL at 05/15/23 1045        Review of Systems   Constitutional:  Positive for fatigue. Negative for fever, weight loss, appetite change and weakness.   HENT:  Positive for postnasal drip and rhinorrhea. Negative for sinus pressure, trouble swallowing and congestion.    Respiratory:  Positive for dyspnea on extertion and somnolence. Negative for cough, sputum production, choking, chest tightness, shortness of breath and wheezing.    Cardiovascular:  Negative for chest pain and leg swelling.   Musculoskeletal:  Positive for arthralgias. Negative for joint swelling.   Gastrointestinal:  Negative for nausea and vomiting.   Neurological:  Negative for dizziness, weakness and headaches.   All other systems reviewed and are negative.    Objective:       Vitals:    06/15/23 1432   BP: 126/80   Pulse: 78   Resp: 17   SpO2: 95%   Weight: 103.4 kg (227 lb 13.5 oz)   Height: 5' 10" (1.778 m)       Physical Exam   Constitutional: He is oriented to person, place, and time. He appears well-developed and well-nourished. No distress.   HENT:   Head: Normocephalic.   Nose: Nose normal.   Mouth/Throat: Oropharynx is clear and moist.   Cardiovascular: Normal rate and regular rhythm.   Pulmonary/Chest: Effort normal. No respiratory distress. He has no wheezes. He has no rhonchi. He has no rales.   Musculoskeletal:         General: No edema.      Cervical back: Normal range of motion and neck supple.   Lymphadenopathy: No " supraclavicular adenopathy is present.     He has no cervical adenopathy.   Neurological: He is alert and oriented to person, place, and time.   Skin: Skin is warm and dry.   Psychiatric: He has a normal mood and affect.   Vitals reviewed.  Personal Diagnostic Review    Echo  · The left ventricle is normal in size with normal systolic function.  · The estimated ejection fraction is 55%.  · Normal left ventricular diastolic function.  · Normal right ventricular size with normal right ventricular systolic   function.  · Mild tricuspid regurgitation.  · Normal central venous pressure (3 mmHg).  · The estimated PA systolic pressure is 32 mmHg.  · Mild aortic regurgitation.             No flowsheet data found.      Assessment/Plan:       Problem List Items Addressed This Visit          Pulmonary    SOB (shortness of breath)    Relevant Orders    Complete PFT with bronchodilator    Stress test, pulmonary    X-Ray Chest PA And Lateral       Cardiac/Vascular    Essential hypertension     Stable, continue current medication management             Other    Class 1 obesity with alveolar hypoventilation, serious comorbidity, and body mass index (BMI) of 32.0 to 32.9 in adult (Chronic)     Weight loss and exercise to improve overall health.           Fatigue (Chronic)    MARIN (obstructive sleep apnea) - Primary     History of mild MARIN 2021  Has gained weight since last sleep study  worsening fatigue  Will get repeat sleep study, he requests home study           Relevant Orders    Home Sleep Study    X-Ray Chest PA And Lateral       Follow up in about 4 weeks (around 7/13/2023) for pft and walk.    Discussed diagnosis, its evaluation, treatment and usual course. All questions answered.    Patient verbalized understanding of plan and left in no acute distress    Thank you for the courtesy of participating in the care of this patient    Ursula Call, [facility]

## 2023-06-16 ENCOUNTER — TELEPHONE (OUTPATIENT)
Dept: PULMONOLOGY | Facility: CLINIC | Age: 73
End: 2023-06-16
Payer: MEDICARE

## 2023-06-21 PROCEDURE — 95800 SLP STDY UNATTENDED: CPT

## 2023-06-22 PROCEDURE — 95806 SLEEP STUDY UNATT&RESP EFFT: CPT | Performed by: INTERNAL MEDICINE

## 2023-06-26 ENCOUNTER — HOSPITAL ENCOUNTER (OUTPATIENT)
Dept: SLEEP MEDICINE | Facility: HOSPITAL | Age: 73
Discharge: HOME | End: 2023-06-26
Attending: INTERNAL MEDICINE
Payer: MEDICARE

## 2023-06-26 DIAGNOSIS — G47.33 OSA (OBSTRUCTIVE SLEEP APNEA): ICD-10-CM

## 2023-06-26 PROCEDURE — 95800 SLP STDY UNATTENDED: CPT

## 2023-06-26 PROCEDURE — 95800 SLP STDY UNATTENDED: CPT | Mod: 26,,, | Performed by: INTERNAL MEDICINE

## 2023-06-26 PROCEDURE — 95800 PR SLEEP STUDY, UNATTENDED, RECORD HEART RATE/O2 SAT/RESP ANAL/SLEEP TIME: ICD-10-PCS | Mod: 26,,, | Performed by: INTERNAL MEDICINE

## 2023-06-26 PROCEDURE — 95806 SLEEP STUDY UNATT&RESP EFFT: CPT | Performed by: INTERNAL MEDICINE

## 2023-06-26 NOTE — Clinical Note
PHYSICIAN INTERPRETATION AND COMMENTS: Findings are consistent with mild, positional obstructive sleep apnea(MARIN). Therapy with CPAP indicated CLINICAL HISTORY: 72 year old male presented with: 15.5 inch neck, BMI of 33, an [place] sleepiness score of 4, history of hypertension and symptoms of nocturnal witnessed apneas. Based on the clinical history, the patient has a high pre-test probability of having Moderate MARIN.

## 2023-06-27 NOTE — PROCEDURES
PHYSICIAN INTERPRETATION AND COMMENTS: Findings are consistent with mild, positional obstructive sleep apnea(MARIN).  Therapy with CPAP indicated  CLINICAL HISTORY: 72 year old male presented with: 15.5 inch neck, BMI of 33, an [place] sleepiness score of 4, history of  hypertension and symptoms of nocturnal witnessed apneas. Based on the clinical history, the patient has a high pre-test  probability of having Moderate MARIN.  SLEEP STUDY FINDINGS: Patient underwent a 2 night Home Sleep Test and by behavioral criteria, slept for approximately  12.97 hours, with a sleep latency of 6 minutes and a sleep efficiency of 94.5%. Mild sleep disordered breathing (AHI=11) is  noted based on a 4% hypopnea desaturation criteria, predominantly in the supine position (39 events/hour). The patient  slept supine 6.4% of the night based on valid recording time of 13.21 hours and is 4.3 times as likely to have  apneas/hypopneas when supine. When considering more subtle measures of sleep disordered breathing, the overall  respiratory disturbance index is mild(RDI=15) based on a 1% hypopnea desaturation criteria with confirmation by  surrogate arousal indicators. The apneas/hypopneas are accompanied by minimal oxygen desaturation (percent time  below 90% SpO2: 0.5%, Min SpO2: 87%). The average desaturation across all sleep disordered breathing events is 2.3%.  Snoring occurs for 5.6% (30 dB) of the study. The mean pulse rate is 53.9 BPM, with frequent pulse rate variability (54  events with >= 6 BPM increase/decrease per hour).  TREATMENT CONSIDERATIONS: For symptomatic mild obstructive sleep apnea, patient preference and compliance  impacts efficacious outcomes. Consider treatment with nasal continuous positive airway pressure (CPAP/AutoPAP),  mandibular advancement splint (MAS), referral to an ENT surgeon for modification to the airway, and/or weight loss or  behavioral therapy. Based on the MARIN Supine data in the study, a Mandibular  "Advancement Splint (MAS) will likely provide  treatment benefit independent of MARIN severity. The patient should avoid sleeping supine; the non-supine AHI is 4.3 times  less severe than the supine AHI.      Dear Ursula Call PA-C  [id number] [place] [address]/Trevor Jarvis MD         The sleep study that you ordered is complete.  You have ordered sleep LAB services to perform the sleep study for Yasir Ernstgeorge Vaughn .      Please find Sleep Study result in  the "Media tab" of Chart Review menu.        You can look  for the report in the  Media by the document type "Sleep Study Documents". Alphabetizing  "Document type" column helps to find the SLEEP STUDY report  Faster.       As the ordering provider, you are responsible for reviewing the results and implementing a treatment plan with your patient.    If you need a Sleep Medicine provider to explain the sleep study findings and arrange treatment for the patient, please refer patient for consultation to our Sleep Clinic via [facility] with Ambulatory Consult Sleep.     To do that please place an order for an  "Ambulatory Consult Sleep" -  order , it will go to our clinic work queue for our staff  to contact the patient for an appointment.      For any questions, please contact our sleep lab  staff at [phone] to talk to clinical staff          Denis Hernandez MD   "

## 2023-07-13 ENCOUNTER — CLINICAL SUPPORT (OUTPATIENT)
Dept: PULMONOLOGY | Facility: CLINIC | Age: 73
End: 2023-07-13
Payer: MEDICARE

## 2023-07-13 ENCOUNTER — HOSPITAL ENCOUNTER (OUTPATIENT)
Dept: RADIOLOGY | Facility: HOSPITAL | Age: 73
Discharge: HOME | End: 2023-07-13
Attending: PHYSICIAN ASSISTANT
Payer: MEDICARE

## 2023-07-13 ENCOUNTER — OFFICE VISIT (OUTPATIENT)
Dept: PULMONOLOGY | Facility: CLINIC | Age: 73
End: 2023-07-13
Payer: MEDICARE

## 2023-07-13 VITALS — HEIGHT: 70 IN | WEIGHT: 231.25 LBS | BODY MASS INDEX: 33.11 KG/M2

## 2023-07-13 VITALS
SYSTOLIC BLOOD PRESSURE: 151 MMHG | DIASTOLIC BLOOD PRESSURE: 72 MMHG | WEIGHT: 231.25 LBS | HEART RATE: 64 BPM | BODY MASS INDEX: 33.11 KG/M2 | RESPIRATION RATE: 18 BRPM | HEIGHT: 70 IN | OXYGEN SATURATION: 96 %

## 2023-07-13 DIAGNOSIS — R09.82 POST-NASAL DRIP: ICD-10-CM

## 2023-07-13 DIAGNOSIS — R06.02 SOB (SHORTNESS OF BREATH): ICD-10-CM

## 2023-07-13 DIAGNOSIS — G47.33 OSA (OBSTRUCTIVE SLEEP APNEA): ICD-10-CM

## 2023-07-13 DIAGNOSIS — E66.2 CLASS 1 OBESITY WITH ALVEOLAR HYPOVENTILATION, SERIOUS COMORBIDITY, AND BODY MASS INDEX (BMI) OF 32.0 TO 32.9 IN ADULT: Chronic | ICD-10-CM

## 2023-07-13 DIAGNOSIS — G47.33 OSA (OBSTRUCTIVE SLEEP APNEA): Primary | ICD-10-CM

## 2023-07-13 DIAGNOSIS — I10 ESSENTIAL HYPERTENSION: ICD-10-CM

## 2023-07-13 LAB
BRPFT: ABNORMAL
DLCO ADJ PRE: 18.17 ML/(MIN*MMHG) (ref 19.53–33.39)
DLCO SINGLE BREATH LLN: 19.53
DLCO SINGLE BREATH PRE REF: 68.7 %
DLCO SINGLE BREATH REF: 26.46
DLCOC SBVA LLN: 2.56
DLCOC SBVA PRE REF: 90.5 %
DLCOC SBVA REF: 3.7
DLCOC SINGLE BREATH LLN: 19.53
DLCOC SINGLE BREATH PRE REF: 68.7 %
DLCOC SINGLE BREATH REF: 26.46
DLCOVA LLN: 2.56
DLCOVA PRE REF: 90.5 %
DLCOVA PRE: 3.35 ML/(MIN*MMHG*L) (ref 2.56–4.85)
DLCOVA REF: 3.7
DLVAADJ PRE: 3.35 ML/(MIN*MMHG*L) (ref 2.56–4.85)
ERV LLN: -16448.99
ERV PRE REF: 63.3 %
ERV REF: 1.01
FEF 25 75 CHG: 19.8 %
FEF 25 75 LLN: 0.93
FEF 25 75 POST REF: 137.9 %
FEF 25 75 PRE REF: 115.1 %
FEF 25 75 REF: 2.27
FET100 CHG: -7.8 %
FEV1 CHG: 9.9 %
FEV1 FVC CHG: 3.6 %
FEV1 FVC LLN: 61
FEV1 FVC POST REF: 107.2 %
FEV1 FVC PRE REF: 103.4 %
FEV1 FVC REF: 75
FEV1 LLN: 2.19
FEV1 POST REF: 98 %
FEV1 PRE REF: 89.2 %
FEV1 REF: 3.08
FRCPLETH LLN: 2.75
FRCPLETH PREREF: 97.6 %
FRCPLETH REF: 3.74
FVC CHG: 6.1 %
FVC LLN: 3.02
FVC POST REF: 90.9 %
FVC PRE REF: 85.7 %
FVC REF: 4.11
IVC PRE: 3.69 L (ref 3.02–5.21)
IVC SINGLE BREATH LLN: 3.02
IVC SINGLE BREATH PRE REF: 89.8 %
IVC SINGLE BREATH REF: 4.11
MVV LLN: 103
MVV PRE REF: 63.8 %
MVV REF: 121
PEF CHG: -14.8 %
PEF LLN: 5.64
PEF POST REF: 75.8 %
PEF PRE REF: 89 %
PEF REF: 7.96
POST FEF 25 75: 3.13 L/S (ref 0.93–3.6)
POST FET 100: 8.35 SEC
POST FEV1 FVC: 80.79 % (ref 61.45–89.35)
POST FEV1: 3.02 L (ref 2.19–3.97)
POST FVC: 3.74 L (ref 3.02–5.21)
POST PEF: 6.03 L/S (ref 5.64–10.28)
PRE DLCO: 18.17 ML/(MIN*MMHG) (ref 19.53–33.39)
PRE ERV: 0.64 L (ref -16448.99–16451.01)
PRE FEF 25 75: 2.61 L/S (ref 0.93–3.6)
PRE FET 100: 9.06 SEC
PRE FEV1 FVC: 78 % (ref 61.45–89.35)
PRE FEV1: 2.75 L (ref 2.19–3.97)
PRE FRC PL: 3.65 L
PRE FVC: 3.52 L (ref 3.02–5.21)
PRE MVV: 77 L/MIN (ref 102.61–138.83)
PRE PEF: 7.08 L/S (ref 5.64–10.28)
PRE RV: 2.59 L (ref 2.06–3.4)
PRE TLC: 6.52 L (ref 5.99–8.29)
RAW LLN: 3.06
RAW PRE REF: 82.9 %
RAW PRE: 2.53 CMH2O*S/L (ref 3.06–3.06)
RAW REF: 3.06
RV LLN: 2.06
RV PRE REF: 94.8 %
RV REF: 2.73
RVTLC LLN: 34
RVTLC PRE REF: 92.6 %
RVTLC PRE: 39.67 % (ref 33.84–51.8)
RVTLC REF: 43
TLC LLN: 5.99
TLC PRE REF: 91.3 %
TLC REF: 7.14
VA PRE: 5.42 L (ref 6.99–6.99)
VA SINGLE BREATH LLN: 6.99
VA SINGLE BREATH PRE REF: 77.5 %
VA SINGLE BREATH REF: 6.99
VC LLN: 3.02
VC PRE REF: 95.7 %
VC PRE: 3.94 L (ref 3.02–5.21)
VC REF: 4.11
VTGRAWPRE: 3.21 L

## 2023-07-13 PROCEDURE — 4010F ACE/ARB THERAPY RXD/TAKEN: CPT | Mod: HCNC,CPTII,S$GLB, | Performed by: PHYSICIAN ASSISTANT

## 2023-07-13 PROCEDURE — 71046 X-RAY EXAM CHEST 2 VIEWS: CPT | Mod: TC,HCNC

## 2023-07-13 PROCEDURE — 99999 PR PBB SHADOW E&M-EST. PATIENT-LVL III: ICD-10-PCS | Mod: PBBFAC,HCNC,, | Performed by: PHYSICIAN ASSISTANT

## 2023-07-13 PROCEDURE — 1159F PR MEDICATION LIST DOCUMENTED IN MEDICAL RECORD: ICD-10-PCS | Mod: HCNC,CPTII,S$GLB, | Performed by: PHYSICIAN ASSISTANT

## 2023-07-13 PROCEDURE — 94618 PULMONARY STRESS TESTING: ICD-10-PCS | Mod: HCNC,S$GLB,, | Performed by: INTERNAL MEDICINE

## 2023-07-13 PROCEDURE — 1101F PT FALLS ASSESS-DOCD LE1/YR: CPT | Mod: HCNC,CPTII,S$GLB, | Performed by: PHYSICIAN ASSISTANT

## 2023-07-13 PROCEDURE — 99999 PR PBB SHADOW E&M-EST. PATIENT-LVL III: CPT | Mod: PBBFAC,HCNC,, | Performed by: PHYSICIAN ASSISTANT

## 2023-07-13 PROCEDURE — 3288F FALL RISK ASSESSMENT DOCD: CPT | Mod: HCNC,CPTII,S$GLB, | Performed by: PHYSICIAN ASSISTANT

## 2023-07-13 PROCEDURE — 94729 PR C02/MEMBANE DIFFUSE CAPACITY: ICD-10-PCS | Mod: HCNC,S$GLB,, | Performed by: INTERNAL MEDICINE

## 2023-07-13 PROCEDURE — 94618 PULMONARY STRESS TESTING: CPT | Mod: HCNC,S$GLB,, | Performed by: INTERNAL MEDICINE

## 2023-07-13 PROCEDURE — 1160F RVW MEDS BY RX/DR IN RCRD: CPT | Mod: HCNC,CPTII,S$GLB, | Performed by: PHYSICIAN ASSISTANT

## 2023-07-13 PROCEDURE — 99999 PR PBB SHADOW E&M-EST. PATIENT-LVL I: ICD-10-PCS | Mod: PBBFAC,HCNC,,

## 2023-07-13 PROCEDURE — 3077F PR MOST RECENT SYSTOLIC BLOOD PRESSURE >= 140 MM HG: ICD-10-PCS | Mod: HCNC,CPTII,S$GLB, | Performed by: PHYSICIAN ASSISTANT

## 2023-07-13 PROCEDURE — 71046 X-RAY EXAM CHEST 2 VIEWS: CPT | Mod: 26,HCNC,, | Performed by: STUDENT IN AN ORGANIZED HEALTH CARE EDUCATION/TRAINING PROGRAM

## 2023-07-13 PROCEDURE — 3078F PR MOST RECENT DIASTOLIC BLOOD PRESSURE < 80 MM HG: ICD-10-PCS | Mod: HCNC,CPTII,S$GLB, | Performed by: PHYSICIAN ASSISTANT

## 2023-07-13 PROCEDURE — 1101F PR PT FALLS ASSESS DOC 0-1 FALLS W/OUT INJ PAST YR: ICD-10-PCS | Mod: HCNC,CPTII,S$GLB, | Performed by: PHYSICIAN ASSISTANT

## 2023-07-13 PROCEDURE — 94060 EVALUATION OF WHEEZING: CPT | Mod: HCNC,S$GLB,, | Performed by: INTERNAL MEDICINE

## 2023-07-13 PROCEDURE — 3078F DIAST BP <80 MM HG: CPT | Mod: HCNC,CPTII,S$GLB, | Performed by: PHYSICIAN ASSISTANT

## 2023-07-13 PROCEDURE — 3008F PR BODY MASS INDEX (BMI) DOCUMENTED: ICD-10-PCS | Mod: HCNC,CPTII,S$GLB, | Performed by: PHYSICIAN ASSISTANT

## 2023-07-13 PROCEDURE — 71046 XR CHEST PA AND LATERAL: ICD-10-PCS | Mod: 26,HCNC,, | Performed by: STUDENT IN AN ORGANIZED HEALTH CARE EDUCATION/TRAINING PROGRAM

## 2023-07-13 PROCEDURE — 3044F PR MOST RECENT HEMOGLOBIN A1C LEVEL <7.0%: ICD-10-PCS | Mod: HCNC,CPTII,S$GLB, | Performed by: PHYSICIAN ASSISTANT

## 2023-07-13 PROCEDURE — 94060 PR EVAL OF BRONCHOSPASM: ICD-10-PCS | Mod: HCNC,S$GLB,, | Performed by: INTERNAL MEDICINE

## 2023-07-13 PROCEDURE — 99214 OFFICE O/P EST MOD 30 MIN: CPT | Mod: HCNC,25,S$GLB, | Performed by: PHYSICIAN ASSISTANT

## 2023-07-13 PROCEDURE — 4010F PR ACE/ARB THEARPY RXD/TAKEN: ICD-10-PCS | Mod: HCNC,CPTII,S$GLB, | Performed by: PHYSICIAN ASSISTANT

## 2023-07-13 PROCEDURE — 1159F MED LIST DOCD IN RCRD: CPT | Mod: HCNC,CPTII,S$GLB, | Performed by: PHYSICIAN ASSISTANT

## 2023-07-13 PROCEDURE — 3288F PR FALLS RISK ASSESSMENT DOCUMENTED: ICD-10-PCS | Mod: HCNC,CPTII,S$GLB, | Performed by: PHYSICIAN ASSISTANT

## 2023-07-13 PROCEDURE — 94726 PULM FUNCT TST PLETHYSMOGRAP: ICD-10-PCS | Mod: HCNC,S$GLB,, | Performed by: INTERNAL MEDICINE

## 2023-07-13 PROCEDURE — 99999 PR PBB SHADOW E&M-EST. PATIENT-LVL I: CPT | Mod: PBBFAC,HCNC,,

## 2023-07-13 PROCEDURE — 3077F SYST BP >= 140 MM HG: CPT | Mod: HCNC,CPTII,S$GLB, | Performed by: PHYSICIAN ASSISTANT

## 2023-07-13 PROCEDURE — 94729 DIFFUSING CAPACITY: CPT | Mod: HCNC,S$GLB,, | Performed by: INTERNAL MEDICINE

## 2023-07-13 PROCEDURE — 99214 PR OFFICE/OUTPT VISIT, EST, LEVL IV, 30-39 MIN: ICD-10-PCS | Mod: HCNC,25,S$GLB, | Performed by: PHYSICIAN ASSISTANT

## 2023-07-13 PROCEDURE — 3044F HG A1C LEVEL LT 7.0%: CPT | Mod: HCNC,CPTII,S$GLB, | Performed by: PHYSICIAN ASSISTANT

## 2023-07-13 PROCEDURE — 3008F BODY MASS INDEX DOCD: CPT | Mod: HCNC,CPTII,S$GLB, | Performed by: PHYSICIAN ASSISTANT

## 2023-07-13 PROCEDURE — 1160F PR REVIEW ALL MEDS BY PRESCRIBER/CLIN PHARMACIST DOCUMENTED: ICD-10-PCS | Mod: HCNC,CPTII,S$GLB, | Performed by: PHYSICIAN ASSISTANT

## 2023-07-13 PROCEDURE — 94726 PLETHYSMOGRAPHY LUNG VOLUMES: CPT | Mod: HCNC,S$GLB,, | Performed by: INTERNAL MEDICINE

## 2023-07-13 RX ORDER — CETIRIZINE HYDROCHLORIDE 10 MG/1
10 TABLET ORAL DAILY
Qty: 30 TABLET | Refills: 11 | Status: SHIPPED | OUTPATIENT
Start: 2023-07-13 | End: 2024-07-12

## 2023-07-13 RX ORDER — FLUTICASONE PROPIONATE 50 MCG
1 SPRAY, SUSPENSION (ML) NASAL DAILY
Qty: 16 G | Refills: 11 | Status: SHIPPED | OUTPATIENT
Start: 2023-07-13

## 2023-07-13 NOTE — PROCEDURES
"[facility] - Pulmonary Function  Six Minute Walk     SUMMARY     Ordering Provider: ZULLY Call   Interpreting Provider: MD Drew  Performing nurse/tech/RT: DARRICK Richey, CRT  Diagnosis: Shortness of Breath  Height: 5' 10" (177.8 cm)  Weight: 104.9 kg (231 lb 4.2 oz)  BMI (Calculated): 33.2   Patient Race:             Phase Oxygen Assessment Supplemental O2 Heart   Rate Blood Pressure Afshin Dyspnea Scale Rating   Resting 96 % Room Air 66 bpm 151/72 0   Exercise        Minute        1 94 % Room Air 88 bpm     2 94 % Room Air 91 bpm     3 96 % Room Air 90 bpm     4 96 % Room Air 88 bpm     5 96 % Room Air 87 bpm     6  95 % Room Air 85 bpm 149/67 2   Recovery        Minute        1 97 % Room Air 70 bpm     2 97 % Room Air 67 bpm     3 96 % Room Air 67 bpm     4 96 % Room Air 61 bpm 130/60 1     Six Minute Walk Summary  6MWT Status: completed without stopping  Patient Reported: Dyspnea, Dizziness, Lightheadedness     Interpretation:  Did the patient stop or pause?: No                                         Total Time Walked (Calculated): 360 seconds  Final Partial Lap Distance (feet): 100 feet  Total Distance Meters (Calculated): 335.28 meters  Predicted Distance Meters (Calculated): 480.84 meters  Percentage of Predicted (Calculated): 69.73  Peak VO2 (Calculated): 14.04  Mets: 4.01  Has The Patient Had a Previous Six Minute Walk Test?: No       Previous 6MWT Results  Has The Patient Had a Previous Six Minute Walk Test?: No      Interpretation:  Total distance walked in six minutes is mildly reduced indicating a reduction in overall  functional capacity. The patient did not meet criteria for supplemental oxygen prescription.  Clinical correlation suggested.  [] Mild exercise-induced hypoxemia described as an arterial oxygen saturation of 93-95% (with a fall of 3-4% with exercise),   [] Moderate exercise-induced hypoxemia as a fall in oxygen saturation to  89-93% (with a fall of 3-4 % with exercise)  [] Severe exercise " induced hypoxemia as < 89% O2 saturation (88% and below).  Medicare Criteria for Oxygen prescription comments: When arterial oxygen saturation is at or below 88% during exercise (severe exercise induced hypoxemia) then the patient falls under   Details about Medicare Group Criteria coverage can be found at http://[email or web address].[email or web address]/manuals/[email or web address]     Riley Russell MD

## 2023-07-13 NOTE — PROGRESS NOTES
Subjective:       Patient ID: Yasir Jeffery Sr. is a 73 y.o. male.    Chief Complaint: Fatigue    7/13/23  Here for fatigue follow up  Completed home sleep study, revealed mild MARIN AHI 11  PFT and walk today  Complains today of post nasal drip  Denies cough, SOB, chest pain, fever, or wheezing    6/15/23  73yo male referred by Ursula Call P* for fatigue  Fatigue started 5 years ago; gutted a burned building with [employer], feels like this is when the fatigue started  He was able to do heavy activity like yard work prior to this without issues  Has a constant post nasal drip  He denies cough  No smoking history  Retired   Does not have consistent sleep schedule  Feels like a light sleeper  Snores  History of mild MARIN diagnosed in 2021, does not wear CPAP, has had weight gain since last study  Fatigue has gradually worsened over the last few years  He complains of shortness of breath but describes this as more of full body fatigue and not out of breath  History of active prostate cancer, follows with oncology for surveillance      BP Readings from Last 3 Encounters:   07/13/23 (!) 151/72   06/15/23 126/80   06/09/23 118/64     Snoring / Sleep:     [place] Questionnaire (validated MARIN screening questionnaire)    yes -- Snoring/apnea    yes -- Fatigue    Body mass index is 33.18 kg/m².  (>25 is overweight, >30 is obese)    Blood Pressure = Hypertension  (PreHTN 120-139/80-89, Stg1 140-159/90-99, Stg2 >160/>100)  [place] = 3 of three MARIN categories are positive (high risk is 2-3 positive categories)     [place] Sleepiness Scale TOTAL =   2  (validated sleepiness questionnaire with a higher score indicating greater sleepiness; range 0-24)  [place] SLEEPINESS SCALE 6/15/2023   Sitting and reading 0   Watching TV 0   Sitting, inactive in a public place (e.g. a theatre or a meeting) 0   As a passenger in a car for an hour without a break 0   Lying down to rest in the afternoon when  circumstances permit 2   Sitting and talking to someone 0   Sitting quietly after a lunch without alcohol 0   In a car, while stopped for a few minutes in traffic 0   Total score 2         STOP-Bang Questionnaire (validated MARIN screening questionnaire)  Negative unless checked off.  [x] Snoring    [x]  Tired/Fatigued/Sleepy  [] Obstruction (apneas/choking)  [x] Pressure (HTN)  [x] BMI >35  [x] Age >50  [x] Neck >40 cm  [x] Gender male   STOP-Bang = 7 (low risk 0-2,high risk 3-8)    Immunization History   Administered Date(s) Administered    Pneumococcal Conjugate - 13 Valent 06/07/2019    Pneumococcal Polysaccharide - 23 Valent 09/07/2017    Tdap 06/20/2015    Zoster 09/07/2017      Tobacco Use: Medium Risk    Smoking Tobacco Use: Former    Smokeless Tobacco Use: Never    Passive Exposure: Never      Past Medical History:   Diagnosis Date    Back pain     Benign prostate hyperplasia     DDD (degenerative disc disease), thoracolumbar     Disorder of kidney and ureter     stage 3    Elevated PSA     Glaucoma     Hx of colonic polyps 2010    Hypertension     Malignant neoplasm of prostate 07/31/2019    Metabolic syndrome     Mixed hyperlipidemia 01/24/2018    Obesity     MARIN (obstructive sleep apnea)     don't use a C-PAP    Pneumonia     Psychophysiological insomnia       Current Outpatient Medications on File Prior to Visit   Medication Sig Dispense Refill    acetaminophen (TYLENOL) 500 MG tablet Take 500 mg by mouth every 6 (six) hours as needed for Pain.      amLODIPine (NORVASC) 5 MG tablet Take 1 tablet (5 mg total) by mouth once daily. 30 tablet 11    aspirin 325 MG tablet Take 325 mg by mouth once daily.      aspirin 81 MG Chew Take 81 mg by mouth once daily.      atorvastatin (LIPITOR) 40 MG tablet Take 1 tablet (40 mg total) by mouth once daily. 90 tablet 3    b complex vitamins tablet Take 1 tablet by mouth once daily.      carvediloL (COREG) 6.25 MG tablet Take 1 tablet (6.25 mg total) by mouth 2 (two)  "times daily with meals. 180 tablet 3    cyanocobalamin 1,000 mcg/mL injection Inject 1 mL (1,000 mcg total) into the muscle once a week. 4 mL 5    dorzolamide-timolol 2-0.5% (COSOPT) 22.3-6.8 mg/mL ophthalmic solution Place 1 drop into both eyes nightly.      latanoprost 0.005 % ophthalmic solution Place 1 drop into both eyes every evening.  6    tamsulosin (FLOMAX) 0.4 mg Cap Take 1 capsule (0.4 mg total) by mouth every evening. 90 capsule 4    valsartan (DIOVAN) 320 MG tablet Take 320 mg by mouth once daily.       Current Facility-Administered Medications on File Prior to Visit   Medication Dose Route Frequency Provider Last Rate Last Admin    sodium chloride 0.9% flush 10 mL  10 mL Intravenous PRN Ani Srivastava NP   10 mL at 05/15/23 1045        Review of Systems   Constitutional:  Positive for fatigue. Negative for fever, weight loss, appetite change and weakness.   HENT:  Positive for postnasal drip and rhinorrhea. Negative for sinus pressure, trouble swallowing and congestion.    Respiratory:  Positive for dyspnea on extertion and somnolence. Negative for cough, sputum production, choking, chest tightness, shortness of breath and wheezing.    Cardiovascular:  Negative for chest pain and leg swelling.   Musculoskeletal:  Positive for arthralgias. Negative for joint swelling.   Gastrointestinal:  Negative for nausea and vomiting.   Neurological:  Negative for dizziness, weakness and headaches.   All other systems reviewed and are negative.    Objective:       Vitals:    07/13/23 1314   BP: (!) 151/72   Pulse: 64   Resp: 18   SpO2: 96%   Weight: 104.9 kg (231 lb 4.2 oz)   Height: 5' 10" (1.778 m)         Physical Exam   Constitutional: He is oriented to person, place, and time. He appears well-developed and well-nourished. No distress.   HENT:   Head: Normocephalic.   Nose: Nose normal.   Mouth/Throat: Oropharynx is clear and moist.   Cardiovascular: Normal rate and regular rhythm.   Pulmonary/Chest: Effort " normal. No respiratory distress. He has no wheezes. He has no rhonchi. He has no rales.   Musculoskeletal:         General: No edema.      Cervical back: Normal range of motion and neck supple.   Lymphadenopathy: No supraclavicular adenopathy is present.     He has no cervical adenopathy.   Neurological: He is alert and oriented to person, place, and time.   Skin: Skin is warm and dry.   Psychiatric: He has a normal mood and affect.   Vitals reviewed.  Personal Diagnostic Review    X-Ray Chest PA And Lateral  Narrative: EXAM: XR CHEST PA AND LATERAL    CLINICAL HISTORY:  [G47.33]-Obstructive sleep apnea (adult) (pediatric)./[R06.02]-Shortness of breath.    TECHNIQUE: PA and lateral views of the chest.    COMPARISON: X-ray dated 02/24/2021    FINDINGS:   Cardiomediastinal silhouette is within normal limits. Trachea is midline. Pulmonary vasculature is normal. Small calcified nodules in the mid to upper lungs appear unchanged.  Lungs are otherwise clear. No significant pleural effusion or pneumothorax. No acute bony abnormality.  Degenerative changes noted in the spine.  Impression: No acute cardiopulmonary abnormality.    Finalized on: 7/13/2023 11:19 AM By:  Boris Paige MD  [facility]# [id number]      2023-07-13 11:21:38.608    [facility]    SLEEP STUDY FINDINGS: Patient underwent a 2 night Home Sleep Test and by behavioral criteria, slept for approximately  12.97 hours, with a sleep latency of 6 minutes and a sleep efficiency of 94.5%. Mild sleep disordered breathing (AHI=11) is  noted based on a 4% hypopnea desaturation criteria, predominantly in the supine position (39 events/hour). The patient  slept supine 6.4% of the night based on valid recording time of 13.21 hours and is 4.3 times as likely to have  apneas/hypopneas when supine. When considering more subtle measures of sleep disordered breathing, the overall  respiratory disturbance index is mild(RDI=15) based on a 1% hypopnea desaturation criteria with confirmation  by  surrogate arousal indicators. The apneas/hypopneas are accompanied by minimal oxygen desaturation (percent time  below 90% SpO2: 0.5%, Min SpO2: 87%). The average desaturation across all sleep disordered breathing events is 2.3%.  Snoring occurs for 5.6% (30 dB) of the study. The mean pulse rate is 53.9 BPM, with frequent pulse rate variability (54  events with >= 6 BPM increase/decrease per hour).        Oxygen Assessment Supplemental O2 Heart   Rate Blood Pressure Afshin Dyspnea Scale Rating    Resting 96 % Room Air 66 bpm 151/72 0   Exercise             Minute             1 94 % Room Air 88 bpm       2 94 % Room Air 91 bpm       3 96 % Room Air 90 bpm       4 96 % Room Air 88 bpm       5 96 % Room Air 87 bpm       6  95 % Room Air 85 bpm 149/67 2   Recovery             Minute             1 97 % Room Air 70 bpm       2 97 % Room Air 67 bpm       3 96 % Room Air 67 bpm       4 96 % Room Air 61 bpm 130/60 1      Six Minute Walk Summary  6MWT Status: completed without stopping  Patient Reported: Dyspnea, Dizziness, Lightheadedness          Interpretation:  Did the patient stop or pause?: No  Total Time Walked (Calculated): 360 seconds  Final Partial Lap Distance (feet): 100 feet  Total Distance Meters (Calculated): 335.28 meters  Predicted Distance Meters (Calculated): 480.84 meters  Percentage of Predicted (Calculated): 69.73  Peak VO2 (Calculated): 14.04  Mets: 4.01  Has The Patient Had a Previous Six Minute Walk Test?: No     Previous 6MWT Results  Has The Patient Had a Previous Six Minute Walk Test?: No    PFT reviewed and agree with physician interpretation: Spirometry is normal. Spirometry remains unimproved following bronchodilator. Lung volume determination is normal. Airway mechanics show normal airway resistance and conductance. DLCO is mildly decreased. MVV is moderately decreased.   Â    Notes: The failure to demonstrate improvement in spirometry does not preclude a clinical response to a trial of  bronchodilators. MVV is reduced out of proportion to FEV1 suggesting poor effort or difficulty performing the maneuver or neuromuscular   disease.   Assessment/Plan:       Problem List Items Addressed This Visit          ENT    Post-nasal drip    Relevant Medications    cetirizine (ZYRTEC) 10 MG tablet    fluticasone propionate (FLONASE) 50 mcg/actuation nasal spray       Cardiac/Vascular    Essential hypertension     Take medications as prescribed. Keep daily blood pressure log at least twice a day and follow up with PCP in 2 weeks              Other    Class 1 obesity with alveolar hypoventilation, serious comorbidity, and body mass index (BMI) of 32.0 to 32.9 in adult (Chronic)     Weight loss and exercise to improve overall health.           MARIN (obstructive sleep apnea) - Primary     Mild MARIN on home sleep study 6/2023  Main complaint is fatigue, recommend CPAP  He is willing to try CPAP with nasal mask  Discussed therapeutic goals for CPAP: Ideal usage 100% of nights for 6-8 hours per night. Minimum usage is 70% of night for at least 4 hours per night.           Relevant Orders    CPAP FOR HOME USE       Follow up in about 3 months (around 10/13/2023) for new cpap dl.    Discussed diagnosis, its evaluation, treatment and usual course. All questions answered.    Patient verbalized understanding of plan and left in no acute distress    Thank you for the courtesy of participating in the care of this patient    Elizabeth G Blough, PA-C [facility]

## 2023-07-13 NOTE — ASSESSMENT & PLAN NOTE
Mild MARIN on home sleep study 6/2023  Main complaint is fatigue, recommend CPAP  He is willing to try CPAP with nasal mask  Discussed therapeutic goals for CPAP: Ideal usage 100% of nights for 6-8 hours per night. Minimum usage is 70% of night for at least 4 hours per night.

## 2023-07-27 DIAGNOSIS — C61 MALIGNANT NEOPLASM OF PROSTATE: ICD-10-CM

## 2023-07-27 DIAGNOSIS — Z90.79 S/P TURP: ICD-10-CM

## 2023-07-27 RX ORDER — VALSARTAN 320 MG/1
TABLET ORAL
Qty: 90 TABLET | Refills: 0 | OUTPATIENT
Start: 2023-07-27

## 2023-07-27 NOTE — TELEPHONE ENCOUNTER
Care Due:                  Date            Visit Type   Department     Provider  --------------------------------------------------------------------------------                                EP -                              PRIMARY      [facility]  Last Visit: 05-      CARE ([facility])   MEDICINE       Trevor Jarvis  Next Visit: None Scheduled  None         None Found                                                            Last  Test          Frequency    Reason                     Performed    Due Date  --------------------------------------------------------------------------------    CMP.........  12 months..  atorvastatin.............  09- 09-    Lipid Panel.  12 months..  atorvastatin.............  09- 09-    [employer] Embedded Care Due Messages. Reference number: [id number].   7/27/2023 5:11:28 PM CDT

## 2023-07-27 NOTE — TELEPHONE ENCOUNTER
No care due was identified.  [facility] Embedded Care Due Messages. Reference number: [id number].   7/27/2023 6:01:44 PM CDT

## 2023-07-27 NOTE — TELEPHONE ENCOUNTER
Refill Decision Note   Yasir Jeffery  is requesting a refill authorization.  Brief Assessment and Rationale for Refill:  Quick Discontinue     Medication Therapy Plan: Pharmacy is requesting new scripts for the following medications without required information, (sig/ frequency/qty/etc)       Medication Reconciliation Completed: No   Comments: Pharmacies have been requesting medications for patients without required information, (sig, frequency, qty, etc.). In addition, requests are sent for medication(s) pt. are currently not taking, and medications patients have never taken.    We have spoken to the pharmacies about these request types and advised their teams previously that we are unable to assess these New Script requests and require all details for these requests. This is a known issue and has been reported.     No Care Gaps recommended.     Note composed:5:48 PM 07/27/2023

## 2023-07-28 ENCOUNTER — TELEPHONE (OUTPATIENT)
Dept: PULMONOLOGY | Facility: CLINIC | Age: 73
End: 2023-07-28
Payer: MEDICARE

## 2023-07-28 DIAGNOSIS — G47.33 OSA (OBSTRUCTIVE SLEEP APNEA): Primary | ICD-10-CM

## 2023-07-28 DIAGNOSIS — I10 ESSENTIAL HYPERTENSION: ICD-10-CM

## 2023-07-28 RX ORDER — TAMSULOSIN HYDROCHLORIDE 0.4 MG/1
0.4 CAPSULE ORAL NIGHTLY
Qty: 90 CAPSULE | Refills: 3 | Status: SHIPPED | OUTPATIENT
Start: 2023-07-28

## 2023-07-28 RX ORDER — AMLODIPINE BESYLATE 5 MG/1
5 TABLET ORAL DAILY
Qty: 90 TABLET | Refills: 3 | Status: SHIPPED | OUTPATIENT
Start: 2023-07-28 | End: 2024-07-27

## 2023-07-28 NOTE — TELEPHONE ENCOUNTER
Refill Routing Note   Medication(s) are not appropriate for processing by [facility] for the following reason(s):      Drug-disease interaction    [facility] action(s):  Defer Care Due:  None identified     Medication Therapy Plan: Drug disease: tamsulosin and Prostate Cancer    Pharmacist review requested: Yes     Appointments  past 12m or future 3m with PCP    Date Provider   Last Visit   5/12/2023 Trevor Jarvis MD   Next Visit   Visit date not found Trevor Jarvis MD   ED visits in past 90 days: 0        Note composed:9:13 PM 07/27/2023

## 2023-07-28 NOTE — TELEPHONE ENCOUNTER
Spoke with pts wife regarding patients cpap supplies. Informed patient supply order was faxed over to [employer]----- Message from Alla Clayton LPN sent at 7/27/2023  4:40 PM CDT -----  Contact: Lina/wife    ----- Message -----  From: Rebeca Maher  Sent: 7/27/2023   4:34 PM CDT  To: [facility] Staff    Lina/wife would like a call back at [phone] in regards to needing to get an update on patient getting the CPAP supplies.  Thanks   Am

## 2023-08-11 ENCOUNTER — LAB VISIT (OUTPATIENT)
Dept: LAB | Facility: HOSPITAL | Age: 73
End: 2023-08-11
Attending: FAMILY MEDICINE
Payer: MEDICARE

## 2023-08-11 DIAGNOSIS — D53.9 NUTRITIONAL ANEMIA, UNSPECIFIED: ICD-10-CM

## 2023-08-11 DIAGNOSIS — R53.83 FATIGUE, UNSPECIFIED TYPE: ICD-10-CM

## 2023-08-11 DIAGNOSIS — Z79.899 ENCOUNTER FOR LONG-TERM (CURRENT) USE OF MEDICATIONS: ICD-10-CM

## 2023-08-11 LAB
ALBUMIN SERPL BCP-MCNC: 3.9 G/DL (ref 3.5–5.2)
ALP SERPL-CCNC: 65 U/L (ref 55–135)
ALT SERPL W/O P-5'-P-CCNC: 18 U/L (ref 10–44)
ANION GAP SERPL CALC-SCNC: 12 MMOL/L (ref 8–16)
AST SERPL-CCNC: 19 U/L (ref 10–40)
BILIRUB SERPL-MCNC: 0.3 MG/DL (ref 0.1–1)
BUN SERPL-MCNC: 35 MG/DL (ref 8–23)
CALCIUM SERPL-MCNC: 9.5 MG/DL (ref 8.7–10.5)
CHLORIDE SERPL-SCNC: 109 MMOL/L (ref 95–110)
CHOLEST SERPL-MCNC: 241 MG/DL (ref 120–199)
CHOLEST/HDLC SERPL: 7.3 {RATIO} (ref 2–5)
CO2 SERPL-SCNC: 20 MMOL/L (ref 23–29)
CREAT SERPL-MCNC: 1.5 MG/DL (ref 0.5–1.4)
ERYTHROCYTE [DISTWIDTH] IN BLOOD BY AUTOMATED COUNT: 13.7 % (ref 11.5–14.5)
EST. GFR  (NO RACE VARIABLE): 48.6 ML/MIN/1.73 M^2
ESTIMATED AVG GLUCOSE: 103 MG/DL (ref 68–131)
FERRITIN SERPL-MCNC: 293 NG/ML (ref 20–300)
FOLATE SERPL-MCNC: 7.4 NG/ML (ref 4–24)
GLUCOSE SERPL-MCNC: 94 MG/DL (ref 70–110)
HBA1C MFR BLD: 5.2 % (ref 4–5.6)
HCT VFR BLD AUTO: 45.9 % (ref 40–54)
HDLC SERPL-MCNC: 33 MG/DL (ref 40–75)
HDLC SERPL: 13.7 % (ref 20–50)
HGB BLD-MCNC: 15 G/DL (ref 14–18)
IRON SERPL-MCNC: 59 UG/DL (ref 45–160)
LDLC SERPL CALC-MCNC: 177.2 MG/DL (ref 63–159)
MCH RBC QN AUTO: 30.2 PG (ref 27–31)
MCHC RBC AUTO-ENTMCNC: 32.7 G/DL (ref 32–36)
MCV RBC AUTO: 92 FL (ref 82–98)
NONHDLC SERPL-MCNC: 208 MG/DL
PLATELET # BLD AUTO: 212 K/UL (ref 150–450)
PMV BLD AUTO: 8.9 FL (ref 9.2–12.9)
POTASSIUM SERPL-SCNC: 4.7 MMOL/L (ref 3.5–5.1)
PROT SERPL-MCNC: 7.4 G/DL (ref 6–8.4)
RBC # BLD AUTO: 4.97 M/UL (ref 4.6–6.2)
SATURATED IRON: 19 % (ref 20–50)
SODIUM SERPL-SCNC: 141 MMOL/L (ref 136–145)
TOTAL IRON BINDING CAPACITY: 306 UG/DL (ref 250–450)
TRANSFERRIN SERPL-MCNC: 207 MG/DL (ref 200–375)
TRIGL SERPL-MCNC: 154 MG/DL (ref 30–150)
VIT B12 SERPL-MCNC: 994 PG/ML (ref 210–950)
WBC # BLD AUTO: 7 K/UL (ref 3.9–12.7)

## 2023-08-11 PROCEDURE — 84466 ASSAY OF TRANSFERRIN: CPT | Mod: HCNC | Performed by: FAMILY MEDICINE

## 2023-08-11 PROCEDURE — 82728 ASSAY OF FERRITIN: CPT | Mod: HCNC | Performed by: FAMILY MEDICINE

## 2023-08-11 PROCEDURE — 82746 ASSAY OF FOLIC ACID SERUM: CPT | Mod: HCNC | Performed by: FAMILY MEDICINE

## 2023-08-11 PROCEDURE — 83036 HEMOGLOBIN GLYCOSYLATED A1C: CPT | Mod: HCNC | Performed by: FAMILY MEDICINE

## 2023-08-11 PROCEDURE — 80053 COMPREHEN METABOLIC PANEL: CPT | Mod: HCNC | Performed by: FAMILY MEDICINE

## 2023-08-11 PROCEDURE — 82607 VITAMIN B-12: CPT | Mod: HCNC | Performed by: FAMILY MEDICINE

## 2023-08-11 PROCEDURE — 83540 ASSAY OF IRON: CPT | Mod: HCNC | Performed by: FAMILY MEDICINE

## 2023-08-11 PROCEDURE — 36415 COLL VENOUS BLD VENIPUNCTURE: CPT | Mod: HCNC,PO | Performed by: FAMILY MEDICINE

## 2023-08-11 PROCEDURE — 85027 COMPLETE CBC AUTOMATED: CPT | Mod: HCNC | Performed by: FAMILY MEDICINE

## 2023-08-11 PROCEDURE — 80061 LIPID PANEL: CPT | Mod: HCNC | Performed by: FAMILY MEDICINE

## 2023-08-13 NOTE — PROGRESS NOTES
Make follow-up lab appointment per recommendation below.  Check to see if patient has seen the results through my chart.  If not then,  #CALL THE PATIENT# to discuss results/see if they have questions and document verification of contact. Make F/U appt if needed. [phone]    #My interpretation that was sent to them through [employer]:  Bill, I have reviewed your recent blood work.     B12 level has improved from previous.  Iron levels are stable.  No significant change.  Your complete blood count is stable.  Folate level remains at lower limits.  Add folic acid supplement over-the-counter.  Your cholesterol is abnormal.  No significant change from previous year.  Please follow-up to discuss hyperlipidemia treatment in detail.  Elevated risk score.  Your hemoglobin A1c is normal.  This test is gold standard screening test for diabetes.  It is a measures 3 months of your average blood sugar.  =========================  Also please address any outstanding health maintenance that may be due: There are no preventive care reminders to display for this patient.

## 2023-08-18 ENCOUNTER — PATIENT MESSAGE (OUTPATIENT)
Dept: ADMINISTRATIVE | Facility: HOSPITAL | Age: 73
End: 2023-08-18
Payer: MEDICARE

## 2023-08-21 ENCOUNTER — OFFICE VISIT (OUTPATIENT)
Dept: FAMILY MEDICINE | Facility: CLINIC | Age: 73
End: 2023-08-21
Payer: MEDICARE

## 2023-08-21 VITALS
HEIGHT: 70 IN | BODY MASS INDEX: 33.04 KG/M2 | SYSTOLIC BLOOD PRESSURE: 116 MMHG | DIASTOLIC BLOOD PRESSURE: 76 MMHG | WEIGHT: 230.81 LBS

## 2023-08-21 DIAGNOSIS — Z79.899 ENCOUNTER FOR LONG-TERM (CURRENT) USE OF MEDICATIONS: ICD-10-CM

## 2023-08-21 DIAGNOSIS — R53.83 FATIGUE, UNSPECIFIED TYPE: Chronic | ICD-10-CM

## 2023-08-21 DIAGNOSIS — R42 VERTIGO: ICD-10-CM

## 2023-08-21 DIAGNOSIS — E66.2 CLASS 1 OBESITY WITH ALVEOLAR HYPOVENTILATION, SERIOUS COMORBIDITY, AND BODY MASS INDEX (BMI) OF 32.0 TO 32.9 IN ADULT: Chronic | ICD-10-CM

## 2023-08-21 DIAGNOSIS — I10 ESSENTIAL HYPERTENSION: ICD-10-CM

## 2023-08-21 DIAGNOSIS — E78.2 MIXED HYPERLIPIDEMIA: Primary | Chronic | ICD-10-CM

## 2023-08-21 PROCEDURE — 1159F MED LIST DOCD IN RCRD: CPT | Mod: HCNC,CPTII,S$GLB, | Performed by: NURSE PRACTITIONER

## 2023-08-21 PROCEDURE — 3008F PR BODY MASS INDEX (BMI) DOCUMENTED: ICD-10-PCS | Mod: HCNC,CPTII,S$GLB, | Performed by: NURSE PRACTITIONER

## 2023-08-21 PROCEDURE — 3074F PR MOST RECENT SYSTOLIC BLOOD PRESSURE < 130 MM HG: ICD-10-PCS | Mod: HCNC,CPTII,S$GLB, | Performed by: NURSE PRACTITIONER

## 2023-08-21 PROCEDURE — 1101F PR PT FALLS ASSESS DOC 0-1 FALLS W/OUT INJ PAST YR: ICD-10-PCS | Mod: HCNC,CPTII,S$GLB, | Performed by: NURSE PRACTITIONER

## 2023-08-21 PROCEDURE — 3044F PR MOST RECENT HEMOGLOBIN A1C LEVEL <7.0%: ICD-10-PCS | Mod: HCNC,CPTII,S$GLB, | Performed by: NURSE PRACTITIONER

## 2023-08-21 PROCEDURE — 3288F PR FALLS RISK ASSESSMENT DOCUMENTED: ICD-10-PCS | Mod: HCNC,CPTII,S$GLB, | Performed by: NURSE PRACTITIONER

## 2023-08-21 PROCEDURE — 4010F ACE/ARB THERAPY RXD/TAKEN: CPT | Mod: HCNC,CPTII,S$GLB, | Performed by: NURSE PRACTITIONER

## 2023-08-21 PROCEDURE — 3078F PR MOST RECENT DIASTOLIC BLOOD PRESSURE < 80 MM HG: ICD-10-PCS | Mod: HCNC,CPTII,S$GLB, | Performed by: NURSE PRACTITIONER

## 2023-08-21 PROCEDURE — 99214 OFFICE O/P EST MOD 30 MIN: CPT | Mod: HCNC,S$GLB,, | Performed by: NURSE PRACTITIONER

## 2023-08-21 PROCEDURE — 1101F PT FALLS ASSESS-DOCD LE1/YR: CPT | Mod: HCNC,CPTII,S$GLB, | Performed by: NURSE PRACTITIONER

## 2023-08-21 PROCEDURE — 3044F HG A1C LEVEL LT 7.0%: CPT | Mod: HCNC,CPTII,S$GLB, | Performed by: NURSE PRACTITIONER

## 2023-08-21 PROCEDURE — 3074F SYST BP LT 130 MM HG: CPT | Mod: HCNC,CPTII,S$GLB, | Performed by: NURSE PRACTITIONER

## 2023-08-21 PROCEDURE — 3008F BODY MASS INDEX DOCD: CPT | Mod: HCNC,CPTII,S$GLB, | Performed by: NURSE PRACTITIONER

## 2023-08-21 PROCEDURE — 1160F PR REVIEW ALL MEDS BY PRESCRIBER/CLIN PHARMACIST DOCUMENTED: ICD-10-PCS | Mod: HCNC,CPTII,S$GLB, | Performed by: NURSE PRACTITIONER

## 2023-08-21 PROCEDURE — 99214 PR OFFICE/OUTPT VISIT, EST, LEVL IV, 30-39 MIN: ICD-10-PCS | Mod: HCNC,S$GLB,, | Performed by: NURSE PRACTITIONER

## 2023-08-21 PROCEDURE — 3078F DIAST BP <80 MM HG: CPT | Mod: HCNC,CPTII,S$GLB, | Performed by: NURSE PRACTITIONER

## 2023-08-21 PROCEDURE — 1159F PR MEDICATION LIST DOCUMENTED IN MEDICAL RECORD: ICD-10-PCS | Mod: HCNC,CPTII,S$GLB, | Performed by: NURSE PRACTITIONER

## 2023-08-21 PROCEDURE — 99999 PR PBB SHADOW E&M-EST. PATIENT-LVL III: ICD-10-PCS | Mod: PBBFAC,HCNC,, | Performed by: NURSE PRACTITIONER

## 2023-08-21 PROCEDURE — 99999 PR PBB SHADOW E&M-EST. PATIENT-LVL III: CPT | Mod: PBBFAC,HCNC,, | Performed by: NURSE PRACTITIONER

## 2023-08-21 PROCEDURE — 1160F RVW MEDS BY RX/DR IN RCRD: CPT | Mod: HCNC,CPTII,S$GLB, | Performed by: NURSE PRACTITIONER

## 2023-08-21 PROCEDURE — 3288F FALL RISK ASSESSMENT DOCD: CPT | Mod: HCNC,CPTII,S$GLB, | Performed by: NURSE PRACTITIONER

## 2023-08-21 PROCEDURE — 4010F PR ACE/ARB THEARPY RXD/TAKEN: ICD-10-PCS | Mod: HCNC,CPTII,S$GLB, | Performed by: NURSE PRACTITIONER

## 2023-08-21 RX ORDER — ATORVASTATIN CALCIUM 40 MG/1
40 TABLET, FILM COATED ORAL DAILY
Qty: 90 TABLET | Refills: 3 | Status: SHIPPED | OUTPATIENT
Start: 2023-08-21 | End: 2023-10-05 | Stop reason: SDUPTHER

## 2023-08-21 RX ORDER — CARVEDILOL 6.25 MG/1
6.25 TABLET ORAL 2 TIMES DAILY WITH MEALS
Qty: 180 TABLET | Refills: 3 | Status: SHIPPED | OUTPATIENT
Start: 2023-08-21 | End: 2024-08-20

## 2023-08-21 RX ORDER — PROMETHAZINE HYDROCHLORIDE 25 MG/1
25 TABLET ORAL EVERY 6 HOURS PRN
COMMUNITY
End: 2023-08-21 | Stop reason: SDUPTHER

## 2023-08-21 RX ORDER — PROMETHAZINE HYDROCHLORIDE 25 MG/1
25 TABLET ORAL EVERY 6 HOURS PRN
Qty: 90 TABLET | Refills: 0 | Status: SHIPPED | OUTPATIENT
Start: 2023-08-21 | End: 2023-11-19

## 2023-08-21 RX ORDER — MECLIZINE HYDROCHLORIDE 50 MG/1
50 TABLET ORAL 3 TIMES DAILY PRN
COMMUNITY

## 2023-08-21 NOTE — ASSESSMENT & PLAN NOTE
Continue Meclizine. Refill Promethazine. Discussed condition course and signs and symptoms to expect. Consider vestibular therapy vs ENT referral if worsening or no improvement. ER precautions.  Call MD or follow-up to clinic if not improving or worsening symptoms.

## 2023-08-21 NOTE — ASSESSMENT & PLAN NOTE
No improvement in fatigue since stopping statin. Discussed possible alternatives. High ASCVD risk. Recommend medication to help lower. He is agreeable to restarting statin. Restart Atorvastatin as previously prescribed. Recheck lipids prior to upcoming appt with cardiology in October. Counseled on hyperlipidemia disease course, healthy diet and increased need for exercise. Please be advised of the risk of cardiovascular disease, increase stroke and heart attack risk with uncontrolled/untreated hyperlipidemia. Patient voiced understanding and understood the treatment plan. All questions were answered.

## 2023-08-21 NOTE — PROGRESS NOTES
Assessment/Plan:  Problem List Items Addressed This Visit          ENT    Vertigo    Overview     Chronic. Stable. Takes Meclizine PRN for acute flares with relief reported. Often has motion sickness with vertigo. Takes promethazine PRN for nausea. Requesting refill.          Current Assessment & Plan     Continue Meclizine. Refill Promethazine. Discussed condition course and signs and symptoms to expect. Consider vestibular therapy vs ENT referral if worsening or no improvement. ER precautions.  Call MD or follow-up to clinic if not improving or worsening symptoms.         Relevant Medications    promethazine (PHENERGAN) 25 MG tablet       Cardiac/Vascular    Mixed hyperlipidemia - Primary (Chronic)    Overview     Managed by cardiology. He has been having some issues with fatigue. He stopped taking statin because he thought this could be the cause. Reports was also concerned about possible liver SE.    Lab Results   Component Value Date    CHOL 241 (H) 08/11/2023    CHOL 248 (H) 09/08/2022    CHOL 213 (H) 02/18/2021     Lab Results   Component Value Date    HDL 33 (L) 08/11/2023    HDL 34 (L) 09/08/2022    HDL 35 (L) 02/18/2021     Lab Results   Component Value Date    LDLCALC 177.2 (H) 08/11/2023    LDLCALC 176.6 (H) 09/08/2022    LDLCALC 155.0 02/18/2021     Lab Results   Component Value Date    TRIG 154 (H) 08/11/2023    TRIG 187 (H) 09/08/2022    TRIG 115 02/18/2021     Lab Results   Component Value Date    CHOLHDL 13.7 (L) 08/11/2023    CHOLHDL 13.7 (L) 09/08/2022    CHOLHDL 16.4 (L) 02/18/2021     Lab Results   Component Value Date    ALT 18 08/11/2023    AST 19 08/11/2023    ALKPHOS 65 08/11/2023    BILITOT 0.3 08/11/2023     The 10-year ASCVD risk score (Lorelei GONZALEZ, et al., 2019) is: 28%    Values used to calculate the score:      Age: 73 years      Sex: Male      Is Non- : No      Diabetic: No      Tobacco smoker: No      Systolic Blood Pressure: 116 mmHg      Is BP treated: Yes       HDL Cholesterol: 33 mg/dL      Total Cholesterol: 241 mg/dL            Current Assessment & Plan     No improvement in fatigue since stopping statin. Discussed possible alternatives. High ASCVD risk. Recommend medication to help lower. He is agreeable to restarting statin. Restart Atorvastatin as previously prescribed. Recheck lipids prior to upcoming appt with cardiology in October. Counseled on hyperlipidemia disease course, healthy diet and increased need for exercise. Please be advised of the risk of cardiovascular disease, increase stroke and heart attack risk with uncontrolled/untreated hyperlipidemia. Patient voiced understanding and understood the treatment plan. All questions were answered.           Relevant Medications    atorvastatin (LIPITOR) 40 MG tablet    Essential hypertension    Overview     CHRONIC. STABLE. BP Reviewed.  Compliant with BP medications. No SE reported.   (-) CP, SOB, palpitations, dizziness, lightheadedness, HA, arm numbness, tingling or weakness, syncope.  Creatinine   Date Value Ref Range Status   08/11/2023 1.5 (H) 0.5 - 1.4 mg/dL Final     Hypertension Medications               amLODIPine (NORVASC) 5 MG tablet Take 1 tablet (5 mg total) by mouth once daily.    valsartan (DIOVAN) 320 MG tablet Take 320 mg by mouth once daily.    carvediloL (COREG) 6.25 MG tablet Take 1 tablet (6.25 mg total) by mouth 2 (two) times daily with meals.          Current Assessment & Plan     Medications refilled. Continue current blood pressure medication regimen.Counseled on importance of hypertension disease course, I recommend ongoing Education for DASH-diet and exercise. Counseled on medication regimen importance of treating high blood pressure. Please be advised of risk of untreated blood pressure as discussed. Please advised of ER precautions were given for symptoms of hypertensive urgency and emergency.          Relevant Medications    carvediloL (COREG) 6.25 MG tablet       Endocrine    Class  1 obesity with alveolar hypoventilation, serious comorbidity, and body mass index (BMI) of 32.0 to 32.9 in adult (Chronic)    Overview     BMI Readings from Last 10 Encounters:   08/21/23 33.12 kg/m²   07/13/23 33.18 kg/m²   07/13/23 33.18 kg/m²   06/15/23 32.69 kg/m²   06/09/23 33.36 kg/m²   05/19/23 33.91 kg/m²   05/19/23 33.91 kg/m²   05/18/23 32.54 kg/m²   05/15/23 32.08 kg/m²   05/12/23 33.43 kg/m²   May 2023: Patient reports fatigue.         Current Assessment & Plan     Encourage increased physical activity, healthy diet choices, and weight loss for prevention of progression of comorbid conditions.             Other    Encounter for long-term (current) use of medications (Chronic)    Overview     August 2023: Reviewed labs.  May 2023: Reviewed labs.  CHRONIC. Stable. Compliant with medications for managed conditions. See medication list. No SE reported.   Routine lab analysis is being monitored. Refills were addressed.  March 2022:  Reviewed labs.  Lab Results   Component Value Date    WBC 7.00 08/11/2023    HGB 15.0 08/11/2023    HCT 45.9 08/11/2023    MCV 92 08/11/2023     08/11/2023       Chemistry        Component Value Date/Time     08/11/2023 0709    K 4.7 08/11/2023 0709     08/11/2023 0709    CO2 20 (L) 08/11/2023 0709    BUN 35 (H) 08/11/2023 0709    CREATININE 1.5 (H) 08/11/2023 0709    GLU 94 08/11/2023 0709        Component Value Date/Time    CALCIUM 9.5 08/11/2023 0709    ALKPHOS 65 08/11/2023 0709    AST 19 08/11/2023 0709    ALT 18 08/11/2023 0709    BILITOT 0.3 08/11/2023 0709    ESTGFRAFRICA 57.6 (A) 03/08/2022 0933    EGFRNONAA 49.8 (A) 03/08/2022 0933        Lab Results   Component Value Date    TSH 1.808 05/12/2023    FREET4 1.14 07/27/2021    T3FREE 2.6 07/27/2021          Current Assessment & Plan     Complete history and physical was completed today.  Complete and thorough medication reconciliation was performed.  Discussed risks and benefits of medications.  Advised  patient on orders and health maintenance.  We discussed old records and old labs if available.  Will request any records not available through epic.  Continue current medications listed on your summary sheet.         Relevant Medications    promethazine (PHENERGAN) 25 MG tablet    Fatigue (Chronic)    Overview     Chronic. August 2023: Minimal improvement. He was concerned fatigue could be 2/2 to statin. He stopped taking medication. Reports no improvement. He got CPAP last week but has not been using it due to issue with mask. He has started taking folate and continues to do B12 injections.     May 2023: Patient reports that he stopped is vitamin B12 injections.  He has been having increasing fatigue.  He is being followed by Urology for prostate cancer.  He is also being evaluated by Cardiology for EKG.    Results for orders placed or performed in visit on 05/05/23   IN OFFICE EKG 12-LEAD (to [place])    Collection Time: 05/05/23  8:25 AM    Narrative    Test Reason : R06.09,    Vent. Rate : 074 BPM     Atrial Rate : 074 BPM     P-R Int : 198 ms          QRS Dur : 078 ms      QT Int : 380 ms       P-R-T Axes : 053 017 018 degrees     QTc Int : 421 ms    Normal sinus rhythm  Inferior infarct ,age undetermined  Abnormal ECG  When compared with ECG of 08-MAR-2021 09:52,  Previous ECG has undetermined rhythm, needs review  Inferior infarct is now Present  Confirmed by Diogenes Cote MD ([id number]) on 5/8/2023 5:51:29 PM    Referred By: LEI LOPEZ           Confirmed By:Diogenes Cote MD     Previous history:  Worsening.  No improvement.  Patient reports that he is deficient in B12 and vitamin-D.  Checking levels.  Patient takes supplement as needed.    March 2022:  Patient reports still having significant amount of fatigue.  Patient does have a history of B12 deficiency and prostate cancer.  Patient also has untreated MARIN.  I discussed these conditions in detail with the patient.  Patient defers treatment for MARIN  currently.  He is willing to increase his B12 supplement and follow-up with Urology concerning prostate cancer.  Lab Results   Component Value Date    WBC 7.00 08/11/2023    HGB 15.0 08/11/2023    HCT 45.9 08/11/2023    MCV 92 08/11/2023     08/11/2023     Lab Results   Component Value Date    IRON 59 08/11/2023    TIBC 306 08/11/2023    FERRITIN 293 08/11/2023     Lab Results   Component Value Date    INSZTDVQ28 994 (H) 08/11/2023     Lab Results   Component Value Date    FOLATE 7.4 08/11/2023     Lab Results   Component Value Date    TSH 1.808 05/12/2023    FREET4 1.14 07/27/2021            Current Assessment & Plan     Restart statin. See HLD. Continue folate and vitamin B12 injections. Proceed with CPAP. Follow up with pulmonology if unable to tolerate to consider alternative mask.          Follow up in about 3 months (around 11/21/2023), or if symptoms worsen or fail to improve.  ER precautions for severe or worsening symptoms.     Socorro Sanchez NP  _____________________________________________________________________________________________________________________________________________________    CC: follow up     HPI: Patient is a 73-ear-old male who presents in clinic today accompanied by his spouse as an established patient here for follow up. Chronic condition has been reviewed and remains stable. Further details as stated above.     Hyperlipidemia: This is a chronic problem. The current episode started more than 1 year ago. The problem is controlled. Recent lipid tests were reviewed and are variable. Pertinent negatives include no chest pain or shortness of breath. Current antihyperlipidemic treatment includes statins. The current treatment provides moderate improvement of lipids. There are no compliance problems.      HTN: The patient is currently being treated for essential hypertension. This condition is chronic and stable. The patient is tolerating their medication well with good  compliance.  Denies any adverse effects of medications.  Counseling was offered regarding low sodium diet.  The patient has a reduced salt intake. Routine exercise recommended. The patient denies headache, vision changes, chest pain, palpitations, shortness of breath, or lower extremity edema.    Past Medical History:  Past Medical History:   Diagnosis Date    Back pain     Benign prostate hyperplasia     DDD (degenerative disc disease), thoracolumbar     Disorder of kidney and ureter     stage 3    Elevated PSA     Glaucoma     Hx of colonic polyps 2010    Hypertension     Malignant neoplasm of prostate 07/31/2019    Metabolic syndrome     Mixed hyperlipidemia 01/24/2018    Obesity     MARIN (obstructive sleep apnea)     don't use a C-PAP    Pneumonia     Psychophysiological insomnia      Past Surgical History:   Procedure Laterality Date    BIOPSY, PROSTATE, USING PROSTATE MAPPING N/A 4/14/2023    Procedure: BIOPSY, PROSTATE, USING PROSTATE MAPPING;  Surgeon: EJ Chan MD;  Location: [facility];  Service: Urology;  Laterality: N/A;    CYST REMOVAL  1993    neck    CYSTOSCOPY N/A 7/17/2019    Procedure: CYSTOSCOPY;  Surgeon: Ulisses Serra MD;  Location: [facility] OR;  Service: Urology;  Laterality: N/A;    EYE SURGERY Bilateral     cataract surgery    GANGLION CYST EXCISION Right 1994    PROSTATE BIOPSY      TESTICLE SURGERY  1980    TRANSRECTAL BIOPSY OF PROSTATE WITH ULTRASOUND GUIDANCE Bilateral 7/17/2019    Procedure: BIOPSY, PROSTATE, RECTAL APPROACH, WITH US GUIDANCE;  Surgeon: Ulisses Serra MD;  Location: [facility] OR;  Service: Urology;  Laterality: Bilateral;    TRANSRECTAL BIOPSY OF PROSTATE WITH ULTRASOUND GUIDANCE N/A 3/11/2020    Procedure: BIOPSY, PROSTATE, RECTAL APPROACH, WITH US GUIDANCE;  Surgeon: Ulisses Serra MD;  Location: [facility];  Service: Urology;  Laterality: N/A;    TRANSURETHRAL SURGICAL REMOVAL OF PROSTATE (TURP) USING GREEN LIGHT LASER N/A 2/18/2022    Procedure: TURP, USING  GREEN LIGHT LASER;  Surgeon: Ulisses Serra MD;  Location: [facility];  Service: Urology;  Laterality: N/A;     Review of patient's allergies indicates:   Allergen Reactions    Benadryl allergy decongestant      Opposite reaction, speeds him up   Opposite reaction, speeds him up     Diphenhydramine      Opposite reaction, speeds him up      Social History     Tobacco Use    Smoking status: Former     Current packs/day: 0.00     Types: Cigars     Start date:      Quit date:      Years since quittin.6     Passive exposure: Never    Smokeless tobacco: Never    Tobacco comments:     cigars sporadically x 3 years   Substance Use Topics    Alcohol use: No    Drug use: Never     Family History   Problem Relation Age of Onset    Arthritis Mother     Prostate cancer Father     No Known Problems Sister     Alzheimer's disease Brother     Dementia Brother     Pacemaker/defibrilator Sister     Arthritis Sister      Current Outpatient Medications on File Prior to Visit   Medication Sig Dispense Refill    acetaminophen (TYLENOL) 500 MG tablet Take 500 mg by mouth every 6 (six) hours as needed for Pain.      amLODIPine (NORVASC) 5 MG tablet Take 1 tablet (5 mg total) by mouth once daily. 90 tablet 3    aspirin 81 MG Chew Take 81 mg by mouth once daily.      b complex vitamins tablet Take 1 tablet by mouth once daily.      cetirizine (ZYRTEC) 10 MG tablet Take 1 tablet (10 mg total) by mouth once daily. 30 tablet 11    cyanocobalamin 1,000 mcg/mL injection Inject 1 mL (1,000 mcg total) into the muscle once a week. 4 mL 5    dorzolamide-timolol 2-0.5% (COSOPT) 22.3-6.8 mg/mL ophthalmic solution Place 1 drop into both eyes nightly.      fluticasone propionate (FLONASE) 50 mcg/actuation nasal spray 1 spray (50 mcg total) by Each Nostril route once daily. 16 g 11    latanoprost 0.005 % ophthalmic solution Place 1 drop into both eyes every evening.  6    meclizine (ANTIVERT) 50 MG tablet Take 50 mg by mouth 3 (three) times  daily as needed.      tamsulosin (FLOMAX) 0.4 mg Cap TAKE 1 CAPSULE (0.4 MG TOTAL) BY MOUTH EVERY EVENING. 90 capsule 3    valsartan (DIOVAN) 320 MG tablet Take 320 mg by mouth once daily.      [DISCONTINUED] carvediloL (COREG) 6.25 MG tablet Take 1 tablet (6.25 mg total) by mouth 2 (two) times daily with meals. 180 tablet 3    [DISCONTINUED] promethazine (PHENERGAN) 25 MG tablet Take 25 mg by mouth every 6 (six) hours as needed for Nausea.      aspirin 325 MG tablet Take 325 mg by mouth once daily.      [DISCONTINUED] atorvastatin (LIPITOR) 40 MG tablet Take 1 tablet (40 mg total) by mouth once daily. (Patient not taking: Reported on 8/21/2023) 90 tablet 3     Current Facility-Administered Medications on File Prior to Visit   Medication Dose Route Frequency Provider Last Rate Last Admin    sodium chloride 0.9% flush 10 mL  10 mL Intravenous PRN Ani Srivastava NP   10 mL at 05/15/23 1045     Review of Systems   Constitutional:  Positive for fatigue (Chronic). Negative for chills, fever and unexpected weight change.   HENT:  Negative for ear pain and sore throat.    Eyes:  Negative for redness and visual disturbance.   Respiratory:  Negative for cough, shortness of breath and wheezing.    Cardiovascular:  Negative for chest pain and palpitations.   Gastrointestinal:  Negative for nausea and vomiting.   Endocrine: Negative for cold intolerance and heat intolerance.   Genitourinary:  Negative for difficulty urinating and hematuria.   Musculoskeletal:  Positive for arthralgias. Negative for myalgias.   Skin:  Negative for rash and wound.   Allergic/Immunologic: Negative for environmental allergies and food allergies.   Neurological:  Negative for weakness and headaches.   Hematological:  Negative for adenopathy. Does not bruise/bleed easily.   Psychiatric/Behavioral:  Negative for sleep disturbance. The patient is not nervous/anxious.      Vitals:    08/21/23 0738   BP: 116/76   Weight: 104.7 kg (230 lb 12.8 oz)  "  Height: 5' 10" (1.778 m)     Wt Readings from Last 3 Encounters:   08/21/23 104.7 kg (230 lb 12.8 oz)   07/13/23 104.9 kg (231 lb 4.2 oz)   07/13/23 104.9 kg (231 lb 4.2 oz)     Physical Exam  Vitals reviewed.   Constitutional:       General: He is not in acute distress.     Appearance: Normal appearance. He is obese. He is not ill-appearing.      Comments: Wife present   HENT:      Head: Normocephalic and atraumatic.      Right Ear: External ear normal.      Left Ear: External ear normal.      Nose: Nose normal.   Eyes:      Extraocular Movements: Extraocular movements intact.      Conjunctiva/sclera: Conjunctivae normal.   Cardiovascular:      Rate and Rhythm: Normal rate.      Heart sounds: Normal heart sounds.   Pulmonary:      Effort: Pulmonary effort is normal. No respiratory distress.      Breath sounds: Normal breath sounds.   Abdominal:      General: Abdomen is flat. There is no distension.   Musculoskeletal:         General: Normal range of motion.      Cervical back: Normal range of motion.   Skin:     General: Skin is warm and dry.      Capillary Refill: Capillary refill takes less than 2 seconds.      Coloration: Skin is not pale.      Findings: No rash.   Neurological:      General: No focal deficit present.      Mental Status: He is alert and oriented to person, place, and time. Mental status is at baseline.   Psychiatric:         Mood and Affect: Mood normal.         Speech: Speech normal. Speech is not rapid and pressured, delayed or slurred.         Behavior: Behavior normal. Behavior is not agitated, slowed, aggressive, withdrawn, hyperactive or combative. Behavior is cooperative.         Thought Content: Thought content normal.         Judgment: Judgment normal.       Health Maintenance   Topic Date Due    Shingles Vaccine (1 of 2) 05/15/2024 (Originally 11/2/2017)    Colorectal Cancer Screening  10/20/2024    TETANUS VACCINE  06/20/2025    Lipid Panel  08/11/2028    Hepatitis C Screening  " Completed    Abdominal Aortic Aneurysm Screening  Completed

## 2023-08-21 NOTE — ASSESSMENT & PLAN NOTE
Encourage increased physical activity, healthy diet choices, and weight loss for prevention of progression of comorbid conditions.

## 2023-08-21 NOTE — ASSESSMENT & PLAN NOTE
Medications refilled. Continue current blood pressure medication regimen.Counseled on importance of hypertension disease course, I recommend ongoing Education for DASH-diet and exercise. Counseled on medication regimen importance of treating high blood pressure. Please be advised of risk of untreated blood pressure as discussed. Please advised of ER precautions were given for symptoms of hypertensive urgency and emergency.

## 2023-08-21 NOTE — ASSESSMENT & PLAN NOTE
Restart statin. See HLD. Continue folate and vitamin B12 injections. Proceed with CPAP. Follow up with pulmonology if unable to tolerate to consider alternative mask.

## 2023-08-22 ENCOUNTER — TELEPHONE (OUTPATIENT)
Dept: PULMONOLOGY | Facility: CLINIC | Age: 73
End: 2023-08-22
Payer: MEDICARE

## 2023-08-22 ENCOUNTER — TELEPHONE (OUTPATIENT)
Dept: FAMILY MEDICINE | Facility: CLINIC | Age: 73
End: 2023-08-22
Payer: MEDICARE

## 2023-08-22 NOTE — TELEPHONE ENCOUNTER
left vm for pt to call back regarding cpap supplies----- Message from Flor Bang sent at 8/22/2023 12:12 PM CDT -----  Contact: [phone]  Patient is calling in regards to getting a order for a mask for his cpap machine. Please call pt back at [phone]. Thanks KB

## 2023-10-02 ENCOUNTER — LAB VISIT (OUTPATIENT)
Dept: LAB | Facility: HOSPITAL | Age: 73
End: 2023-10-02
Payer: MEDICARE

## 2023-10-02 DIAGNOSIS — I10 ESSENTIAL HYPERTENSION: ICD-10-CM

## 2023-10-02 DIAGNOSIS — E55.9 VITAMIN D INSUFFICIENCY: ICD-10-CM

## 2023-10-02 DIAGNOSIS — E78.2 MIXED HYPERLIPIDEMIA: ICD-10-CM

## 2023-10-02 LAB
25(OH)D3+25(OH)D2 SERPL-MCNC: 27 NG/ML (ref 30–96)
ALBUMIN SERPL BCP-MCNC: 3.6 G/DL (ref 3.5–5.2)
ALP SERPL-CCNC: 70 U/L (ref 55–135)
ALT SERPL W/O P-5'-P-CCNC: 15 U/L (ref 10–44)
ANION GAP SERPL CALC-SCNC: 5 MMOL/L (ref 8–16)
AST SERPL-CCNC: 20 U/L (ref 10–40)
BILIRUB SERPL-MCNC: 0.3 MG/DL (ref 0.1–1)
BUN SERPL-MCNC: 21 MG/DL (ref 8–23)
CALCIUM SERPL-MCNC: 9.1 MG/DL (ref 8.7–10.5)
CHLORIDE SERPL-SCNC: 114 MMOL/L (ref 95–110)
CHOLEST SERPL-MCNC: 205 MG/DL (ref 120–199)
CHOLEST/HDLC SERPL: 5.9 {RATIO} (ref 2–5)
CO2 SERPL-SCNC: 25 MMOL/L (ref 23–29)
CREAT SERPL-MCNC: 1.4 MG/DL (ref 0.5–1.4)
EST. GFR  (NO RACE VARIABLE): 52.7 ML/MIN/1.73 M^2
GLUCOSE SERPL-MCNC: 111 MG/DL (ref 70–110)
HDLC SERPL-MCNC: 35 MG/DL (ref 40–75)
HDLC SERPL: 17.1 % (ref 20–50)
LDLC SERPL CALC-MCNC: 151.4 MG/DL (ref 63–159)
NONHDLC SERPL-MCNC: 170 MG/DL
POTASSIUM SERPL-SCNC: 4.8 MMOL/L (ref 3.5–5.1)
PROT SERPL-MCNC: 6.9 G/DL (ref 6–8.4)
SODIUM SERPL-SCNC: 144 MMOL/L (ref 136–145)
TRIGL SERPL-MCNC: 93 MG/DL (ref 30–150)

## 2023-10-02 PROCEDURE — 82306 VITAMIN D 25 HYDROXY: CPT | Mod: HCNC | Performed by: NURSE PRACTITIONER

## 2023-10-02 PROCEDURE — 80053 COMPREHEN METABOLIC PANEL: CPT | Mod: HCNC | Performed by: NURSE PRACTITIONER

## 2023-10-02 PROCEDURE — 80061 LIPID PANEL: CPT | Mod: HCNC | Performed by: NURSE PRACTITIONER

## 2023-10-02 PROCEDURE — 83735 ASSAY OF MAGNESIUM: CPT | Mod: HCNC | Performed by: NURSE PRACTITIONER

## 2023-10-05 DIAGNOSIS — E78.2 MIXED HYPERLIPIDEMIA: ICD-10-CM

## 2023-10-05 DIAGNOSIS — E55.9 VITAMIN D DEFICIENCY: ICD-10-CM

## 2023-10-05 DIAGNOSIS — I10 ESSENTIAL HYPERTENSION: Primary | ICD-10-CM

## 2023-10-05 RX ORDER — ATORVASTATIN CALCIUM 80 MG/1
80 TABLET, FILM COATED ORAL DAILY
Qty: 90 TABLET | Refills: 3 | Status: SHIPPED | OUTPATIENT
Start: 2023-10-05 | End: 2023-10-05 | Stop reason: SDUPTHER

## 2023-10-05 RX ORDER — CHOLECALCIFEROL (VITAMIN D3) 50 MCG
2000 TABLET ORAL DAILY
COMMUNITY

## 2023-10-05 RX ORDER — ATORVASTATIN CALCIUM 80 MG/1
80 TABLET, FILM COATED ORAL DAILY
Qty: 90 TABLET | Refills: 3 | Status: SHIPPED | OUTPATIENT
Start: 2023-10-05 | End: 2024-01-04

## 2023-10-05 NOTE — TELEPHONE ENCOUNTER
----- Message from Ani Srivastava NP sent at 10/5/2023  2:25 PM CDT -----  His cholesterol is high, but improved. Vitamin D level is very low. I would like to increase atorvastatin to 80 mg once daily. He can take 2 tablets daily until he is out, and then a new prescription will be 80mg. I sent it to [facility]'s. We can recheck lipids in 2 months. I also want him to take vitamin D3 2,000 units every day with a meal. I put the orders in.

## 2023-10-05 NOTE — TELEPHONE ENCOUNTER
Patient would like the new prescriptions to be sent to [facility]. Patients wife wanted to know if the appointment that is scheduled for next week was still needed or if he could wait  couple months to see how the medication changes do?       Followed up with Yasir's wife (Lina), she has been notified of this information and all questions answered. Per patient's provider:  His cholesterol is high, but improved. Vitamin D level is very low. I would like to increase atorvastatin to 80 mg once daily. He can take 2 tablets daily until he is out, and then a new prescription will be 80mg. I sent it to [facility]. We can recheck lipids in 2 months. I also want him to take vitamin D3 2,000 units every day with a meal. I put the orders in.. Patient's wife verbalized understanding.

## 2023-10-05 NOTE — PROGRESS NOTES
His cholesterol is high, but improved. Vitamin D level is very low. I would like to increase atorvastatin to 80 mg once daily. He can take 2 tablets daily until he is out, and then a new prescription will be 80mg. I sent it to [facility]'s. We can recheck lipids in 2 months. I also want him to take vitamin D3 2,000 units every day with a meal. I put the orders in.

## 2023-10-10 ENCOUNTER — TELEPHONE (OUTPATIENT)
Dept: PULMONOLOGY | Facility: CLINIC | Age: 73
End: 2023-10-10
Payer: MEDICARE

## 2023-10-13 ENCOUNTER — PATIENT MESSAGE (OUTPATIENT)
Dept: FAMILY MEDICINE | Facility: CLINIC | Age: 73
End: 2023-10-13
Payer: MEDICARE

## 2023-10-13 LAB — MAGNESIUM RBC-MCNC: 5.4 MG/DL (ref 4–6.4)

## 2023-10-19 ENCOUNTER — OFFICE VISIT (OUTPATIENT)
Dept: PULMONOLOGY | Facility: CLINIC | Age: 73
End: 2023-10-19
Payer: MEDICARE

## 2023-10-19 VITALS
HEIGHT: 71 IN | WEIGHT: 236 LBS | BODY MASS INDEX: 33.04 KG/M2 | SYSTOLIC BLOOD PRESSURE: 120 MMHG | RESPIRATION RATE: 16 BRPM | DIASTOLIC BLOOD PRESSURE: 80 MMHG | OXYGEN SATURATION: 95 % | HEART RATE: 64 BPM

## 2023-10-19 DIAGNOSIS — I10 ESSENTIAL HYPERTENSION: Primary | ICD-10-CM

## 2023-10-19 DIAGNOSIS — E66.2 CLASS 1 OBESITY WITH ALVEOLAR HYPOVENTILATION, SERIOUS COMORBIDITY, AND BODY MASS INDEX (BMI) OF 32.0 TO 32.9 IN ADULT: Chronic | ICD-10-CM

## 2023-10-19 DIAGNOSIS — G47.33 OSA (OBSTRUCTIVE SLEEP APNEA): ICD-10-CM

## 2023-10-19 DIAGNOSIS — R53.83 FATIGUE, UNSPECIFIED TYPE: Chronic | ICD-10-CM

## 2023-10-19 PROBLEM — R06.02 SOB (SHORTNESS OF BREATH): Status: RESOLVED | Noted: 2017-10-25 | Resolved: 2023-10-19

## 2023-10-19 PROCEDURE — 1101F PR PT FALLS ASSESS DOC 0-1 FALLS W/OUT INJ PAST YR: ICD-10-PCS | Mod: HCNC,CPTII,S$GLB, | Performed by: PHYSICIAN ASSISTANT

## 2023-10-19 PROCEDURE — 99999 PR PBB SHADOW E&M-EST. PATIENT-LVL IV: CPT | Mod: PBBFAC,HCNC,, | Performed by: PHYSICIAN ASSISTANT

## 2023-10-19 PROCEDURE — 1101F PT FALLS ASSESS-DOCD LE1/YR: CPT | Mod: HCNC,CPTII,S$GLB, | Performed by: PHYSICIAN ASSISTANT

## 2023-10-19 PROCEDURE — 1159F MED LIST DOCD IN RCRD: CPT | Mod: HCNC,CPTII,S$GLB, | Performed by: PHYSICIAN ASSISTANT

## 2023-10-19 PROCEDURE — 3008F BODY MASS INDEX DOCD: CPT | Mod: HCNC,CPTII,S$GLB, | Performed by: PHYSICIAN ASSISTANT

## 2023-10-19 PROCEDURE — 1159F PR MEDICATION LIST DOCUMENTED IN MEDICAL RECORD: ICD-10-PCS | Mod: HCNC,CPTII,S$GLB, | Performed by: PHYSICIAN ASSISTANT

## 2023-10-19 PROCEDURE — 1160F PR REVIEW ALL MEDS BY PRESCRIBER/CLIN PHARMACIST DOCUMENTED: ICD-10-PCS | Mod: HCNC,CPTII,S$GLB, | Performed by: PHYSICIAN ASSISTANT

## 2023-10-19 PROCEDURE — 1160F RVW MEDS BY RX/DR IN RCRD: CPT | Mod: HCNC,CPTII,S$GLB, | Performed by: PHYSICIAN ASSISTANT

## 2023-10-19 PROCEDURE — 99214 OFFICE O/P EST MOD 30 MIN: CPT | Mod: HCNC,S$GLB,, | Performed by: PHYSICIAN ASSISTANT

## 2023-10-19 PROCEDURE — 3079F DIAST BP 80-89 MM HG: CPT | Mod: HCNC,CPTII,S$GLB, | Performed by: PHYSICIAN ASSISTANT

## 2023-10-19 PROCEDURE — 99214 PR OFFICE/OUTPT VISIT, EST, LEVL IV, 30-39 MIN: ICD-10-PCS | Mod: HCNC,S$GLB,, | Performed by: PHYSICIAN ASSISTANT

## 2023-10-19 PROCEDURE — 3044F PR MOST RECENT HEMOGLOBIN A1C LEVEL <7.0%: ICD-10-PCS | Mod: HCNC,CPTII,S$GLB, | Performed by: PHYSICIAN ASSISTANT

## 2023-10-19 PROCEDURE — 3288F FALL RISK ASSESSMENT DOCD: CPT | Mod: HCNC,CPTII,S$GLB, | Performed by: PHYSICIAN ASSISTANT

## 2023-10-19 PROCEDURE — 3074F SYST BP LT 130 MM HG: CPT | Mod: HCNC,CPTII,S$GLB, | Performed by: PHYSICIAN ASSISTANT

## 2023-10-19 PROCEDURE — 3008F PR BODY MASS INDEX (BMI) DOCUMENTED: ICD-10-PCS | Mod: HCNC,CPTII,S$GLB, | Performed by: PHYSICIAN ASSISTANT

## 2023-10-19 PROCEDURE — 99999 PR PBB SHADOW E&M-EST. PATIENT-LVL IV: ICD-10-PCS | Mod: PBBFAC,HCNC,, | Performed by: PHYSICIAN ASSISTANT

## 2023-10-19 PROCEDURE — 4010F ACE/ARB THERAPY RXD/TAKEN: CPT | Mod: HCNC,CPTII,S$GLB, | Performed by: PHYSICIAN ASSISTANT

## 2023-10-19 PROCEDURE — 3288F PR FALLS RISK ASSESSMENT DOCUMENTED: ICD-10-PCS | Mod: HCNC,CPTII,S$GLB, | Performed by: PHYSICIAN ASSISTANT

## 2023-10-19 PROCEDURE — 3044F HG A1C LEVEL LT 7.0%: CPT | Mod: HCNC,CPTII,S$GLB, | Performed by: PHYSICIAN ASSISTANT

## 2023-10-19 PROCEDURE — 3074F PR MOST RECENT SYSTOLIC BLOOD PRESSURE < 130 MM HG: ICD-10-PCS | Mod: HCNC,CPTII,S$GLB, | Performed by: PHYSICIAN ASSISTANT

## 2023-10-19 PROCEDURE — 3079F PR MOST RECENT DIASTOLIC BLOOD PRESSURE 80-89 MM HG: ICD-10-PCS | Mod: HCNC,CPTII,S$GLB, | Performed by: PHYSICIAN ASSISTANT

## 2023-10-19 PROCEDURE — 4010F PR ACE/ARB THEARPY RXD/TAKEN: ICD-10-PCS | Mod: HCNC,CPTII,S$GLB, | Performed by: PHYSICIAN ASSISTANT

## 2023-10-19 NOTE — ASSESSMENT & PLAN NOTE
Tolerating CPAP  He notes some improvement in fatigue  Would benefit from daily exercise although he has trouble with this due to chronic pain  Continue CPAP and follow up with PCP

## 2023-10-19 NOTE — PROGRESS NOTES
Subjective:       Patient ID: Yasir Jeffery Sr. is a 73 y.o. male.    Chief Complaint: Fatigue    10/16/23  Here for MARIN on CPAP follow up  Initial Compliance download reviewed, days with usage > 4 hours is 84%, average AHI is 2.0  Patient states some improved symptoms with use of CPAP. Sleeping more soundly. Mildly improved daytime sleepiness.        10/19/2023     1:47 PM   EPWORTH SLEEPINESS SCALE   Sitting and reading 1   Watching TV 0   Sitting, inactive in a public place (e.g. a theatre or a meeting) 0   As a passenger in a car for an hour without a break 0   Lying down to rest in the afternoon when circumstances permit 3   Sitting and talking to someone 0   Sitting quietly after a lunch without alcohol 0   In a car, while stopped for a few minutes in traffic 0   Total score 4       7/13/23  Here for fatigue follow up  Completed home sleep study, revealed mild MARIN AHI 11  PFT and walk today  Complains today of post nasal drip  Denies cough, SOB, chest pain, fever, or wheezing    6/15/23  73yo male referred by No ref. provider found for fatigue  Fatigue started 5 years ago; gutted a burned building with [employer], feels like this is when the fatigue started  He was able to do heavy activity like yard work prior to this without issues  Has a constant post nasal drip  He denies cough  No smoking history  Retired   Does not have consistent sleep schedule  Feels like a light sleeper  Snores  History of mild MARIN diagnosed in 2021, does not wear CPAP, has had weight gain since last study  Fatigue has gradually worsened over the last few years  He complains of shortness of breath but describes this as more of full body fatigue and not out of breath  History of active prostate cancer, follows with oncology for surveillance      BP Readings from Last 3 Encounters:   10/19/23 120/80   08/21/23 116/76   07/13/23 (!) 151/72     Snoring / Sleep:     [place] Questionnaire (validated MARIN screening  questionnaire)    yes -- Snoring/apnea    yes -- Fatigue    Body mass index is 32.92 kg/m².  (>25 is overweight, >30 is obese)    Blood Pressure = Hypertension  (PreHTN 120-139/80-89, Stg1 140-159/90-99, Stg2 >160/>100)  [place] = 3 of three MARIN categories are positive (high risk is 2-3 positive categories)     [place] Sleepiness Scale TOTAL =   2  (validated sleepiness questionnaire with a higher score indicating greater sleepiness; range 0-24)  EPWORTH SLEEPINESS SCALE 6/15/2023   Sitting and reading 0   Watching TV 0   Sitting, inactive in a public place (e.g. a theatre or a meeting) 0   As a passenger in a car for an hour without a break 0   Lying down to rest in the afternoon when circumstances permit 2   Sitting and talking to someone 0   Sitting quietly after a lunch without alcohol 0   In a car, while stopped for a few minutes in traffic 0   Total score 2         STOP-Bang Questionnaire (validated MARIN screening questionnaire)  Negative unless checked off.  [x] Snoring    [x]  Tired/Fatigued/Sleepy  [] Obstruction (apneas/choking)  [x] Pressure (HTN)  [x] BMI >35  [x] Age >50  [x] Neck >40 cm  [x] Gender male   STOP-Bang = 7 (low risk 0-2,high risk 3-8)    Immunization History   Administered Date(s) Administered    Pneumococcal Conjugate - 13 Valent 06/07/2019    Pneumococcal Polysaccharide - 23 Valent 09/07/2017    Tdap 06/20/2015    Zoster 09/07/2017      Tobacco Use: Medium Risk (10/19/2023)    Patient History     Smoking Tobacco Use: Former     Smokeless Tobacco Use: Never     Passive Exposure: Never      Past Medical History:   Diagnosis Date    Back pain     Benign prostate hyperplasia     DDD (degenerative disc disease), thoracolumbar     Disorder of kidney and ureter     stage 3    Elevated PSA     Glaucoma     Hx of colonic polyps 2010    Hypertension     Malignant neoplasm of prostate 07/31/2019    Metabolic syndrome     Mixed hyperlipidemia 01/24/2018    Obesity     MARIN (obstructive sleep apnea)      don't use a C-PAP    Pneumonia     Psychophysiological insomnia       Current Outpatient Medications on File Prior to Visit   Medication Sig Dispense Refill    acetaminophen (TYLENOL) 500 MG tablet Take 500 mg by mouth every 6 (six) hours as needed for Pain.      amLODIPine (NORVASC) 5 MG tablet Take 1 tablet (5 mg total) by mouth once daily. 90 tablet 3    aspirin 325 MG tablet Take 325 mg by mouth once daily.      aspirin 81 MG Chew Take 81 mg by mouth once daily.      atorvastatin (LIPITOR) 80 MG tablet Take 1 tablet (80 mg total) by mouth once daily. 90 tablet 3    b complex vitamins tablet Take 1 tablet by mouth once daily.      carvediloL (COREG) 6.25 MG tablet Take 1 tablet (6.25 mg total) by mouth 2 (two) times daily with meals. 180 tablet 3    cetirizine (ZYRTEC) 10 MG tablet Take 1 tablet (10 mg total) by mouth once daily. 30 tablet 11    cholecalciferol, vitamin D3, (VITAMIN D3) 50 mcg (2,000 unit) Tab Take 2,000 Units by mouth once daily.      cyanocobalamin 1,000 mcg/mL injection Inject 1 mL (1,000 mcg total) into the muscle once a week. 4 mL 5    dorzolamide-timolol 2-0.5% (COSOPT) 22.3-6.8 mg/mL ophthalmic solution Place 1 drop into both eyes nightly.      fluticasone propionate (FLONASE) 50 mcg/actuation nasal spray 1 spray (50 mcg total) by Each Nostril route once daily. 16 g 11    latanoprost 0.005 % ophthalmic solution Place 1 drop into both eyes every evening.  6    meclizine (ANTIVERT) 50 MG tablet Take 50 mg by mouth 3 (three) times daily as needed.      promethazine (PHENERGAN) 25 MG tablet Take 1 tablet (25 mg total) by mouth every 6 (six) hours as needed for Nausea. 90 tablet 0    tamsulosin (FLOMAX) 0.4 mg Cap TAKE 1 CAPSULE (0.4 MG TOTAL) BY MOUTH EVERY EVENING. 90 capsule 3    valsartan (DIOVAN) 320 MG tablet Take 320 mg by mouth once daily.       Current Facility-Administered Medications on File Prior to Visit   Medication Dose Route Frequency Provider Last Rate Last Admin    sodium  "chloride 0.9% flush 10 mL  10 mL Intravenous PRN Ani SrivastavaSERGE   10 mL at 05/15/23 1045        Review of Systems   Constitutional:  Positive for fatigue. Negative for fever, weight loss, appetite change and weakness.   HENT:  Positive for postnasal drip and rhinorrhea. Negative for sinus pressure, trouble swallowing and congestion.    Respiratory:  Positive for dyspnea on extertion and somnolence. Negative for cough, sputum production, choking, chest tightness, shortness of breath and wheezing.    Cardiovascular:  Negative for chest pain and leg swelling.   Musculoskeletal:  Positive for arthralgias. Negative for joint swelling.   Gastrointestinal:  Negative for nausea and vomiting.   Neurological:  Negative for dizziness, weakness and headaches.   All other systems reviewed and are negative.      Objective:       Vitals:    10/19/23 1344   BP: 120/80   Pulse: 64   Resp: 16   SpO2: 95%   Weight: 107.1 kg (236 lb)   Height: 5' 11" (1.803 m)         Physical Exam   Constitutional: He is oriented to person, place, and time. He appears well-developed and well-nourished. No distress.   HENT:   Head: Normocephalic.   Nose: Nose normal.   Mouth/Throat: Oropharynx is clear and moist.   Cardiovascular: Normal rate and regular rhythm.   Pulmonary/Chest: Effort normal. No respiratory distress. He has no wheezes. He has no rhonchi. He has no rales.   Musculoskeletal:         General: No edema.      Cervical back: Normal range of motion and neck supple.   Lymphadenopathy: No supraclavicular adenopathy is present.     He has no cervical adenopathy.   Neurological: He is alert and oriented to person, place, and time.   Skin: Skin is warm and dry.   Psychiatric: He has a normal mood and affect.   Vitals reviewed.    Personal Diagnostic Review    X-Ray Chest PA And Lateral  Narrative: EXAM: XR CHEST PA AND LATERAL    CLINICAL HISTORY:  [G47.33]-Obstructive sleep apnea (adult) (pediatric)./[R06.02]-Shortness of " breath.    TECHNIQUE: PA and lateral views of the chest.    COMPARISON: X-ray dated 02/24/2021    FINDINGS:   Cardiomediastinal silhouette is within normal limits. Trachea is midline. Pulmonary vasculature is normal. Small calcified nodules in the mid to upper lungs appear unchanged.  Lungs are otherwise clear. No significant pleural effusion or pneumothorax. No acute bony abnormality.  Degenerative changes noted in the spine.  Impression: No acute cardiopulmonary abnormality.    Finalized on: 7/13/2023 11:19 AM By:  Boris Paige MD  [facility]# [id number]      2023-07-13 11:21:[id number]    [facility]    SLEEP STUDY FINDINGS: Patient underwent a 2 night Home Sleep Test and by behavioral criteria, slept for approximately  12.97 hours, with a sleep latency of 6 minutes and a sleep efficiency of 94.5%. Mild sleep disordered breathing (AHI=11) is  noted based on a 4% hypopnea desaturation criteria, predominantly in the supine position (39 events/hour). The patient  slept supine 6.4% of the night based on valid recording time of 13.21 hours and is 4.3 times as likely to have  apneas/hypopneas when supine. When considering more subtle measures of sleep disordered breathing, the overall  respiratory disturbance index is mild(RDI=15) based on a 1% hypopnea desaturation criteria with confirmation by  surrogate arousal indicators. The apneas/hypopneas are accompanied by minimal oxygen desaturation (percent time  below 90% SpO2: 0.5%, Min SpO2: 87%). The average desaturation across all sleep disordered breathing events is 2.3%.  Snoring occurs for 5.6% (30 dB) of the study. The mean pulse rate is 53.9 BPM, with frequent pulse rate variability (54  events with >= 6 BPM increase/decrease per hour).        Oxygen Assessment Supplemental O2 Heart   Rate Blood Pressure Afshin Dyspnea Scale Rating    Resting 96 % Room Air 66 bpm 151/72 0   Exercise             Minute             1 94 % Room Air 88 bpm       2 94 % Room Air 91 bpm       3 96 % Room  Air 90 bpm       4 96 % Room Air 88 bpm       5 96 % Room Air 87 bpm       6  95 % Room Air 85 bpm 149/67 2   Recovery             Minute             1 97 % Room Air 70 bpm       2 97 % Room Air 67 bpm       3 96 % Room Air 67 bpm       4 96 % Room Air 61 bpm 130/60 1      Six Minute Walk Summary  6MWT Status: completed without stopping  Patient Reported: Dyspnea, Dizziness, Lightheadedness          Interpretation:  Did the patient stop or pause?: No  Total Time Walked (Calculated): 360 seconds  Final Partial Lap Distance (feet): 100 feet  Total Distance Meters (Calculated): 335.28 meters  Predicted Distance Meters (Calculated): 480.84 meters  Percentage of Predicted (Calculated): 69.73  Peak VO2 (Calculated): 14.04  Mets: 4.01  Has The Patient Had a Previous Six Minute Walk Test?: No     Previous 6MWT Results  Has The Patient Had a Previous Six Minute Walk Test?: No    PFT reviewed and agree with physician interpretation: Spirometry is normal. Spirometry remains unimproved following bronchodilator. Lung volume determination is normal. Airway mechanics show normal airway resistance and conductance. DLCO is mildly decreased. MVV is moderately decreased.   Â    Notes: The failure to demonstrate improvement in spirometry does not preclude a clinical response to a trial of bronchodilators. MVV is reduced out of proportion to FEV1 suggesting poor effort or difficulty performing the maneuver or neuromuscular   disease.   Assessment/Plan:       Problem List Items Addressed This Visit          Cardiac/Vascular    Essential hypertension - Primary     Stable, continue current medication management             Endocrine    Class 1 obesity with alveolar hypoventilation, serious comorbidity, and body mass index (BMI) of 32.0 to 32.9 in adult (Chronic)     Weight loss and exercise to improve overall health.              Other    Fatigue (Chronic)     Tolerating CPAP  He notes some improvement in fatigue  Would benefit from daily  exercise although he has trouble with this due to chronic pain  Continue CPAP and follow up with PCP         MARIN (obstructive sleep apnea)     Compliant with CPAP and Benefits from use  Discussed therapeutic goals for CPAP: Ideal usage 100% of nights for 6-8 hours per night. Minimum usage is 70% of night for at least 4 hours per night.                Follow up in about 1 year (around 10/19/2024) for MARIN follow up.    Discussed diagnosis, its evaluation, treatment and usual course. All questions answered.    Patient verbalized understanding of plan and left in no acute distress    Thank you for the courtesy of participating in the care of this patient    Ursula Call PA-C  [facility]

## 2023-10-19 NOTE — ASSESSMENT & PLAN NOTE
Compliant with CPAP and Benefits from use  Discussed therapeutic goals for CPAP: Ideal usage 100% of nights for 6-8 hours per night. Minimum usage is 70% of night for at least 4 hours per night.

## 2023-10-24 ENCOUNTER — PATIENT MESSAGE (OUTPATIENT)
Dept: CARDIOLOGY | Facility: CLINIC | Age: 73
End: 2023-10-24
Payer: MEDICARE

## 2023-12-05 ENCOUNTER — LAB VISIT (OUTPATIENT)
Dept: LAB | Facility: HOSPITAL | Age: 73
End: 2023-12-05
Payer: MEDICARE

## 2023-12-05 DIAGNOSIS — E78.2 MIXED HYPERLIPIDEMIA: ICD-10-CM

## 2023-12-05 DIAGNOSIS — I10 ESSENTIAL HYPERTENSION: ICD-10-CM

## 2023-12-05 DIAGNOSIS — E55.9 VITAMIN D DEFICIENCY: ICD-10-CM

## 2023-12-05 LAB
25(OH)D3+25(OH)D2 SERPL-MCNC: 28 NG/ML (ref 30–96)
ALBUMIN SERPL BCP-MCNC: 3.6 G/DL (ref 3.5–5.2)
ALP SERPL-CCNC: 64 U/L (ref 55–135)
ALT SERPL W/O P-5'-P-CCNC: 20 U/L (ref 10–44)
ANION GAP SERPL CALC-SCNC: 10 MMOL/L (ref 8–16)
AST SERPL-CCNC: 19 U/L (ref 10–40)
BILIRUB SERPL-MCNC: 0.5 MG/DL (ref 0.1–1)
BUN SERPL-MCNC: 21 MG/DL (ref 8–23)
CALCIUM SERPL-MCNC: 9.2 MG/DL (ref 8.7–10.5)
CHLORIDE SERPL-SCNC: 108 MMOL/L (ref 95–110)
CHOLEST SERPL-MCNC: 228 MG/DL (ref 120–199)
CHOLEST/HDLC SERPL: 7.6 {RATIO} (ref 2–5)
CO2 SERPL-SCNC: 22 MMOL/L (ref 23–29)
CREAT SERPL-MCNC: 1.6 MG/DL (ref 0.5–1.4)
EST. GFR  (NO RACE VARIABLE): 44.9 ML/MIN/1.73 M^2
GLUCOSE SERPL-MCNC: 94 MG/DL (ref 70–110)
HDLC SERPL-MCNC: 30 MG/DL (ref 40–75)
HDLC SERPL: 13.2 % (ref 20–50)
LDLC SERPL CALC-MCNC: 162.6 MG/DL (ref 63–159)
NONHDLC SERPL-MCNC: 198 MG/DL
POTASSIUM SERPL-SCNC: 4.7 MMOL/L (ref 3.5–5.1)
PROT SERPL-MCNC: 7 G/DL (ref 6–8.4)
SODIUM SERPL-SCNC: 140 MMOL/L (ref 136–145)
TRIGL SERPL-MCNC: 177 MG/DL (ref 30–150)

## 2023-12-05 PROCEDURE — 80061 LIPID PANEL: CPT | Mod: HCNC | Performed by: NURSE PRACTITIONER

## 2023-12-05 PROCEDURE — 82306 VITAMIN D 25 HYDROXY: CPT | Mod: HCNC | Performed by: NURSE PRACTITIONER

## 2023-12-05 PROCEDURE — 80053 COMPREHEN METABOLIC PANEL: CPT | Mod: HCNC | Performed by: NURSE PRACTITIONER

## 2023-12-05 PROCEDURE — 83735 ASSAY OF MAGNESIUM: CPT | Mod: HCNC | Performed by: NURSE PRACTITIONER

## 2023-12-08 NOTE — PROGRESS NOTES
His cholesterol looks a little worse and his vitamin D level has not changed. It looks like he is not taking lipitor 80mg QD or vitamin D. Let me know.

## 2023-12-11 ENCOUNTER — TELEPHONE (OUTPATIENT)
Dept: CARDIOLOGY | Facility: CLINIC | Age: 73
End: 2023-12-11
Payer: MEDICARE

## 2023-12-11 NOTE — TELEPHONE ENCOUNTER
Followed up with Yasir's wife, she stated that the patient refuses the lipitor and rarely takes his vitamin d. Please advise

## 2023-12-11 NOTE — TELEPHONE ENCOUNTER
----- Message from Ani Srivastava NP sent at 12/8/2023  5:41 PM CST -----  His cholesterol looks a little worse and his vitamin D level has not changed. It looks like he is not taking lipitor 80mg QD or vitamin D. Let me know.

## 2023-12-14 LAB — MAGNESIUM RBC-MCNC: 5 MG/DL (ref 4–6.4)

## 2024-01-03 NOTE — PROGRESS NOTES
echSubjective:    Patient ID:  Yasir Jeffery Sr. is a 73 y.o. male who presents for evaluation of Follow-up      HPI: Mr. Yasir Jeffery Sr. presents to the clinic for follow-up of diastolic dysfunction, HTN, mixed HLP. He refuses statin cholesterol medications, but he is taking Red Yeast Rice 600mg BID. He is also taking vitamin D supplement 2,000 units BID. He is not taking magnesium. He is off B12 injections because he ran out.  HgbA1C on 8/11/23 was 5.2.  He reports continued fatigue and easy fatigability. He used CPAP for 2.5 months and didn't feel any better, so he returned the machine. He indicated that his MARIN is mild. Pulmonary note on 10/19/23 indicates that he did report some improvement and was tolerating CPAP. Days with usage > 4 hours was 84%.  He has arthritis in multiple joints such as shoulders and hips. He is less active than he used to be due to discomfort.  He reports feeling a vibrating sensation in his chest for about a week that felt like a phone vibrating in his pocket, but there was no phone. Stress test negative in May 2023.    He denied any chest pain. He denied orthopnea, PND, but occasional edema when on his feet. He denied palpitations, lightheadedness, dizziness, or near syncope.   He does have prostate cancer for which he has been doing active surveillance. Is overdue for PSA and appointment with Dr. Chan.    MARIN: not using CPAP. Returned machine.      Medications: he is not missing doses of medications. He is taking aspirin.  Taking Coreg as directed twice a day.  He is taking valsartan 320 mg p.o. q.day and amlodipine 5mg daily.  He refuses statin, but is taking Red Yeast Rice.  Sodium: he does not add salt to foods,  he is not reading labels for sodium content. He denied eating salty foods.  Diet: not eating oatmeal. Eating two meals per day. Chicken, occasional red meat. Cooks with a little butter. He generally cooks his own meals from scratch. He is not drinking juice or  sodas. He may eat a donut at [employer]; he likes slim jims.  He drinks about a gallon of milk over a couple of days.  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 He limits climbing due to osteoarthritis pain-shoulders, hips, and knees and fatigue.  Tobacco: hasn't smoked in years. no alcohol use  Coffee: one-3 cups/day.     Weight: 107.5 kg (237 lb) he states that his daily weights has been stable. Body mass index is 33.05 kg/m².  Wt Readings from Last 3 Encounters:   01/04/24 107.5 kg (237 lb)   10/19/23 107.1 kg (236 lb)   08/21/23 104.7 kg (230 lb 12.8 oz)     BP log:  None.      Review of Systems   Constitutional: Positive for malaise/fatigue. Negative for chills, decreased appetite, diaphoresis, fever, night sweats, weight gain and weight loss.   HENT:          Chronic sinus problems   Cardiovascular:  Positive for dyspnea on exertion (chronic and unchanged from the past.). Negative for chest pain, claudication, cyanosis, irregular heartbeat, near-syncope, orthopnea, palpitations, paroxysmal nocturnal dyspnea and syncope. Leg swelling: He stated that his right leg does tend to swell more readily than his left; no injury/trauma to that leg. But lately, no swelling..  Respiratory:  Negative for cough, hemoptysis, shortness of breath, sputum production and wheezing.    Hematologic/Lymphatic: Negative for adenopathy and bleeding problem. Does not bruise/bleed easily.   Skin:  Negative for color change and nail changes.   Gastrointestinal:  Negative for bloating, abdominal pain, change in bowel habit, heartburn, hematochezia, melena, nausea and vomiting.   Genitourinary:  Negative for hematuria.   Neurological:  Negative for dizziness and light-headedness.   Psychiatric/Behavioral:  Negative for altered mental status.        Objective:   Physical Exam  Constitutional:       General: He is not in acute distress.     Appearance: He is well-developed. He is not diaphoretic.   HENT:      Head:  Normocephalic and atraumatic.   Eyes:      General: No scleral icterus.     Conjunctiva/sclera: Conjunctivae normal.   Neck:      Thyroid: No thyromegaly.      Vascular: No JVD.      Trachea: No tracheal deviation.   Cardiovascular:      Rate and Rhythm: Normal rate and regular rhythm.      Pulses: Intact distal pulses.      Heart sounds: Normal heart sounds. No murmur heard.     No friction rub. No gallop.   Pulmonary:      Effort: Pulmonary effort is normal. No respiratory distress.      Breath sounds: Normal breath sounds. No wheezing or rales.   Chest:      Chest wall: No tenderness.   Abdominal:      General: Bowel sounds are normal. There is no distension.      Palpations: Abdomen is soft. There is no mass.      Tenderness: There is no abdominal tenderness. There is no guarding or rebound.   Musculoskeletal:         General: Normal range of motion.      Cervical back: Neck supple.      Right lower leg: No edema.      Left lower leg: No edema.   Lymphadenopathy:      Cervical: No cervical adenopathy.   Skin:     General: Skin is warm and dry.      Coloration: Skin is not pale.      Findings: No erythema or rash.      Comments: Arab   Neurological:      Mental Status: He is alert and oriented to person, place, and time.        Latest Reference Range & Units 08/11/23 07:09 10/02/23 08:06 12/05/23 07:58   Sodium 136 - 145 mmol/L 141 144 140   Potassium 3.5 - 5.1 mmol/L 4.7 4.8 4.7   Chloride 95 - 110 mmol/L 109 114 (H) 108   CO2 23 - 29 mmol/L 20 (L) 25 22 (L)   Anion Gap 8 - 16 mmol/L 12 5 (L) 10   BUN 8 - 23 mg/dL 35 (H) 21 21   Creatinine 0.5 - 1.4 mg/dL 1.5 (H) 1.4 1.6 (H)   eGFR >60 mL/min/1.73 m^2 48.6 ! 52.7 ! 44.9 !   Glucose 70 - 110 mg/dL 94 111 (H) 94   Calcium 8.7 - 10.5 mg/dL 9.5 9.1 9.2   ALP 55 - 135 U/L 65 70 64   PROTEIN TOTAL 6.0 - 8.4 g/dL 7.4 6.9 7.0   Albumin 3.5 - 5.2 g/dL 3.9 3.6 3.6   BILIRUBIN TOTAL 0.1 - 1.0 mg/dL 0.3 0.3 0.5   AST 10 - 40 U/L 19 20 19   ALT 10 - 44 U/L 18 15 20    Cholesterol Total 120 - 199 mg/dL 241 (H) 205 (H) 228 (H)   HDL 40 - 75 mg/dL 33 (L) 35 (L) 30 (L)   HDL/Cholesterol Ratio 20.0 - 50.0 % 13.7 (L) 17.1 (L) 13.2 (L)   Non-HDL Cholesterol mg/dL 208 170 198   Total Cholesterol/HDL Ratio 2.0 - 5.0  7.3 (H) 5.9 (H) 7.6 (H)   Triglycerides 30 - 150 mg/dL 154 (H) 93 177 (H)   LDL Cholesterol 63.0 - 159.0 mg/dL 177.2 (H) 151.4 162.6 (H)   Magnesium RBC 4.0 - 6.4 mg/dL  5.4    Vit D, 25-Hydroxy 30 - 96 ng/mL  27 (L) 28 (L)   Vitamin B-12 210 - 950 pg/mL 994 (H)     Hemoglobin A1C External 4.0 - 5.6 % 5.2     Estimated Avg Glucose 68 - 131 mg/dL 103     (H): Data is abnormally high  (L): Data is abnormally low  !: Data is abnormal       Latest Reference Range & Units 05/12/23 11:40   TSH 0.400 - 4.000 uIU/mL 1.808        Latest Reference Range & Units 09/08/22 09:34 03/21/23 10:35 04/11/23 15:17   Sodium 136 - 145 mmol/L 141  138   Potassium 3.5 - 5.1 mmol/L 5.2 (H)  4.8   Chloride 95 - 110 mmol/L 105  102   CO2 22 - 31 mmol/L 26  27   Anion Gap 8 - 16 mmol/L 10  9   BUN 9 - 21 mg/dL 16  25 (H)   Creatinine 0.50 - 1.40 mg/dL 1.6 (H)  1.48 (H)   eGFR >60 mL/min/1.73 m^2 45.2 !  50 !   Glucose 70 - 110 mg/dL 98  95   Calcium 8.4 - 10.2 mg/dL 9.9  9.3   Alkaline Phosphatase 55 - 135 U/L 61     PROTEIN TOTAL 6.0 - 8.4 g/dL 7.4     Albumin 3.5 - 5.2 g/dL 4.2     BILIRUBIN TOTAL 0.1 - 1.0 mg/dL 0.9     AST 10 - 40 U/L 22     ALT 10 - 44 U/L 13     Cholesterol 120 - 199 mg/dL 248 (H)     HDL 40 - 75 mg/dL 34 (L)     HDL/Cholesterol Ratio 20.0 - 50.0 % 13.7 (L)     LDL Cholesterol External 63.0 - 159.0 mg/dL 176.6 (H)     Non-HDL Cholesterol mg/dL 214     Total Cholesterol/HDL Ratio 2.0 - 5.0  7.3 (H)     Triglycerides 30 - 150 mg/dL 187 (H)     Hemoglobin A1C External 4.0 - 5.6 % 5.4     Estimated Avg Glucose mg/dL 108  114   Hemoglobin A1c referral test <=5.6 %   5.6   PSA Diagnostic 0.00 - 4.00 ng/mL 12.3 (H) 13.3 (H)    (H): Data is abnormally high  !: Data is abnormal  (L):  Data is abnormally low        48-hour Holter April 23, 2021:  Supraventricular ectopic activity consisted of 06148 beats, of which, 35 were in 9 runs of EAT, 400 were in atrial couplets, 24  were late beats, 10579 were single PACs, 3006 were in bigeminy, 65 were in trigeminy. The longest R-R interval was 1.5 seconds  occurring at 9:15:33 AM D2. The longest N-N interval was 1.4 seconds occurring at 5:38:50 AM D1. The longest supraventricular  run occurred at 2:27:57 AM D1 consisting of 6 beats, with maximum heart rate of 118 BPM. The fastest supraventricular run  occurred at 8:09:00 PM D2, consisting of 3 beats, with maximum heart rate of 169 BPM.PVCs.    CXR 2/24/2021: FINDINGS:  NO SIGNIFICANT INTERVAL CHANGE.  LUNGS SYMMETRICALLY AERATED THERE IS AGAIN NOTED TO BE BILATERAL GRANULOMAS.  NO NEW OR DEVELOPING AREA OF CONSOLIDATION OR EFFUSION.  HEART AND PULMONARY VASCULATURE WITHIN NORMAL LIMITS.  MILD DEGENERATIVE CHANGE THROUGHOUT THE SPINE WITH MINIMAL UNDERLYING SCOLIOSIS.  MULTILEVEL BRIDGING SYNDESMOPHYTES PRESENT.   Impression:   STABLE EXAM WITHOUT ACUTE INFILTRATE.    Lexiscan Nuclear Stress test 5/15/23: Conclusion     Normal myocardial perfusion scan. There is no evidence of myocardial ischemia or infarction.    There is mild apical thinning which is a normal variant.    The gated perfusion images showed an ejection fraction of 46% at rest. The gated perfusion images showed an ejection fraction of 6% post stress.    There is normal wall motion at rest and post stress.    LV cavity size is normal at rest and normal at stress.    The ECG portion of the study is negative for ischemia.    The patient reported chest pain during the stress test.    Pharmacologic nuclear medicine stress test 03/08/2021:Conclusion       Abnormal myocardial perfusion scan.    There is a mild intensity, fixed defect consistent with scar in the inferoapical wall(s).    The gated perfusion images showed an ejection fraction of 44%  at rest. The gated perfusion images showed an ejection fraction of 44% post stress. Normal ejection fraction is greater than 54%.    There is mild global hypokinesis at rest and stress.    LV cavity size is mildly enlarged at rest and mildly enlarged at stress.    The EKG portion of this study is negative for ischemia.    The patient reported no chest pain during the stress test.   Spoke with Dr. Chavira no reversible ischemia.    Echo 5/18/23: Summary  The left ventricle is normal in size with normal systolic function.  The estimated ejection fraction is 55%.  Normal left ventricular diastolic function.  Normal right ventricular size with normal right ventricular systolic function.  Mild tricuspid regurgitation.  Normal central venous pressure (3 mmHg).  The estimated PA systolic pressure is 32 mmHg.  Mild aortic regurgitation.    Echo 3/10/2021: Conclusion  The left ventricle is normal in size with concentric hypertrophy and normal systolic function. The estimated ejection fraction is 55%  Indeterminate left ventricular diastolic function.  Normal right ventricular size with normal right ventricular systolic function.  Normal central venous pressure (3 mmHg).  The estimated PA systolic pressure is 35 mmHg.       Echocardiogram 03/05/2020:Conclusion  Concentric left ventricular remodeling.  Normal left ventricular systolic function. The estimated ejection fraction is 55%.  Normal LV diastolic function.  Normal central venous pressure (3 mmHg).  The estimated PA systolic pressure is 29 mmHg.     Echo 11/02/17: CONCLUSIONS     1 - Concentric hypertrophy.     2 - No wall motion abnormalities.     3 - Low normal to mildly depressed left ventricular systolic function (EF 50-55%).     4 - Normal left ventricular diastolic function.     5 - Normal right ventricular systolic function .     6 - The estimated PA systolic pressure is 31 mmHg.     7 - Mild mitral regurgitation.     Assessment:      1. Essential hypertension     2. Mixed hyperlipidemia    3. Vitamin D deficiency    4. Metabolic syndrome    5. Class 1 obesity due to excess calories with serious comorbidity and body mass index (BMI) of 33.0 to 33.9 in adult    6. Obstructive sleep apnea    7. Stage 3a chronic kidney disease    8. Prostate cancer      Plan:   Essential hypertension    Mixed hyperlipidemia    Vitamin D deficiency    Metabolic syndrome    Class 1 obesity due to excess calories with serious comorbidity and body mass index (BMI) of 33.0 to 33.9 in adult    Obstructive sleep apnea    Stage 3a chronic kidney disease    Prostate cancer      Continue Coreg 6.25mg p.o. b.i.d.; due to bradycardia in the past we have not increased this dose-hypertension, PACs and PVCs.  Continue amlodipine 5mg QD, valsartan 320 mg p.o. q.day-hypertension.  Monitor BP at home-his wife is a nurse and checks it manually.  Blood pressure log.  Sodium restriction encouraged.  Encouraged them to read labels for sodium content.  Decrease vitamin D to once daily with meal.  Magnesium glycinate to 400mg Daily with meal.    Encouraged walking daily or even chair exercises.  Whole foods diet recommended. Increase greens in the diet. He is growing spinach at this time. Eating hygiene discussed.  Continue red yeast rice - HLP. Will recheck lipids in 2 months.  Continue ASA daily.  Can try 4-8 ounces of lemon water and sip during meals, as vitamin-D, magnesium and B12 insufficiency may be related to malabsorption from insufficient stomach acid.  Note to Dr. Chan regarding appointment for prostate cancer monitoring.  Follow-up in 4 months with BP log or call sooner for any problems.  Ani Srivastava NP  [facility]    This note has been prepared using a combination of MModaL dictation device and typing.  It has been checked for errors but some errors may still exist within the note as a result of speech recognition errors and/or typographical errors.

## 2024-01-04 ENCOUNTER — OFFICE VISIT (OUTPATIENT)
Dept: CARDIOLOGY | Facility: CLINIC | Age: 74
End: 2024-01-04
Payer: MEDICARE

## 2024-01-04 VITALS
HEART RATE: 82 BPM | DIASTOLIC BLOOD PRESSURE: 72 MMHG | SYSTOLIC BLOOD PRESSURE: 126 MMHG | WEIGHT: 237 LBS | OXYGEN SATURATION: 97 % | HEIGHT: 71 IN | BODY MASS INDEX: 33.18 KG/M2

## 2024-01-04 DIAGNOSIS — E88.810 METABOLIC SYNDROME: ICD-10-CM

## 2024-01-04 DIAGNOSIS — G47.33 OBSTRUCTIVE SLEEP APNEA: ICD-10-CM

## 2024-01-04 DIAGNOSIS — N18.31 STAGE 3A CHRONIC KIDNEY DISEASE: ICD-10-CM

## 2024-01-04 DIAGNOSIS — E55.9 VITAMIN D DEFICIENCY: ICD-10-CM

## 2024-01-04 DIAGNOSIS — E78.2 MIXED HYPERLIPIDEMIA: ICD-10-CM

## 2024-01-04 DIAGNOSIS — E66.09 CLASS 1 OBESITY DUE TO EXCESS CALORIES WITH SERIOUS COMORBIDITY AND BODY MASS INDEX (BMI) OF 33.0 TO 33.9 IN ADULT: ICD-10-CM

## 2024-01-04 DIAGNOSIS — I10 ESSENTIAL HYPERTENSION: Primary | ICD-10-CM

## 2024-01-04 DIAGNOSIS — C61 PROSTATE CANCER: ICD-10-CM

## 2024-01-04 PROCEDURE — 99214 OFFICE O/P EST MOD 30 MIN: CPT | Mod: S$GLB,,, | Performed by: NURSE PRACTITIONER

## 2024-01-04 PROCEDURE — 99999 PR PBB SHADOW E&M-EST. PATIENT-LVL IV: CPT | Mod: PBBFAC,,, | Performed by: NURSE PRACTITIONER

## 2024-01-04 RX ORDER — AMPICILLIN TRIHYDRATE 250 MG
600 CAPSULE ORAL DAILY
COMMUNITY

## 2024-01-04 NOTE — PATIENT INSTRUCTIONS
Magnesium glycinate work toward around 400mg per day with a meal.  Decrease vitamin D to one capsule daily.

## 2024-01-05 ENCOUNTER — TELEPHONE (OUTPATIENT)
Dept: UROLOGY | Facility: CLINIC | Age: 74
End: 2024-01-05
Payer: MEDICARE

## 2024-01-05 DIAGNOSIS — R97.20 ELEVATED PSA: Primary | ICD-10-CM

## 2024-01-05 NOTE — TELEPHONE ENCOUNTER
----- Message from EJ Chan MD sent at 1/4/2024  4:05 PM CST -----  Please schedule follow-up with PSA    ----- Message -----  From: Ani Srivastava NP  Sent: 1/4/2024   3:25 PM CST  To: EJ Chan MD    Mr. Jeffery is overdue for PSA and appointment. Can you schedule?  Ani Srivastava NP

## 2024-02-27 ENCOUNTER — TELEPHONE (OUTPATIENT)
Dept: FAMILY MEDICINE | Facility: CLINIC | Age: 74
End: 2024-02-27
Payer: MEDICARE

## 2024-02-27 DIAGNOSIS — Z76.89 REFERRAL OF PATIENT: Primary | ICD-10-CM

## 2024-02-27 NOTE — TELEPHONE ENCOUNTER
I have signed for the following orders AND/OR meds.  Please call the patient and ask the patient to schedule the testing AND/OR inform about any medications that were sent.      Orders Placed This Encounter   Procedures    Ambulatory referral/consult to Ophthalmology     Standing Status:   Future     Standing Expiration Date:   3/27/2025     Referral Priority:   Routine     Referral Type:   Consultation     Referral Reason:   Specialty Services Required     Referred to Provider:   Mitzi Ramirez MD     Requested Specialty:   Ophthalmology     Number of Visits Requested:   1

## 2024-02-27 NOTE — TELEPHONE ENCOUNTER
----- Message from Raquel Townsend sent at 2/27/2024  2:14 PM CST -----  Contact: pt wife   Darlinould like a call back at [phone] , in regards to getting the the pt an ophthalmology referral to see  for his Glaucoma. (Dr Ramirez office# [phone])

## 2024-03-11 ENCOUNTER — TELEPHONE (OUTPATIENT)
Dept: ADMINISTRATIVE | Facility: CLINIC | Age: 74
End: 2024-03-11
Payer: MEDICARE

## 2024-03-11 NOTE — TELEPHONE ENCOUNTER
Called pt, no answer; left message informing pt I was calling to remind pt of his in office EAWV on 3/12/24 and to return my call if he had any questions; left my name and number.

## 2024-03-12 ENCOUNTER — OFFICE VISIT (OUTPATIENT)
Dept: FAMILY MEDICINE | Facility: CLINIC | Age: 74
End: 2024-03-12
Payer: MEDICARE

## 2024-03-12 ENCOUNTER — LAB VISIT (OUTPATIENT)
Dept: LAB | Facility: HOSPITAL | Age: 74
End: 2024-03-12
Attending: FAMILY MEDICINE
Payer: MEDICARE

## 2024-03-12 VITALS
OXYGEN SATURATION: 97 % | BODY MASS INDEX: 33.35 KG/M2 | DIASTOLIC BLOOD PRESSURE: 78 MMHG | HEIGHT: 71 IN | TEMPERATURE: 98 F | SYSTOLIC BLOOD PRESSURE: 146 MMHG | HEART RATE: 71 BPM | WEIGHT: 238.19 LBS

## 2024-03-12 DIAGNOSIS — Z00.00 ENCOUNTER FOR PREVENTIVE CARE: Primary | ICD-10-CM

## 2024-03-12 DIAGNOSIS — E66.2 CLASS 1 OBESITY WITH ALVEOLAR HYPOVENTILATION, SERIOUS COMORBIDITY, AND BODY MASS INDEX (BMI) OF 32.0 TO 32.9 IN ADULT: Chronic | ICD-10-CM

## 2024-03-12 DIAGNOSIS — E88.810 METABOLIC SYNDROME: ICD-10-CM

## 2024-03-12 DIAGNOSIS — M51.36 DDD (DEGENERATIVE DISC DISEASE), LUMBAR: ICD-10-CM

## 2024-03-12 DIAGNOSIS — J44.9 CHRONIC OBSTRUCTIVE PULMONARY DISEASE, UNSPECIFIED COPD TYPE: ICD-10-CM

## 2024-03-12 DIAGNOSIS — R97.20 ELEVATED PSA: ICD-10-CM

## 2024-03-12 DIAGNOSIS — E78.2 MIXED HYPERLIPIDEMIA: Chronic | ICD-10-CM

## 2024-03-12 DIAGNOSIS — E53.8 B12 DEFICIENCY: Chronic | ICD-10-CM

## 2024-03-12 DIAGNOSIS — I47.19 ECTOPIC ATRIAL TACHYCARDIA: Chronic | ICD-10-CM

## 2024-03-12 DIAGNOSIS — I10 ESSENTIAL HYPERTENSION: ICD-10-CM

## 2024-03-12 DIAGNOSIS — C61 MALIGNANT NEOPLASM OF PROSTATE: Chronic | ICD-10-CM

## 2024-03-12 DIAGNOSIS — R39.12 BENIGN PROSTATIC HYPERPLASIA WITH WEAK URINARY STREAM: ICD-10-CM

## 2024-03-12 DIAGNOSIS — M51.34 DDD (DEGENERATIVE DISC DISEASE), THORACIC: ICD-10-CM

## 2024-03-12 DIAGNOSIS — E55.9 VITAMIN D DEFICIENCY: Chronic | ICD-10-CM

## 2024-03-12 DIAGNOSIS — Z79.899 ENCOUNTER FOR LONG-TERM (CURRENT) USE OF MEDICATIONS: Chronic | ICD-10-CM

## 2024-03-12 DIAGNOSIS — M50.30 DDD (DEGENERATIVE DISC DISEASE), CERVICAL: ICD-10-CM

## 2024-03-12 DIAGNOSIS — F51.04 PSYCHOPHYSIOLOGICAL INSOMNIA: ICD-10-CM

## 2024-03-12 DIAGNOSIS — G95.9 SPINAL CORD LESION: Chronic | ICD-10-CM

## 2024-03-12 DIAGNOSIS — N40.1 BENIGN PROSTATIC HYPERPLASIA WITH WEAK URINARY STREAM: ICD-10-CM

## 2024-03-12 DIAGNOSIS — G47.33 OSA (OBSTRUCTIVE SLEEP APNEA): ICD-10-CM

## 2024-03-12 LAB — COMPLEXED PSA SERPL-MCNC: 20.7 NG/ML (ref 0–4)

## 2024-03-12 PROCEDURE — 36415 COLL VENOUS BLD VENIPUNCTURE: CPT | Mod: PO | Performed by: UROLOGY

## 2024-03-12 PROCEDURE — 99999 PR PBB SHADOW E&M-EST. PATIENT-LVL V: CPT | Mod: PBBFAC,,, | Performed by: NURSE PRACTITIONER

## 2024-03-12 PROCEDURE — G0439 PPPS, SUBSEQ VISIT: HCPCS | Mod: S$GLB,,, | Performed by: NURSE PRACTITIONER

## 2024-03-12 PROCEDURE — 84153 ASSAY OF PSA TOTAL: CPT | Performed by: UROLOGY

## 2024-03-12 NOTE — PATIENT INSTRUCTIONS
Continue current plan of care  F/U with PCP, specialists as advised  RTC as needed  Report to ER immediately if symptoms worsen or persist    Ok [place],     If you are due for any health screening(s) below please notify me so we can arrange them to be ordered and scheduled. Most healthy patients at your age complete them, but you are free to accept or refuse.     If you can't do it, I'll definitely understand. If you can, I'd certainly appreciate it!    All of your core healthy metrics are met.

## 2024-03-12 NOTE — PROGRESS NOTES
"  Yasir Jeffery presented for a follow-up Medicare AWV today. The following components were reviewed and updated:    Medical history  Family History  Social history  Allergies and Current Medications  Health Risk Assessment  Health Maintenance  Care Team    **See Completed Assessments for Annual Wellness visit with in the encounter summary  The following assessments were completed:  Depression Screening  Cognitive function Screening  Timed Get Up Test  Whisper Test  PHQ-2  Nutrition screen  ADL  PAQ  Osteoporosis risk  CAGE  Living situation      Opioid documentation:      Patient does not have a current opioid prescription.          Vitals:    03/12/24 1106 03/12/24 1139   BP: (!) 148/76 (!) 146/78   Pulse: 71    Temp: 98.2 °F (36.8 °C)    SpO2: 97%    Weight: 108 kg (238 lb 3.2 oz)    Height: 5' 11" (1.803 m)      Body mass index is 33.22 kg/m².             Diagnoses and health risks identified today and associated recommendations/orders:  1. Encounter for preventive care  Stable  Up to date    2. Malignant neoplasm of prostate  Stable  Managed by urology; sees q6m  Continue current medications, plan of care  F/U with specialist as advised  - Ambulatory referral/consult to Urology; Future    3. Ectopic atrial tachycardia  Stable  Managed by cardiology  Continue current medications, plan of care  F/U with specialist as advised    4. Spinal cord lesion  Stable  Managed by PCP  Pt states no longer seeing pain management  Continue current medications, plan of care  F/U with PCP as advised    5. Chronic obstructive pulmonary disease, unspecified COPD type  Stable  Managed by pulmonology  Continue current medications, plan of care  F/U with specialist as advised    6. Class 1 obesity with alveolar hypoventilation, serious comorbidity, and body mass index (BMI) of 32.0 to 32.9 in adult  Unstable  Managed by PCP  Healthy diet, weight loss recommended  Consider dietician    7. Essential hypertension  Stable  Managed by " PCP  Low sodium diet recommended  Continue current medications, plan of care  F/U with PCP as advised    8. Mixed hyperlipidemia  Stable  Managed by PCP  Low cholesterol diet recommended  Continue current medications, plan of care  F/U with PCP as advised    9. MARIN (obstructive sleep apnea)  Stable  Managed by pulmonology  Continue current medications, plan of care  F/U with specialist as advised    10. Metabolic syndrome  Stable  Managed by PCP  Continue current medications, plan of care  F/U with PCP as advised    11. B12 deficiency  Stable  Managed by PCP  Continue current medications, plan of care  F/U with PCP as advised    12. Vitamin D deficiency  Stable  Managed by PCP  Continue current medications, plan of care  F/U with PCP as advised    13. Benign prostatic hyperplasia with weak urinary stream  Stable  Managed by urology  Continue current medications, plan of care  F/U with PCP as advised    14. DDD (degenerative disc disease), cervical  Stable  Managed by PCP  Pt states no longer seeing pain management  Continue current medications, plan of care  F/U with PCP as advised    15. DDD (degenerative disc disease), thoracic  Stable  Managed by PCP  Pt states no longer seeing pain management  Continue current medications, plan of care  F/U with PCP as advised    16. DDD (degenerative disc disease), lumbar  Stable  Managed by PCP  Pt states no longer seeing pain management  Continue current medications, plan of care  F/U with PCP as advised    17. Psychophysiological insomnia  Stable  Managed by PCP  Continue current medications, plan of care  F/U with PCP as advised    18. Encounter for long-term (current) use of medications  Stable  Continue current plan of care       I offered to discuss end of life issues, including information on how to make advance directives that the patient could use to name someone who would make medical decisions on their behalf if they became too ill to make themselves.    ___Patient  declined - already done.    _x__Patient is interested, I provided paperwork and offered to discuss   Provided Yasir with a 5-10 year written screening schedule and personal prevention plan. Recommendations were developed using the USPSTF age appropriate recommendations. Education, counseling, and referrals were provided as needed.  After Visit Summary printed and given to patient which includes a list of additional screenings\tests needed.    No follow-ups on file.      Mary Veronica, DNP

## 2024-03-14 ENCOUNTER — OFFICE VISIT (OUTPATIENT)
Dept: UROLOGY | Facility: CLINIC | Age: 74
End: 2024-03-14
Payer: MEDICARE

## 2024-03-14 VITALS — BODY MASS INDEX: 33.34 KG/M2 | WEIGHT: 238.13 LBS | HEIGHT: 71 IN

## 2024-03-14 DIAGNOSIS — N40.1 ENLARGED PROSTATE WITH URINARY OBSTRUCTION: ICD-10-CM

## 2024-03-14 DIAGNOSIS — C61 MALIGNANT NEOPLASM OF PROSTATE: Primary | Chronic | ICD-10-CM

## 2024-03-14 DIAGNOSIS — C61 CARCINOMA OF PROSTATE: ICD-10-CM

## 2024-03-14 DIAGNOSIS — N13.8 ENLARGED PROSTATE WITH URINARY OBSTRUCTION: ICD-10-CM

## 2024-03-14 LAB
BILIRUBIN, UA POC OHS: NEGATIVE
BLOOD, UA POC OHS: ABNORMAL
CLARITY, UA POC OHS: ABNORMAL
COLOR, UA POC OHS: YELLOW
GLUCOSE, UA POC OHS: NEGATIVE
KETONES, UA POC OHS: NEGATIVE
LEUKOCYTES, UA POC OHS: ABNORMAL
NITRITE, UA POC OHS: NEGATIVE
PH, UA POC OHS: 5.5
PROTEIN, UA POC OHS: NEGATIVE
SPECIFIC GRAVITY, UA POC OHS: 1.02
UROBILINOGEN, UA POC OHS: 0.2

## 2024-03-14 PROCEDURE — 99214 OFFICE O/P EST MOD 30 MIN: CPT | Mod: S$GLB,,, | Performed by: UROLOGY

## 2024-03-14 PROCEDURE — 99999 PR PBB SHADOW E&M-EST. PATIENT-LVL III: CPT | Mod: PBBFAC,,, | Performed by: UROLOGY

## 2024-03-14 PROCEDURE — 81003 URINALYSIS AUTO W/O SCOPE: CPT | Mod: QW,S$GLB,, | Performed by: UROLOGY

## 2024-03-14 NOTE — PROGRESS NOTES
Subjective:       Patient ID: Yasir Jeffery Sr. is a 73 y.o. male.    Chief Complaint: neoplasm of prostate    HPI    73 year old with prostate cancer on active surveillance.  He underwent previous biopsy by Dr. Serra in 03/2020.  He had grade group 2 prostate cancer.  He had repeat prostate MRI which noted a PiRADS 4 and PiRADS 3 lesions.  Prostate volume was 42 mL.  He underwent repeat targeted biopsy in 03/2023.  Target 1 noted high-grade PIN.  Target to noted 2 cores positive for grade group 1 prostate cancer.  One other core and a standard template biopsy was also positive for grade group 1 prostate cancer.   He again elected active surveillance.  His most recent PSA is increased to 20.7.  He has no complaints today.  Urine dipstick shows negative for all components except trace blood and 1+ leukocyte.    Date PSA   3/12/2024 20.7 (H)   3/21/2023 13.3 (H)   9/8/2022 12.3 (H)   3/8/2022 9.3 (H)   1/25/2022 10.9 (H)   10/18/2021 12.5 (H)   6/3/2021 9.3 (H)     Review of Systems   Constitutional:  Negative for fever.   Genitourinary:  Negative for dysuria and hematuria.       Objective:      Physical Exam  Vitals reviewed.   Constitutional:       Appearance: He is well-developed.   Pulmonary:      Effort: Pulmonary effort is normal.   Skin:     Findings: No rash.   Neurological:      Mental Status: He is alert and oriented to person, place, and time.         Assessment:       1. Malignant neoplasm of prostate    2. Carcinoma of prostate    3. Enlarged prostate with urinary obstruction        Plan:       Malignant neoplasm of prostate  -     Ambulatory referral/consult to Urology  -     POCT Urinalysis(Instrument)  -     MRI Prostate W W/O Contrast; Future; Expected date: 03/14/2024    Carcinoma of prostate    Enlarged prostate with urinary obstruction      Due to increased PSA, will repeat MRI and possible repeat needle biopsy

## 2024-03-25 RX ORDER — LEVOFLOXACIN 500 MG/1
500 TABLET, FILM COATED ORAL DAILY
Qty: 3 TABLET | Refills: 0 | Status: SHIPPED | OUTPATIENT
Start: 2024-03-25 | End: 2024-05-01 | Stop reason: ALTCHOICE

## 2024-03-26 ENCOUNTER — TELEPHONE (OUTPATIENT)
Dept: UROLOGY | Facility: CLINIC | Age: 74
End: 2024-03-26
Payer: MEDICARE

## 2024-03-26 DIAGNOSIS — R97.20 ELEVATED PSA: Primary | ICD-10-CM

## 2024-03-26 NOTE — TELEPHONE ENCOUNTER
----- Message from Meenu Hartmann sent at 3/26/2024  9:31 AM CDT -----  Type: Needs Medical Advice  Who Called:  pt wife     Best Call Back Number: [phone] (home)     Additional Information: pt wife calling in regards to a biopsy please advise

## 2024-03-27 ENCOUNTER — E-CONSULT (OUTPATIENT)
Dept: CARDIOLOGY | Facility: CLINIC | Age: 74
End: 2024-03-27
Payer: MEDICARE

## 2024-03-27 DIAGNOSIS — E66.01 MORBID (SEVERE) OBESITY DUE TO EXCESS CALORIES: Chronic | ICD-10-CM

## 2024-03-27 DIAGNOSIS — I47.19 ECTOPIC ATRIAL TACHYCARDIA: Chronic | ICD-10-CM

## 2024-03-27 DIAGNOSIS — E78.2 MIXED HYPERLIPIDEMIA: Chronic | ICD-10-CM

## 2024-03-27 DIAGNOSIS — G47.33 OSA (OBSTRUCTIVE SLEEP APNEA): Primary | ICD-10-CM

## 2024-03-27 DIAGNOSIS — I10 ESSENTIAL HYPERTENSION: ICD-10-CM

## 2024-03-27 PROCEDURE — 99451 NTRPROF PH1/NTRNET/EHR 5/>: CPT | Mod: S$GLB,,, | Performed by: INTERNAL MEDICINE

## 2024-03-27 NOTE — CONSULTS
[facility]  Response for E-Consult     Patient Name: Yasir Jeffery Sr.  MRN: [id number]  Primary Care Provider: Trevor Jarvis MD   Requesting Provider: EJ Chan MD  Consults    Recommendation: Pt cleared for procedure/surgery at moderate CV risk the patient can hold as 7 days before procedure.    Contingency: 74 y/o male with PMHx for obesity, HTN, HLP, MARIN and atrial tachycardia referred for Cv clearance for surgery-urological.Pt last seen 1-24 without Cv sx.Pt cleared for procedure/surgery at moderate CV risk the patient can hold as 7 days before procedure.      Total time of Consultation: 5 minute    I did not speak to the requesting provider verbally about this.     *This eConsult is based on the clinical data available to me and is furnished without benefit of a physical examination. The eConsult will need to be interpreted in light of any clinical issues or changes in patient status not available to me at the time of filing this eConsults. Significant changes in patient condition or level of acuity should result in immediate formal consultation and reevaluation. Please alert me if you have further questions.    Thank you for this eConsult referral.     Phi Chavira MD  [facility]

## 2024-03-28 ENCOUNTER — TELEPHONE (OUTPATIENT)
Dept: UROLOGY | Facility: CLINIC | Age: 74
End: 2024-03-28
Payer: MEDICARE

## 2024-03-28 NOTE — TELEPHONE ENCOUNTER
Called patient, spoke with patient's wife, Lina, regarding procedure clearance and instructions. Also informed her that Dr Chan sent in antibiotic to patient's pharmacy, to start the day before procedure. Patient's wife verbalized understanding of all of this information.

## 2024-03-29 ENCOUNTER — PATIENT MESSAGE (OUTPATIENT)
Dept: FAMILY MEDICINE | Facility: CLINIC | Age: 74
End: 2024-03-29
Payer: MEDICARE

## 2024-04-03 ENCOUNTER — PATIENT MESSAGE (OUTPATIENT)
Dept: PULMONOLOGY | Facility: CLINIC | Age: 74
End: 2024-04-03
Payer: MEDICARE

## 2024-04-25 ENCOUNTER — TELEPHONE (OUTPATIENT)
Dept: UROLOGY | Facility: CLINIC | Age: 74
End: 2024-04-25
Payer: MEDICARE

## 2024-04-25 NOTE — TELEPHONE ENCOUNTER
----- Message from Debbi Hood sent at 4/25/2024 10:56 AM CDT -----  Regarding: advice  Contact: Lina spouse  Type: Needs Medical Advice  Who Called:  Lina spouse  Symptoms (please be specific):    How long has patient had these symptoms:    Pharmacy name and phone #:    Best Call Back Number: [phone]  Additional Information: Spouse states patient is supposed to have pre op tomorrow.  There isn't anything scheduled.  Please call spouse to advise.  Thanks!

## 2024-05-08 ENCOUNTER — TELEPHONE (OUTPATIENT)
Dept: UROLOGY | Facility: CLINIC | Age: 74
End: 2024-05-08
Payer: MEDICARE

## 2024-05-08 NOTE — TELEPHONE ENCOUNTER
----- Message from Krys Kovacs sent at 5/8/2024  1:28 PM CDT -----  Regarding: Needs return call  Type: Needs Medical Advice  Who Called:  Wife    Best Call Back Number: [phone]    Additional Information: they are needing the Bowel prep instruction for biopsy on Friday, please solo to [facility]

## 2024-05-08 NOTE — TELEPHONE ENCOUNTER
----- Message from Krys Kovacs sent at 5/8/2024  1:28 PM CDT -----  Regarding: Needs return call  Type: Needs Medical Advice  Who Called:  Wife    Best Call Back Number: [phone]    Additional Information: they are needing the Bowel prep instruction for biopsy on Friday, please solo to [facility]

## 2024-05-09 RX ORDER — LEVOFLOXACIN 500 MG/1
500 TABLET, FILM COATED ORAL DAILY
Qty: 3 TABLET | Refills: 0 | Status: SHIPPED | OUTPATIENT
Start: 2024-05-09 | End: 2024-06-03

## 2024-05-15 ENCOUNTER — TELEPHONE (OUTPATIENT)
Dept: HEMATOLOGY/ONCOLOGY | Facility: CLINIC | Age: 74
End: 2024-05-15
Payer: MEDICARE

## 2024-05-15 DIAGNOSIS — C61 PROSTATE CANCER: Primary | ICD-10-CM

## 2024-05-15 NOTE — TELEPHONE ENCOUNTER
Patient scheduled on 5/17 with Dr. Chan and Dr. Borrero. [facility] referral placed.     ----- Message from EJ Chan MD sent at 5/15/2024 12:59 PM CDT -----  I discussed biopsy results with patient.  High-risk due to PSA greater than 20.  Please schedule follow-up in multidisciplinary clinic.  Include radiation oncology.

## 2024-05-15 NOTE — NURSING
Spoke to patient's wife, Lina. Explained my role as oncology navigator.     Scheduled first available  Multi-D clinic apt on 5/17 with Dr. Chan and Dr. Borrero. Discussed purpose and expected duration of Multi-D clinic. Reviewed apt date/time/location.     Ms. Mills verbalized understanding. Contact information provided. Encouraged to contact me with any questions or concerns. She verbalized understanding of this and thanked me for my time.     Oncology Navigation   Intake  Date of Diagnosis: 05/13/24  Cancer Type:   Type of Referral: Internal  Date of Referral: 05/15/24  Initial Nurse Navigator Contact: 05/15/24  Referral to Initial Contact Timeline (days): 0  Date Worked: 05/15/24  First Appointment Available: 05/17/24  Appointment Date: 05/17/24  First Available Date vs. Scheduled Date (days): 0  Multiple appointments: Yes     Treatment    Radiation Oncologist: Dr. Suzan Borrero    Procedures: Biopsy  Biopsy Schedule Date: 05/10/24    General Referrals: [employer]    Radiation Oncologist: Dr. Suzan Borrero     Acuity  Treatment Tolerability: Has not started treatment yet/treatment fully completed and side effects resolved  Comorbidities in Medical History: 2  Hospitalization Within the Past Month: 0   Needed: 0  Support: 0  Verbalizes Financial Concerns: 0  Transportation: 0  History of noncompliance/frequent no shows and cancellations: 0  Verbalizes the need for more education: 1  Navigation Acuity: 4     Follow Up  Follow up in about 2 days (around 5/17/2024).

## 2024-05-16 ENCOUNTER — TELEPHONE (OUTPATIENT)
Dept: HEMATOLOGY/ONCOLOGY | Facility: CLINIC | Age: 74
End: 2024-05-16
Payer: MEDICARE

## 2024-05-16 NOTE — NURSING
Spoke to patient - asked to arrive at 1:30PM for Multi-D clinic tomorrow. He verbalized understanding.

## 2024-05-17 ENCOUNTER — OFFICE VISIT (OUTPATIENT)
Dept: UROLOGY | Facility: CLINIC | Age: 74
End: 2024-05-17
Payer: MEDICARE

## 2024-05-17 ENCOUNTER — TELEPHONE (OUTPATIENT)
Dept: HEMATOLOGY/ONCOLOGY | Facility: CLINIC | Age: 74
End: 2024-05-17
Payer: MEDICARE

## 2024-05-17 ENCOUNTER — OFFICE VISIT (OUTPATIENT)
Dept: RADIATION ONCOLOGY | Facility: CLINIC | Age: 74
End: 2024-05-17
Payer: MEDICARE

## 2024-05-17 VITALS
RESPIRATION RATE: 16 BRPM | DIASTOLIC BLOOD PRESSURE: 86 MMHG | BODY MASS INDEX: 33.99 KG/M2 | OXYGEN SATURATION: 96 % | TEMPERATURE: 97 F | HEART RATE: 66 BPM | WEIGHT: 237.44 LBS | HEIGHT: 70 IN | SYSTOLIC BLOOD PRESSURE: 136 MMHG

## 2024-05-17 VITALS
SYSTOLIC BLOOD PRESSURE: 136 MMHG | OXYGEN SATURATION: 96 % | DIASTOLIC BLOOD PRESSURE: 86 MMHG | HEART RATE: 66 BPM | WEIGHT: 237.44 LBS | RESPIRATION RATE: 16 BRPM | HEIGHT: 70 IN | TEMPERATURE: 97 F | BODY MASS INDEX: 33.99 KG/M2

## 2024-05-17 DIAGNOSIS — C61 PROSTATE CANCER: ICD-10-CM

## 2024-05-17 DIAGNOSIS — C61 PROSTATE CANCER: Primary | ICD-10-CM

## 2024-05-17 DIAGNOSIS — C61 MALIGNANT NEOPLASM OF PROSTATE: Primary | Chronic | ICD-10-CM

## 2024-05-17 PROCEDURE — 99999 PR PBB SHADOW E&M-EST. PATIENT-LVL IV: CPT | Mod: PBBFAC,,, | Performed by: RADIOLOGY

## 2024-05-17 PROCEDURE — 1159F MED LIST DOCD IN RCRD: CPT | Mod: CPTII,S$GLB,, | Performed by: RADIOLOGY

## 2024-05-17 PROCEDURE — 99999 PR PBB SHADOW E&M-EST. PATIENT-LVL IV: CPT | Mod: PBBFAC,,, | Performed by: UROLOGY

## 2024-05-17 PROCEDURE — 3075F SYST BP GE 130 - 139MM HG: CPT | Mod: CPTII,S$GLB,, | Performed by: UROLOGY

## 2024-05-17 PROCEDURE — 3075F SYST BP GE 130 - 139MM HG: CPT | Mod: CPTII,S$GLB,, | Performed by: RADIOLOGY

## 2024-05-17 PROCEDURE — 99214 OFFICE O/P EST MOD 30 MIN: CPT | Mod: S$GLB,,, | Performed by: UROLOGY

## 2024-05-17 PROCEDURE — 3079F DIAST BP 80-89 MM HG: CPT | Mod: CPTII,S$GLB,, | Performed by: UROLOGY

## 2024-05-17 PROCEDURE — 1101F PT FALLS ASSESS-DOCD LE1/YR: CPT | Mod: CPTII,S$GLB,, | Performed by: UROLOGY

## 2024-05-17 PROCEDURE — 3288F FALL RISK ASSESSMENT DOCD: CPT | Mod: CPTII,S$GLB,, | Performed by: RADIOLOGY

## 2024-05-17 PROCEDURE — 3079F DIAST BP 80-89 MM HG: CPT | Mod: CPTII,S$GLB,, | Performed by: RADIOLOGY

## 2024-05-17 PROCEDURE — 99213 OFFICE O/P EST LOW 20 MIN: CPT | Mod: S$GLB,,, | Performed by: RADIOLOGY

## 2024-05-17 PROCEDURE — 3288F FALL RISK ASSESSMENT DOCD: CPT | Mod: CPTII,S$GLB,, | Performed by: UROLOGY

## 2024-05-17 PROCEDURE — 1125F AMNT PAIN NOTED PAIN PRSNT: CPT | Mod: CPTII,S$GLB,, | Performed by: UROLOGY

## 2024-05-17 PROCEDURE — 1101F PT FALLS ASSESS-DOCD LE1/YR: CPT | Mod: CPTII,S$GLB,, | Performed by: RADIOLOGY

## 2024-05-17 PROCEDURE — 1125F AMNT PAIN NOTED PAIN PRSNT: CPT | Mod: CPTII,S$GLB,, | Performed by: RADIOLOGY

## 2024-05-17 NOTE — NURSING
Mr. Jeffery was seen today in  Multi-D clinic by Dr. Chan and Dr. Borrero.     New orders from Dr. Chan for PET psma - scheduled this per patient request. Spoke with Mr. Jeffery and discussed purpose of PETpsma scan.     Per Dr. Borrero, patient and wife to consider surgical option vs RT.     Mr. Castillo states he will likely choose a surgical option but wishes to take some time to make a final decision.     Of note, Mr. Castillo is active in his [employer] community. He states he has several friends who have also experienced prostate cancer. Encouraged him to discuss his diagnosis with his friends if he desires. He states he has lots of support. Denies any financial concerns. States he and his wife have reliable transportation.     Contact information given. Encouraged Mr. Jeffery and his wife to contact me with any questions or concerns moving forward. They verbalized understanding and thanked me for my time.

## 2024-05-17 NOTE — PROGRESS NOTES
Subjective:       Patient ID: Yasir Jeffery Sr. is a 73 y.o. male.    Chief Complaint: Other    HPI    73 year old with prostate cancer on active surveillance.  He underwent previous biopsy by Dr. Serra in 03/2020.  He had grade group 2 prostate cancer.  He had repeat prostate MRI which noted a PiRADS 4 and PiRADS 3 lesions.  He underwent repeat targeted biopsy in 03/2023.  Target 1 noted high-grade PIN.  Target to noted 2 cores positive for grade group 1 prostate cancer.  One other core and a standard template biopsy was also positive for grade group 1 prostate cancer.   He again elected active surveillance.  His most recent PSA increased to 20.7.  Repeat MRI noted a PiRADS 5 lesion among others.  Prostate volume was 62 mL.  He underwent for repeat prostate needle biopsy using Uronav. (3rd biopsy).  Target lesion is positive for grade group 2 prostate cancer.  7 of 12 template cores also positive for highest grade group 2 prostate cancer.   Has severe ED now.  Has chronic fatigue not improved with previous testosterone replacement.    PSA  20.7  Stage  cT1c  Risk group High  Volume 62 ml    1. prostate, Target #1, core biopsy   - BENIGN PROSTATIC GLANDS AND STROMA     2. prostate, Target #2, core biopsy   - PROSTATIC ADENOCARCINOMA, JASON SCORE 3+4=7 (GRADE GROUP 2) [20%    PATTERN 4]   - 2.5 MM (8% OF SUBMITTED TISSUE), 2 OF 2 COREs   - cribriform glands present   - NEGATIVE FOR PERINEURAL INVASION     3. prostate, Target #3, core biopsy   - atypical small acinar proliferation     4. prostate, Left base, core biopsy   - PROSTATIC ADENOCARCINOMA, JASON SCORE 3+4=7 (GRADE GROUP 2) [5%    PATTERN 4]   - 11 MM (39% OF SUBMITTED TISSUE), 3 OF 3 COREs   - NEGATIVE FOR PERINEURAL INVASIOn     5. prostate, Left mid, core biopsy   - PROSTATIC ADENOCARCINOMA, JASON SCORE 3+3=6 (GRADE GROUP 1)   - 7 MM (32% OF SUBMITTED TISSUE), 2 OF 2 fragmented cOREs   - NEGATIVE FOR PERINEURAL INVASIOn     6. prostate, Left  apex, core biopsy   - PROSTATIC ADENOCARCINOMA, JASON SCORE 3+3=6 (GRADE GROUP 1)   - 5 MM (20% OF SUBMITTED TISSUE), 2 OF 2 COREs   - NEGATIVE FOR PERINEURAL INVASIOn     7. prostate, Right base, core biopsy   - PROSTATIC ADENOCARCINOMA, JASON SCORE 3+3=6 (GRADE GROUP 1)   - 1 MM (less than 5% OF SUBMITTED TISSUE), 1 OF 2 COREs   - NEGATIVE FOR PERINEURAL INVASION     8. prostate, Right mid, core biopsy   - BENIGN PROSTATIC GLANDS AND STROMA   - benign seminal vesicle / ejaculatory duct tissue 9. prostate, Right    apex, core biopsy   - BENIGN PROSTATIC GLANDS AND STROMA     Review of Systems   Constitutional:  Negative for fever.   Genitourinary:  Negative for dysuria and hematuria.       Objective:      Physical Exam  Vitals reviewed.   Constitutional:       Appearance: He is well-developed.   Pulmonary:      Effort: Pulmonary effort is normal.   Abdominal:      Palpations: Abdomen is soft.   Skin:     Findings: No rash.   Neurological:      Mental Status: He is alert and oriented to person, place, and time.         Assessment:       1. Prostate cancer        Plan:       Prostate cancer  -     NM PET CT F 18 PYL PSMA, [facility] to [facility]; Future; Expected date: 05/17/2024

## 2024-05-18 ENCOUNTER — PATIENT MESSAGE (OUTPATIENT)
Dept: FAMILY MEDICINE | Facility: CLINIC | Age: 74
End: 2024-05-18
Payer: MEDICARE

## 2024-05-21 ENCOUNTER — PATIENT MESSAGE (OUTPATIENT)
Dept: ADMINISTRATIVE | Facility: HOSPITAL | Age: 74
End: 2024-05-21
Payer: MEDICARE

## 2024-05-23 ENCOUNTER — TELEPHONE (OUTPATIENT)
Dept: HEMATOLOGY/ONCOLOGY | Facility: CLINIC | Age: 74
End: 2024-05-23
Payer: MEDICARE

## 2024-05-23 ENCOUNTER — HOSPITAL ENCOUNTER (OUTPATIENT)
Dept: RADIOLOGY | Facility: HOSPITAL | Age: 74
Discharge: HOME | End: 2024-05-23
Attending: UROLOGY
Payer: MEDICARE

## 2024-05-23 DIAGNOSIS — C61 PROSTATE CANCER: ICD-10-CM

## 2024-05-23 PROCEDURE — 78815 PET IMAGE W/CT SKULL-THIGH: CPT | Mod: 26,PI,, | Performed by: RADIOLOGY

## 2024-05-23 PROCEDURE — A9595 HC PIFLUFOLASTAT F-18, DX, PER 1 MCI: HCPCS | Mod: TB,PN | Performed by: UROLOGY

## 2024-05-23 PROCEDURE — 78815 PET IMAGE W/CT SKULL-THIGH: CPT | Mod: TC,PN

## 2024-05-23 RX ADMIN — PIFLUFOLASTAT F-18 9.6 MILLICURIE: 80 INJECTION INTRAVENOUS at 12:05

## 2024-05-23 NOTE — NURSING
Mr. Jeffery's wife, Lina, contacted me today. She states Mr. Jeffery has decided to proceed with surgery rather than radiation therapy. PET scan completed today. Palmer escalera sent to Dr. Chan's office to schedule f/u apt to further discuss surgery.

## 2024-05-23 NOTE — PROGRESS NOTES
PET Imaging Questionnaire    Are you a Diabetic? Recent Blood Sugar level? No    Are you anemic? Bone Marrow Stimulation Meds? No    Have you had a CT Scan, if so when & where was your last one? Yes -     Have you had a PET Scan, if so when & where was your last one? No    Chemotherapy or currently on Chemotherapy? No    Radiation therapy? No    Surgical History:   Past Surgical History:   Procedure Laterality Date    BIOPSY, PROSTATE, USING PROSTATE MAPPING N/A 4/14/2023    Procedure: BIOPSY, PROSTATE, USING PROSTATE MAPPING;  Surgeon: EJ Chan MD;  Location: [facility];  Service: Urology;  Laterality: N/A;    BIOPSY, PROSTATE, USING PROSTATE MAPPING N/A 5/10/2024    Procedure: BIOPSY, PROSTATE, USING PROSTATE MAPPING;  Surgeon: EJ Chan MD;  Location: [facility];  Service: Urology;  Laterality: N/A;    CYST REMOVAL  1993    neck    CYSTOSCOPY N/A 7/17/2019    Procedure: CYSTOSCOPY;  Surgeon: Ulisses Serra MD;  Location: [facility];  Service: Urology;  Laterality: N/A;    EYE SURGERY Bilateral     cataract surgery    GANGLION CYST EXCISION Right 1994    PROSTATE BIOPSY      TESTICLE SURGERY  1980    TRANSRECTAL BIOPSY OF PROSTATE WITH ULTRASOUND GUIDANCE Bilateral 7/17/2019    Procedure: BIOPSY, PROSTATE, RECTAL APPROACH, WITH US GUIDANCE;  Surgeon: Ulisses eSrra MD;  Location: [facility];  Service: Urology;  Laterality: Bilateral;    TRANSRECTAL BIOPSY OF PROSTATE WITH ULTRASOUND GUIDANCE N/A 3/11/2020    Procedure: BIOPSY, PROSTATE, RECTAL APPROACH, WITH US GUIDANCE;  Surgeon: Ulisses Serra MD;  Location: [facility];  Service: Urology;  Laterality: N/A;    TRANSURETHRAL SURGICAL REMOVAL OF PROSTATE (TURP) USING GREEN LIGHT LASER N/A 2/18/2022    Procedure: TURP, USING GREEN LIGHT LASER;  Surgeon: Ulisses Serra MD;  Location: [facility];  Service: Urology;  Laterality: N/A;        Have you been fasting for at least 6 hours? Yes    Is there any chance you may be pregnant or breastfeeding?  No    Assay: 11.09 MCi@:1156   Injection Site:lt hand     Residual: 1.49 mCi@: 1158   Technologist: Yomaira Valdovinos Injected:9.6mCi

## 2024-05-24 ENCOUNTER — TELEPHONE (OUTPATIENT)
Dept: UROLOGY | Facility: CLINIC | Age: 74
End: 2024-05-24
Payer: MEDICARE

## 2024-05-24 NOTE — TELEPHONE ENCOUNTER
----- Message from EJ Chan MD sent at 5/23/2024  4:20 PM CDT -----  Regarding: RE: Surgery vs Radiation  Schedule follow up with Dr. Wing ANNE to discuss RALP  ----- Message -----  From: Billie Winter MA  Sent: 5/23/2024   3:34 PM CDT  To: EJ Chan MD  Subject: FW: Surgery vs Radiation                         Do you want an overbook to see this pt?  ----- Message -----  From: Candida Gomez RN  Sent: 5/23/2024   3:05 PM CDT  To: EJ Chan [facility] [place]  Subject: Surgery vs Radiation                             Good afternoon,     Ms. Jeffery contacted me today and states that Mr. Jeffery has decided to proceed with surgery rather than radiation therapy. PET scan completed today. They would like schedule a f/u visit to further discuss surgery.     Thank you!    Candida Gomez, RN, BSN  RN Oncology Navigator   [facility]  [facility] [address]   [place] [id number]  [email or web address]  (P) [phone]  (F) [phone]

## 2024-06-03 ENCOUNTER — OFFICE VISIT (OUTPATIENT)
Dept: UROLOGY | Facility: CLINIC | Age: 74
End: 2024-06-03
Payer: MEDICARE

## 2024-06-03 VITALS — BODY MASS INDEX: 33.42 KG/M2 | WEIGHT: 233.44 LBS | HEIGHT: 70 IN

## 2024-06-03 DIAGNOSIS — Z90.79 S/P TURP: ICD-10-CM

## 2024-06-03 DIAGNOSIS — C61 PROSTATE CANCER: Primary | ICD-10-CM

## 2024-06-03 PROBLEM — R07.9 CHEST PAIN: Status: RESOLVED | Noted: 2017-10-25 | Resolved: 2024-06-03

## 2024-06-03 LAB
BILIRUBIN, UA POC OHS: NEGATIVE
BLOOD, UA POC OHS: NEGATIVE
CLARITY, UA POC OHS: CLEAR
COLOR, UA POC OHS: YELLOW
GLUCOSE, UA POC OHS: NEGATIVE
KETONES, UA POC OHS: NEGATIVE
LEUKOCYTES, UA POC OHS: NEGATIVE
NITRITE, UA POC OHS: NEGATIVE
PH, UA POC OHS: 7
PROTEIN, UA POC OHS: NEGATIVE
SPECIFIC GRAVITY, UA POC OHS: 1.01
UROBILINOGEN, UA POC OHS: 1

## 2024-06-03 PROCEDURE — 1125F AMNT PAIN NOTED PAIN PRSNT: CPT | Mod: CPTII,S$GLB,, | Performed by: STUDENT IN AN ORGANIZED HEALTH CARE EDUCATION/TRAINING PROGRAM

## 2024-06-03 PROCEDURE — 1101F PT FALLS ASSESS-DOCD LE1/YR: CPT | Mod: CPTII,S$GLB,, | Performed by: STUDENT IN AN ORGANIZED HEALTH CARE EDUCATION/TRAINING PROGRAM

## 2024-06-03 PROCEDURE — 81003 URINALYSIS AUTO W/O SCOPE: CPT | Mod: QW,S$GLB,, | Performed by: STUDENT IN AN ORGANIZED HEALTH CARE EDUCATION/TRAINING PROGRAM

## 2024-06-03 PROCEDURE — 1160F RVW MEDS BY RX/DR IN RCRD: CPT | Mod: CPTII,S$GLB,, | Performed by: STUDENT IN AN ORGANIZED HEALTH CARE EDUCATION/TRAINING PROGRAM

## 2024-06-03 PROCEDURE — 99999 PR PBB SHADOW E&M-EST. PATIENT-LVL III: CPT | Mod: PBBFAC,,, | Performed by: STUDENT IN AN ORGANIZED HEALTH CARE EDUCATION/TRAINING PROGRAM

## 2024-06-03 PROCEDURE — 1159F MED LIST DOCD IN RCRD: CPT | Mod: CPTII,S$GLB,, | Performed by: STUDENT IN AN ORGANIZED HEALTH CARE EDUCATION/TRAINING PROGRAM

## 2024-06-03 PROCEDURE — 99215 OFFICE O/P EST HI 40 MIN: CPT | Mod: S$GLB,,, | Performed by: STUDENT IN AN ORGANIZED HEALTH CARE EDUCATION/TRAINING PROGRAM

## 2024-06-03 PROCEDURE — G2211 COMPLEX E/M VISIT ADD ON: HCPCS | Mod: S$GLB,,, | Performed by: STUDENT IN AN ORGANIZED HEALTH CARE EDUCATION/TRAINING PROGRAM

## 2024-06-03 PROCEDURE — 3288F FALL RISK ASSESSMENT DOCD: CPT | Mod: CPTII,S$GLB,, | Performed by: STUDENT IN AN ORGANIZED HEALTH CARE EDUCATION/TRAINING PROGRAM

## 2024-06-03 NOTE — PROGRESS NOTES
[facility] - Urology   Clinic Note    Subjective:     Chief Complaint: Consult (RALP)      History of Present Illness:  Yasir Jeffery Sr. is a 73 y.o. male who presents to clinic for evaluation and management of prostate cancer. He is established to our clinic but new to me    He has been on active surveillance with recent increase in PSA to >20. He has a history of a laser TURP in .   PSA - 20.7. Prostate volume - 62.8. PSA density - 0.32.  MRI prostate from 3/2024 showed a PI-RADS 5 lesion at the left PZ. No evidence of extraprostatic extension, seminal vesicle invasion, neurovascular bundle invasion, or lymphadenopathy.  Uronav fusion biopsy on 5/10/24 showed:  Target lesion #2 (left PZ) - Grade Group 2 (20% pattern 4 with cribriform glands), 2/2 cores  Left base - Grade Group 2 (5% pattern 4), 3/3 cores    Left Mid - PROSTATIC ADENOCARCINOMA, JASON SCORE 3+3=6 (GRADE GROUP 1)   - 7 MM (32% OF SUBMITTED TISSUE), 2 OF 2 fragmented cOREs   - NEGATIVE FOR PERINEURAL INVASIOn   Left apex, core biopsy  - PROSTATIC ADENOCARCINOMA, JASON SCORE 3+3=6 (GRADE GROUP 1) - 5 MM (20% OF SUBMITTED TISSUE), 2 OF 2 COREs   , Right base, core biopsy - - PROSTATIC ADENOCARCINOMA, JASON SCORE 3+3=6 (GRADE GROUP 1)   - 1 MM (less than 5% OF SUBMITTED TISSUE), 1 OF 2 COREs   Stage - T1c  NCCN - high risk    PSMA PET scan shows no evidence of metastatic disease    He reports a positive family history of prostate cancer in his father -  from metastatic prostate cancer    Erectile dysfunction - he has severe ED with no erections    He reports no previous abdominal surgeries     Past medical, family, surgical and social history reviewed as documented in chart with pertinent positive medical, family, surgical and social history detailed in HPI.    A review systems was conducted with pertinent positive and negative findings documented in HPI.    Anticoagulation/Antiplatelets:  No    Objective:     Estimated body mass index  "is 33.5 kg/m² as calculated from the following:    Height as of this encounter: 5' 10" (1.778 m).    Weight as of this encounter: 105.9 kg (233 lb 7.5 oz).    Vital Signs (Most Recent)       Physical Exam  Constitutional:       General: He is not in acute distress.     Appearance: He is not ill-appearing or toxic-appearing.   Pulmonary:      Effort: Pulmonary effort is normal. No accessory muscle usage or respiratory distress.   Neurological:      Mental Status: He is alert.         Lab Results   Component Value Date    BUN 24 (H) 05/10/2024    CREATININE 1.73 (H) 05/10/2024    WBC 6.57 05/10/2024    HGB 15.3 05/10/2024    HCT 44.5 05/10/2024     05/10/2024    AST 19 12/05/2023    ALT 20 12/05/2023    ALKPHOS 64 12/05/2023    ALBUMIN 3.6 12/05/2023    HGBA1C 5.2 08/11/2023        Lab Results   Component Value Date    PSADIAG 20.7 (H) 03/12/2024    PSADIAG 13.3 (H) 03/21/2023    PSADIAG 12.3 (H) 09/08/2022    PSADIAG 9.3 (H) 03/08/2022    PSADIAG 10.9 (H) 01/25/2022    PSADIAG 12.5 (H) 10/18/2021    PSADIAG 9.3 (H) 06/03/2021    PSADIAG 6.7 (H) 11/18/2020    PSADIAG 7.6 (H) 12/18/2019    PSADIAG 6.5 (H) 06/27/2019    PSAFREE 0.78 07/13/2017    PSAFREEPCT 24.38 07/13/2017      Assessment:     1. Prostate cancer    2. S/P TURP      Plan:      Cancer Staging   Prostate cancer  Staging form: Prostate, AJCC 8th Edition  - Clinical stage from 5/17/2024: Stage IIIA (cT1c, cN0, cM0, PSA: 20, Grade Group: 2) - Signed by Suzan Borrero MD on 5/17/2024     We discussed his prostate cancer in depth. We reviewed his diagnosis, stage, grade, risk group, and prognosis. We discussed NCCN risk stratification and discussed the concept of low risk, intermediate risk, and high risk disease. He has high risk disease.  We also reviewed the NCCN treatment nomogram. We discussed the different treatment options including active surveillance (as well as the surveillance protocol), prostate brachytherapy, external beam radiation " therapy, HIFU, and robotic prostatectomy. We also discussed the advantages, disadvantages, risks and benefits, as well as complications of each option.    - Radiation therapy: we discussed treatment planning, the different techniques, short and long term complications including radiation cystitis, radiation proctitis, and impotence. He is not interested in radiation    - Robotic prostatectomy: we discussed the surgery including preoperative preparation, surgical technique, postoperative recuperation and recovery. We reviewed short and long term complications including bleeding or injury to surrounding structures. We discussed the risk of anastomotic leakage and urethral stenosis. We discussed the risks of incontinence and impotence. We discussed the possibility of a positive margin or PSA persistence and its implications. We discussed pre-op and post-op Kegel exercises, post-op penile rehab, and treatment options for incontinence and impotence. We discussed cancer free survival and recurrence, as well as salvage therapeutic options. We discussed the possible indications for adjuvant radiation therapy.    - We discussed somatic testing with Juliet. We reviewed the indications, utility, and implications of testing    - He was given the opportunity to ask questions, and his questions and concerns were answered to his satisfaction.    - After discussion he elected to proceed with RALP with JAVAD Dimas MD

## 2024-06-04 ENCOUNTER — TELEPHONE (OUTPATIENT)
Dept: UROLOGY | Facility: CLINIC | Age: 74
End: 2024-06-04
Payer: MEDICARE

## 2024-06-04 DIAGNOSIS — C61 PROSTATE CANCER: Primary | ICD-10-CM

## 2024-06-04 NOTE — TELEPHONE ENCOUNTER
Called pt, spoke to pt wife and informed her that patient needs a pre op appointment at the [facility] outpatient pavilion in addition to appointment at primary doctor's office for clearance. Pt wife verbalized understanding and stated that she will call and schedule pre op visit at [facility] outpatient pavilion

## 2024-06-05 ENCOUNTER — OFFICE VISIT (OUTPATIENT)
Dept: FAMILY MEDICINE | Facility: CLINIC | Age: 74
End: 2024-06-05
Payer: MEDICARE

## 2024-06-05 ENCOUNTER — LAB VISIT (OUTPATIENT)
Dept: LAB | Facility: HOSPITAL | Age: 74
End: 2024-06-05
Attending: NURSE PRACTITIONER
Payer: MEDICARE

## 2024-06-05 VITALS
DIASTOLIC BLOOD PRESSURE: 72 MMHG | BODY MASS INDEX: 32.48 KG/M2 | TEMPERATURE: 98 F | WEIGHT: 232 LBS | HEART RATE: 63 BPM | SYSTOLIC BLOOD PRESSURE: 126 MMHG | OXYGEN SATURATION: 95 % | HEIGHT: 71 IN | RESPIRATION RATE: 16 BRPM

## 2024-06-05 DIAGNOSIS — G47.33 OSA (OBSTRUCTIVE SLEEP APNEA): ICD-10-CM

## 2024-06-05 DIAGNOSIS — Z01.818 PREOPERATIVE CLEARANCE: Primary | ICD-10-CM

## 2024-06-05 DIAGNOSIS — Z01.818 PREOPERATIVE CLEARANCE: ICD-10-CM

## 2024-06-05 DIAGNOSIS — C61 PROSTATE CANCER: ICD-10-CM

## 2024-06-05 DIAGNOSIS — E66.2 CLASS 1 OBESITY WITH ALVEOLAR HYPOVENTILATION, SERIOUS COMORBIDITY, AND BODY MASS INDEX (BMI) OF 32.0 TO 32.9 IN ADULT: Chronic | ICD-10-CM

## 2024-06-05 DIAGNOSIS — N18.32 STAGE 3B CHRONIC KIDNEY DISEASE: ICD-10-CM

## 2024-06-05 DIAGNOSIS — I10 ESSENTIAL HYPERTENSION: ICD-10-CM

## 2024-06-05 PROBLEM — R09.82 POST-NASAL DRIP: Status: RESOLVED | Noted: 2023-07-13 | Resolved: 2024-06-05

## 2024-06-05 PROBLEM — D53.9 NUTRITIONAL ANEMIA, UNSPECIFIED: Status: RESOLVED | Noted: 2023-05-12 | Resolved: 2024-06-05

## 2024-06-05 PROBLEM — R09.81 SINUS CONGESTION: Status: RESOLVED | Noted: 2020-08-24 | Resolved: 2024-06-05

## 2024-06-05 LAB
ALBUMIN SERPL BCP-MCNC: 3.7 G/DL (ref 3.5–5.2)
ALP SERPL-CCNC: 58 U/L (ref 55–135)
ALT SERPL W/O P-5'-P-CCNC: 10 U/L (ref 10–44)
ANION GAP SERPL CALC-SCNC: 7 MMOL/L (ref 8–16)
AST SERPL-CCNC: 19 U/L (ref 10–40)
BILIRUB SERPL-MCNC: 0.7 MG/DL (ref 0.1–1)
BUN SERPL-MCNC: 12 MG/DL (ref 8–23)
CALCIUM SERPL-MCNC: 9.2 MG/DL (ref 8.7–10.5)
CHLORIDE SERPL-SCNC: 107 MMOL/L (ref 95–110)
CO2 SERPL-SCNC: 25 MMOL/L (ref 23–29)
CREAT SERPL-MCNC: 1.6 MG/DL (ref 0.5–1.4)
ERYTHROCYTE [DISTWIDTH] IN BLOOD BY AUTOMATED COUNT: 13.1 % (ref 11.5–14.5)
EST. GFR  (NO RACE VARIABLE): 44.9 ML/MIN/1.73 M^2
GLUCOSE SERPL-MCNC: 97 MG/DL (ref 70–110)
HCT VFR BLD AUTO: 44.9 % (ref 40–54)
HGB BLD-MCNC: 15.4 G/DL (ref 14–18)
MCH RBC QN AUTO: 30.9 PG (ref 27–31)
MCHC RBC AUTO-ENTMCNC: 34.3 G/DL (ref 32–36)
MCV RBC AUTO: 90 FL (ref 82–98)
OHS QRS DURATION: 78 MS
OHS QTC CALCULATION: 419 MS
PLATELET # BLD AUTO: 172 K/UL (ref 150–450)
PMV BLD AUTO: 8.5 FL (ref 9.2–12.9)
POTASSIUM SERPL-SCNC: 4.5 MMOL/L (ref 3.5–5.1)
PROT SERPL-MCNC: 7.2 G/DL (ref 6–8.4)
RBC # BLD AUTO: 4.98 M/UL (ref 4.6–6.2)
SODIUM SERPL-SCNC: 139 MMOL/L (ref 136–145)
WBC # BLD AUTO: 7.29 K/UL (ref 3.9–12.7)

## 2024-06-05 PROCEDURE — 99999 PR PBB SHADOW E&M-EST. PATIENT-LVL IV: CPT | Mod: PBBFAC,,, | Performed by: NURSE PRACTITIONER

## 2024-06-05 PROCEDURE — 80053 COMPREHEN METABOLIC PANEL: CPT | Performed by: NURSE PRACTITIONER

## 2024-06-05 PROCEDURE — 93010 ELECTROCARDIOGRAM REPORT: CPT | Mod: S$GLB,,, | Performed by: STUDENT IN AN ORGANIZED HEALTH CARE EDUCATION/TRAINING PROGRAM

## 2024-06-05 PROCEDURE — 4010F ACE/ARB THERAPY RXD/TAKEN: CPT | Mod: CPTII,S$GLB,, | Performed by: NURSE PRACTITIONER

## 2024-06-05 PROCEDURE — 1160F RVW MEDS BY RX/DR IN RCRD: CPT | Mod: CPTII,S$GLB,, | Performed by: NURSE PRACTITIONER

## 2024-06-05 PROCEDURE — 1126F AMNT PAIN NOTED NONE PRSNT: CPT | Mod: CPTII,S$GLB,, | Performed by: NURSE PRACTITIONER

## 2024-06-05 PROCEDURE — 1159F MED LIST DOCD IN RCRD: CPT | Mod: CPTII,S$GLB,, | Performed by: NURSE PRACTITIONER

## 2024-06-05 PROCEDURE — 93005 ELECTROCARDIOGRAM TRACING: CPT | Mod: S$GLB,,, | Performed by: NURSE PRACTITIONER

## 2024-06-05 PROCEDURE — 3288F FALL RISK ASSESSMENT DOCD: CPT | Mod: CPTII,S$GLB,, | Performed by: NURSE PRACTITIONER

## 2024-06-05 PROCEDURE — 3078F DIAST BP <80 MM HG: CPT | Mod: CPTII,S$GLB,, | Performed by: NURSE PRACTITIONER

## 2024-06-05 PROCEDURE — 85027 COMPLETE CBC AUTOMATED: CPT | Performed by: NURSE PRACTITIONER

## 2024-06-05 PROCEDURE — 3074F SYST BP LT 130 MM HG: CPT | Mod: CPTII,S$GLB,, | Performed by: NURSE PRACTITIONER

## 2024-06-05 PROCEDURE — 99214 OFFICE O/P EST MOD 30 MIN: CPT | Mod: S$GLB,,, | Performed by: NURSE PRACTITIONER

## 2024-06-05 PROCEDURE — 1101F PT FALLS ASSESS-DOCD LE1/YR: CPT | Mod: CPTII,S$GLB,, | Performed by: NURSE PRACTITIONER

## 2024-06-05 PROCEDURE — 36415 COLL VENOUS BLD VENIPUNCTURE: CPT | Mod: PO | Performed by: NURSE PRACTITIONER

## 2024-06-05 RX ORDER — VALSARTAN 320 MG/1
320 TABLET ORAL DAILY
Qty: 90 TABLET | Refills: 3 | Status: SHIPPED | OUTPATIENT
Start: 2024-06-05 | End: 2024-06-05

## 2024-06-05 RX ORDER — AMLODIPINE BESYLATE 5 MG/1
5 TABLET ORAL DAILY
Qty: 90 TABLET | Refills: 3 | Status: SHIPPED | OUTPATIENT
Start: 2024-06-05 | End: 2024-06-05

## 2024-06-05 RX ORDER — CARVEDILOL 6.25 MG/1
6.25 TABLET ORAL 2 TIMES DAILY WITH MEALS
Qty: 180 TABLET | Refills: 3 | Status: SHIPPED | OUTPATIENT
Start: 2024-06-05 | End: 2025-06-05

## 2024-06-05 RX ORDER — CARVEDILOL 6.25 MG/1
6.25 TABLET ORAL 2 TIMES DAILY WITH MEALS
Qty: 180 TABLET | Refills: 3 | Status: SHIPPED | OUTPATIENT
Start: 2024-06-05 | End: 2024-06-05

## 2024-06-05 RX ORDER — AMLODIPINE BESYLATE 5 MG/1
5 TABLET ORAL DAILY
Qty: 90 TABLET | Refills: 3 | Status: SHIPPED | OUTPATIENT
Start: 2024-06-05 | End: 2025-06-05

## 2024-06-05 RX ORDER — VALSARTAN 320 MG/1
320 TABLET ORAL DAILY
Qty: 90 TABLET | Refills: 3 | Status: SHIPPED | OUTPATIENT
Start: 2024-06-05

## 2024-06-05 NOTE — ASSESSMENT & PLAN NOTE
Refill BP medications. Continue current blood pressure medication regimen. Counseled on importance of hypertension disease course. Counseled on medication regimen importance of treating high blood pressure. Please be advised of risk of untreated blood pressure as discussed. Please advised of ER precautions were given for symptoms of hypertensive urgency and emergency.

## 2024-06-05 NOTE — ASSESSMENT & PLAN NOTE
reheck renal function today. Avoid NSAIDS. Increase hydration with water. Follow up with nephrology.

## 2024-06-05 NOTE — PROGRESS NOTES
Assessment/Plan:  Problem List Items Addressed This Visit          Cardiac/Vascular    Essential hypertension (Chronic)    Overview     CHRONIC. STABLE. BP Reviewed.  Compliant with BP medications. No SE reported. Needing refills.    (-) CP, SOB, palpitations, dizziness, lightheadedness, HA, arm numbness, tingling or weakness, syncope.  Creatinine   Date Value Ref Range Status   05/10/2024 1.73 (H) 0.50 - 1.40 mg/dL Final     Hypertension Medications               amLODIPine (NORVASC) 5 MG tablet Take 1 tablet (5 mg total) by mouth once daily.    valsartan (DIOVAN) 320 MG tablet Take 320 mg by mouth once daily.    carvediloL (COREG) 6.25 MG tablet Take 1 tablet (6.25 mg total) by mouth 2 (two) times daily with meals.            Current Assessment & Plan     Refill BP medications. Continue current blood pressure medication regimen. Counseled on importance of hypertension disease course. Counseled on medication regimen importance of treating high blood pressure. Please be advised of risk of untreated blood pressure as discussed. Please advised of ER precautions were given for symptoms of hypertensive urgency and emergency.          Relevant Medications    amLODIPine (NORVASC) 5 MG tablet    carvediloL (COREG) 6.25 MG tablet    valsartan (DIOVAN) 320 MG tablet       Renal/    Stage 3b chronic kidney disease (Chronic)    Overview     Follows with nephrology    BMP  Lab Results   Component Value Date     05/10/2024    K 4.6 05/10/2024     05/10/2024    CO2 20 (L) 05/10/2024    BUN 24 (H) 05/10/2024    CREATININE 1.73 (H) 05/10/2024    CALCIUM 9.4 05/10/2024    ANIONGAP 10 05/10/2024    EGFRNORACEVR 41 (A) 05/10/2024            Current Assessment & Plan     reheck renal function today. Avoid NSAIDS. Increase hydration with water. Follow up with nephrology.             Oncology    Prostate cancer    Overview     Chronic.  June 2024: PSA 20.7. Planning to have prostatectomy this month.     PROSTATE SPECIFIC  ANTIGEN, DIAGNOSTIC             Component Ref Range & Units 2 mo ago  (3/12/24) 1 yr ago  (3/21/23) 1 yr ago  (9/8/22) 2 yr ago  (3/8/22) 2 yr ago  (1/25/22) 2 yr ago  (10/18/21) 3 yr ago  (6/3/21)   PSA Diagnostic 0.00 - 4.00 ng/mL 20.7 High  13.3 High  CM 12.3 High  CM 9.3 High  CM 10.9 High  CM 12.5 High  CM 9.3 High  CM           March 2022: Stable.  Managed by Urology.  PSA has been monitored.  No symptoms.  Patient with recent TURP procedure by Urology.  Patient states no improvement in his urination.           Current Assessment & Plan     Continue current medications and plan of care per urology.             Endocrine    Class 1 obesity with alveolar hypoventilation, serious comorbidity, and body mass index (BMI) of 32.0 to 32.9 in adult (Chronic)    Overview     BMI Readings from Last 10 Encounters:   06/05/24 32.36 kg/m²   06/03/24 33.50 kg/m²   05/17/24 34.07 kg/m²   05/17/24 34.07 kg/m²   05/10/24 32.90 kg/m²   05/01/24 33.56 kg/m²   03/14/24 33.21 kg/m²   03/12/24 33.22 kg/m²   01/04/24 33.05 kg/m²   10/19/23 32.92 kg/m²     May 2023: Patient reports fatigue.         Current Assessment & Plan     Weight loss and exercise to improve overall health.            Other    MARIN (obstructive sleep apnea)    Overview     June 2024: chronic. Noncompliant with cpap. Followed by pulmonology.    5/13/2021 PSG The diagnostic polysomnography revealed a borderline obstructive sleep apnea / hypopnea syndrome (A + H Index = 5.3 events / hr asleep with 0.5 respiratory event - related arousals / hr asleep for the study, and no RERAs (respiratory effort - related arousals).  The mean SpO2 value was 92.8 %, moderate, minimum oxygen saturation during sleep was 88.0 %, and waking baseline SpO2 was 98 %.  Sporadic, mild snoring was noted.  A CPAP titration polysomnography is recommended.   5/20/2021 orders CPAP titration    March 2022:  Patient here today complaining of fatigue.  Patient is not wearing CPAP and defers at this  time.  Patient states he had mild sleep apnea and does not think that this is his problem.         Current Assessment & Plan     Discussed risks of untreated sleep apnea. Follow up with pulmonology to discuss other masks/options if unable to tolerate.           Other Visit Diagnoses       Preoperative clearance    -  Primary    Relevant Orders    IN OFFICE EKG 12-LEAD (to Muse) (Completed)    CBC Without Differential (Completed)    Comprehensive Metabolic Panel (Completed)          Patient here for medical preoperative evaluation.  History and physical exam reviewed and performed.  Lab data and imaging that is available was reviewed.  Based off of this patient is CLEARED with class I risk which correlates to a 3.9 % 30-day risk of death, MI, or cardiac arrest based off of modified Hua cardiac Risk index.  Chronic condition has been reviewed and remains stable  Follow up if symptoms worsen or fail to improve.  ER precautions for severe or worsening symptoms.     Socorro Sanchez NP  _____________________________________________________________________________________________________________________________________________________    CC: preop clearance     HPI: Patient is a 73-year-old male who presents in clinic today as an established patient here for preop clearance.  The patient is here for a preop clearance to have surgery to have a prostatectomy   The physician that is performing the surgery is Dr. Bruce Dimas   The surgery is being planned for 6/20/24  The patient states that there has not been previous problems with sedation. He has had prior surgeries in the past with no adverse effects from anesthesia.   He is currently taking a low dose ASA. Patient stopped his aspirin prior to biposy 2 weeks ago and plans to restart after prostactecomy.   He has no history of blood clots or bleeding disorders.   Patient is a nonsmoker.  He does have a history of sleep apnea, not currently using his CPAP.   No history  of MI, PE, DVT, arrhythmia, CHF, COPD, diabetes, thyroid problems.  He does have a history of HTN, CKD, obesity. Chronic condition has been reviewed and remains stable.   Stable EKG- NSR w/ PAC- on beta blocker   Negative urinalysis  Stable labs  Recently cleared by cardiology prior to biopsy      Lab Results   Component Value Date    WBC 7.29 06/05/2024    HGB 15.4 06/05/2024    HCT 44.9 06/05/2024    MCV 90 06/05/2024     06/05/2024     Lab Results   Component Value Date     06/05/2024    K 4.5 06/05/2024     06/05/2024    CO2 25 06/05/2024    BUN 12 06/05/2024    CREATININE 1.6 (H) 06/05/2024    CALCIUM 9.2 06/05/2024    ANIONGAP 7 (L) 06/05/2024    EGFRNORACEVR 44.9 (A) 06/05/2024     Lab Results   Component Value Date    ALT 10 06/05/2024    AST 19 06/05/2024    ALKPHOS 58 06/05/2024    BILITOT 0.7 06/05/2024     Lab Results   Component Value Date    HGBA1C 5.2 08/11/2023     Lab Results   Component Value Date    TSH 1.808 05/12/2023     Past Medical History:  Past Medical History:   Diagnosis Date    Back pain     Benign prostate hyperplasia     DDD (degenerative disc disease), thoracolumbar     Disorder of kidney and ureter     stage 3    Elevated PSA     Glaucoma     Hx of colonic polyps 2010    Hypertension     Malignant neoplasm of prostate 07/31/2019    Metabolic syndrome     Mixed hyperlipidemia 01/24/2018    Obesity     MARIN (obstructive sleep apnea)     Mild case no CPAP    Pneumonia     Psychophysiological insomnia      Past Surgical History:   Procedure Laterality Date    BIOPSY, PROSTATE, USING PROSTATE MAPPING N/A 4/14/2023    Procedure: BIOPSY, PROSTATE, USING PROSTATE MAPPING;  Surgeon: EJ Chan MD;  Location: [facility];  Service: Urology;  Laterality: N/A;    BIOPSY, PROSTATE, USING PROSTATE MAPPING N/A 5/10/2024    Procedure: BIOPSY, PROSTATE, USING PROSTATE MAPPING;  Surgeon: EJ Chan MD;  Location: [facility];  Service: Urology;  Laterality: N/A;    CYST  REMOVAL      neck    CYSTOSCOPY N/A 2019    Procedure: CYSTOSCOPY;  Surgeon: Ulisses Serra MD;  Location: [facility];  Service: Urology;  Laterality: N/A;    EYE SURGERY Bilateral     cataract surgery    GANGLION CYST EXCISION Right     PROSTATE BIOPSY      TESTICLE SURGERY  1980    TRANSRECTAL BIOPSY OF PROSTATE WITH ULTRASOUND GUIDANCE Bilateral 2019    Procedure: BIOPSY, PROSTATE, RECTAL APPROACH, WITH US GUIDANCE;  Surgeon: Ulisses Serra MD;  Location: [facility];  Service: Urology;  Laterality: Bilateral;    TRANSRECTAL BIOPSY OF PROSTATE WITH ULTRASOUND GUIDANCE N/A 3/11/2020    Procedure: BIOPSY, PROSTATE, RECTAL APPROACH, WITH US GUIDANCE;  Surgeon: Ulisses Serra MD;  Location: [facility];  Service: Urology;  Laterality: N/A;    TRANSURETHRAL SURGICAL REMOVAL OF PROSTATE (TURP) USING GREEN LIGHT LASER N/A 2022    Procedure: TURP, USING GREEN LIGHT LASER;  Surgeon: Ulisses Serra MD;  Location: [facility];  Service: Urology;  Laterality: N/A;     Review of patient's allergies indicates:   Allergen Reactions    Benadryl allergy decongestant      Opposite reaction, speeds him up   Opposite reaction, speeds him up     Diphenhydramine      Opposite reaction, speeds him up      Social History     Tobacco Use    Smoking status: Former     Types: Cigars     Start date:      Quit date:      Years since quittin.4     Passive exposure: Never    Smokeless tobacco: Never    Tobacco comments:     cigars sporadically x 3 years   Substance Use Topics    Alcohol use: No    Drug use: Never     Family History   Problem Relation Name Age of Onset    Arthritis Mother      Prostate cancer Father      No Known Problems Sister      Alzheimer's disease Brother      Dementia Brother      Pacemaker/defibrilator Sister      Arthritis Sister       Current Outpatient Medications on File Prior to Visit   Medication Sig Dispense Refill    acetaminophen (TYLENOL) 500 MG tablet Take 500 mg by mouth  "every 6 (six) hours as needed for Pain.      aspirin 81 MG Chew Take 81 mg by mouth once daily.      b complex vitamins tablet Take 1 tablet by mouth once daily.      cetirizine (ZYRTEC) 10 MG tablet Take 1 tablet (10 mg total) by mouth once daily. 30 tablet 11    cholecalciferol, vitamin D3, (VITAMIN D3) 50 mcg (2,000 unit) Tab Take 2,000 Units by mouth once daily.      dorzolamide-timolol 2-0.5% (COSOPT) 22.3-6.8 mg/mL ophthalmic solution Place 1 drop into both eyes nightly.      fluticasone propionate (FLONASE) 50 mcg/actuation nasal spray 1 spray (50 mcg total) by Each Nostril route once daily. 16 g 11    latanoprost 0.005 % ophthalmic solution Place 1 drop into both eyes every evening.  6    meclizine (ANTIVERT) 50 MG tablet Take 50 mg by mouth 3 (three) times daily as needed.      red yeast rice 600 mg Cap Take 600 mg by mouth once daily.      tamsulosin (FLOMAX) 0.4 mg Cap TAKE 1 CAPSULE (0.4 MG TOTAL) BY MOUTH EVERY EVENING. 90 capsule 3     No current facility-administered medications on file prior to visit.     Review of Systems   Constitutional:  Negative for appetite change, chills, fatigue and fever.   HENT:  Negative for congestion, rhinorrhea and sore throat.    Eyes:  Negative for visual disturbance.   Respiratory:  Negative for cough and shortness of breath.    Cardiovascular:  Negative for chest pain, palpitations and leg swelling.   Gastrointestinal:  Negative for abdominal pain, diarrhea and vomiting.   Genitourinary:  Negative for dysuria and hematuria.   Musculoskeletal:  Negative for arthralgias and myalgias.   Skin:  Negative for rash and wound.   Neurological:  Negative for dizziness and headaches.   Psychiatric/Behavioral:  Negative for behavioral problems. The patient is not nervous/anxious.      Vitals:    06/05/24 0825   BP: 126/72   Pulse: 63   Resp: 16   Temp: 98 °F (36.7 °C)   TempSrc: Oral   SpO2: 95%   Weight: 105.2 kg (232 lb)   Height: 5' 11" (1.803 m)     Wt Readings from Last 3 " Encounters:   06/05/24 105.2 kg (232 lb)   06/03/24 105.9 kg (233 lb 7.5 oz)   05/17/24 107.7 kg (237 lb 7 oz)     Physical Exam  Vitals reviewed.   Constitutional:       General: He is not in acute distress.     Appearance: Normal appearance. He is not ill-appearing.      Comments: Wife present   HENT:      Head: Normocephalic and atraumatic.      Right Ear: External ear normal.      Left Ear: External ear normal.      Nose: Nose normal.   Eyes:      Extraocular Movements: Extraocular movements intact.      Conjunctiva/sclera: Conjunctivae normal.   Cardiovascular:      Rate and Rhythm: Normal rate.      Heart sounds: Normal heart sounds.   Pulmonary:      Effort: Pulmonary effort is normal. No respiratory distress.      Breath sounds: Normal breath sounds.   Abdominal:      General: Abdomen is flat. There is no distension.   Musculoskeletal:         General: Normal range of motion.      Cervical back: Normal range of motion.   Skin:     General: Skin is warm and dry.      Capillary Refill: Capillary refill takes less than 2 seconds.      Coloration: Skin is not pale.      Findings: No rash.   Neurological:      General: No focal deficit present.      Mental Status: He is alert and oriented to person, place, and time. Mental status is at baseline.   Psychiatric:         Mood and Affect: Mood normal.         Speech: Speech normal. Speech is not rapid and pressured, delayed or slurred.         Behavior: Behavior normal. Behavior is not agitated, slowed, aggressive, withdrawn, hyperactive or combative. Behavior is cooperative.         Thought Content: Thought content normal.         Judgment: Judgment normal.       Health Maintenance   Topic Date Due    Shingles Vaccine (1 of 2) 11/02/2017    Colorectal Cancer Screening  10/20/2024    TETANUS VACCINE  06/20/2025    Lipid Panel  12/05/2028    Hepatitis C Screening  Completed    Abdominal Aortic Aneurysm Screening  Completed

## 2024-06-05 NOTE — ASSESSMENT & PLAN NOTE
Discussed risks of untreated sleep apnea. Follow up with pulmonology to discuss other masks/options if unable to tolerate.

## 2024-06-08 ENCOUNTER — E-CONSULT (OUTPATIENT)
Dept: CARDIOLOGY | Facility: CLINIC | Age: 74
End: 2024-06-08
Payer: MEDICARE

## 2024-06-08 DIAGNOSIS — G47.33 OSA (OBSTRUCTIVE SLEEP APNEA): ICD-10-CM

## 2024-06-08 DIAGNOSIS — N18.32 STAGE 3B CHRONIC KIDNEY DISEASE: Chronic | ICD-10-CM

## 2024-06-08 DIAGNOSIS — E78.2 MIXED HYPERLIPIDEMIA: Primary | Chronic | ICD-10-CM

## 2024-06-08 DIAGNOSIS — E66.01 MORBID (SEVERE) OBESITY DUE TO EXCESS CALORIES: Chronic | ICD-10-CM

## 2024-06-08 DIAGNOSIS — I47.19 ECTOPIC ATRIAL TACHYCARDIA: Chronic | ICD-10-CM

## 2024-06-08 PROCEDURE — 99451 NTRPROF PH1/NTRNET/EHR 5/>: CPT | Mod: S$GLB,,, | Performed by: INTERNAL MEDICINE

## 2024-06-08 NOTE — CONSULTS
[facility]  Response for E-Consult     Patient Name: Yasir Jeffery Sr.  MRN: [id number]  Primary Care Provider: Trevor Jarvis MD   Requesting Provider: Bruce Dimas MD  Consults    Recommendation: Pt cleared for procedure/surgery at moderate CV risk . Pt can stop aspirin 7 days before procedure.    Contingency that warrants a repeat eConsult or referral: 72 y/o male with PMHx for ectopic atrial tachycardia, HLP, obesity, and MARIN referred for CV clearance- urologic procedure. Pt last seen in CV clinic 1-24 without sx.Pt cleared for procedure/surgery at moderate CV risk . Pt can stop aspirin 7 days before procedure.    Total time of Consultation: 5 minute    I did not speak to the requesting provider verbally about this.     *This eConsult is based on the clinical data available to me and is furnished without benefit of a physical examination. The eConsult will need to be interpreted in light of any clinical issues or changes in patient status not available to me at the time of filing this eConsults. Significant changes in patient condition or level of acuity should result in immediate formal consultation and reevaluation. Please alert me if you have further questions.    Thank you for this eConsult referral.     Phi Chavira MD  [facility]

## 2024-06-11 ENCOUNTER — TELEPHONE (OUTPATIENT)
Dept: UROLOGY | Facility: CLINIC | Age: 74
End: 2024-06-11
Payer: MEDICARE

## 2024-06-11 ENCOUNTER — PATIENT MESSAGE (OUTPATIENT)
Dept: UROLOGY | Facility: CLINIC | Age: 74
End: 2024-06-11
Payer: MEDICARE

## 2024-06-19 ENCOUNTER — PATIENT MESSAGE (OUTPATIENT)
Dept: PULMONOLOGY | Facility: CLINIC | Age: 74
End: 2024-06-19
Payer: MEDICARE

## 2024-06-24 ENCOUNTER — TELEPHONE (OUTPATIENT)
Dept: UROLOGY | Facility: CLINIC | Age: 74
End: 2024-06-24
Payer: MEDICARE

## 2024-06-24 NOTE — TELEPHONE ENCOUNTER
----- Message from Miles Miller sent at 6/24/2024 11:25 AM CDT -----  Regarding: pa  Type:  Needs Medical Advice    Who Called: vikas Lorenzo Call Back Number: [phone]      Additional Information: wife st that pt needs a pa for oxybutynin (DITROPAN) 5 MG Tab. She st that pharm sent over a request that needs to filled out so they can get the insurance to pay for it.  please call to discuss.

## 2024-06-24 NOTE — TELEPHONE ENCOUNTER
----- Message from Nkechi Uribe sent at 6/24/2024  9:07 AM CDT -----  Regarding: give hospital update  Contact: Lina  Type:  Needs Medical Advice    Who Called: Lina  Would the patient rather a call back or a response via [facility]? Call back  Best Call Back Number: [phone]    Additional Information: she wants to give the Dr fung hospital f/u

## 2024-06-27 ENCOUNTER — OFFICE VISIT (OUTPATIENT)
Dept: UROLOGY | Facility: CLINIC | Age: 74
End: 2024-06-27
Payer: MEDICARE

## 2024-06-27 VITALS — BODY MASS INDEX: 31.82 KG/M2 | HEIGHT: 71 IN | WEIGHT: 227.31 LBS

## 2024-06-27 DIAGNOSIS — C61 PROSTATE CA: ICD-10-CM

## 2024-06-27 DIAGNOSIS — Z90.79 S/P PROSTATECTOMY: Primary | ICD-10-CM

## 2024-06-27 PROCEDURE — 99999 PR PBB SHADOW E&M-EST. PATIENT-LVL III: CPT | Mod: PBBFAC,,,

## 2024-06-27 PROCEDURE — 99499 UNLISTED E&M SERVICE: CPT | Mod: S$GLB,,,

## 2024-06-27 NOTE — PROGRESS NOTES
[facility] Note  Staff: JEFF Suárez    PCP: MD Matheus    Chief Complaint: Voiding Trial    Subjective:        HPI: Yasir Jeffery is a 73 y.o. male presents today for voiding trial. He is accompanied by his wife. He underwent RALP by Dr. Dimas on 6/20/2024. He is here for valerio catheter removal. He is doing well. He was experiencing constipation which was relieved with miralax. He still complains of abd gas. He denies fever and hematuria.     Questions asked the pt during ov today:  Dysuria: No  Gross Hematuria:No    Last PSA Screening:   Lab Results   Component Value Date    PSADIAG 20.7 (H) 03/12/2024    PSADIAG 13.3 (H) 03/21/2023    PSADIAG 12.3 (H) 09/08/2022       History of Kidney Stones?:  No    Constipation issues?:  No    REVIEW OF SYSTEMS:  Review of Systems   Constitutional: Negative.  Negative for chills and fever.   HENT: Negative.     Eyes: Negative.    Respiratory: Negative.     Cardiovascular: Negative.    Gastrointestinal: Negative.  Negative for abdominal pain, constipation, diarrhea, nausea and vomiting.   Genitourinary: Negative.  Negative for dysuria, flank pain and hematuria.   Musculoskeletal: Negative.  Negative for back pain.   Skin: Negative.    Neurological:  Positive for weakness.   Endo/Heme/Allergies: Negative.    Psychiatric/Behavioral: Negative.         PMHx:  Past Medical History:   Diagnosis Date    Anticoagulant long-term use     Back pain     Benign prostate hyperplasia     DDD (degenerative disc disease), thoracolumbar     Disorder of kidney and ureter     stage 3    Elevated PSA     Glaucoma     Hx of colonic polyps 2010    Hypertension     Malignant neoplasm of prostate 07/31/2019    Metabolic syndrome     Mixed hyperlipidemia 01/24/2018    Obesity     MARIN (obstructive sleep apnea)     Mild case no CPAP    Pneumonia     Psychophysiological insomnia        PSHx:  Past Surgical History:   Procedure Laterality Date    BIOPSY, PROSTATE, USING  PROSTATE MAPPING N/A 4/14/2023    Procedure: BIOPSY, PROSTATE, USING PROSTATE MAPPING;  Surgeon: EJ Chan MD;  Location[facility];  Service: Urology;  Laterality: N/A;    BIOPSY, PROSTATE, USING PROSTATE MAPPING N/A 5/10/2024    Procedure: BIOPSY, PROSTATE, USING PROSTATE MAPPING;  Surgeon: EJ Chan MD;  Location: [facility];  Service: Urology;  Laterality: N/A;    CYST REMOVAL  1993    neck    CYSTOSCOPY N/A 7/17/2019    Procedure: CYSTOSCOPY;  Surgeon: Ulisses Serra MD;  Location: [facility];  Service: Urology;  Laterality: N/A;    EYE SURGERY Bilateral     cataract surgery    GANGLION CYST EXCISION Right 1994    PROSTATE BIOPSY      ROBOT-ASSISTED LAPAROSCOPIC PELVIC LYMPHADENECTOMY USING DA PATSY XI Bilateral 6/20/2024    Procedure: XI ROBOTIC LYMPHADENECTOMY, PELVIC;  Surgeon: Bruce Dimas MD;  Location: [facility];  Service: Urology;  Laterality: Bilateral;    ROBOT-ASSISTED LAPAROSCOPIC PROSTATECTOMY USING DA PATSY XI N/A 6/20/2024    Procedure: XI ROBOTIC PROSTATECTOMY, RADICAL;  Surgeon: Bruce Dimas MD;  Location: [facility];  Service: Urology;  Laterality: N/A;    TESTICLE SURGERY  1980    TRANSRECTAL BIOPSY OF PROSTATE WITH ULTRASOUND GUIDANCE Bilateral 7/17/2019    Procedure: BIOPSY, PROSTATE, RECTAL APPROACH, WITH US GUIDANCE;  Surgeon: Ulisses Serra MD;  Location: [facility];  Service: Urology;  Laterality: Bilateral;    TRANSRECTAL BIOPSY OF PROSTATE WITH ULTRASOUND GUIDANCE N/A 3/11/2020    Procedure: BIOPSY, PROSTATE, RECTAL APPROACH, WITH US GUIDANCE;  Surgeon: Ulisses Serra MD;  Location: [facility];  Service: Urology;  Laterality: N/A;    TRANSURETHRAL SURGICAL REMOVAL OF PROSTATE (TURP) USING GREEN LIGHT LASER N/A 2/18/2022    Procedure: TURP, USING GREEN LIGHT LASER;  Surgeon: Ulisses Serra MD;  Location: [facility];  Service: Urology;  Laterality: N/A;       Fam Hx:   malignancies: Yes - father prostate cancer    kidney stones: No     Soc Hx:  , lives in  [place]    Allergies:  Benadryl allergy decongestant and Diphenhydramine    Medications: reviewed     Objective:   There were no vitals filed for this visit.    Physical Exam  Constitutional:       Appearance: Normal appearance.   HENT:      Head: Normocephalic.   Eyes:      Conjunctiva/sclera: Conjunctivae normal.   Pulmonary:      Effort: Pulmonary effort is normal.   Abdominal:      General: There is no distension.      Palpations: Abdomen is soft.      Tenderness: There is no abdominal tenderness.      Comments: Incision sites clean and dry with minimal bruising   Genitourinary:     Comments: Manrique catheter in place draining c/y/u  Musculoskeletal:         General: Normal range of motion.      Cervical back: Normal range of motion.      Comments: Uses walker   Skin:     General: Skin is warm.   Neurological:      Mental Status: He is oriented to person, place, and time.   Psychiatric:         Mood and Affect: Mood normal.         Behavior: Behavior normal.           Assessment:       1. S/P prostatectomy    2. Prostate CA          Plan:     Manrique catheter removed by myself today in office without complications  Update PSA prior to f/u with Dr. Dimas- ordered    F/u as scheduled    [facility]: Active    JEFF Suárez

## 2024-06-28 ENCOUNTER — TELEPHONE (OUTPATIENT)
Dept: UROLOGY | Facility: CLINIC | Age: 74
End: 2024-06-28
Payer: MEDICARE

## 2024-06-28 NOTE — TELEPHONE ENCOUNTER
----- Message from Bárbara Garces, Patient Care Assistant sent at 6/28/2024  7:57 AM CDT -----  Regarding: advice  Contact: pt's wife June  Type: Needs Medical Advice    Who Called:  pt     Symptoms (please be specific):  not urinating   How long has patient had these symptoms:  1 day     Best Call Back Number: [phone]     Additional Information: please call to advise. Thanks!

## 2024-06-28 NOTE — TELEPHONE ENCOUNTER
----- Message from Bruce Dimas MD sent at 6/28/2024  2:47 PM CDT -----  Please have this patient come in to see me either Tuesday at 8 am or 1 pm. Thanks.

## 2024-07-02 ENCOUNTER — OFFICE VISIT (OUTPATIENT)
Dept: UROLOGY | Facility: CLINIC | Age: 74
End: 2024-07-02
Payer: MEDICARE

## 2024-07-02 VITALS — BODY MASS INDEX: 31.76 KG/M2 | HEIGHT: 71 IN | WEIGHT: 226.88 LBS

## 2024-07-02 DIAGNOSIS — Z90.79 S/P PROSTATECTOMY: ICD-10-CM

## 2024-07-02 DIAGNOSIS — C61 PROSTATE CANCER: Primary | ICD-10-CM

## 2024-07-02 PROBLEM — R39.12 BENIGN PROSTATIC HYPERPLASIA WITH WEAK URINARY STREAM: Status: RESOLVED | Noted: 2017-06-06 | Resolved: 2024-07-02

## 2024-07-02 PROBLEM — N40.1 BENIGN PROSTATIC HYPERPLASIA WITH WEAK URINARY STREAM: Status: RESOLVED | Noted: 2017-06-06 | Resolved: 2024-07-02

## 2024-07-02 PROCEDURE — 4010F ACE/ARB THERAPY RXD/TAKEN: CPT | Mod: CPTII,S$GLB,, | Performed by: STUDENT IN AN ORGANIZED HEALTH CARE EDUCATION/TRAINING PROGRAM

## 2024-07-02 PROCEDURE — 1125F AMNT PAIN NOTED PAIN PRSNT: CPT | Mod: CPTII,S$GLB,, | Performed by: STUDENT IN AN ORGANIZED HEALTH CARE EDUCATION/TRAINING PROGRAM

## 2024-07-02 PROCEDURE — 1101F PT FALLS ASSESS-DOCD LE1/YR: CPT | Mod: CPTII,S$GLB,, | Performed by: STUDENT IN AN ORGANIZED HEALTH CARE EDUCATION/TRAINING PROGRAM

## 2024-07-02 PROCEDURE — 1159F MED LIST DOCD IN RCRD: CPT | Mod: CPTII,S$GLB,, | Performed by: STUDENT IN AN ORGANIZED HEALTH CARE EDUCATION/TRAINING PROGRAM

## 2024-07-02 PROCEDURE — 99024 POSTOP FOLLOW-UP VISIT: CPT | Mod: S$GLB,,, | Performed by: STUDENT IN AN ORGANIZED HEALTH CARE EDUCATION/TRAINING PROGRAM

## 2024-07-02 PROCEDURE — 99999 PR PBB SHADOW E&M-EST. PATIENT-LVL III: CPT | Mod: PBBFAC,,, | Performed by: STUDENT IN AN ORGANIZED HEALTH CARE EDUCATION/TRAINING PROGRAM

## 2024-07-02 PROCEDURE — 3288F FALL RISK ASSESSMENT DOCD: CPT | Mod: CPTII,S$GLB,, | Performed by: STUDENT IN AN ORGANIZED HEALTH CARE EDUCATION/TRAINING PROGRAM

## 2024-07-02 RX ORDER — SULFAMETHOXAZOLE AND TRIMETHOPRIM 800; 160 MG/1; MG/1
1 TABLET ORAL
COMMUNITY
Start: 2024-06-21

## 2024-07-02 RX ORDER — TAMSULOSIN HYDROCHLORIDE 0.4 MG/1
0.4 CAPSULE ORAL NIGHTLY
Qty: 30 CAPSULE | Refills: 11 | Status: SHIPPED | OUTPATIENT
Start: 2024-07-02 | End: 2025-07-02

## 2024-07-02 NOTE — PROGRESS NOTES
"[place] - Urology   Clinic Note    Subjective:     Chief Complaint: Urinary Retention      History of Present Illness:  Yasir Jeffery is a 74 y.o. male who presents to clinic for evaluation and management of prostate cancer. He is established to our clinic    He underwent robotic radical prostatectomy 6/20/24. Final pathology showed:  Grade Group 2, pT3a, pN0, M0, R1. Bladder neck invasion and positive bladder neck margin    Manrique was removed and he developed urinary retention. Manrique was replaced in the ED. He presents today for follow up.   He reports lower abdominal pain near the incisions. He previously had bloating and gas pain but has been having better BMs. He also had presented to the ED for this pain and CT showed no significant abnormalities.     Past medical, family, surgical and social history reviewed as documented in chart with pertinent positive medical, family, surgical and social history detailed in HPI.    A review systems was conducted with pertinent positive and negative findings documented in HPI.    Objective:     Estimated body mass index is 32.09 kg/m² as calculated from the following:    Height as of this encounter: 5' 10.5" (1.791 m).    Weight as of this encounter: 102.9 kg (226 lb 13.7 oz).    Vital Signs (Most Recent)       Physical Exam  Constitutional:       General: He is not in acute distress.     Appearance: He is not ill-appearing or toxic-appearing.   Pulmonary:      Effort: Pulmonary effort is normal. No accessory muscle usage or respiratory distress.   Neurological:      Mental Status: He is alert.         Labs reviewed below:  Lab Results   Component Value Date    BUN 17 06/28/2024    CREATININE 1.64 (H) 06/28/2024    WBC 8.86 06/28/2024    HGB 12.8 (L) 06/28/2024    HCT 38.0 (L) 06/28/2024     06/28/2024    AST 34 06/28/2024    ALT 25 06/28/2024    ALKPHOS 59 06/28/2024    ALBUMIN 3.5 06/28/2024    HGBA1C 5.2 08/11/2023        PSA trend reviewed below:  Lab Results "   Component Value Date    PSADIAG 20.7 (H) 03/12/2024    PSADIAG 13.3 (H) 03/21/2023    PSADIAG 12.3 (H) 09/08/2022    PSADIAG 9.3 (H) 03/08/2022    PSADIAG 10.9 (H) 01/25/2022    PSADIAG 12.5 (H) 10/18/2021    PSADIAG 9.3 (H) 06/03/2021    PSADIAG 6.7 (H) 11/18/2020    PSADIAG 7.6 (H) 12/18/2019    PSADIAG 6.5 (H) 06/27/2019    PSAFREE 0.78 07/13/2017    PSAFREEPCT 24.38 07/13/2017      Assessment:     1. Prostate cancer    2. S/P prostatectomy      Plan:     Flomax  Manrique removal 1 year  Keep follow up for PSA in 6 weeks    Bruce Dimas MD

## 2024-07-10 ENCOUNTER — CLINICAL SUPPORT (OUTPATIENT)
Dept: UROLOGY | Facility: CLINIC | Age: 74
End: 2024-07-10
Payer: MEDICARE

## 2024-07-10 DIAGNOSIS — R33.9 URINARY RETENTION: Primary | ICD-10-CM

## 2024-07-10 LAB — POC RESIDUAL URINE VOLUME: 157 ML (ref 0–100)

## 2024-07-10 PROCEDURE — 51798 US URINE CAPACITY MEASURE: CPT | Mod: S$GLB,,, | Performed by: STUDENT IN AN ORGANIZED HEALTH CARE EDUCATION/TRAINING PROGRAM

## 2024-07-10 NOTE — PROGRESS NOTES
Pt showed up to clinic today for a voiding trial. Pt had a 18Fr catheter in and 10cc was removed from balloon before catheter was removed. PT was notified to come back to clinic at 1400 if pt has not voided. Pt was informed to stay hydrated, pt may see some hematuria as this is normal after having catheter removed and also to take a warm shower if unable to void as this helps relax the bladder. Pt and wife verbalized understanding.

## 2024-07-10 NOTE — PROGRESS NOTES
Pt showed back up to clinic after 1415 unable to void. PVR was done and originally pt had 157mL in bladder. Awaited on doctors response as provider was in surgery and went back and did another PVR and pt had 215mL. Pt is leaking urine, but that is common due to the procedure pt just had. A 18 Fr catheter was replaced and pt was scheduled for a 2 week voiding trial per provider.

## 2024-07-17 ENCOUNTER — TELEPHONE (OUTPATIENT)
Dept: UROLOGY | Facility: CLINIC | Age: 74
End: 2024-07-17
Payer: MEDICARE

## 2024-07-17 NOTE — TELEPHONE ENCOUNTER
----- Message from Angel No sent at 7/17/2024  9:34 AM CDT -----  Pt sent a message through live chat. Pt would like to speak with office. Pt has a nurse visit to get catheter removed. Pt would like the doctor to do it. Pt would like a call back.             Pt can be reached at [phone]                  TY

## 2024-07-24 ENCOUNTER — CLINICAL SUPPORT (OUTPATIENT)
Dept: UROLOGY | Facility: CLINIC | Age: 74
End: 2024-07-24
Payer: MEDICARE

## 2024-07-24 DIAGNOSIS — R33.9 URINARY RETENTION: Primary | ICD-10-CM

## 2024-07-24 PROCEDURE — 99999 PR PBB SHADOW E&M-EST. PATIENT-LVL III: CPT | Mod: PBBFAC,,,

## 2024-07-24 NOTE — PROGRESS NOTES
Pt coming in for removal of valerio catheter. Withdrew 10  Cc from balloon and removed 18  Fr valerio catheter intact. Provided voiding trial instruction. Pt expressed understanding and tolerated well.

## 2024-08-02 ENCOUNTER — OFFICE VISIT (OUTPATIENT)
Dept: UROLOGY | Facility: CLINIC | Age: 74
End: 2024-08-02
Payer: MEDICARE

## 2024-08-02 ENCOUNTER — LAB VISIT (OUTPATIENT)
Dept: LAB | Facility: HOSPITAL | Age: 74
End: 2024-08-02
Attending: STUDENT IN AN ORGANIZED HEALTH CARE EDUCATION/TRAINING PROGRAM
Payer: MEDICARE

## 2024-08-02 VITALS — HEIGHT: 71 IN | WEIGHT: 222.88 LBS | BODY MASS INDEX: 31.2 KG/M2

## 2024-08-02 DIAGNOSIS — Z90.79 S/P TURP: Chronic | ICD-10-CM

## 2024-08-02 DIAGNOSIS — C61 PROSTATE CANCER: ICD-10-CM

## 2024-08-02 DIAGNOSIS — N30.00 ACUTE CYSTITIS WITHOUT HEMATURIA: ICD-10-CM

## 2024-08-02 DIAGNOSIS — C61 PROSTATE CANCER: Primary | ICD-10-CM

## 2024-08-02 LAB
BACTERIA #/AREA URNS HPF: ABNORMAL /HPF
BILIRUBIN, UA POC OHS: NEGATIVE
BLOOD, UA POC OHS: ABNORMAL
CLARITY, UA POC OHS: ABNORMAL
COLOR, UA POC OHS: YELLOW
COMPLEXED PSA SERPL-MCNC: 0.02 NG/ML (ref 0–4)
GLUCOSE, UA POC OHS: NEGATIVE
KETONES, UA POC OHS: NEGATIVE
LEUKOCYTES, UA POC OHS: ABNORMAL
MICROSCOPIC COMMENT: ABNORMAL
NITRITE, UA POC OHS: POSITIVE
PH, UA POC OHS: 5.5
POC RESIDUAL URINE VOLUME: 36 ML (ref 0–100)
PROTEIN, UA POC OHS: 100
RBC #/AREA URNS HPF: 10 /HPF (ref 0–4)
SPECIFIC GRAVITY, UA POC OHS: 1.02
UROBILINOGEN, UA POC OHS: 0.2
WBC #/AREA URNS HPF: >100 /HPF (ref 0–5)

## 2024-08-02 PROCEDURE — 99999 PR PBB SHADOW E&M-EST. PATIENT-LVL III: CPT | Mod: PBBFAC,,, | Performed by: STUDENT IN AN ORGANIZED HEALTH CARE EDUCATION/TRAINING PROGRAM

## 2024-08-02 PROCEDURE — 36415 COLL VENOUS BLD VENIPUNCTURE: CPT | Mod: PO | Performed by: STUDENT IN AN ORGANIZED HEALTH CARE EDUCATION/TRAINING PROGRAM

## 2024-08-02 PROCEDURE — 84153 ASSAY OF PSA TOTAL: CPT | Performed by: STUDENT IN AN ORGANIZED HEALTH CARE EDUCATION/TRAINING PROGRAM

## 2024-08-02 PROCEDURE — 81000 URINALYSIS NONAUTO W/SCOPE: CPT | Mod: PO | Performed by: STUDENT IN AN ORGANIZED HEALTH CARE EDUCATION/TRAINING PROGRAM

## 2024-08-02 PROCEDURE — 87086 URINE CULTURE/COLONY COUNT: CPT | Performed by: STUDENT IN AN ORGANIZED HEALTH CARE EDUCATION/TRAINING PROGRAM

## 2024-08-02 RX ORDER — CIPROFLOXACIN 500 MG/1
500 TABLET ORAL 2 TIMES DAILY
Qty: 20 TABLET | Refills: 0 | Status: SHIPPED | OUTPATIENT
Start: 2024-08-02 | End: 2024-08-12

## 2024-08-02 NOTE — PROGRESS NOTES
"[place] - Urology   Clinic Note    Subjective:     Chief Complaint: Post-op Evaluation      History of Present Illness:  Yasir Jeffery is a 74 y.o. male who presents to clinic for evaluation and management of prostate cancer. He is established to our clinic    He underwent robotic radical prostatectomy 6/20/24. Final pathology showed:  Grade Group 2, pT3a, pN0, M0, R1. Bladder neck invasion and positive bladder neck margin  PSA pre-op was 20.7  PSA not done for today    He had urinary retention post-op and failed multiple VTs but have been voiding since most recent removal. He has difficulty urinating but also leakage. PVR is 36 mL today. Urine looks infected today    Past medical, family, surgical and social history reviewed as documented in chart with pertinent positive medical, family, surgical and social history detailed in HPI.    A review systems was conducted with pertinent positive and negative findings documented in HPI.    Objective:     Estimated body mass index is 31.53 kg/m² as calculated from the following:    Height as of this encounter: 5' 10.5" (1.791 m).    Weight as of this encounter: 101.1 kg (222 lb 14.2 oz).    Vital Signs (Most Recent)       Physical Exam  Constitutional:       General: He is not in acute distress.     Appearance: He is not ill-appearing or toxic-appearing.   Pulmonary:      Effort: Pulmonary effort is normal. No accessory muscle usage or respiratory distress.   Neurological:      Mental Status: He is alert.         Labs reviewed below:  Lab Results   Component Value Date    BUN 17 06/28/2024    CREATININE 1.64 (H) 06/28/2024    WBC 8.86 06/28/2024    HGB 12.8 (L) 06/28/2024    HCT 38.0 (L) 06/28/2024     06/28/2024    AST 34 06/28/2024    ALT 25 06/28/2024    ALKPHOS 59 06/28/2024    ALBUMIN 3.5 06/28/2024    HGBA1C 5.2 08/11/2023        PSA trend reviewed below:  Lab Results   Component Value Date    PSADIAG 20.7 (H) 03/12/2024    PSADIAG 13.3 (H) 03/21/2023    " PSADIAG 12.3 (H) 09/08/2022    PSADIAG 9.3 (H) 03/08/2022    PSADIAG 10.9 (H) 01/25/2022    PSADIAG 12.5 (H) 10/18/2021    PSADIAG 9.3 (H) 06/03/2021    PSADIAG 6.7 (H) 11/18/2020    PSADIAG 7.6 (H) 12/18/2019    PSADIAG 6.5 (H) 06/27/2019    PSAFREE 0.78 07/13/2017    PSAFREEPCT 24.38 07/13/2017      Assessment:     1. Prostate cancer    2. Acute cystitis without hematuria    3. S/P TURP      Plan:     Continue Flomax  Empiric cipro  Urine culture  PSA today  Decipher prostate score    Likely present at  Tumor board    Bruce Dimas MD

## 2024-08-03 LAB — BACTERIA UR CULT: ABNORMAL

## 2024-08-05 DIAGNOSIS — N30.00 ACUTE CYSTITIS WITHOUT HEMATURIA: Primary | ICD-10-CM

## 2024-08-05 RX ORDER — SULFAMETHOXAZOLE AND TRIMETHOPRIM 800; 160 MG/1; MG/1
1 TABLET ORAL 2 TIMES DAILY
Qty: 20 TABLET | Refills: 0 | Status: SHIPPED | OUTPATIENT
Start: 2024-08-05 | End: 2024-08-15

## 2024-08-08 ENCOUNTER — DOCUMENTATION ONLY (OUTPATIENT)
Dept: HEMATOLOGY/ONCOLOGY | Facility: CLINIC | Age: 74
End: 2024-08-08
Payer: MEDICARE

## 2024-08-08 ENCOUNTER — TUMOR BOARD CONFERENCE (OUTPATIENT)
Dept: HEMATOLOGY/ONCOLOGY | Facility: CLINIC | Age: 74
End: 2024-08-08
Payer: MEDICARE

## 2024-08-08 DIAGNOSIS — C61 PROSTATE CANCER: Primary | ICD-10-CM

## 2024-08-09 ENCOUNTER — PATIENT MESSAGE (OUTPATIENT)
Dept: UROLOGY | Facility: CLINIC | Age: 74
End: 2024-08-09
Payer: MEDICARE

## 2024-08-16 ENCOUNTER — TELEPHONE (OUTPATIENT)
Dept: HEMATOLOGY/ONCOLOGY | Facility: CLINIC | Age: 74
End: 2024-08-16
Payer: MEDICARE

## 2024-08-16 ENCOUNTER — PATIENT MESSAGE (OUTPATIENT)
Dept: HEMATOLOGY/ONCOLOGY | Facility: CLINIC | Age: 74
End: 2024-08-16
Payer: MEDICARE

## 2024-08-16 NOTE — NURSING
Chart reviewed. Mr. Jeffery has f/u visit scheduled with Dr. Dimas along with PSA in November.     Spoke briefly with wife, Lina, who states Mr. Castillo is doing well.    Attempted to contact Mr. Castillo at home - no answer.     Will continue to follow for [facility] navigation needs.

## 2024-08-26 ENCOUNTER — PATIENT MESSAGE (OUTPATIENT)
Dept: UROLOGY | Facility: CLINIC | Age: 74
End: 2024-08-26
Payer: MEDICARE

## 2024-08-26 ENCOUNTER — DOCUMENTATION ONLY (OUTPATIENT)
Dept: UROLOGY | Facility: CLINIC | Age: 74
End: 2024-08-26
Payer: MEDICARE

## 2024-09-11 ENCOUNTER — PATIENT MESSAGE (OUTPATIENT)
Dept: FAMILY MEDICINE | Facility: CLINIC | Age: 74
End: 2024-09-11
Payer: MEDICARE

## 2024-10-02 PROBLEM — Z80.42 FAMILY HISTORY OF PROSTATE CANCER IN FATHER: Status: ACTIVE | Noted: 2024-10-02

## 2024-10-02 NOTE — PROGRESS NOTES
"Cancer Genetics  Hereditary and High-Risk Clinic  Department of Hematology and Oncology  [facility]    [facility]    Date of Service:  10/10/24  Visit Provider:  Sravanthi Raines MS, [facility]    Patient ID  Name: Yasir Jeffery    : 1950    MRN: [id number]      Referring Provider  Bruce Dimas MD  [address] [place] [address]    Face-to-face time with patient:  Approximately 20 minutes.    Approximately 50 minutes in total were spent on this encounter, which includes face-to-face time and non-face-to-face time preparing to see the patient (e.g., review of records and tests), obtaining and/or reviewing separately obtained history, documenting clinical information in the electronic or other health record, independently interpreting results (not separately reported) and communicating results to the patient/family/caregiver, or care coordination (not separately reported).     IMPRESSION      Yasir Jeffery is a pleasant 75yo male patient presenting to genetic counseling today due to his personal and family history of prostate cancer. He is accompanied by his wife Lina. We discussed that based on his personal history of prostate cancer, as well as his father having metastatic prostate cancer we would meet NCCN guidelines for genetic testing. It is difficult to assess how likely he is to have a genetic predisposition due to limited information regarding his paternal side of the family. Jonathan decided not to proceed with genetic testing today. Jonathan is open to genetic testing in the future. He is welcome to be re-referred to genetics in the future as needed.     FOCUSED PERSONAL HISTORY       Chief Complaint: Genetic Evaluation (Personal and family history of prostate cancer)    History of Present Illness (HPI):  Yasir Jeffery ("Jonathan"), 74 y.o., assigned male sex at birth, is established with the [facility] of [facility] but new to me.  He was referred by  Urology  for " hereditary cancer risk assessment given his personal history of prostate cancer, as well as his father's history of metastatic prostate cancer.    Cancer History  Yasir Jeffery has a personal history of prostate cancer. He has had multiple biopsies over the years (7/17/2019, 3/13/2020, 4/13/2024, 5/10/2024) and has had his elevated PSA monitored. He underwent robotic radical prostatectomy 6/20/24. Final pathology showed:  Grade Group 2, pT3a, pN0, M0, R1. Bladder neck invasion and positive bladder neck margin  PSA pre-op was 20.7    Focused Medical History  Previous germline cancer genetic testing:  No  Colonoscopy: Yes  Most recent colonoscopy: 10/20/2014  Colon polyp: reports he had polyps found con colonoscopy in 2010  Pancreatitis:  No    Tobacco Use  Tobacco Use: Medium Risk (8/8/2024)    Patient History     Smoking Tobacco Use: Former     Smokeless Tobacco Use: Never     Passive Exposure: Never     Former smoker  Total number of years smoked:  3  Average number of packs smoked:  N/A he smoked cigars sporadically    Review of Systems   Patient's Stress Score today was 1/10 (with 10 being the worst).       FAMILY HISTORY     Cancer Pedigree     Father with metastatic prostate cancer diagnosed in his 70s, passed away at 78    A family history of birth defects, intellectual disability, SIDS, sudden early death, multiple miscarriages and consanguinity were denied. Please refer to above pedigree for further details. A larger copy has been scanned in the Media tab.     DISCUSSION     Approximately 5-10% of cancers are hereditary. Things that make us suspicious of a hereditary cause of cancer include individuals with rare tumors and cancers, the type of cancers seen in the family, number of people with cancer, ages of people with cancer, and certain cancer pathologies.The majority of hereditary prostate cancer is caused by mutations in RONEN, BRCA1, BRCA2, CHEK2, HOXB13, and TP53. Bill meets NCCN criteria for genetic  "testing based on his father having metastatic prostate cancer, as well as himself having prostate cancer. Therefore, he was offered phenotype-driven and broad panel testing. Jonathan opted not to proceed genetic testing today.     We reviewed that mutations in the highly penetrant genes put an individual at a significantly increased risk of prostate cancer and other cancers. There are established screening and surgery guidelines for these syndromes. Mutations in the moderately penetrant genes increase the risk of prostate and other cancers, but less is understood regarding their role in cancer risk. There may not be standard screening or management guidelines for individuals who have mutations in these genes.     ASSESSMENT / PLAN      Yasir Jeffery ("Jonathan"), 74 y.o., presented today for hereditary cancer risk assessment and genetic counseling given his personal and family history of prostate cancer. He is accompanied by his wife Lina. We discussed that based on his personal history of prostate cancer, as well as his father having metastatic prostate cancer we would meet NCCN guidelines for genetic testing. It is difficult to assess how likely he is to have a genetic predisposition due to limited information regarding his paternal side of the family. Jonathan decided not to proceed with genetic testing today. Jonathan is open to genetic testing in the future.         ICD-10-CM ICD-9-CM   1. Prostate cancer  C61 185   2. Family history of prostate cancer in father  Z80.42 V16.42     1. Prostate cancer    2. Family history of prostate cancer in father      Follow-up:  No follow-ups on file.    Questions were encouraged and answered to the patient's satisfaction, and he verbalized understanding of the information and agreement with the plan. He will reach back to me if he has any questions. He is welcome to be re-referred to genetics in the future as needed.       Approximately 20 minutes were spent face-to-face with the " patient.  Approximately 50 minutes in total were spent on this encounter, which includes face-to-face time and non-face-to-face time preparing to see the patient (e.g., review of tests), obtaining and/or reviewing separately obtained history, documenting clinical information in the electronic or other health record, independently interpreting results (not separately reported) and communicating results to the patient/family/caregiver, or care coordination (not separately reported).     This assessment is based on the history and reports provided, as well as the current scientific knowledge regarding cancer genetics.         Sravanthi Raines MS, [facility]  Genetic Counselor, Hereditary and High-Risk Clinic  Department of Hematology and Oncology  [facility]    [facility]        CC:  Dr. Bruce Dimas

## 2024-10-10 ENCOUNTER — OFFICE VISIT (OUTPATIENT)
Dept: HEMATOLOGY/ONCOLOGY | Facility: CLINIC | Age: 74
End: 2024-10-10
Payer: MEDICARE

## 2024-10-10 DIAGNOSIS — Z80.42 FAMILY HISTORY OF PROSTATE CANCER IN FATHER: ICD-10-CM

## 2024-10-10 DIAGNOSIS — C61 PROSTATE CANCER: Primary | ICD-10-CM

## 2024-10-10 PROCEDURE — 99999 PR PBB SHADOW E&M-EST. PATIENT-LVL I: CPT | Mod: PBBFAC,,,

## 2024-10-10 PROCEDURE — 99499 UNLISTED E&M SERVICE: CPT | Mod: S$GLB,,, | Performed by: INTERNAL MEDICINE

## 2024-10-10 PROCEDURE — 96040 PR GENETIC COUNSELING, EACH 30 MIN: CPT | Mod: S$GLB,,, | Performed by: INTERNAL MEDICINE

## 2024-11-04 ENCOUNTER — PATIENT OUTREACH (OUTPATIENT)
Dept: ADMINISTRATIVE | Facility: HOSPITAL | Age: 74
End: 2024-11-04
Payer: MEDICARE

## 2024-11-04 ENCOUNTER — TELEPHONE (OUTPATIENT)
Dept: UROLOGY | Facility: CLINIC | Age: 74
End: 2024-11-04
Payer: MEDICARE

## 2024-11-04 DIAGNOSIS — N18.32 STAGE 3B CHRONIC KIDNEY DISEASE: Primary | Chronic | ICD-10-CM

## 2024-11-04 DIAGNOSIS — C61 PROSTATE CANCER: Primary | ICD-10-CM

## 2024-11-04 DIAGNOSIS — R33.9 URINARY RETENTION: ICD-10-CM

## 2024-11-04 NOTE — TELEPHONE ENCOUNTER
----- Message from Bruce Dimas MD sent at 11/4/2024  3:51 PM CST -----  Please make sure this patient has a PSA scheduled prior to his upcoming visit.

## 2024-11-05 ENCOUNTER — LAB VISIT (OUTPATIENT)
Dept: LAB | Facility: HOSPITAL | Age: 74
End: 2024-11-05
Attending: STUDENT IN AN ORGANIZED HEALTH CARE EDUCATION/TRAINING PROGRAM
Payer: MEDICARE

## 2024-11-05 DIAGNOSIS — N18.32 STAGE 3B CHRONIC KIDNEY DISEASE: Chronic | ICD-10-CM

## 2024-11-05 DIAGNOSIS — C61 PROSTATE CANCER: ICD-10-CM

## 2024-11-05 LAB
ALBUMIN/CREAT UR: 8.6 UG/MG (ref 0–30)
COMPLEXED PSA SERPL-MCNC: 0.01 NG/ML (ref 0–4)
CREAT UR-MCNC: 162 MG/DL (ref 23–375)
MICROALBUMIN UR DL<=1MG/L-MCNC: 14 UG/ML

## 2024-11-05 PROCEDURE — 82043 UR ALBUMIN QUANTITATIVE: CPT | Performed by: FAMILY MEDICINE

## 2024-11-05 PROCEDURE — 84153 ASSAY OF PSA TOTAL: CPT | Performed by: STUDENT IN AN ORGANIZED HEALTH CARE EDUCATION/TRAINING PROGRAM

## 2024-11-05 PROCEDURE — 36415 COLL VENOUS BLD VENIPUNCTURE: CPT | Mod: PO | Performed by: STUDENT IN AN ORGANIZED HEALTH CARE EDUCATION/TRAINING PROGRAM

## 2024-11-06 NOTE — PROGRESS NOTES
Please CALL patient with results and Document verification.   [phone]     URINE microalbumin CREATININE RATIO NORMAL-The urine microalbumin/creatinine screen for protein in the urine was normal.  Repeat this annually.

## 2024-11-12 ENCOUNTER — OFFICE VISIT (OUTPATIENT)
Dept: UROLOGY | Facility: CLINIC | Age: 74
End: 2024-11-12
Payer: MEDICARE

## 2024-11-12 VITALS — HEIGHT: 71 IN | WEIGHT: 224.19 LBS | BODY MASS INDEX: 31.39 KG/M2

## 2024-11-12 DIAGNOSIS — R39.9 LOWER URINARY TRACT SYMPTOMS (LUTS): ICD-10-CM

## 2024-11-12 DIAGNOSIS — R39.89 SUSPECTED UTI: ICD-10-CM

## 2024-11-12 DIAGNOSIS — C61 PROSTATE CANCER: Primary | ICD-10-CM

## 2024-11-12 LAB
BACTERIA #/AREA URNS HPF: ABNORMAL /HPF
BILIRUBIN, UA POC OHS: NEGATIVE
BLOOD, UA POC OHS: NEGATIVE
CLARITY, UA POC OHS: ABNORMAL
COLOR, UA POC OHS: YELLOW
GLUCOSE, UA POC OHS: NEGATIVE
KETONES, UA POC OHS: NEGATIVE
LEUKOCYTES, UA POC OHS: ABNORMAL
MICROSCOPIC COMMENT: ABNORMAL
NITRITE, UA POC OHS: POSITIVE
PH, UA POC OHS: 5.5
POC RESIDUAL URINE VOLUME: 0 ML (ref 0–100)
PROTEIN, UA POC OHS: NEGATIVE
SPECIFIC GRAVITY, UA POC OHS: 1.01
UROBILINOGEN, UA POC OHS: 0.2
WBC #/AREA URNS HPF: 17 /HPF (ref 0–5)

## 2024-11-12 PROCEDURE — 1159F MED LIST DOCD IN RCRD: CPT | Mod: CPTII,S$GLB,, | Performed by: STUDENT IN AN ORGANIZED HEALTH CARE EDUCATION/TRAINING PROGRAM

## 2024-11-12 PROCEDURE — 4010F ACE/ARB THERAPY RXD/TAKEN: CPT | Mod: CPTII,S$GLB,, | Performed by: STUDENT IN AN ORGANIZED HEALTH CARE EDUCATION/TRAINING PROGRAM

## 2024-11-12 PROCEDURE — 87088 URINE BACTERIA CULTURE: CPT | Performed by: STUDENT IN AN ORGANIZED HEALTH CARE EDUCATION/TRAINING PROGRAM

## 2024-11-12 PROCEDURE — 3061F NEG MICROALBUMINURIA REV: CPT | Mod: CPTII,S$GLB,, | Performed by: STUDENT IN AN ORGANIZED HEALTH CARE EDUCATION/TRAINING PROGRAM

## 2024-11-12 PROCEDURE — G2211 COMPLEX E/M VISIT ADD ON: HCPCS | Mod: S$GLB,,, | Performed by: STUDENT IN AN ORGANIZED HEALTH CARE EDUCATION/TRAINING PROGRAM

## 2024-11-12 PROCEDURE — 3288F FALL RISK ASSESSMENT DOCD: CPT | Mod: CPTII,S$GLB,, | Performed by: STUDENT IN AN ORGANIZED HEALTH CARE EDUCATION/TRAINING PROGRAM

## 2024-11-12 PROCEDURE — 81003 URINALYSIS AUTO W/O SCOPE: CPT | Mod: QW,S$GLB,, | Performed by: STUDENT IN AN ORGANIZED HEALTH CARE EDUCATION/TRAINING PROGRAM

## 2024-11-12 PROCEDURE — 87186 SC STD MICRODIL/AGAR DIL: CPT | Performed by: STUDENT IN AN ORGANIZED HEALTH CARE EDUCATION/TRAINING PROGRAM

## 2024-11-12 PROCEDURE — 81000 URINALYSIS NONAUTO W/SCOPE: CPT | Mod: PO | Performed by: STUDENT IN AN ORGANIZED HEALTH CARE EDUCATION/TRAINING PROGRAM

## 2024-11-12 PROCEDURE — 87086 URINE CULTURE/COLONY COUNT: CPT | Performed by: STUDENT IN AN ORGANIZED HEALTH CARE EDUCATION/TRAINING PROGRAM

## 2024-11-12 PROCEDURE — 1101F PT FALLS ASSESS-DOCD LE1/YR: CPT | Mod: CPTII,S$GLB,, | Performed by: STUDENT IN AN ORGANIZED HEALTH CARE EDUCATION/TRAINING PROGRAM

## 2024-11-12 PROCEDURE — 51798 US URINE CAPACITY MEASURE: CPT | Mod: S$GLB,,, | Performed by: STUDENT IN AN ORGANIZED HEALTH CARE EDUCATION/TRAINING PROGRAM

## 2024-11-12 PROCEDURE — 3066F NEPHROPATHY DOC TX: CPT | Mod: CPTII,S$GLB,, | Performed by: STUDENT IN AN ORGANIZED HEALTH CARE EDUCATION/TRAINING PROGRAM

## 2024-11-12 PROCEDURE — 1126F AMNT PAIN NOTED NONE PRSNT: CPT | Mod: CPTII,S$GLB,, | Performed by: STUDENT IN AN ORGANIZED HEALTH CARE EDUCATION/TRAINING PROGRAM

## 2024-11-12 PROCEDURE — 99214 OFFICE O/P EST MOD 30 MIN: CPT | Mod: S$GLB,,, | Performed by: STUDENT IN AN ORGANIZED HEALTH CARE EDUCATION/TRAINING PROGRAM

## 2024-11-12 PROCEDURE — 99999 PR PBB SHADOW E&M-EST. PATIENT-LVL III: CPT | Mod: PBBFAC,,, | Performed by: STUDENT IN AN ORGANIZED HEALTH CARE EDUCATION/TRAINING PROGRAM

## 2024-11-12 NOTE — PROGRESS NOTES
"[place] - Urology   Clinic Note    Subjective:     Chief Complaint: prostate cancer      History of Present Illness:  Yasir Jeffery is a 74 y.o. male who presents to clinic for evaluation and management of prostate cancer. He is established to our clinic    He underwent robotic radical prostatectomy 6/20/24. Final pathology showed:  Grade Group 2, pT3a, pN0, M0, R1. Bladder neck invasion and positive excisional bladder neck margin  PSA pre-op was 20.7  PSA was 0.02, most recent was 0.01 from 11/5/24  Decipher Score was 0.75 - High Risk    He had urinary retention post-op and failed multiple VTs but have been voiding since most recent removal. He still has obstructive LUTS with a weak stream and straining to void. IPSS 18.3.     IPSS Questionnaire (AUA-SS):  1) Incomplete Emptying 2 - Less than half the time   2) Frequency 2 - Less than half the time   3) Intermittency 2 - Less than half the time   4) Urgency 2 - Less than half the time   5) Weak Stream  3 - About half the time   6) Straining 4 - More than half the time   7) Nocturia 3 - 3 times   Total score:   18   Quality of Life:  3 - Mixed        Past medical, family, surgical and social history reviewed as documented in chart with pertinent positive medical, family, surgical and social history detailed in HPI.    A review systems was conducted with pertinent positive and negative findings documented in HPI.    Objective:     Estimated body mass index is 31.72 kg/m² as calculated from the following:    Height as of this encounter: 5' 10.5" (1.791 m).    Weight as of this encounter: 101.7 kg (224 lb 3.3 oz).    Vital Signs (Most Recent)       Physical Exam  Constitutional:       General: He is not in acute distress.     Appearance: He is not ill-appearing or toxic-appearing.   Pulmonary:      Effort: Pulmonary effort is normal. No accessory muscle usage or respiratory distress.   Neurological:      Mental Status: He is alert.         Labs reviewed " below:  Lab Results   Component Value Date    BUN 17 06/28/2024    CREATININE 1.64 (H) 06/28/2024    WBC 8.86 06/28/2024    HGB 12.8 (L) 06/28/2024    HCT 38.0 (L) 06/28/2024     06/28/2024    AST 34 06/28/2024    ALT 25 06/28/2024    ALKPHOS 59 06/28/2024    ALBUMIN 3.5 06/28/2024    HGBA1C 5.2 08/11/2023        PSA trend reviewed below:  Lab Results   Component Value Date    PSADIAG 0.01 11/05/2024    PSADIAG 0.02 08/02/2024    PSADIAG 20.7 (H) 03/12/2024    PSADIAG 13.3 (H) 03/21/2023    PSADIAG 12.3 (H) 09/08/2022    PSADIAG 9.3 (H) 03/08/2022    PSADIAG 10.9 (H) 01/25/2022    PSADIAG 12.5 (H) 10/18/2021    PSADIAG 9.3 (H) 06/03/2021    PSADIAG 6.7 (H) 11/18/2020    PSAFREE 0.78 07/13/2017    PSAFREEPCT 24.38 07/13/2017               Urine dipstick today showed no blood, small leukocyte esterase, and positive nitrite.     Assessment:     1. Prostate cancer    2. Suspected UTI    3. Lower urinary tract symptoms (LUTS)        Plan:     Reviewed PSA in trend.  We discussed his pathology.  We discussed the decipher score.  We discussed the indications for salvage therapy  Continue PSA monitoring for now  Continue Flomax  Urine culture  Recommend cystoscopy to evaluate the LUTS further    We will plan PSA in 3 months    The visit today included increased complexity associated with the care of the episodic problem addressed above as well as managing the longitudinal care of the patient due to the serious and/or complex managed problem(s).      Bruce Dimas MD

## 2024-11-15 DIAGNOSIS — N30.00 ACUTE CYSTITIS WITHOUT HEMATURIA: Primary | ICD-10-CM

## 2024-11-15 LAB — BACTERIA UR CULT: ABNORMAL

## 2024-11-15 RX ORDER — SULFAMETHOXAZOLE AND TRIMETHOPRIM 800; 160 MG/1; MG/1
1 TABLET ORAL 2 TIMES DAILY
Qty: 20 TABLET | Refills: 0 | Status: SHIPPED | OUTPATIENT
Start: 2024-11-15 | End: 2024-11-25

## 2024-11-29 ENCOUNTER — TELEPHONE (OUTPATIENT)
Dept: UROLOGY | Facility: CLINIC | Age: 74
End: 2024-11-29

## 2024-11-29 ENCOUNTER — PROCEDURE VISIT (OUTPATIENT)
Dept: UROLOGY | Facility: CLINIC | Age: 74
End: 2024-11-29
Payer: MEDICARE

## 2024-11-29 VITALS — BODY MASS INDEX: 32.23 KG/M2 | HEIGHT: 71 IN | WEIGHT: 230.19 LBS

## 2024-11-29 DIAGNOSIS — C61 PROSTATE CANCER: ICD-10-CM

## 2024-11-29 DIAGNOSIS — N32.0: Primary | ICD-10-CM

## 2024-11-29 DIAGNOSIS — R39.89 SUSPECTED UTI: ICD-10-CM

## 2024-11-29 DIAGNOSIS — N32.0 BLADDER NECK CONTRACTURE: ICD-10-CM

## 2024-11-29 NOTE — PROCEDURES
Cystoscopy    Date/Time: 11/29/2024 8:15 AM    Performed by: Bruce Dimas MD  Authorized by: Bruce Dimas MD      Procedure:   Flexible cysto-urethroscopy    Pre Procedure Diagnosis:  LUTS, recurrent UTIs, and history of prostatectomy    Post Procedure Diagnosis:  Vesicourethral anastomotic stricture    Surgeon: Bruce Dimas MD     Anesthesia: 2% uro-jet lidocaine jelly for local analgesia    Procedure note:  The risks and benefits were explained and informed consent was obtained. 2% lidocaine urojet was used for local analgesia. The genitalia was prepped and draped in the sterile fashion.    The flexible scope was advanced into the urethra.  There were no strictures seen throughout the pendulous or bulbous urethra.  At the level of the vesicourethral anastomosis there was narrowing of about 10-12 [place] and was unable to accommodate the flexible cystoscope.  Gentle pressure was applied but this was unable to be advanced further.  The procedure was then terminated    The patient tolerated the procedure well without complication.     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  Recommend cystoscopy in the OR with TUR of bladder neck contracture.  We discussed the risks, benefits.  We discussed the potential risk of worsening urinary incontinence

## 2024-12-02 ENCOUNTER — TELEPHONE (OUTPATIENT)
Dept: UROLOGY | Facility: CLINIC | Age: 74
End: 2024-12-02
Payer: MEDICARE

## 2024-12-02 ENCOUNTER — TELEPHONE (OUTPATIENT)
Dept: FAMILY MEDICINE | Facility: CLINIC | Age: 74
End: 2024-12-02
Payer: MEDICARE

## 2024-12-02 NOTE — TELEPHONE ENCOUNTER
----- Message from Med Assistant Wise sent at 12/2/2024  3:31 PM CST -----  Contact: Wife Lina    ----- Message -----  From: Alicia Dougherty  Sent: 12/2/2024   3:22 PM CST  To: [facility] Staff    Type: Needs Medical Advice  Who Called:  Patients Alix Mills  Best Call Back Number: [phone]    Additional Information: Pts wife is calling back in regards to his procedure Wednesday 12/4 with Dr Dimas, just spoke to his PCP's Office and the earliest they could get him in for clearance is the same day at 8:20, can we please call back to discuss. Thank You

## 2024-12-02 NOTE — TELEPHONE ENCOUNTER
----- Message from Nelida sent at 12/2/2024  2:53 PM CST -----  Regarding: Needs Medical Clearance appointment  Contact: Lina, wife at [phone]  Type: Needs Medical Clearance  Who Called:  Lina, wife at [phone]    Additional Information: patient has a procedure this Wednesday for urology and was asked to get clearance today or tomorrow. Please call and advise. Thank you

## 2024-12-02 NOTE — TELEPHONE ENCOUNTER
----- Message from Med Assistant Wise sent at 12/2/2024 12:27 PM CST -----  Regarding: FW: Needs Medical Advice  Contact: wife at [phone]    ----- Message -----  From: Nelida Crawford  Sent: 12/2/2024  12:26 PM CST  To: [facility] Staff  Subject: Needs Medical Advice                             Type: Needs Medical Advice  Who Called:  wife at [phone]    Additional Information: would like to get details for procedure. Please call and advise. Thank you

## 2024-12-04 ENCOUNTER — LAB VISIT (OUTPATIENT)
Dept: LAB | Facility: HOSPITAL | Age: 74
End: 2024-12-04
Attending: PHYSICIAN ASSISTANT
Payer: MEDICARE

## 2024-12-04 ENCOUNTER — OFFICE VISIT (OUTPATIENT)
Dept: FAMILY MEDICINE | Facility: CLINIC | Age: 74
End: 2024-12-04
Payer: MEDICARE

## 2024-12-04 VITALS
TEMPERATURE: 98 F | SYSTOLIC BLOOD PRESSURE: 134 MMHG | BODY MASS INDEX: 32.93 KG/M2 | WEIGHT: 230 LBS | HEIGHT: 70 IN | OXYGEN SATURATION: 97 % | HEART RATE: 70 BPM | DIASTOLIC BLOOD PRESSURE: 66 MMHG | RESPIRATION RATE: 18 BRPM

## 2024-12-04 DIAGNOSIS — N18.32 STAGE 3B CHRONIC KIDNEY DISEASE: Chronic | ICD-10-CM

## 2024-12-04 DIAGNOSIS — Z01.818 PREOPERATIVE CLEARANCE: Primary | ICD-10-CM

## 2024-12-04 DIAGNOSIS — E78.2 MIXED HYPERLIPIDEMIA: ICD-10-CM

## 2024-12-04 DIAGNOSIS — Z01.818 PREOPERATIVE CLEARANCE: ICD-10-CM

## 2024-12-04 DIAGNOSIS — I10 ESSENTIAL HYPERTENSION: Chronic | ICD-10-CM

## 2024-12-04 LAB
ALBUMIN SERPL BCP-MCNC: 3.8 G/DL (ref 3.5–5.2)
ALP SERPL-CCNC: 66 U/L (ref 40–150)
ALT SERPL W/O P-5'-P-CCNC: 12 U/L (ref 10–44)
ANION GAP SERPL CALC-SCNC: 10 MMOL/L (ref 8–16)
AST SERPL-CCNC: 19 U/L (ref 10–40)
BASOPHILS # BLD AUTO: 0.08 K/UL (ref 0–0.2)
BASOPHILS NFR BLD: 1.3 % (ref 0–1.9)
BILIRUB SERPL-MCNC: 0.5 MG/DL (ref 0.1–1)
BUN SERPL-MCNC: 25 MG/DL (ref 8–23)
CALCIUM SERPL-MCNC: 9.6 MG/DL (ref 8.7–10.5)
CHLORIDE SERPL-SCNC: 107 MMOL/L (ref 95–110)
CHOLEST SERPL-MCNC: 213 MG/DL (ref 120–199)
CHOLEST/HDLC SERPL: 6.5 {RATIO} (ref 2–5)
CO2 SERPL-SCNC: 23 MMOL/L (ref 23–29)
CREAT SERPL-MCNC: 1.4 MG/DL (ref 0.5–1.4)
DIFFERENTIAL METHOD BLD: ABNORMAL
EOSINOPHIL # BLD AUTO: 0.1 K/UL (ref 0–0.5)
EOSINOPHIL NFR BLD: 2 % (ref 0–8)
ERYTHROCYTE [DISTWIDTH] IN BLOOD BY AUTOMATED COUNT: 13.6 % (ref 11.5–14.5)
EST. GFR  (NO RACE VARIABLE): 52.4 ML/MIN/1.73 M^2
GLUCOSE SERPL-MCNC: 94 MG/DL (ref 70–110)
HCT VFR BLD AUTO: 46.4 % (ref 40–54)
HDLC SERPL-MCNC: 33 MG/DL (ref 40–75)
HDLC SERPL: 15.5 % (ref 20–50)
HGB BLD-MCNC: 14.8 G/DL (ref 14–18)
IMM GRANULOCYTES # BLD AUTO: 0.02 K/UL (ref 0–0.04)
IMM GRANULOCYTES NFR BLD AUTO: 0.3 % (ref 0–0.5)
LDLC SERPL CALC-MCNC: 155.8 MG/DL (ref 63–159)
LYMPHOCYTES # BLD AUTO: 1.9 K/UL (ref 1–4.8)
LYMPHOCYTES NFR BLD: 29.2 % (ref 18–48)
MCH RBC QN AUTO: 30.2 PG (ref 27–31)
MCHC RBC AUTO-ENTMCNC: 31.9 G/DL (ref 32–36)
MCV RBC AUTO: 95 FL (ref 82–98)
MONOCYTES # BLD AUTO: 0.6 K/UL (ref 0.3–1)
MONOCYTES NFR BLD: 8.6 % (ref 4–15)
NEUTROPHILS # BLD AUTO: 3.7 K/UL (ref 1.8–7.7)
NEUTROPHILS NFR BLD: 58.6 % (ref 38–73)
NONHDLC SERPL-MCNC: 180 MG/DL
NRBC BLD-RTO: 0 /100 WBC
PLATELET # BLD AUTO: 184 K/UL (ref 150–450)
PMV BLD AUTO: 8.8 FL (ref 9.2–12.9)
POTASSIUM SERPL-SCNC: 5 MMOL/L (ref 3.5–5.1)
PROT SERPL-MCNC: 7.3 G/DL (ref 6–8.4)
RBC # BLD AUTO: 4.9 M/UL (ref 4.6–6.2)
SODIUM SERPL-SCNC: 140 MMOL/L (ref 136–145)
TRIGL SERPL-MCNC: 121 MG/DL (ref 30–150)
WBC # BLD AUTO: 6.36 K/UL (ref 3.9–12.7)

## 2024-12-04 PROCEDURE — 80053 COMPREHEN METABOLIC PANEL: CPT | Performed by: PHYSICIAN ASSISTANT

## 2024-12-04 PROCEDURE — 36415 COLL VENOUS BLD VENIPUNCTURE: CPT | Mod: PO | Performed by: PHYSICIAN ASSISTANT

## 2024-12-04 PROCEDURE — 80061 LIPID PANEL: CPT | Performed by: PHYSICIAN ASSISTANT

## 2024-12-04 PROCEDURE — 93010 ELECTROCARDIOGRAM REPORT: CPT | Mod: S$GLB,,, | Performed by: INTERNAL MEDICINE

## 2024-12-04 PROCEDURE — 99999 PR PBB SHADOW E&M-EST. PATIENT-LVL V: CPT | Mod: PBBFAC,,, | Performed by: PHYSICIAN ASSISTANT

## 2024-12-04 PROCEDURE — 85025 COMPLETE CBC W/AUTO DIFF WBC: CPT | Performed by: PHYSICIAN ASSISTANT

## 2024-12-04 NOTE — PATIENT INSTRUCTIONS
Ok Cooley,     If you are due for any health screening(s) below please notify me so we can arrange them to be ordered and scheduled. Most healthy patients at your age complete them, but you are free to accept or refuse.     If you can't do it, I'll definitely understand. If you can, I'd certainly appreciate it!    Tests to Keep You Healthy    Colon Cancer Screening: DUE  Last Blood Pressure <= 139/89 (6/5/2024): Yes      Its time for your colon cancer screening     Colorectal cancer is one of the leading causes of cancer death for men and women but it doesnt have to be. Screenings can prevent colorectal cancer or find it early enough to treat and cure the disease.     Our records indicate that you may be overdue for colon cancer screening. A colonoscopy or stool screening test can help identify patients at risk for developing colon cancer. Cancer screenings save lives, so schedule yours today to stay healthy.     A colonoscopy is the preferred test for detecting colon cancer. It is needed only once every 10 years if results are negative. While you are sedated, a flexible, lighted tube with a tiny camera is inserted into the rectum and advanced through the colon to look for cancers.     An alternative screening test that is used at home and returned to the lab may also be used. It detects hidden blood in bowel movements which could indicate cancer in the colon. If results are positive, you will need a colonoscopy to determine if the blood is a sign of cancer. This type of follow up (diagnostic) colonoscopy usually requires additional copays as required by your insurance provider.     If you recently had your colon cancer screening performed outside of [facility], please let your Health care team know so that they can update your health record. Please contact your PCP if you have any questions.

## 2024-12-04 NOTE — PROGRESS NOTES
Assessment/Plan:    Problem List Items Addressed This Visit          Cardiac/Vascular    Mixed hyperlipidemia (Chronic)    Overview     Managed by cardiology. He has been having some issues with fatigue. He stopped taking statin because he thought this could be the cause. Reports was also concerned about possible liver SE.    Lab Results   Component Value Date    CHOL 228 (H) 12/05/2023    CHOL 205 (H) 10/02/2023    CHOL 241 (H) 08/11/2023     Lab Results   Component Value Date    HDL 30 (L) 12/05/2023    HDL 35 (L) 10/02/2023    HDL 33 (L) 08/11/2023     Lab Results   Component Value Date    LDLCALC 162.6 (H) 12/05/2023    LDLCALC 151.4 10/02/2023    LDLCALC 177.2 (H) 08/11/2023     Lab Results   Component Value Date    TRIG 177 (H) 12/05/2023    TRIG 93 10/02/2023    TRIG 154 (H) 08/11/2023     Lab Results   Component Value Date    CHOLHDL 13.2 (L) 12/05/2023    CHOLHDL 17.1 (L) 10/02/2023    CHOLHDL 13.7 (L) 08/11/2023     Lab Results   Component Value Date    ALT 25 06/28/2024    AST 34 06/28/2024    ALKPHOS 59 06/28/2024    BILITOT 0.8 06/28/2024     The 10-year ASCVD risk score (Lorelei GONZALEZ, et al., 2019) is: 36.5%    Values used to calculate the score:      Age: 74 years      Sex: Male      Is Non- : No      Diabetic: No      Tobacco smoker: No      Systolic Blood Pressure: 134 mmHg      Is BP treated: Yes      HDL Cholesterol: 30 mg/dL      Total Cholesterol: 228 mg/dL            Relevant Orders    Lipid Panel    Essential hypertension (Chronic)    Overview     CHRONIC. STABLE. BP Reviewed.  Compliant with BP medications. No SE reported.     (-) CP, SOB, palpitations, dizziness, lightheadedness, HA, arm numbness, tingling or weakness, syncope.  Creatinine   Date Value Ref Range Status   06/28/2024 1.64 (H) 0.50 - 1.40 mg/dL Final     Hypertension Medications               amLODIPine (NORVASC) 5 MG tablet Take 1 tablet (5 mg total) by mouth once daily.    valsartan (DIOVAN) 320 MG tablet  Take 320 mg by mouth once daily.    carvediloL (COREG) 6.25 MG tablet Take 1 tablet (6.25 mg total) by mouth 2 (two) times daily with meals.               Renal/    Stage 3b chronic kidney disease (Chronic)    Overview     Follows with nephrology    BMP  Lab Results   Component Value Date     06/28/2024    K 4.5 06/28/2024     06/28/2024    CO2 22 06/28/2024    BUN 17 06/28/2024    CREATININE 1.64 (H) 06/28/2024    CALCIUM 8.5 06/28/2024    ANIONGAP 7 06/28/2024    EGFRNORACEVR 44 (A) 06/28/2024             Other Visit Diagnoses       Preoperative clearance    -  Primary    Relevant Orders    CBC Auto Differential    Comprehensive Metabolic Panel    IN OFFICE EKG 12-LEAD (to [place])           Assessment:   74 y.o. male with planned surgery cystoscopy with TUR of bladder neck contracture.    Known risk factors for perioperative complications: ectopic atrial tachycardia, HTN, HLD, CKD, MARIN, obesity and prostate cancer  Difficulty with intubation is not anticipated.    Cardiac Risk Estimation: per the Revised Cardiac Risk Index (Circ. 100:1043, 1999), the patient's risk factors for cardiac complications include  none , putting him in: RCI RISK CLASS I (0 risk factors, risk of major cardiac compl. appr. 0.5%).    Current medications which may produce withdrawal symptoms if withheld perioperatively: none.     Plan:   1. Preoperative workup as follows ECG, hemoglobin, hematocrit, electrolytes, creatinine, glucose, liver function studies.  2. Change in medication regimen before surgery: discontinue aspirin 7 days before procedure-he is no longer taking aspirin  3. Prophylaxis for cardiac events with perioperative beta-blockers: should be considered, specific regimen per anesthesia.  4. Invasive hemodynamic monitoring perioperatively: at the discretion of anesthesiologist.  5. Deep vein thrombosis prophylaxis postoperatively:regimen to be chosen by surgical team.  6. Surveillance for postoperative MI with ECG  immediately postoperatively and on postoperative days 1 and 2 AND troponin levels 24 hours postoperatively and on day 4 or hospital discharge (whichever comes first): at the discretion of anesthesiologist.  7. Other measures: None. Will provide clearance once reviewing results.   Addendum 12/5/24: EKG stable. CBC and CMP unremarkable. Patient cleared for surgery from primary care perspective.    Follow up if symptoms worsen or fail to improve.    Sravanthi Godinez PA-C  _____________________________________________________________________________________________________________________________________________________    CC: preop clearance    HPI: Patient is in clinic today as an established patient here for preop clearance to have a cystoscopy with TUR of bladder neck contracture. The physician that is performing the surgery is Dr. Dimas. The surgery is being planned for 12/11/24. The patient states that there has not been previous problems with sedation. He has had prior surgeries in the past with no adverse effects from anesthesia. He is not currently taking a blood thinner. He has no history of blood clots or bleeding disorders. Patient is a nonsmoker. No history of MI, PE, DVT, CHF, and/or COPD. He has a history of ectopic atrial tachycardia, HTN, HLD, CKD, MARIN, obesity and prostate cancer. He did undergo a prostatectomy in 6/2024 and tolerated the procedure well and without any complications. He was cleared by cardiology at that time at moderate CV risk with instructions to stop aspirin 7 days before the procedure. Patient has not taken aspirin since that time. No other complaints today.    Past Medical History:   Diagnosis Date    Anticoagulant long-term use     Back pain     Benign prostate hyperplasia     Benign prostatic hyperplasia with weak urinary stream 06/06/2017    Treated with Avodart by Dr. Wyatt       DDD (degenerative disc disease), thoracolumbar     Disorder of kidney and ureter     stage 3     Elevated PSA     Glaucoma     Hx of colonic polyps 2010    Hypertension     Malignant neoplasm of prostate 07/31/2019    Metabolic syndrome     Mixed hyperlipidemia 01/24/2018    Obesity     MARIN (obstructive sleep apnea)     Mild case no CPAP    Pneumonia     Psychophysiological insomnia      Past Surgical History:   Procedure Laterality Date    BIOPSY, PROSTATE, USING PROSTATE MAPPING N/A 4/14/2023    Procedure: BIOPSY, PROSTATE, USING PROSTATE MAPPING;  Surgeon: EJ Chan MD;  Location: [facility];  Service: Urology;  Laterality: N/A;    BIOPSY, PROSTATE, USING PROSTATE MAPPING N/A 5/10/2024    Procedure: BIOPSY, PROSTATE, USING PROSTATE MAPPING;  Surgeon: EJ Chan MD;  Location: [facility];  Service: Urology;  Laterality: N/A;    CYST REMOVAL  1993    neck    CYSTOSCOPY N/A 7/17/2019    Procedure: CYSTOSCOPY;  Surgeon: Ulisses Serra MD;  Location: [facility];  Service: Urology;  Laterality: N/A;    EYE SURGERY Bilateral     cataract surgery    GANGLION CYST EXCISION Right 1994    PROSTATE BIOPSY      ROBOT-ASSISTED LAPAROSCOPIC PELVIC LYMPHADENECTOMY USING DA PATSY XI Bilateral 6/20/2024    Procedure: XI ROBOTIC LYMPHADENECTOMY, PELVIC;  Surgeon: Bruce Dimas MD;  Location: [facility];  Service: Urology;  Laterality: Bilateral;    ROBOT-ASSISTED LAPAROSCOPIC PROSTATECTOMY USING DA PATSY XI N/A 6/20/2024    Procedure: XI ROBOTIC PROSTATECTOMY, RADICAL;  Surgeon: Bruce Dimas MD;  Location: [facility];  Service: Urology;  Laterality: N/A;    TESTICLE SURGERY  1980    TRANSRECTAL BIOPSY OF PROSTATE WITH ULTRASOUND GUIDANCE Bilateral 7/17/2019    Procedure: BIOPSY, PROSTATE, RECTAL APPROACH, WITH US GUIDANCE;  Surgeon: Ulisses Serra MD;  Location: [facility];  Service: Urology;  Laterality: Bilateral;    TRANSRECTAL BIOPSY OF PROSTATE WITH ULTRASOUND GUIDANCE N/A 3/11/2020    Procedure: BIOPSY, PROSTATE, RECTAL APPROACH, WITH US GUIDANCE;  Surgeon: Ulisses Serra MD;  Location: [facility];  Service:  Urology;  Laterality: N/A;    TRANSURETHRAL SURGICAL REMOVAL OF PROSTATE (TURP) USING GREEN LIGHT LASER N/A 2022    Procedure: TURP, USING GREEN LIGHT LASER;  Surgeon: Ulisses Serra MD;  Location: [facility];  Service: Urology;  Laterality: N/A;     Review of patient's allergies indicates:   Allergen Reactions    Benadryl allergy decongestant      Opposite reaction, speeds him up   Opposite reaction, speeds him up     Diphenhydramine      Opposite reaction, speeds him up      Social History     Tobacco Use    Smoking status: Former     Types: Cigars     Start date:      Quit date:      Years since quittin.9     Passive exposure: Never    Smokeless tobacco: Never    Tobacco comments:     cigars sporadically x 3 years   Substance Use Topics    Alcohol use: No    Drug use: Never     Family History   Problem Relation Name Age of Onset    Arthritis Mother      Prostate cancer Father  70 - 79        spread to the bone    No Known Problems Sister      Alzheimer's disease Brother      Dementia Brother      Pacemaker/defibrilator Sister      Arthritis Sister       Current Outpatient Medications on File Prior to Visit   Medication Sig Dispense Refill    amLODIPine (NORVASC) 5 MG tablet Take 1 tablet (5 mg total) by mouth once daily. 90 tablet 3    aspirin 81 MG Chew Take 81 mg by mouth once daily.      b complex vitamins tablet Take 1 tablet by mouth once daily.      carvediloL (COREG) 6.25 MG tablet Take 1 tablet (6.25 mg total) by mouth 2 (two) times daily with meals. 180 tablet 3    cholecalciferol, vitamin D3, (VITAMIN D3) 50 mcg (2,000 unit) Tab Take 2,000 Units by mouth once daily.      dorzolamide-timolol 2-0.5% (COSOPT) 22.3-6.8 mg/mL ophthalmic solution Place 1 drop into both eyes nightly.      fluticasone propionate (FLONASE) 50 mcg/actuation nasal spray 1 spray (50 mcg total) by Each Nostril route once daily. 16 g 11    latanoprost 0.005 % ophthalmic solution Place 1 drop into both eyes every  "evening.  6    meclizine (ANTIVERT) 50 MG tablet Take 50 mg by mouth 3 (three) times daily as needed.      oxyCODONE (ROXICODONE) 5 MG immediate release tablet Take 1 tablet (5 mg total) by mouth every 6 (six) hours as needed (pain). 10 tablet 0    red yeast rice 600 mg Cap Take 600 mg by mouth once daily.      tamsulosin (FLOMAX) 0.4 mg Cap Take 1 capsule (0.4 mg total) by mouth every evening. 30 capsule 11    valsartan (DIOVAN) 320 MG tablet Take 1 tablet (320 mg total) by mouth once daily. 90 tablet 3    cetirizine (ZYRTEC) 10 MG tablet Take 1 tablet (10 mg total) by mouth once daily. 30 tablet 11     No current facility-administered medications on file prior to visit.       Review of Systems   Constitutional:  Negative for chills, diaphoresis, fatigue and fever.   HENT:  Negative for congestion, ear pain, postnasal drip, sinus pain and sore throat.    Eyes:  Negative for pain and redness.   Respiratory:  Negative for cough, chest tightness and shortness of breath.    Cardiovascular:  Negative for chest pain and leg swelling.   Gastrointestinal:  Negative for abdominal pain, constipation, diarrhea, nausea and vomiting.   Genitourinary:  Negative for dysuria and hematuria.   Musculoskeletal:  Negative for arthralgias and joint swelling.   Skin:  Negative for rash.   Neurological:  Negative for dizziness, syncope and headaches.   Psychiatric/Behavioral:  Negative for dysphoric mood. The patient is not nervous/anxious.        Vitals:    12/04/24 0815   BP: 134/66   BP Location: Right arm   Patient Position: Sitting   Pulse: 70   Resp: 18   Temp: 97.7 °F (36.5 °C)   SpO2: 97%   Weight: 104.3 kg (230 lb)   Height: 5' 10" (1.778 m)       Wt Readings from Last 3 Encounters:   12/04/24 104.3 kg (230 lb)   11/29/24 104.4 kg (230 lb 2.6 oz)   11/12/24 101.7 kg (224 lb 3.3 oz)       Physical Exam  Constitutional:       General: He is not in acute distress.     Appearance: Normal appearance. He is well-developed.   HENT:     "  Head: Normocephalic and atraumatic.   Eyes:      Conjunctiva/sclera: Conjunctivae normal.   Cardiovascular:      Rate and Rhythm: Normal rate and regular rhythm.      Pulses: Normal pulses.      Heart sounds: Normal heart sounds. No murmur heard.  Pulmonary:      Effort: Pulmonary effort is normal. No respiratory distress.      Breath sounds: Normal breath sounds.   Abdominal:      General: Bowel sounds are normal. There is no distension.      Palpations: Abdomen is soft.      Tenderness: There is no abdominal tenderness.   Musculoskeletal:         General: Normal range of motion.      Cervical back: Normal range of motion and neck supple.   Skin:     General: Skin is warm and dry.      Findings: No rash.   Neurological:      General: No focal deficit present.      Mental Status: He is alert and oriented to person, place, and time.   Psychiatric:         Mood and Affect: Mood normal.         Behavior: Behavior normal.         Health Maintenance   Topic Date Due    Shingles Vaccine (1 of 2) 11/02/2017    RSV Vaccine (Age 60+ and Pregnant patients) (1 - 1-dose 75+ series) Never done    Influenza Vaccine (1) Never done    Colorectal Cancer Screening  10/20/2024    TETANUS VACCINE  06/20/2025    Annual UACr  11/05/2025    Lipid Panel  12/05/2028    Hepatitis C Screening  Completed    Pneumococcal Vaccines (Age 65+)  Completed    Abdominal Aortic Aneurysm Screening  Completed    COVID-19 Vaccine  Discontinued

## 2024-12-05 ENCOUNTER — TELEPHONE (OUTPATIENT)
Dept: FAMILY MEDICINE | Facility: CLINIC | Age: 74
End: 2024-12-05
Payer: MEDICARE

## 2024-12-05 LAB
OHS QRS DURATION: 76 MS
OHS QTC CALCULATION: 420 MS

## 2024-12-05 NOTE — TELEPHONE ENCOUNTER
Please let patient know that his labs (CBC and CMP) and EKG were stable. His cholesterol was also stable. I know he was previously on cholesterol medication. His ASCVD score is 34.9%, which is considered to be high. This a number that is calculated using cholesterol levels plus other risk factors and is used to estimate risk of having a cardiovascular event (heart attack or stroke) within the next 10 years. Due to his elevated risk, he may want to consider restarting cholesterol medication. Otherwise he is cleared to proceed with surgery from a primary care perspective. No further testing needed at this time. Please let Dr. Dimas's staff know that he is cleared.

## 2024-12-06 ENCOUNTER — TELEPHONE (OUTPATIENT)
Dept: UROLOGY | Facility: CLINIC | Age: 74
End: 2024-12-06
Payer: MEDICARE

## 2024-12-12 ENCOUNTER — TELEPHONE (OUTPATIENT)
Dept: UROLOGY | Facility: CLINIC | Age: 74
End: 2024-12-12
Payer: MEDICARE

## 2025-01-31 ENCOUNTER — TELEPHONE (OUTPATIENT)
Dept: HEMATOLOGY/ONCOLOGY | Facility: CLINIC | Age: 75
End: 2025-01-31
Payer: MEDICARE

## 2025-01-31 ENCOUNTER — TELEPHONE (OUTPATIENT)
Dept: UROLOGY | Facility: CLINIC | Age: 75
End: 2025-01-31
Payer: MEDICARE

## 2025-01-31 NOTE — NURSING
Chart reviewed. Mr. Jeffery was no-show to scheduled 1/23 f/u visit with Dr. Dimas.     Called Mr. Jeffery to check in - spoke with wife, Lina.     Ms. Mills states that both she and Mr. Castillo have had colds recently. She would like to reschedule his f/u visit with Dr. Dimas.     [facility] to assist with scheduling.     No further questions/concerns at this time.

## 2025-01-31 NOTE — TELEPHONE ENCOUNTER
----- Message from CARLOS Tirado sent at 1/31/2025  9:44 AM CST -----  Regarding: Dr. Dimas follow-up  Good morning,     I spoke with wife Ms. Mills today to check in. She states she and Mr. Castillo have been sick with colds recently. She would like to reschedule a follow-up visit with Dr. Dimas. Pelalejandro contact her to assist with scheduling.    Thank you!  Candida

## 2025-02-04 ENCOUNTER — LAB VISIT (OUTPATIENT)
Dept: LAB | Facility: HOSPITAL | Age: 75
End: 2025-02-04
Attending: PHYSICIAN ASSISTANT
Payer: MEDICARE

## 2025-02-04 DIAGNOSIS — C61 PROSTATE CANCER: ICD-10-CM

## 2025-02-04 LAB — COMPLEXED PSA SERPL-MCNC: 0.03 NG/ML (ref 0–4)

## 2025-02-04 PROCEDURE — 36415 COLL VENOUS BLD VENIPUNCTURE: CPT | Mod: PO | Performed by: STUDENT IN AN ORGANIZED HEALTH CARE EDUCATION/TRAINING PROGRAM

## 2025-02-04 PROCEDURE — 84153 ASSAY OF PSA TOTAL: CPT | Performed by: STUDENT IN AN ORGANIZED HEALTH CARE EDUCATION/TRAINING PROGRAM

## 2025-02-07 ENCOUNTER — OFFICE VISIT (OUTPATIENT)
Dept: UROLOGY | Facility: CLINIC | Age: 75
End: 2025-02-07
Payer: MEDICARE

## 2025-02-07 VITALS — WEIGHT: 201.5 LBS | HEIGHT: 70 IN | BODY MASS INDEX: 28.85 KG/M2

## 2025-02-07 DIAGNOSIS — N32.0: ICD-10-CM

## 2025-02-07 DIAGNOSIS — C61 PROSTATE CANCER: Primary | ICD-10-CM

## 2025-02-07 LAB
BILIRUBIN, UA POC OHS: NEGATIVE
BLOOD, UA POC OHS: NEGATIVE
CLARITY, UA POC OHS: CLEAR
COLOR, UA POC OHS: YELLOW
GLUCOSE, UA POC OHS: NEGATIVE
KETONES, UA POC OHS: NEGATIVE
LEUKOCYTES, UA POC OHS: ABNORMAL
NITRITE, UA POC OHS: NEGATIVE
PH, UA POC OHS: 5.5
PROTEIN, UA POC OHS: NEGATIVE
SPECIFIC GRAVITY, UA POC OHS: 1.01
UROBILINOGEN, UA POC OHS: 0.2

## 2025-02-07 PROCEDURE — G2211 COMPLEX E/M VISIT ADD ON: HCPCS | Mod: S$GLB,,, | Performed by: STUDENT IN AN ORGANIZED HEALTH CARE EDUCATION/TRAINING PROGRAM

## 2025-02-07 PROCEDURE — 99999 PR PBB SHADOW E&M-EST. PATIENT-LVL III: CPT | Mod: PBBFAC,,, | Performed by: STUDENT IN AN ORGANIZED HEALTH CARE EDUCATION/TRAINING PROGRAM

## 2025-02-07 PROCEDURE — 1101F PT FALLS ASSESS-DOCD LE1/YR: CPT | Mod: CPTII,S$GLB,, | Performed by: STUDENT IN AN ORGANIZED HEALTH CARE EDUCATION/TRAINING PROGRAM

## 2025-02-07 PROCEDURE — 1126F AMNT PAIN NOTED NONE PRSNT: CPT | Mod: CPTII,S$GLB,, | Performed by: STUDENT IN AN ORGANIZED HEALTH CARE EDUCATION/TRAINING PROGRAM

## 2025-02-07 PROCEDURE — 3288F FALL RISK ASSESSMENT DOCD: CPT | Mod: CPTII,S$GLB,, | Performed by: STUDENT IN AN ORGANIZED HEALTH CARE EDUCATION/TRAINING PROGRAM

## 2025-02-07 PROCEDURE — 99214 OFFICE O/P EST MOD 30 MIN: CPT | Mod: S$GLB,,, | Performed by: STUDENT IN AN ORGANIZED HEALTH CARE EDUCATION/TRAINING PROGRAM

## 2025-02-07 PROCEDURE — 81003 URINALYSIS AUTO W/O SCOPE: CPT | Mod: QW,S$GLB,, | Performed by: STUDENT IN AN ORGANIZED HEALTH CARE EDUCATION/TRAINING PROGRAM

## 2025-02-07 PROCEDURE — 1159F MED LIST DOCD IN RCRD: CPT | Mod: CPTII,S$GLB,, | Performed by: STUDENT IN AN ORGANIZED HEALTH CARE EDUCATION/TRAINING PROGRAM

## 2025-02-07 NOTE — PROGRESS NOTES
"[facility] - Urology   Clinic Note    Subjective:     Chief Complaint: prostate cancer      History of Present Illness:  Yasir Jeffery is a 74 y.o. male who presents to clinic for evaluation and management of prostate cancer. He is established to our clinic    He underwent robotic radical prostatectomy 6/20/24.  He had a prior TURP. Final pathology showed:  Grade Group 2, pT3a, pN0, M0, R1. Bladder neck invasion and positive excisional bladder neck margin  PSA pre-op was 20.7  PSA post-op: 0.02, 0.01, most recently 0.03 2/25  Decipher Score was 0.75 - High Risk    He had urinary retention post-op and failed multiple VTs. He was diagnosed with vesicourethral anastomotic stenosis and underwent dilation in the OR 12/11/24. He presents today for follow up.  He reports somewhat improvement in symptoms but no significant change since the dilation.    Past medical, family, surgical and social history reviewed as documented in chart with pertinent positive medical, family, surgical and social history detailed in HPI.    A review systems was conducted with pertinent positive and negative findings documented in HPI.    Objective:     Estimated body mass index is 28.91 kg/m² as calculated from the following:    Height as of this encounter: 5' 10" (1.778 m).    Weight as of this encounter: 91.4 kg (201 lb 8 oz).    Vital Signs (Most Recent)       Physical Exam  Constitutional:       General: He is not in acute distress.     Appearance: He is not ill-appearing or toxic-appearing.   Pulmonary:      Effort: Pulmonary effort is normal. No accessory muscle usage or respiratory distress.   Neurological:      Mental Status: He is alert.         Labs reviewed below:  Lab Results   Component Value Date    BUN 25 (H) 12/04/2024    CREATININE 1.4 12/04/2024    WBC 6.36 12/04/2024    HGB 14.8 12/04/2024    HCT 46.4 12/04/2024     12/04/2024    AST 19 12/04/2024    ALT 12 12/04/2024    ALKPHOS 66 12/04/2024    ALBUMIN 3.8 " 12/04/2024    HGBA1C 5.2 08/11/2023        PSA trend reviewed below:  Lab Results   Component Value Date    PSADIAG 0.03 02/04/2025    PSADIAG 0.01 11/05/2024    PSADIAG 0.02 08/02/2024    PSADIAG 20.7 (H) 03/12/2024    PSADIAG 13.3 (H) 03/21/2023    PSADIAG 12.3 (H) 09/08/2022    PSADIAG 9.3 (H) 03/08/2022    PSADIAG 10.9 (H) 01/25/2022    PSADIAG 12.5 (H) 10/18/2021    PSADIAG 9.3 (H) 06/03/2021    PSAFREE 0.78 07/13/2017    PSAFREEPCT 24.38 07/13/2017               Urine dipstick today showed no blood, small leukocyte esterase, and positive nitrite.     Assessment:     1. Prostate cancer    2. Stricture of vesicourethral anastomosis      Plan:     Reviewed PSA in trend.  We discussed his pathology.  We discussed the decipher score.  We discussed the indications for salvage therapy including ADT and radiation  Continue PSA monitoring for now.  Discussed further evaluation and management of the vesicourethral anastomotic stenosis.  We discussed further cystoscopy.  We discussed Optilume urethral dilation if there is a recurrence.  We discussed the lower risk of recurrence using Optilume.    We will plan PSA in 3 months  Consider cystoscopy if LUTS are still bothersome    The visit today included increased complexity associated with the care of the episodic problem addressed above as well as managing the longitudinal care of the patient due to the serious and/or complex managed problem(s).      Bruce Dimas MD

## 2025-02-10 ENCOUNTER — TELEPHONE (OUTPATIENT)
Dept: HEMATOLOGY/ONCOLOGY | Facility: CLINIC | Age: 75
End: 2025-02-10
Payer: MEDICARE

## 2025-03-24 DIAGNOSIS — Z00.00 ENCOUNTER FOR MEDICARE ANNUAL WELLNESS EXAM: ICD-10-CM

## 2025-05-23 ENCOUNTER — LAB VISIT (OUTPATIENT)
Dept: LAB | Facility: HOSPITAL | Age: 75
End: 2025-05-23
Attending: STUDENT IN AN ORGANIZED HEALTH CARE EDUCATION/TRAINING PROGRAM
Payer: MEDICARE

## 2025-05-23 DIAGNOSIS — C61 PROSTATE CANCER: ICD-10-CM

## 2025-05-23 PROCEDURE — 84153 ASSAY OF PSA TOTAL: CPT

## 2025-05-23 PROCEDURE — 36415 COLL VENOUS BLD VENIPUNCTURE: CPT | Mod: PO

## 2025-05-24 LAB — PSA SERPL-MCNC: 0.05 NG/ML

## 2025-05-27 ENCOUNTER — OFFICE VISIT (OUTPATIENT)
Dept: UROLOGY | Facility: CLINIC | Age: 75
End: 2025-05-27
Payer: MEDICARE

## 2025-05-27 VITALS — HEIGHT: 70 IN | WEIGHT: 224 LBS | BODY MASS INDEX: 32.07 KG/M2

## 2025-05-27 DIAGNOSIS — R97.20 ELEVATED PSA: ICD-10-CM

## 2025-05-27 DIAGNOSIS — C61 PROSTATE CANCER: Primary | ICD-10-CM

## 2025-05-27 DIAGNOSIS — R68.82 LOW LIBIDO: ICD-10-CM

## 2025-05-27 LAB
BILIRUBIN, UA POC OHS: NEGATIVE
BLOOD, UA POC OHS: NEGATIVE
CLARITY, UA POC OHS: CLEAR
COLOR, UA POC OHS: YELLOW
GLUCOSE, UA POC OHS: NEGATIVE
KETONES, UA POC OHS: NEGATIVE
LEUKOCYTES, UA POC OHS: NEGATIVE
NITRITE, UA POC OHS: NEGATIVE
PH, UA POC OHS: 5.5
POC RESIDUAL URINE VOLUME: 0 ML (ref 0–100)
PROTEIN, UA POC OHS: NEGATIVE
SPECIFIC GRAVITY, UA POC OHS: 1.02
UROBILINOGEN, UA POC OHS: 0.2

## 2025-05-27 PROCEDURE — 99999 PR PBB SHADOW E&M-EST. PATIENT-LVL II: CPT | Mod: PBBFAC,,, | Performed by: STUDENT IN AN ORGANIZED HEALTH CARE EDUCATION/TRAINING PROGRAM

## 2025-05-27 PROCEDURE — 1126F AMNT PAIN NOTED NONE PRSNT: CPT | Mod: CPTII,S$GLB,, | Performed by: STUDENT IN AN ORGANIZED HEALTH CARE EDUCATION/TRAINING PROGRAM

## 2025-05-27 PROCEDURE — G2211 COMPLEX E/M VISIT ADD ON: HCPCS | Mod: S$GLB,,, | Performed by: STUDENT IN AN ORGANIZED HEALTH CARE EDUCATION/TRAINING PROGRAM

## 2025-05-27 PROCEDURE — 81003 URINALYSIS AUTO W/O SCOPE: CPT | Mod: QW,S$GLB,, | Performed by: STUDENT IN AN ORGANIZED HEALTH CARE EDUCATION/TRAINING PROGRAM

## 2025-05-27 PROCEDURE — 3288F FALL RISK ASSESSMENT DOCD: CPT | Mod: CPTII,S$GLB,, | Performed by: STUDENT IN AN ORGANIZED HEALTH CARE EDUCATION/TRAINING PROGRAM

## 2025-05-27 PROCEDURE — 51798 US URINE CAPACITY MEASURE: CPT | Mod: S$GLB,,, | Performed by: STUDENT IN AN ORGANIZED HEALTH CARE EDUCATION/TRAINING PROGRAM

## 2025-05-27 PROCEDURE — 99214 OFFICE O/P EST MOD 30 MIN: CPT | Mod: S$GLB,,, | Performed by: STUDENT IN AN ORGANIZED HEALTH CARE EDUCATION/TRAINING PROGRAM

## 2025-05-27 PROCEDURE — 1101F PT FALLS ASSESS-DOCD LE1/YR: CPT | Mod: CPTII,S$GLB,, | Performed by: STUDENT IN AN ORGANIZED HEALTH CARE EDUCATION/TRAINING PROGRAM

## 2025-05-27 NOTE — PROGRESS NOTES
"[place] - Urology   Clinic Note    Subjective:     Chief Complaint: prostate cancer      History of Present Illness:  Yasir Jeffery is a 74 y.o. male who presents to clinic for evaluation and management of prostate cancer. He is established to our clinic    He underwent robotic radical prostatectomy 6/20/24.  He had a prior TURP. Final pathology showed:  Grade Group 2, pT3a, pN0, M0, R1. Bladder neck invasion and positive excisional bladder neck margin  PSA pre-op was 20.7  PSA post-op: 0.02, 0.01, most recently 0.03 2/25  Decipher Score was 0.75 - High Risk    He had urinary retention post-op and failed multiple VTs. He was diagnosed with vesicourethral anastomotic stenosis and underwent dilation in the OR 12/11/24.     URINARY SYMPTOMS:  He reports persistent weak urinary stream requiring straining to initiate and maintain urine flow. He straining. He has not had much improvement in symptoms following recent dilation procedure.    IPSS 17/4    LABORATORY RESULTS:  PSA has increased from 0.03 to 0.05         Past medical, family, surgical and social history reviewed as documented in chart with pertinent positive medical, family, surgical and social history detailed in HPI.    A review systems was conducted with pertinent positive and negative findings documented in HPI.    Objective:     Estimated body mass index is 32.14 kg/m² as calculated from the following:    Height as of this encounter: 5' 10" (1.778 m).    Weight as of this encounter: 101.6 kg (223 lb 15.8 oz).    Vital Signs (Most Recent)       Physical Exam  Constitutional:       General: He is not in acute distress.     Appearance: He is not ill-appearing or toxic-appearing.   Pulmonary:      Effort: Pulmonary effort is normal. No accessory muscle usage or respiratory distress.   Neurological:      Mental Status: He is alert.         Labs reviewed below:  Lab Results   Component Value Date    BUN 25 (H) 12/04/2024    CREATININE 1.4 12/04/2024    WBC " 6.36 12/04/2024    HGB 14.8 12/04/2024    HCT 46.4 12/04/2024     12/04/2024    AST 19 12/04/2024    ALT 12 12/04/2024    ALKPHOS 66 12/04/2024    ALBUMIN 3.8 12/04/2024    HGBA1C 5.2 08/11/2023        PSA trend reviewed below:  Lab Results   Component Value Date    PSA 0.05 05/23/2025    PSADIAG 0.03 02/04/2025    PSADIAG 0.01 11/05/2024    PSADIAG 0.02 08/02/2024    PSADIAG 20.7 (H) 03/12/2024    PSADIAG 13.3 (H) 03/21/2023    PSADIAG 12.3 (H) 09/08/2022    PSADIAG 9.3 (H) 03/08/2022    PSADIAG 10.9 (H) 01/25/2022    PSADIAG 12.5 (H) 10/18/2021    PSADIAG 9.3 (H) 06/03/2021    PSAFREE 0.78 07/13/2017    PSAFREEPCT 24.38 07/13/2017               Urine dipstick today showed no blood, small leukocyte esterase, and positive nitrite.     Assessment:     1. Prostate cancer    2. Elevated PSA      Plan:     Reviewed PSA trend and pathology as well as the decipher score.  We discussed the indications for salvage therapy including ADT and radiation  Continue PSA monitoring for now.  Discussed further evaluation and management of the vesicourethral anastomotic stenosis.  We discussed further cystoscopy.  We discussed Optilume urethral dilation if there is a recurrence.  We discussed the lower risk of recurrence using Optilume.    We will plan PSA in 4 months  Consider cystoscopy if LUTS are still bothersome    The visit today included increased complexity associated with the care of the episodic problem addressed above as well as managing the longitudinal care of the patient due to the serious and/or complex managed problem(s).      Bruce Dimas MD

## 2025-06-04 ENCOUNTER — DOCUMENTATION ONLY (OUTPATIENT)
Dept: HEMATOLOGY/ONCOLOGY | Facility: CLINIC | Age: 75
End: 2025-06-04
Payer: MEDICARE

## 2025-06-04 DIAGNOSIS — I10 ESSENTIAL HYPERTENSION: ICD-10-CM

## 2025-06-05 RX ORDER — VALSARTAN 320 MG/1
320 TABLET ORAL
Qty: 90 TABLET | Refills: 0 | Status: SHIPPED | OUTPATIENT
Start: 2025-06-05

## 2025-06-05 RX ORDER — AMLODIPINE BESYLATE 5 MG/1
5 TABLET ORAL
Qty: 90 TABLET | Refills: 0 | Status: SHIPPED | OUTPATIENT
Start: 2025-06-05

## 2025-06-05 RX ORDER — CARVEDILOL 6.25 MG/1
6.25 TABLET ORAL 2 TIMES DAILY WITH MEALS
Qty: 180 TABLET | Refills: 0 | Status: SHIPPED | OUTPATIENT
Start: 2025-06-05

## 2025-08-06 DIAGNOSIS — I10 ESSENTIAL HYPERTENSION: ICD-10-CM

## 2025-08-07 RX ORDER — AMLODIPINE BESYLATE 5 MG/1
5 TABLET ORAL
Qty: 90 TABLET | Refills: 0 | Status: SHIPPED | OUTPATIENT
Start: 2025-08-07

## 2025-08-07 RX ORDER — VALSARTAN 320 MG/1
320 TABLET ORAL
Qty: 90 TABLET | Refills: 0 | Status: SHIPPED | OUTPATIENT
Start: 2025-08-07

## 2025-08-07 RX ORDER — CARVEDILOL 6.25 MG/1
6.25 TABLET ORAL 2 TIMES DAILY WITH MEALS
Qty: 180 TABLET | Refills: 0 | Status: SHIPPED | OUTPATIENT
Start: 2025-08-07

## 2025-08-07 NOTE — TELEPHONE ENCOUNTER
Refill Routing Note   Medication(s) are not appropriate for processing by [facility] for the following reason(s):        Non-participating provider    [facility] action(s):  Route               Appointments  past 12m or future 3m with PCP    Date Provider   Last Visit   6/5/2024 Socorro Sanchez, NP   Next Visit   10/7/2025 Socorro Sanchez, NP   ED visits in past 90 days: 0        Note composed:9:38 AM 08/07/2025

## (undated) DEVICE — PACK CYSTO

## (undated) DEVICE — GLOVE BIOGEL ECLIPSE SZ 7.5

## (undated) DEVICE — SET TUR BLADDER IRRIG Y TYPE

## (undated) DEVICE — SEE MEDLINE ITEM 152622

## (undated) DEVICE — SEE MEDLINE ITEM 152487

## (undated) DEVICE — SEE MEDLINE ITEM 157128

## (undated) DEVICE — COVER LIGHT HANDLE 80/CA

## (undated) DEVICE — SEE MEDLINE ITEM 154981

## (undated) DEVICE — BAG URO DRAIN

## (undated) DEVICE — SOL IRR NACL .9% 3000ML

## (undated) DEVICE — NEPTUNE 4 PORT MANIFOLD

## (undated) DEVICE — GOWN SURGICAL X-LARGE

## (undated) DEVICE — SPONGE GAUZE 16PLY 4X4

## (undated) DEVICE — CONTAINER SPECIMEN STRL 4OZ

## (undated) DEVICE — SEE MEDLINE ITEM 146313